# Patient Record
Sex: MALE | Race: WHITE | NOT HISPANIC OR LATINO | Employment: UNEMPLOYED | URBAN - NONMETROPOLITAN AREA
[De-identification: names, ages, dates, MRNs, and addresses within clinical notes are randomized per-mention and may not be internally consistent; named-entity substitution may affect disease eponyms.]

---

## 2017-04-07 ENCOUNTER — HOSPITAL ENCOUNTER (EMERGENCY)
Facility: HOSPITAL | Age: 42
Discharge: HOME | End: 2017-04-07
Admitting: EMERGENCY MEDICINE

## 2017-04-07 ENCOUNTER — HOSPITAL ENCOUNTER (EMERGENCY)
Facility: HOSPITAL | Age: 42
Discharge: HOME | End: 2017-04-07
Attending: EMERGENCY MEDICINE | Admitting: EMERGENCY MEDICINE

## 2017-04-07 VITALS
TEMPERATURE: 98.2 F | RESPIRATION RATE: 18 BRPM | HEIGHT: 71 IN | OXYGEN SATURATION: 99 % | WEIGHT: 200 LBS | SYSTOLIC BLOOD PRESSURE: 120 MMHG | BODY MASS INDEX: 28 KG/M2 | HEART RATE: 117 BPM | DIASTOLIC BLOOD PRESSURE: 78 MMHG

## 2017-04-07 VITALS
OXYGEN SATURATION: 98 % | TEMPERATURE: 98.2 F | HEART RATE: 127 BPM | WEIGHT: 200 LBS | SYSTOLIC BLOOD PRESSURE: 132 MMHG | HEIGHT: 71 IN | BODY MASS INDEX: 28 KG/M2 | RESPIRATION RATE: 18 BRPM | DIASTOLIC BLOOD PRESSURE: 75 MMHG

## 2017-04-07 DIAGNOSIS — L89.90 DECUBITUS SKIN ULCER, UNSPECIFIED PRESSURE ULCER STAGE: Primary | ICD-10-CM

## 2017-04-07 DIAGNOSIS — F15.10 METHAMPHETAMINE ABUSE (HCC): ICD-10-CM

## 2017-04-07 DIAGNOSIS — L03.311 ABDOMINAL WALL CELLULITIS: Primary | ICD-10-CM

## 2017-04-07 LAB
ALBUMIN SERPL-MCNC: 4.7 G/DL (ref 3.5–5)
ALBUMIN/GLOB SERPL: 1.4 G/DL (ref 1.5–2.5)
ALP SERPL-CCNC: 138 U/L (ref 40–129)
ALT SERPL W P-5'-P-CCNC: 24 U/L (ref 10–44)
ANION GAP SERPL CALCULATED.3IONS-SCNC: 7.6 MMOL/L (ref 3.6–11.2)
ANISOCYTOSIS BLD QL: NORMAL
AST SERPL-CCNC: 27 U/L (ref 10–34)
BACTERIA UR QL AUTO: ABNORMAL /HPF
BASOPHILS # BLD AUTO: 0.03 10*3/MM3 (ref 0–0.3)
BASOPHILS NFR BLD AUTO: 0.3 % (ref 0–2)
BILIRUB SERPL-MCNC: 0.4 MG/DL (ref 0.2–1.8)
BILIRUB UR QL STRIP: ABNORMAL
BUN BLD-MCNC: 10 MG/DL (ref 7–21)
BUN/CREAT SERPL: 16.4 (ref 7–25)
CALCIUM SPEC-SCNC: 9.5 MG/DL (ref 7.7–10)
CHLORIDE SERPL-SCNC: 104 MMOL/L (ref 99–112)
CLARITY UR: ABNORMAL
CO2 SERPL-SCNC: 27.4 MMOL/L (ref 24.3–31.9)
COD CRY URNS QL: ABNORMAL /HPF
COLOR UR: ABNORMAL
CREAT BLD-MCNC: 0.61 MG/DL (ref 0.43–1.29)
CRP SERPL-MCNC: 10.44 MG/DL (ref 0–0.99)
DEPRECATED RDW RBC AUTO: 51.8 FL (ref 37–54)
EOSINOPHIL # BLD AUTO: 0.34 10*3/MM3 (ref 0–0.7)
EOSINOPHIL NFR BLD AUTO: 3.5 % (ref 0–5)
ERYTHROCYTE [DISTWIDTH] IN BLOOD BY AUTOMATED COUNT: 20.8 % (ref 11.5–14.5)
ERYTHROCYTE [SEDIMENTATION RATE] IN BLOOD: 34 MM/HR (ref 0–15)
GFR SERPL CREATININE-BSD FRML MDRD: 146 ML/MIN/1.73
GLOBULIN UR ELPH-MCNC: 3.4 GM/DL
GLUCOSE BLD-MCNC: 105 MG/DL (ref 70–110)
GLUCOSE UR STRIP-MCNC: NEGATIVE MG/DL
HCT VFR BLD AUTO: 37.1 % (ref 42–52)
HGB BLD-MCNC: 11.4 G/DL (ref 14–18)
HGB UR QL STRIP.AUTO: ABNORMAL
HYALINE CASTS UR QL AUTO: ABNORMAL /LPF
HYPOCHROMIA BLD QL: NORMAL
IMM GRANULOCYTES # BLD: 0.03 10*3/MM3 (ref 0–0.03)
IMM GRANULOCYTES NFR BLD: 0.3 % (ref 0–0.5)
KETONES UR QL STRIP: ABNORMAL
LEUKOCYTE ESTERASE UR QL STRIP.AUTO: ABNORMAL
LYMPHOCYTES # BLD AUTO: 1.6 10*3/MM3 (ref 1–3)
LYMPHOCYTES NFR BLD AUTO: 16.5 % (ref 21–51)
MCH RBC QN AUTO: 22.4 PG (ref 27–33)
MCHC RBC AUTO-ENTMCNC: 30.7 G/DL (ref 33–37)
MCV RBC AUTO: 73 FL (ref 80–94)
MICROCYTES BLD QL: NORMAL
MONOCYTES # BLD AUTO: 1.01 10*3/MM3 (ref 0.1–0.9)
MONOCYTES NFR BLD AUTO: 10.4 % (ref 0–10)
NEUTROPHILS # BLD AUTO: 6.68 10*3/MM3 (ref 1.4–6.5)
NEUTROPHILS NFR BLD AUTO: 69 % (ref 30–70)
NITRITE UR QL STRIP: POSITIVE
OSMOLALITY SERPL CALC.SUM OF ELEC: 276.9 MOSM/KG (ref 273–305)
PH UR STRIP.AUTO: 5.5 [PH] (ref 5–8)
PLAT MORPH BLD: NORMAL
PLATELET # BLD AUTO: 371 10*3/MM3 (ref 130–400)
PMV BLD AUTO: 9.5 FL (ref 6–10)
POIKILOCYTOSIS BLD QL SMEAR: NORMAL
POTASSIUM BLD-SCNC: 3.9 MMOL/L (ref 3.5–5.3)
PROT SERPL-MCNC: 8.1 G/DL (ref 6–8)
PROT UR QL STRIP: ABNORMAL
RBC # BLD AUTO: 5.08 10*6/MM3 (ref 4.7–6.1)
RBC # UR: ABNORMAL /HPF
REF LAB TEST METHOD: ABNORMAL
SODIUM BLD-SCNC: 139 MMOL/L (ref 135–153)
SP GR UR STRIP: 1.03 (ref 1–1.03)
SQUAMOUS #/AREA URNS HPF: ABNORMAL /HPF
UROBILINOGEN UR QL STRIP: ABNORMAL
WBC NRBC COR # BLD: 9.69 10*3/MM3 (ref 4.5–12.5)
WBC UR QL AUTO: ABNORMAL /HPF

## 2017-04-07 PROCEDURE — 99284 EMERGENCY DEPT VISIT MOD MDM: CPT

## 2017-04-07 PROCEDURE — 85025 COMPLETE CBC W/AUTO DIFF WBC: CPT | Performed by: PHYSICIAN ASSISTANT

## 2017-04-07 PROCEDURE — 96361 HYDRATE IV INFUSION ADD-ON: CPT

## 2017-04-07 PROCEDURE — 87086 URINE CULTURE/COLONY COUNT: CPT | Performed by: EMERGENCY MEDICINE

## 2017-04-07 PROCEDURE — 85652 RBC SED RATE AUTOMATED: CPT | Performed by: PHYSICIAN ASSISTANT

## 2017-04-07 PROCEDURE — 81001 URINALYSIS AUTO W/SCOPE: CPT | Performed by: EMERGENCY MEDICINE

## 2017-04-07 PROCEDURE — 87077 CULTURE AEROBIC IDENTIFY: CPT | Performed by: EMERGENCY MEDICINE

## 2017-04-07 PROCEDURE — 86140 C-REACTIVE PROTEIN: CPT | Performed by: PHYSICIAN ASSISTANT

## 2017-04-07 PROCEDURE — 96365 THER/PROPH/DIAG IV INF INIT: CPT

## 2017-04-07 PROCEDURE — 36415 COLL VENOUS BLD VENIPUNCTURE: CPT

## 2017-04-07 PROCEDURE — 25010000002 VANCOMYCIN PER 500 MG: Performed by: PHYSICIAN ASSISTANT

## 2017-04-07 PROCEDURE — 87186 SC STD MICRODIL/AGAR DIL: CPT | Performed by: EMERGENCY MEDICINE

## 2017-04-07 PROCEDURE — 99283 EMERGENCY DEPT VISIT LOW MDM: CPT

## 2017-04-07 PROCEDURE — 85007 BL SMEAR W/DIFF WBC COUNT: CPT | Performed by: PHYSICIAN ASSISTANT

## 2017-04-07 PROCEDURE — 80053 COMPREHEN METABOLIC PANEL: CPT | Performed by: PHYSICIAN ASSISTANT

## 2017-04-07 PROCEDURE — 87040 BLOOD CULTURE FOR BACTERIA: CPT | Performed by: PHYSICIAN ASSISTANT

## 2017-04-07 RX ORDER — MAGNESIUM HYDROXIDE 1200 MG/15ML
1000 LIQUID ORAL ONCE
Status: COMPLETED | OUTPATIENT
Start: 2017-04-07 | End: 2017-04-07

## 2017-04-07 RX ORDER — MAGNESIUM HYDROXIDE 1200 MG/15ML
LIQUID ORAL
Status: COMPLETED
Start: 2017-04-07 | End: 2017-04-07

## 2017-04-07 RX ORDER — SODIUM CHLORIDE 9 MG/ML
INJECTION, SOLUTION INTRAVENOUS
Status: COMPLETED
Start: 2017-04-07 | End: 2017-04-07

## 2017-04-07 RX ORDER — ALPRAZOLAM 0.5 MG/1
0.5 TABLET ORAL 3 TIMES DAILY
COMMUNITY
End: 2017-04-10 | Stop reason: SDUPTHER

## 2017-04-07 RX ORDER — MECLIZINE HCL 12.5 MG/1
25 TABLET ORAL ONCE
Status: DISCONTINUED | OUTPATIENT
Start: 2017-04-07 | End: 2017-04-07

## 2017-04-07 RX ORDER — SODIUM CHLORIDE 0.9 % (FLUSH) 0.9 %
10 SYRINGE (ML) INJECTION AS NEEDED
Status: DISCONTINUED | OUTPATIENT
Start: 2017-04-07 | End: 2017-04-07 | Stop reason: HOSPADM

## 2017-04-07 RX ORDER — SULFAMETHOXAZOLE AND TRIMETHOPRIM 800; 160 MG/1; MG/1
2 TABLET ORAL 2 TIMES DAILY
Qty: 40 TABLET | Refills: 0 | Status: SHIPPED | OUTPATIENT
Start: 2017-04-07 | End: 2017-04-10

## 2017-04-07 RX ADMIN — VANCOMYCIN HYDROCHLORIDE 1750 MG: 5 INJECTION, POWDER, LYOPHILIZED, FOR SOLUTION INTRAVENOUS at 06:26

## 2017-04-07 RX ADMIN — SODIUM CHLORIDE 1000 ML: 9 INJECTION, SOLUTION INTRAVENOUS at 06:21

## 2017-04-07 RX ADMIN — MAGNESIUM HYDROXIDE 1000 ML: 1200 LIQUID ORAL at 13:31

## 2017-04-07 RX ADMIN — SODIUM CHLORIDE 1000 ML: 900 IRRIGANT IRRIGATION at 13:31

## 2017-04-07 NOTE — PROGRESS NOTES
Case Management/Social Work    Patient Name:  Alex Tierney  YOB: 1975  MRN: [id number]  Admit Date:  4/7/2017    SS received call this AM from RN, Drea who states pt has multiple wounds and is not able to care for himself. SS spoke with pt on this date who states he was discharged from [facility] in [place] this past Monday, 4/3/17. Pt was unable to inform SS of where he has been staying. Pt denies wanting to live with his mother. SS offered pt homeless shelter options and pt denied. Pt states he is going to live with his father in [facility] on this date. Pt has an appointment with PCP on Monday, 4/10/17 and pt states he will request home health to be arranged at that time. Pt's father plans to provide transportation at discharge. No other needs identified.     Electronically signed by:  Tasha Bernal  04/07/17 4:03 PM

## 2017-04-07 NOTE — ED NOTES
Waiting for dad to come and .  Replaced dressings and re secured.  Feeling better. .       Drea Andres RN  04/07/17 7336

## 2017-04-07 NOTE — ED PROVIDER NOTES
Subjective   History of Present Illness  See Rubina Ghosh's chart for H&P  Review of Systems    Past Medical History:   Diagnosis Date   • Allergic rhinitis    • Depression    • Iron deficiency    • T6 spinal cord injury        No Known Allergies    Past Surgical History:   Procedure Laterality Date   • AMPUTATION      left foot   • VASECTOMY      RECORDED 11.23.15       Family History   Problem Relation Age of Onset   • Cancer Maternal Aunt    • Cancer Maternal Grandmother    • Anxiety disorder Other    • Hypertension Other        Social History     Social History   • Marital status: Single     Spouse name: N/A   • Number of children: N/A   • Years of education: N/A     Social History Main Topics   • Smoking status: Current Every Day Smoker     Packs/day: 1.00     Types: Cigarettes   • Smokeless tobacco: None   • Alcohol use No   • Drug use: No   • Sexual activity: Not Asked     Other Topics Concern   • None     Social History Narrative           Objective   Physical Exam    Procedures         ED Course  ED Course   Comment By Time   Endorsed to RITA Aleman PA 04/07 3318   Pt's ED stay has been uneventful.  He is currently sitting in his wheelchair, eating breakfast.  Will discharge home on Bactrim and Bactroban ointment.  Pt to follow up with his PCP next week. RITA Jarrell 04/07 0087                  MDM  Number of Diagnoses or Management Options  Abdominal wall cellulitis:      Amount and/or Complexity of Data Reviewed  Clinical lab tests: reviewed  Decide to obtain previous medical records or to obtain history from someone other than the patient: yes    Patient Progress  Patient progress: stable      Final diagnoses:   Abdominal wall cellulitis            RITA Jarrell  04/07/17 5554

## 2017-04-07 NOTE — ED NOTES
Called in house  ayad that pt was ready t be seen.  Left message.     Drea Andres RN  04/07/17 1811
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
## 2017-04-07 NOTE — DISCHARGE INSTRUCTIONS

## 2017-04-07 NOTE — ED NOTES
Department of [facility] (Meaghan Delong)called and talked with staff and states she had talked with pt in Feb of this year. Staff told her pt was still here so she also talked with pt. Pt stated she  told him she was trying to help find him a place maybe an apartment or something like that  where he could stay and maybe take care of himself.       Drea Andres RN  04/07/17 6070

## 2017-04-07 NOTE — ED NOTES
Pt sitting in his wheelchair bedside, offered to place pt in the bed, pt declined at this time, call light within reach, pt encouraged to call with any needs.     Ayse Hancock RN  04/07/17 0757

## 2017-04-07 NOTE — ED NOTES
Pt in room giving self a bed bath with assit per staff and also stating that he had talked with sister and their dad was suppose to be coming up to get later.       Drea Andres RN  04/07/17 6080
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
## 2017-04-07 NOTE — ED NOTES
Called central intake and talked with Precious Sherman concerning pt situation.  Will report  to her supervisor. Also states that pt sounded like he was qualified to get help from their services.     Drea Andres RN  04/07/17 1422       Drea Andres RN  04/07/17 1421

## 2017-04-07 NOTE — ED NOTES
Cleaned and dried all sore areas and put wet to dry dressing in place. Packing used where needed.  All areas covered with 4x4;s abdo pads and taped.        Drea Andres RN  04/07/17 5590

## 2017-04-08 NOTE — ED NOTES
Sister here and ready to go.  Discharge instructions given and also advised to do what [facility] told him and also house  when they talked with him.  Advised that follow up with his primary doctor was very important to get something done with follow up care for pressure ulcers.     Drea Andres RN  04/07/17 1237

## 2017-04-08 NOTE — ED NOTES
Continues to want for ride.  States now sister will be coming to get.  States when talked with dad he was hateful and that's how he always is, so sister will be picking up.     Drea Andres RN  04/07/17 9548

## 2017-04-08 NOTE — ED NOTES
Talked to lead Marilu Mario rn concerning pt and doing social consult.     Drea Andres, LOBITO  04/07/17 1653

## 2017-04-09 LAB — BACTERIA SPEC AEROBE CULT: ABNORMAL

## 2017-04-10 ENCOUNTER — OFFICE VISIT (OUTPATIENT)
Dept: FAMILY MEDICINE CLINIC | Facility: CLINIC | Age: 42
End: 2017-04-10

## 2017-04-10 VITALS
RESPIRATION RATE: 12 BRPM | OXYGEN SATURATION: 96 % | DIASTOLIC BLOOD PRESSURE: 65 MMHG | TEMPERATURE: 98.2 F | BODY MASS INDEX: 28 KG/M2 | SYSTOLIC BLOOD PRESSURE: 100 MMHG | HEART RATE: 95 BPM | WEIGHT: 200 LBS | HEIGHT: 71 IN

## 2017-04-10 DIAGNOSIS — M86.69: ICD-10-CM

## 2017-04-10 DIAGNOSIS — F17.200 SMOKER: ICD-10-CM

## 2017-04-10 DIAGNOSIS — F32.89 OTHER DEPRESSION: ICD-10-CM

## 2017-04-10 DIAGNOSIS — J30.9 CHRONIC ALLERGIC RHINITIS: Primary | ICD-10-CM

## 2017-04-10 DIAGNOSIS — S24.102S T6 SPINAL CORD INJURY, SEQUELA (HCC): ICD-10-CM

## 2017-04-10 DIAGNOSIS — Z89.612 HISTORY OF LEFT LOWER EXTREMITY AMPUTATION (HCC): ICD-10-CM

## 2017-04-10 PROCEDURE — 99214 OFFICE O/P EST MOD 30 MIN: CPT | Performed by: GENERAL PRACTICE

## 2017-04-10 RX ORDER — SENNA PLUS 8.6 MG/1
1 TABLET ORAL 2 TIMES DAILY
Qty: 60 TABLET | Refills: 5 | Status: SHIPPED | OUTPATIENT
Start: 2017-04-10 | End: 2017-11-06 | Stop reason: SDUPTHER

## 2017-04-10 RX ORDER — CETIRIZINE HYDROCHLORIDE 10 MG/1
10 TABLET ORAL NIGHTLY
Qty: 30 TABLET | Refills: 5 | Status: SHIPPED | OUTPATIENT
Start: 2017-04-10 | End: 2017-11-06 | Stop reason: SDUPTHER

## 2017-04-10 RX ORDER — FLUTICASONE PROPIONATE 50 MCG
2 SPRAY, SUSPENSION (ML) NASAL DAILY
Qty: 1 EACH | Refills: 5 | Status: ON HOLD | OUTPATIENT
Start: 2017-04-10 | End: 2017-06-02

## 2017-04-10 RX ORDER — GABAPENTIN 800 MG/1
800 TABLET ORAL 3 TIMES DAILY
Qty: 90 TABLET | Refills: 2 | Status: SHIPPED | OUTPATIENT
Start: 2017-04-10 | End: 2017-06-20 | Stop reason: SDUPTHER

## 2017-04-10 RX ORDER — ALPRAZOLAM 0.5 MG/1
0.5 TABLET ORAL 2 TIMES DAILY
Qty: 60 TABLET | Refills: 0 | Status: SHIPPED | OUTPATIENT
Start: 2017-04-10 | End: 2017-05-15 | Stop reason: SDUPTHER

## 2017-04-10 RX ORDER — FOLIC ACID 1 MG/1
1 TABLET ORAL DAILY
Qty: 30 TABLET | Refills: 5 | Status: SHIPPED | OUTPATIENT
Start: 2017-04-10 | End: 2017-11-06 | Stop reason: SDUPTHER

## 2017-04-10 RX ORDER — BACLOFEN 10 MG/1
20 TABLET ORAL 3 TIMES DAILY
Qty: 180 TABLET | Refills: 5 | Status: SHIPPED | OUTPATIENT
Start: 2017-04-10 | End: 2017-11-06 | Stop reason: SDUPTHER

## 2017-04-10 RX ORDER — AMITRIPTYLINE HYDROCHLORIDE 25 MG/1
25 TABLET, FILM COATED ORAL NIGHTLY
Qty: 30 TABLET | Refills: 5 | Status: SHIPPED | OUTPATIENT
Start: 2017-04-10 | End: 2017-11-06 | Stop reason: SDUPTHER

## 2017-04-10 NOTE — PROGRESS NOTES
Subjective   Alex Tierney is a 41 y.o. male.     History of Present Illness     Osteomyelitis  Discharged from [facility] on 4/3/17 following a several week admission for sepsis associated with an infected stage IV sacral ulcer.  Bone scan revealed multiple foci of osteomyelitis.  He has a chronic indwelling Sheldon catheter and underwent a colostomy last year.  He also underwent a left BKA.  He has been resistant to a nursing home/rehabilitation setting and is currently living in [place] with the help of his father.  He requires home health assistance and would like referrals to a local orthopedist and urologist.    Depression  At present his mood is good and he denies any loss of interest in activities, difficulty concentrating, or any problem with his appetite or sleep.  He has been taking alprazolam 0.5 twice a day for intermittent anxiety as well as amitriptyline 25 daily at bedtime.  He denies any suicidal ideation.    Allergic Rhinitis  Patient has a history of allergic rhinitis. Patient's symptoms include sneezing, clear rhinorrhea, nasal congestion, periorbital pressure and postnasal drip. These symptoms are perennial with seasonal exacerbation. The patient has been suffering from these symptoms for a number of years . The patient has tried prescription antihistamine - cetirizine and prescription nasal corticosteroid - fluticasone with good relief of symptoms.     The following portions of the patient's history were reviewed and updated as appropriate: allergies, current medications, past family history, past medical history, past social history, past surgical history and problem list.    Review of Systems   Constitutional: Positive for fatigue. Negative for appetite change, chills, fever and unexpected weight change.   HENT: Positive for congestion and rhinorrhea. Negative for ear pain, sneezing, sore throat and voice change.    Eyes: Negative for visual disturbance.   Respiratory: Negative for cough, shortness of  breath and wheezing.    Cardiovascular: Negative for chest pain, palpitations and leg swelling.   Gastrointestinal: Negative for abdominal pain, blood in stool, constipation, diarrhea, nausea and vomiting.   Endocrine: Negative for polydipsia and polyuria.   Genitourinary: Negative for difficulty urinating, dysuria, frequency, hematuria and urgency.   Musculoskeletal: Positive for arthralgias and back pain. Negative for joint swelling, myalgias and neck pain.   Skin: Positive for wound (sacrum). Negative for color change.   Neurological: Positive for weakness and numbness. Negative for tremors and headaches.   Psychiatric/Behavioral: Negative for dysphoric mood, sleep disturbance and suicidal ideas. The patient is nervous/anxious.      Objective   Physical Exam   Constitutional: He is oriented to person, place, and time. He appears well-developed and well-nourished. He is cooperative. He does not have a sickly appearance. No distress.   Sitting in a wheelchair.  Status post left BKA. Bright and in fair spirits. No apparent distress at rest. No pallor, jaundice, diaphoresis, or cyanosis   HENT:   Head: Atraumatic.   Right Ear: Tympanic membrane, external ear and ear canal normal.   Left Ear: Tympanic membrane, external ear and ear canal normal.   Mouth/Throat: Oropharynx is clear and moist.   Eyes: EOM are normal. Pupils are equal, round, and reactive to light. No scleral icterus.   Neck: No JVD present. Carotid bruit is not present. No tracheal deviation present. No thyromegaly present.   Cardiovascular: Normal rate, regular rhythm, S1 normal, S2 normal, normal heart sounds and intact distal pulses.  Exam reveals no gallop.    No murmur heard.  Pulmonary/Chest: Breath sounds normal. He has no wheezes. He has no rales.   Abdominal: Soft. Normal aorta and bowel sounds are normal. He exhibits no abdominal bruit and no mass. There is no hepatosplenomegaly. There is no tenderness. No hernia.   Musculoskeletal: He  exhibits no tenderness or deformity.   Lymphadenopathy:        Head (right side): No submandibular adenopathy present.        Head (left side): No submandibular adenopathy present.     He has no cervical adenopathy.   Neurological: He is alert and oriented to person, place, and time. No cranial nerve deficit.   Spastic paraplegia   Skin: Skin is warm and dry. No rash noted. He is not diaphoretic. No pallor. Nails show no clubbing.   Psychiatric: He has a normal mood and affect. His speech is normal and behavior is normal. Thought content normal.     Assessment/Plan   Problems Addressed this Visit        Respiratory    Chronic allergic rhinitis   Continue current treatment. Reminded regarding allergen avoidance.    Relevant Medications    fluticasone (FLONASE) 50 MCG/ACT nasal spray    cetirizine (zyrTEC) 10 MG tablet       Nervous and Auditory    T6 spinal cord injury  Complicated by recurrent decubitus ulcers and osteomyelitis requiring a left BKA, permanent colostomy, and chronic indwelling Sheldon catheter.  Will arrange home health for nursing assistance and consults both orthopedic surgery and urology.    Relevant Medications    senna (SENNA-TIME) 8.6 MG tablet    gabapentin (NEURONTIN) 800 MG tablet    folic acid (FOLVITE) 1 MG tablet    baclofen (LIORESAL) 10 MG tablet    amitriptyline (ELAVIL) 25 MG tablet    ALPRAZolam (XANAX) 0.5 MG tablet    Other Relevant Orders    Ambulatory Referral to Orthopedic Surgery    Ambulatory Referral to Urology       Musculoskeletal and Integument    Chronic osteomyelitis of multiple sites       Other    Depression  Significant situational component. Supportive therapy.  We will likely begin an alprazolam taper and once 2 months     Relevant Medications    amitriptyline (ELAVIL) 25 MG tablet    ALPRAZolam (XANAX) 0.5 MG tablet    Smoker    History of left lower extremity amputation

## 2017-04-12 ENCOUNTER — DOCUMENTATION (OUTPATIENT)
Dept: FAMILY MEDICINE CLINIC | Facility: CLINIC | Age: 42
End: 2017-04-12

## 2017-04-12 LAB
BACTERIA SPEC AEROBE CULT: NORMAL
BACTERIA SPEC AEROBE CULT: NORMAL

## 2017-04-12 NOTE — PROGRESS NOTES
I faxed a RX for wound care to [facility]. I also submitted via Release a Last Face to face of patient's visit to [facility] for patient to receive wound care from them.

## 2017-04-13 ENCOUNTER — TELEPHONE (OUTPATIENT)
Dept: FAMILY MEDICINE CLINIC | Facility: CLINIC | Age: 42
End: 2017-04-13

## 2017-04-25 ENCOUNTER — OFFICE VISIT (OUTPATIENT)
Dept: UROLOGY | Facility: CLINIC | Age: 42
End: 2017-04-25

## 2017-04-25 DIAGNOSIS — N31.9 NEUROGENIC BLADDER: Primary | ICD-10-CM

## 2017-04-25 PROCEDURE — 99214 OFFICE O/P EST MOD 30 MIN: CPT | Performed by: UROLOGY

## 2017-04-25 RX ORDER — SOLIFENACIN SUCCINATE 5 MG/1
10 TABLET, FILM COATED ORAL DAILY
Qty: 30 TABLET | Refills: 11 | Status: ON HOLD | OUTPATIENT
Start: 2017-04-25 | End: 2017-06-02
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
## 2017-05-01 NOTE — NUR
ADMISSION SKIN ASSESSMENT CONTINUED:
 
LEFT POSTERIOR THIGH #1, UPPER, STAGE 1  4.5 X 6.0 CM REDDENED AREA. BLANCHES,
BUT SLOWLY.
 
LEFT POSTERIOR THIGH #2, MIDDLE, STAGE 2, 3.5 X 3.5 CM CIRCULAR, RED WOUND BED
WITH WHITE EDGES WITH SURROUNDING REDNESS. NO DRAINAGE NOTED.
 
LEFT POSTERIOR THIGH #3, LOWER, 3CM X 1CM DRY SCABBED HEALING AREA WITH
SCARRING SUROUNDING.
 
RT HEEL, 4 DRIED SCABBED AREAS WITH APPEARANCE OF HEALING.
 
RT OUTER FOOT DRIED SCABBED AREA, APPEARANCE OF HEALING.
 
RT ANKLE OUTER, DRY HEALED SCARRED AREA.
 
RT FOOT EDEMATOUS, COOL TO TOUCH PULSE THREADY
 
NOTED MULTIPLE SCATTERED SCARS AND SCABBED AREAS ON BILATERAL LOWER
EXTREMITIES, CHEST, ABD BACK AND ARMS. PT STATES FROM "SORES THAT JUST POP UP
AND THEN GO AWAY"

## 2017-05-15 DIAGNOSIS — S24.102S T6 SPINAL CORD INJURY, SEQUELA (HCC): ICD-10-CM

## 2017-05-15 RX ORDER — ALPRAZOLAM 0.5 MG/1
TABLET ORAL
Qty: 45 TABLET | Refills: 0 | Status: ON HOLD | OUTPATIENT
Start: 2017-05-15 | End: 2017-06-02

## 2017-06-01 ENCOUNTER — HOSPITAL ENCOUNTER (INPATIENT)
Facility: HOSPITAL | Age: 42
LOS: 15 days | Discharge: HOME HEALTH | End: 2017-06-16
Attending: FAMILY MEDICINE | Admitting: FAMILY MEDICINE

## 2017-06-01 DIAGNOSIS — S24.102S T6 SPINAL CORD INJURY, SEQUELA (HCC): Primary | ICD-10-CM

## 2017-06-01 PROBLEM — R53.81 DEBILITY: Status: ACTIVE | Noted: 2017-06-01

## 2017-06-01 LAB
BILIRUB UR QL STRIP: NEGATIVE
CLARITY UR: ABNORMAL
COLOR UR: YELLOW
GLUCOSE UR STRIP-MCNC: NEGATIVE MG/DL
HGB UR QL STRIP.AUTO: NEGATIVE
KETONES UR QL STRIP: NEGATIVE
LEUKOCYTE ESTERASE UR QL STRIP.AUTO: NEGATIVE
NITRITE UR QL STRIP: NEGATIVE
PH UR STRIP.AUTO: >=9 [PH] (ref 5–8)
PROT UR QL STRIP: NEGATIVE
SP GR UR STRIP: 1.01 (ref 1–1.03)
UROBILINOGEN UR QL STRIP: ABNORMAL

## 2017-06-01 PROCEDURE — 81003 URINALYSIS AUTO W/O SCOPE: CPT | Performed by: FAMILY MEDICINE

## 2017-06-01 RX ORDER — ACETAMINOPHEN 325 MG/1
650 TABLET ORAL EVERY 4 HOURS PRN
Status: DISCONTINUED | OUTPATIENT
Start: 2017-06-01 | End: 2017-06-16 | Stop reason: HOSPADM

## 2017-06-01 RX ORDER — ALUMINA, MAGNESIA, AND SIMETHICONE 2400; 2400; 240 MG/30ML; MG/30ML; MG/30ML
15 SUSPENSION ORAL EVERY 6 HOURS PRN
Status: DISCONTINUED | OUTPATIENT
Start: 2017-06-01 | End: 2017-06-16 | Stop reason: HOSPADM

## 2017-06-01 RX ORDER — FLUTICASONE PROPIONATE 50 MCG
2 SPRAY, SUSPENSION (ML) NASAL DAILY
Status: DISCONTINUED | OUTPATIENT
Start: 2017-06-02 | End: 2017-06-16 | Stop reason: HOSPADM

## 2017-06-01 RX ORDER — AMITRIPTYLINE HYDROCHLORIDE 25 MG/1
25 TABLET, FILM COATED ORAL NIGHTLY
Status: DISCONTINUED | OUTPATIENT
Start: 2017-06-01 | End: 2017-06-16 | Stop reason: HOSPADM

## 2017-06-01 RX ORDER — ASPIRIN 81 MG/1
81 TABLET ORAL DAILY
Status: DISCONTINUED | OUTPATIENT
Start: 2017-06-02 | End: 2017-06-16 | Stop reason: HOSPADM

## 2017-06-01 RX ORDER — GABAPENTIN 400 MG/1
800 CAPSULE ORAL EVERY 8 HOURS SCHEDULED
Status: DISCONTINUED | OUTPATIENT
Start: 2017-06-01 | End: 2017-06-16 | Stop reason: HOSPADM

## 2017-06-01 RX ORDER — ONDANSETRON 4 MG/1
4 TABLET, FILM COATED ORAL EVERY 6 HOURS PRN
Status: DISCONTINUED | OUTPATIENT
Start: 2017-06-01 | End: 2017-06-16 | Stop reason: HOSPADM

## 2017-06-01 RX ORDER — BISACODYL 10 MG
10 SUPPOSITORY, RECTAL RECTAL DAILY PRN
Status: DISCONTINUED | OUTPATIENT
Start: 2017-06-01 | End: 2017-06-16 | Stop reason: HOSPADM

## 2017-06-01 RX ORDER — ONDANSETRON 2 MG/ML
4 INJECTION INTRAMUSCULAR; INTRAVENOUS EVERY 6 HOURS PRN
Status: DISCONTINUED | OUTPATIENT
Start: 2017-06-01 | End: 2017-06-16 | Stop reason: HOSPADM

## 2017-06-01 RX ORDER — BACLOFEN 10 MG/1
20 TABLET ORAL EVERY 8 HOURS SCHEDULED
Status: DISCONTINUED | OUTPATIENT
Start: 2017-06-01 | End: 2017-06-16 | Stop reason: HOSPADM

## 2017-06-01 RX ORDER — VENLAFAXINE HYDROCHLORIDE 75 MG/1
75 CAPSULE, EXTENDED RELEASE ORAL
Status: DISCONTINUED | OUTPATIENT
Start: 2017-06-02 | End: 2017-06-16 | Stop reason: HOSPADM

## 2017-06-01 RX ORDER — FOLIC ACID 1 MG/1
1 TABLET ORAL DAILY
Status: DISCONTINUED | OUTPATIENT
Start: 2017-06-02 | End: 2017-06-16 | Stop reason: HOSPADM

## 2017-06-01 RX ORDER — ONDANSETRON 4 MG/1
4 TABLET, ORALLY DISINTEGRATING ORAL EVERY 6 HOURS PRN
Status: DISCONTINUED | OUTPATIENT
Start: 2017-06-01 | End: 2017-06-16 | Stop reason: HOSPADM

## 2017-06-01 RX ORDER — SENNA AND DOCUSATE SODIUM 50; 8.6 MG/1; MG/1
2 TABLET, FILM COATED ORAL NIGHTLY
Status: DISCONTINUED | OUTPATIENT
Start: 2017-06-01 | End: 2017-06-16 | Stop reason: HOSPADM

## 2017-06-01 RX ORDER — CETIRIZINE HYDROCHLORIDE 10 MG/1
10 TABLET ORAL DAILY
Status: DISCONTINUED | OUTPATIENT
Start: 2017-06-02 | End: 2017-06-16 | Stop reason: HOSPADM

## 2017-06-01 RX ORDER — ALPRAZOLAM 0.5 MG/1
0.5 TABLET ORAL 3 TIMES DAILY
Status: COMPLETED | OUTPATIENT
Start: 2017-06-01 | End: 2017-06-11

## 2017-06-01 RX ORDER — MULTIVITAMIN
1 TABLET ORAL DAILY
Status: DISCONTINUED | OUTPATIENT
Start: 2017-06-02 | End: 2017-06-16 | Stop reason: HOSPADM

## 2017-06-01 RX ADMIN — ALPRAZOLAM 0.5 MG: 0.5 TABLET ORAL at 22:17

## 2017-06-01 RX ADMIN — DOCUSATE SODIUM AND SENNA 2 TABLET: 50; 8.6 TABLET, FILM COATED ORAL at 22:17

## 2017-06-01 RX ADMIN — BACLOFEN 20 MG: 10 TABLET ORAL at 22:16

## 2017-06-01 RX ADMIN — METOPROLOL TARTRATE 25 MG: 25 TABLET, FILM COATED ORAL at 22:16

## 2017-06-01 RX ADMIN — AMITRIPTYLINE HYDROCHLORIDE 25 MG: 25 TABLET, FILM COATED ORAL at 22:17

## 2017-06-01 RX ADMIN — GABAPENTIN 800 MG: 400 CAPSULE ORAL at 22:16

## 2017-06-01 NOTE — PLAN OF CARE
Problem: Patient Care Overview (Adult)  Goal: Plan of Care Review  Outcome: Ongoing (interventions implemented as appropriate)    06/01/17 8788   Coping/Psychosocial Response Interventions   Plan Of Care Reviewed With patient   Patient Care Overview   Progress no change         Problem: Activity Intolerance (Adult)  Goal: Identify Related Risk Factors and Signs and Symptoms  Outcome: Outcome(s) achieved Date Met:  06/01/17  Goal: Activity Tolerance  Outcome: Ongoing (interventions implemented as appropriate)    Problem: Fall Risk (Adult)  Goal: Identify Related Risk Factors and Signs and Symptoms  Outcome: Outcome(s) achieved Date Met:  06/01/17  Goal: Absence of Falls  Outcome: Ongoing (interventions implemented as appropriate)

## 2017-06-01 NOTE — PROGRESS NOTES
Rehabilitation Nursing  Inpatient Rehabilitation Admission Assessment of Functional Measures  and Plan of Care    Plan of Care  Copy from POC  Body Function Structure    Skin Integrity (Active)  Current Status (6/1/2017 6:00:00 PM): impairment in skin integrity  Weekly Goal: no further skin breakdown this week  Discharge Goal: wounds showing signs of healing, no s/s infection by discharge    Safety    Potential for Injury (Active)  Current Status (6/1/2017 6:00:00 PM): potential for falls  Weekly Goal: no falls this week  Discharge Goal: no falls by discharge  Functional Measures  LYNETTE Eating:  LYNETTE Grooming:  LYNETTE Bathing:  LYNETTE Upper Body Dressing:  LYNETTE Lower Body Dressing:  LYNETTE Toileting:    [facility] Bladder Management  Level of Assistance:  Bladder Score = 1.  Patient is total assistance for  bladder management. Patient has an indwelling/Junior catheter.  Frequency/Number of Accidents (4 days prior to admission):  Bladder accidents  prior to admission:  0 Patient has not had an accident, but used a  device/medication 4 days prior to admission requiring: junior cath .  Frequency/Number of Accidents this Shift: Bladder accidents this shift:  0 .  Patient has not had an accident, but used a device/medication this shift  requiring: catheter .    [facility] Bowel Management  Level of Assistance: Bowel Score = 3. Patient performs 50-74% of tasks and  requires moderate assistance for bowel management requiring assistive  device/method: colostomy .  Frequency/Number of Accidents (4 days prior to admission): Bowel accidents prior  to admission:  0 Patient has not had an accident 4 days prior to admission.  colostomy  Frequency/Number of Accidents this Shift: Bowel accidents this shift: 0 .  Patient has not had an accident this shift. colostomy    [facility] Bed/Chair/Wheelchair Transfer:  [facility] Toilet Transfer:  [facility] Tub/Shower Transfer:    Previously Documented Mode of Locomotion at Discharge:  [facility] Expected Mode of Locomotion at Discharge:  [facility]  Walk/Wheelchair:  LYNETTE Stairs:    LYNETTE Comprehension:  Both ( auditory and visual) modes of comprehension are used  equally. Comprehension Score = 7, Independent.  Patient comprehends  complex/abstract information in their primary language.  Patient is completely  independent for auditory and visual comprehension.  There are no activity  limitations.  LYNETTE Expression:  Vocal expression is the usual mode. Expression Score = 7,  Independent.  Patient expresses complex/abstract information in their primary  language.  Patient is completely independent for vocal expression.  There are no  activity limitations.  LYNETTE Social Interaction:  Social Interaction Score = 6, Modified Independent.  Patient is modified independent for social interaction, requiring: Requires  additional time.  LYNETTE Problem Solving:  Problem Solving Score = 6, Modified [place].  Patient  makes appropriate decisions in order to solve complex problems, but requires  extra time.  LYNETTE Memory:  Memory Score = 6, Modified [place].  Patient is modified  independent for memory, requiring: Requires additional time.    Discharge Functional Goals:  Mode of Comprehension: B  Comprehension: 7  Mode of Expression: V  Expression: 7  Problem Solvin  Social Interaction: 7  Memory: 7    Signed by: Minnie Pickard, Nurse

## 2017-06-01 NOTE — PLAN OF CARE
Problem: Pressure Ulcer (Adult)  Goal: Signs and Symptoms of Listed Potential Problems Will be Absent or Manageable (Pressure Ulcer)  Outcome: Ongoing (interventions implemented as appropriate)    06/01/17 1653   Pressure Ulcer   Problems Assessed (Pressure Ulcer) all   Problems Present (Pressure Ulcer) none

## 2017-06-01 NOTE — PROGRESS NOTES
Patient Assessment Instrument  Quality Indicators - Admission    Section B. Hearing, Speech Vision  Expression of Ideas and Wants: Expresses complex messages without difficulty and  with speech that is clear and easy to understand.  Understanding Verbal Content: Understands: Clear comprehension without cues or  repetitions.    Section C. Cognitive Patterns      Section [id number]. Prior Functioning      Section [id number]. Prior Device Use      Section [id number]. Self Care Performance      Section VS2268. Self Care Discharge Goals      Section [id number]. Mobility Performance      Section [id number]. Mobility Discharge Goals      Section H. Bladder and Bowel  Bladder Continence: Not applicable (e.g., indwelling catheter).  Bowel Continence: Always incontinent (no episodes of continent bowel movements).    Section I. Active Diagnosis      Section J. Health Conditions      Section K. Swallowing/Nutritional Status      Section M. Skin Conditions      Section O. Special Treatments, Procedures, and Programs      OPTIONAL BRANCH FOR TRACKING FALLS  Fall(s) During Shift:    Signed by: Minnie Pickard, Nurse

## 2017-06-01 NOTE — NURSING NOTE
Pt has numerous scabs on stomach and thighs that are healing blister according to pt.On lower right shin pt has several open areas that are not healed..Cleaned and applied temporary dsg until wound care sees pt in the AM

## 2017-06-02 LAB
ALBUMIN SERPL-MCNC: 3.1 G/DL (ref 3.5–5)
ALBUMIN/GLOB SERPL: 1.1 G/DL (ref 1.5–2.5)
ALP SERPL-CCNC: 88 U/L (ref 40–129)
ALT SERPL W P-5'-P-CCNC: 13 U/L (ref 10–44)
ANION GAP SERPL CALCULATED.3IONS-SCNC: 0.5 MMOL/L (ref 3.6–11.2)
AST SERPL-CCNC: 15 U/L (ref 10–34)
BASOPHILS # BLD AUTO: 0.02 10*3/MM3 (ref 0–0.3)
BASOPHILS NFR BLD AUTO: 0.4 % (ref 0–2)
BILIRUB SERPL-MCNC: 0.2 MG/DL (ref 0.2–1.8)
BUN BLD-MCNC: 10 MG/DL (ref 7–21)
BUN/CREAT SERPL: 19.6 (ref 7–25)
CALCIUM SPEC-SCNC: 8.7 MG/DL (ref 7.7–10)
CHLORIDE SERPL-SCNC: 109 MMOL/L (ref 99–112)
CO2 SERPL-SCNC: 30.5 MMOL/L (ref 24.3–31.9)
CREAT BLD-MCNC: 0.51 MG/DL (ref 0.43–1.29)
DEPRECATED RDW RBC AUTO: 60 FL (ref 37–54)
EOSINOPHIL # BLD AUTO: 0.34 10*3/MM3 (ref 0–0.7)
EOSINOPHIL NFR BLD AUTO: 6.3 % (ref 0–5)
ERYTHROCYTE [DISTWIDTH] IN BLOOD BY AUTOMATED COUNT: 20.6 % (ref 11.5–14.5)
GFR SERPL CREATININE-BSD FRML MDRD: >150 ML/MIN/1.73
GLOBULIN UR ELPH-MCNC: 2.8 GM/DL
GLUCOSE BLD-MCNC: 91 MG/DL (ref 70–110)
HCT VFR BLD AUTO: 26.9 % (ref 42–52)
HGB BLD-MCNC: 7.9 G/DL (ref 14–18)
IMM GRANULOCYTES # BLD: 0.06 10*3/MM3 (ref 0–0.03)
IMM GRANULOCYTES NFR BLD: 1.1 % (ref 0–0.5)
LYMPHOCYTES # BLD AUTO: 1.71 10*3/MM3 (ref 1–3)
LYMPHOCYTES NFR BLD AUTO: 31.7 % (ref 21–51)
MCH RBC QN AUTO: 24.2 PG (ref 27–33)
MCHC RBC AUTO-ENTMCNC: 29.4 G/DL (ref 33–37)
MCV RBC AUTO: 82.3 FL (ref 80–94)
MONOCYTES # BLD AUTO: 0.69 10*3/MM3 (ref 0.1–0.9)
MONOCYTES NFR BLD AUTO: 12.8 % (ref 0–10)
NEUTROPHILS # BLD AUTO: 2.58 10*3/MM3 (ref 1.4–6.5)
NEUTROPHILS NFR BLD AUTO: 47.7 % (ref 30–70)
OSMOLALITY SERPL CALC.SUM OF ELEC: 278 MOSM/KG (ref 273–305)
PLATELET # BLD AUTO: 307 10*3/MM3 (ref 130–400)
PMV BLD AUTO: 9.6 FL (ref 6–10)
POTASSIUM BLD-SCNC: 4.2 MMOL/L (ref 3.5–5.3)
PROT SERPL-MCNC: 5.9 G/DL (ref 6–8)
RBC # BLD AUTO: 3.27 10*6/MM3 (ref 4.7–6.1)
SODIUM BLD-SCNC: 140 MMOL/L (ref 135–153)
WBC NRBC COR # BLD: 5.4 10*3/MM3 (ref 4.5–12.5)

## 2017-06-02 PROCEDURE — 97110 THERAPEUTIC EXERCISES: CPT

## 2017-06-02 PROCEDURE — 85025 COMPLETE CBC W/AUTO DIFF WBC: CPT | Performed by: FAMILY MEDICINE

## 2017-06-02 PROCEDURE — 97163 PT EVAL HIGH COMPLEX 45 MIN: CPT

## 2017-06-02 PROCEDURE — 25010000002 ENOXAPARIN PER 10 MG: Performed by: FAMILY MEDICINE

## 2017-06-02 PROCEDURE — 97167 OT EVAL HIGH COMPLEX 60 MIN: CPT | Performed by: OCCUPATIONAL THERAPIST

## 2017-06-02 PROCEDURE — 97530 THERAPEUTIC ACTIVITIES: CPT

## 2017-06-02 PROCEDURE — 97535 SELF CARE MNGMENT TRAINING: CPT | Performed by: OCCUPATIONAL THERAPIST

## 2017-06-02 PROCEDURE — 80053 COMPREHEN METABOLIC PANEL: CPT | Performed by: FAMILY MEDICINE

## 2017-06-02 PROCEDURE — 97530 THERAPEUTIC ACTIVITIES: CPT | Performed by: OCCUPATIONAL THERAPIST

## 2017-06-02 RX ORDER — ALPRAZOLAM 0.5 MG/1
0.5 TABLET ORAL NIGHTLY
COMMUNITY
End: 2017-07-13 | Stop reason: SDUPTHER

## 2017-06-02 RX ORDER — ALPRAZOLAM 0.5 MG/1
0.25 TABLET ORAL EVERY MORNING
COMMUNITY
End: 2017-07-13

## 2017-06-02 RX ORDER — CASTOR OIL AND BALSAM, PERU 788; 87 MG/G; MG/G
OINTMENT TOPICAL EVERY 12 HOURS SCHEDULED
Status: DISCONTINUED | OUTPATIENT
Start: 2017-06-02 | End: 2017-06-16 | Stop reason: HOSPADM

## 2017-06-02 RX ORDER — VENLAFAXINE HYDROCHLORIDE 75 MG/1
75 CAPSULE, EXTENDED RELEASE ORAL DAILY
COMMUNITY
End: 2017-06-20 | Stop reason: SDUPTHER

## 2017-06-02 RX ORDER — MULTIVITAMIN
1 TABLET ORAL DAILY
COMMUNITY
End: 2017-11-06 | Stop reason: SDUPTHER

## 2017-06-02 RX ORDER — FLUTICASONE PROPIONATE 50 MCG
2 SPRAY, SUSPENSION (ML) NASAL DAILY PRN
COMMUNITY
End: 2017-11-06 | Stop reason: SDUPTHER

## 2017-06-02 RX ORDER — ASPIRIN 81 MG/1
81 TABLET ORAL DAILY
COMMUNITY
End: 2017-11-06 | Stop reason: SDUPTHER

## 2017-06-02 RX ADMIN — GABAPENTIN 800 MG: 400 CAPSULE ORAL at 05:12

## 2017-06-02 RX ADMIN — ALPRAZOLAM 0.5 MG: 0.5 TABLET ORAL at 17:39

## 2017-06-02 RX ADMIN — ASPIRIN 81 MG: 81 TABLET ORAL at 08:59

## 2017-06-02 RX ADMIN — CASTOR OIL AND BALSAM, PERU 1 APPLICATION: 788; 87 OINTMENT TOPICAL at 17:39

## 2017-06-02 RX ADMIN — DOCUSATE SODIUM AND SENNA 2 TABLET: 50; 8.6 TABLET, FILM COATED ORAL at 20:28

## 2017-06-02 RX ADMIN — ENOXAPARIN SODIUM 40 MG: 40 INJECTION SUBCUTANEOUS at 08:59

## 2017-06-02 RX ADMIN — Medication 1 TABLET: at 09:00

## 2017-06-02 RX ADMIN — ALPRAZOLAM 0.5 MG: 0.5 TABLET ORAL at 20:27

## 2017-06-02 RX ADMIN — BACLOFEN 20 MG: 10 TABLET ORAL at 05:12

## 2017-06-02 RX ADMIN — AMITRIPTYLINE HYDROCHLORIDE 25 MG: 25 TABLET, FILM COATED ORAL at 20:27

## 2017-06-02 RX ADMIN — BACLOFEN 20 MG: 10 TABLET ORAL at 21:00

## 2017-06-02 RX ADMIN — ALPRAZOLAM 0.5 MG: 0.5 TABLET ORAL at 08:59

## 2017-06-02 RX ADMIN — CETIRIZINE HYDROCHLORIDE 10 MG: 10 TABLET ORAL at 08:59

## 2017-06-02 RX ADMIN — FOLIC ACID 1 MG: 1 TABLET ORAL at 08:59

## 2017-06-02 RX ADMIN — METOPROLOL TARTRATE 25 MG: 25 TABLET, FILM COATED ORAL at 09:00

## 2017-06-02 RX ADMIN — GABAPENTIN 800 MG: 400 CAPSULE ORAL at 14:31

## 2017-06-02 RX ADMIN — BACLOFEN 20 MG: 10 TABLET ORAL at 14:31

## 2017-06-02 RX ADMIN — METOPROLOL TARTRATE 25 MG: 25 TABLET, FILM COATED ORAL at 20:28

## 2017-06-02 RX ADMIN — GABAPENTIN 800 MG: 400 CAPSULE ORAL at 21:00

## 2017-06-02 RX ADMIN — FLUTICASONE PROPIONATE 2 SPRAY: 50 SPRAY, METERED NASAL at 08:59

## 2017-06-02 RX ADMIN — VENLAFAXINE HYDROCHLORIDE 75 MG: 75 CAPSULE, EXTENDED RELEASE ORAL at 08:59

## 2017-06-02 NOTE — PLAN OF CARE
Problem: Patient Care Overview (Adult)  Goal: Plan of Care Review  Outcome: Ongoing (interventions implemented as appropriate)    06/01/17 9688   Coping/Psychosocial Response Interventions   Plan Of Care Reviewed With patient   Patient Care Overview   Progress improving       Goal: Adult Individualization and Mutuality  Outcome: Ongoing (interventions implemented as appropriate)  Goal: Discharge Needs Assessment  Outcome: Ongoing (interventions implemented as appropriate)    Problem: Activity Intolerance (Adult)  Goal: Activity Tolerance  Outcome: Ongoing (interventions implemented as appropriate)  Goal: Effective Energy Conservation Techniques  Outcome: Ongoing (interventions implemented as appropriate)    Problem: Fall Risk (Adult)  Goal: Absence of Falls  Outcome: Ongoing (interventions implemented as appropriate)    Problem: Pressure Ulcer (Adult)  Goal: Signs and Symptoms of Listed Potential Problems Will be Absent or Manageable (Pressure Ulcer)  Outcome: Ongoing (interventions implemented as appropriate)

## 2017-06-02 NOTE — PROGRESS NOTES
Initial Discharge Functional Goals:  Bladder: 1  Bowel: 1      *************************  Modify Initial Discharge Functional Goals:    Signed by: Skylar Dias, Supervisor

## 2017-06-02 NOTE — PROGRESS NOTES
Inpatient Rehabilitation Functional Measures Assessment    Functional Measures  LYNETTE Eating:  LYNETTE Grooming: Grooming Score = 5. Patient is supervision/set-up for grooming,  requiring: Setting out grooming equipment. Initial preparation. Opening  containers. No assistive devices were required.  LYNETTE Bathing:  Patient bathed in bed. Patient requires no physical assistance for  washing, rinsing, and drying the right arm. Patient requires no physical  assistance for washing, rinsing, and drying the left arm. Patient requires no  physical assistance for washing, rinsing, and drying the chest. Patient requires  no physical assistance for washing, rinsing, and drying the abdomen. Patient  requires no physical assistance for washing, rinsing, and drying the perineal  area. Patient requires total assistance for washing, rinsing, or drying the  buttocks. Patient requires moderate assistance for washing, rinsing, or drying  the right upper leg. Patient requires moderate assistance for washing, rinsing,  or drying the left upper leg. Patient requires total assistance for washing,  rinsing, or drying the right lower leg, including the foot. Patient requires no  physical assistance for washing, rinsing, and drying the left lower leg,  including the foot. Patient performs 60 % of bathing tasks. Bathing Score = 3,  Moderate Assistance. No assistive devices were required.  LYNETTE Upper Body Dressing:  Upper Body Dressing Score = 5. Patient is supervision  for upper body dressing, requiring: Gathering/setting out clothes. Setting out  upper body dressing equipment. No assistive devices were required.  LYNETTE Lower Body Dressing:  Patient requires total assistance for gathering  clothes. Patient requires total assistance for holding clothing and/or threading  the right leg through the undergarment. Patient requires no physical assistance  for donning and/or doffing undergarment threading left leg. Patient requires no  physical assistance for  donning and/or doffing undergarment over hips and  adjusting fasteners. Patient requires total assistance for holding clothing  and/or threading the right leg through the pants/skirt. Patient requires total  assistance for holding clothing and/or threading the left leg through the  pants/skirt. Patient requires total assistance for holding clothing and/or  pulling pants/skirt over hips and adjusting fasteners. Donning and/or doffing  right sock is not applicable for this patient. Donning and/or doffing left sock  is not applicable for this patient. Donning and/or doffing right shoe is not  applicable for this patient. Donning and/or doffing left shoe is not applicable  for this patient. Patient performs 28.57 % of lower body dressing tasks. Lower  Body Dressing Score = 2, Maximal Assistance. No assistive devices were required.    LYNETTE Toileting:    [facility] Bladder Management  Level of Assistance:  Bladder Score = 1.  Patient is total assistance for  bladder management. Patient has an indwelling/[place] catheter.  Frequency/Number of Accidents this Shift:  Bladder accidents this shift:  0 .  Patient has not had an accident, but used a device/medication this shift  requiring: catheter .    LYNETTE Bowel Management  Level of Assistance: Bowel Score = 5.  Patient is supervision/set-up for bowel  management, requiring: colostomy . No assistive devices were required.  Frequency/Number of Accidents this Shift: Bowel accidents this shift: 0 .  Patient has not had an accident, but used a device/medication this shift  requiring: colostomy .    LYNETTE Bed/Chair/Wheelchair Transfer:  LYNETTE Toilet Transfer:  LYNETTE Tub/Shower Transfer:    Previously Documented Mode of Locomotion at Discharge:  [facility] Expected Mode of Locomotion at Discharge:  LYNETTE Walk/Wheelchair:  [facility] Stairs:    [facility] Comprehension:  LYNETTE Expression:  LYNETTE Social Interaction:  LYNETTE Problem Solving:  LYNETTE Memory:    Therapy Mode Minutes  Occupational Therapy:  Physical Therapy:  Speech  Language Pathology:    Discharge Functional Goals:    Signed by: Laura Tomlinson, SRNA

## 2017-06-02 NOTE — PROGRESS NOTES
Inpatient Rehabilitation Functional Measures Assessment    Functional Measures  LYNETTE Eating:  Eating Score = 7. Patient is completely independent for eating.  There are no activity limitations.  LYNETTE Grooming: Activity was not observed.  LYNETTE Bathing:  Activity was not observed.  LYNETTE Upper Body Dressing:  Activity was not observed.  LYNETTE Lower Body Dressing:  Activity was not observed.  LYNETTE Toileting:  Patient requires moderate assistance for adjusting clothing  before using a toilet, commode, bedpan, or urinal. Patient requires minimal  assistance for hygiene. Patient requires moderate assistance for adjusting  clothing after using a toilet, commode, bedpan, or urinal. Patient performs 0 -  24% of toileting tasks.  Toileting Score = 1, Total Assistance. No assistive  devices were required.    LYNETTE Bladder Management  Level of Assistance:  Bladder Score = 1.  Patient is total assistance for  bladder management. Patient has an indwelling/Junior catheter.  Frequency/Number of Accidents this Shift:  Bladder accidents this shift:  0 .  Patient has not had an accident, but used a device/medication this shift  requiring: junior cath .    LYNETTE Bowel Management  Level of Assistance: Bowel Score = 4. Patient performs greater than 75% of tasks  and requires minimal assistance for bowel management requiring assistive  device/method: colostomy .  Frequency/Number of Accidents this Shift: Bowel accidents this shift: 0 .  Patient has not had an accident, but used a device/medication this shift  requiring: colostomy .    LYNETTE Bed/Chair/Wheelchair Transfer:  Bed/chair/wheelchair Transfer Score = 4.  Patient performs 75% or more of effort and minimal assistance (little/incidental  help/lifting of one limb/steadying) for transferring to and from the  bed/chair/wheelchair, requiring: Steadying. placement of sliding board Patient  requires the following assistive device(s): Sliding board. Grab bars. Seating  system of wheelchair.  LYNETTE Toilet  Transfer:  Activity was not observed.  LYNETTE Tub/Shower Transfer:  Activity was not observed.    Previously Documented Mode of Locomotion at Discharge:  LYNETTE Expected Mode of Locomotion at Discharge:  LYNETTE Walk/Wheelchair:  LYNETTE Stairs:    LYNETTE Comprehension:  LYNETTE Expression:  [facility] Social Interaction:  [facility] Problem Solving:  LYNETTE Memory:    Therapy Mode Minutes  Occupational Therapy:  Physical Therapy:  Speech Language Pathology:    Discharge Functional Goals:    Signed by: TALI Dumont
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
## 2017-06-02 NOTE — PROGRESS NOTES
Inpatient Rehabilitation Functional Measures Assessment and Plan of Care    Plan of Care  Mobility    [PT] Bed/Chair/Wheelchair(Active)  Current Status(06/02/2017): min assist with SB  Weekly Goal(06/09/2017): cga  Discharge Goal: spv    [PT] Bed Mobility(Active)  Current Status(06/02/2017): Min/mod  Weekly Goal(06/09/2017): min assist  Discharge Goal: spv    Functional Measures  LYNETTE Eating:  LYNETTE Grooming:  LYNETTE Bathing:  LYNETTE Upper Body Dressing:  LYNETTE Lower Body Dressing:  LYNETTE Toileting:    LYNETTE Bladder Management  Level of Assistance:  Frequency/Number of Accidents this Shift:    LYNETTE Bowel Management  Level of Assistance:  Frequency/Number of Accidents this Shift:    LYNETTE Bed/Chair/Wheelchair Transfer:  Bed/chair/wheelchair Transfer Score = 4.  Patient performs 75% or more of effort and minimal assistance (little/incidental  help/lifting of one limb/steadying) for transferring to and from the  bed/chair/wheelchair, requiring: Steadying. Patient requires the following  assistive device(s): Sliding board.  LYNETTE Toilet Transfer:  LYNETTE Tub/Shower Transfer:    Previously Documented Mode of Locomotion at Discharge:  LYNETTE Expected Mode of Locomotion at Discharge:  LYNETTE Walk/Wheelchair:  WHEELCHAIR OBSERVATION   Wheelchair locomotion was observed using a manual wheelchair. Wheelchair  Distance Scale = 3.  Distance traveled in wheelchair is greater than 150 feet.  Wheelchair Score = 5.  Patient is supervision or set up only for propelling  wheelchair, requiring: Patient was able to propel a distance of 300 feet in a  wheelchair. No other assistive devices were required.    WALK OBSERVATION   Walking did not occur because activity was contraindicated for patient.  LYNETTE Stairs:  Stairs did not occur because activity was contraindicated for  patient.    LYNETTE Comprehension:  LYNTETE Expression:  LYNETTE Social Interaction:  LYNETTE Problem Solving:  LYNETTE Memory:    Therapy Mode Minutes  Occupational Therapy:  Physical Therapy: Individual: 90  minutes.  Speech Language Pathology:    Discharge Functional Goals:  Bed, Chair, Wheelchair Transfers: 5  Tub/Shower: 5  Toilet Transfers: 5  Mode of Locomotion: C  Walk: 1  Wheelchair: 5  Stairs: 1    Signed by: Perico Lucas, Physical Therapist

## 2017-06-02 NOTE — PLAN OF CARE
Problem: Patient Care Overview (Adult)  Goal: Plan of Care Review  Outcome: Ongoing (interventions implemented as appropriate)    Problem: Activity Intolerance (Adult)  Goal: Activity Tolerance  Outcome: Ongoing (interventions implemented as appropriate)    Problem: Fall Risk (Adult)  Goal: Absence of Falls  Outcome: Ongoing (interventions implemented as appropriate)    Problem: Pressure Ulcer (Adult)  Goal: Signs and Symptoms of Listed Potential Problems Will be Absent or Manageable (Pressure Ulcer)  Outcome: Ongoing (interventions implemented as appropriate)
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
## 2017-06-02 NOTE — CONSULTS
Nutrition Services    Patient Name:  Alex Tierney  YOB: 1975  MRN: [id number]  Admit Date:  6/1/2017        WILL ADD XTRA MEAT/EGGS W/ EACH MEAL AND ADD ENSRUE ENLIVE W/ EACH MEAL, per protocol, r/t need for xtra protein for healing of multiple pressure ulcers.  Intakes good so far at  100% meals.  RD will follow.      Electronically signed by:  Iveth Corcoran RD  06/02/17 12:26 PM

## 2017-06-02 NOTE — PROGRESS NOTES
Inpatient Rehabilitation Functional Measures Assessment and Plan of Care    Plan of Care  Self Care    [OT] Bathing(Active)  Current Status(06/02/2017): Mod A  Weekly Goal(06/06/2017): Min A  Discharge Goal: Set-up/supervision    [OT] Dressing (Lower)(Active)  Current Status(06/02/2017): Max A  Weekly Goal(06/06/2017): Mod A  Discharge Goal: Min A    Performed Intervention(s)  ADL retraining, AE education, strengthening, fxal mobility    Functional Measures  LYNETTE Eating:  Eating Score = 5. Patient is supervision/set-up for eating,  requiring: No assistive devices were required.  LYNETTE Grooming: Grooming Score = 5. Patient is supervision/set-up for grooming,  requiring: Setting out grooming equipment. No assistive devices were required.  LYNETTE Bathing:  Patient took sponge bath. Patient requires no physical assistance  for washing, rinsing, and drying the right arm. Patient requires no physical  assistance for washing, rinsing, and drying the left arm. Patient requires no  physical assistance for washing, rinsing, and drying the chest. Patient requires  no physical assistance for washing, rinsing, and drying the abdomen. Patient  requires no physical assistance for washing, rinsing, and drying the perineal  area. Patient requires minimal assistance for washing, rinsing, or drying the  buttocks. Patient requires no physical assistance for washing, rinsing, and  drying the right upper leg. Patient requires no physical assistance for washing,  rinsing, and drying the left upper leg. Patient requires maximal assistance for  washing, rinsing, or drying the right lower leg, including the foot. Patient  requires maximal assistance for washing, rinsing, or drying the left lower leg,  including the foot. Patient performs 70 % of bathing tasks. Bathing Score = 3,  Moderate Assistance. Patient requires the following assistive device(s): Grab  bar/arm rest to maintain balance.  LYNETTE Upper Body Dressing:  Upper Body Dressing Score = 5.  Patient is supervision  for upper body dressing, requiring: Gathering/setting out clothes. Stand by  assistance. No assistive devices were required.  LYNETTE Lower Body Dressing:  Patient requires total assistance for gathering  clothes. Wearing underwear or an undergarment is not applicable for this  patient. Patient requires maximal/significant physical assistance for holding  clothing and/or threading the right leg through the pants/skirt. Patient  requires maximal/significant physical assistance for holding clothing and/or  threading the left leg through the pants/skirt. Patient requires  minimal/incidental physical assistance for pulling pants/skirt over hips and  adjusting fasteners. Patient requires total assistance for holding clothing  and/or donning and/or doffing right sock. Donning and/or doffing left sock is  not applicable for this patient. Donning and/or doffing right shoe is not  applicable for this patient. Donning and/or doffing left shoe is not applicable  for this patient. Patient performs 25 % of lower body dressing tasks. Lower Body  Dressing Score = 2, Maximal Assistance. No assistive devices were required.  LYNETTE Toileting:  Toileting Score = 5.  Patient is supervision/set-up for  toileting, requiring: Verbal cuing, prompting, or instructing. Setting out  toileting equipment. Patient requires the following assistive device(s):  Colostomy & junior cath .    LYNETTE Bladder Management  Level of Assistance:  Frequency/Number of Accidents this Shift:    LYNETTE Bowel Management  Level of Assistance:  Frequency/Number of Accidents this Shift:    LYNETTE Bed/Chair/Wheelchair Transfer:  Bed/chair/wheelchair Transfer Score = 4.  Patient performs 75% or more of effort and minimal assistance (little/incidental  help/lifting of one limb/steadying) for transferring to and from the  bed/chair/wheelchair, requiring: Steadying. Patient requires the following  assistive device(s): Sliding board.  LYNETTE Toilet Transfer:  Activity  was not observed. Pt has junior catheter &  colostomy  LYNETTE Tub/Shower Transfer:  Shower Transfer Score = 1.  Patient performs 75% or  more of effort and requires minimum assistance (little/incidental help/lifting  of one limb/steadying) of two or more people for transferring to and from the  shower. . Patient requires the following assistive device(s): Grab bars. Shower  chair. Sliding board.    Previously Documented Mode of Locomotion at Discharge:  [facility] Expected Mode of Locomotion at Discharge:  [facility] Walk/Wheelchair:  [facility] Stairs:    [facility] Comprehension:  [facility] Expression:  [facility] Social Interaction:  [facility] Problem Solving:  LYNETTE Memory:    Therapy Mode Minutes  Occupational Therapy: Individual: 90 minutes.  Physical Therapy:  Speech Language Pathology:    Discharge Functional Goals:  Eatin  Groomin  Bathing; 5  Upper Body Dressin  Lower Body Dressin  Toiletin  Mode of Bathing: S  Tub/Shower: 4  Toilet Transfers: 4    Signed by: Lucía Pedro OT

## 2017-06-02 NOTE — PROGRESS NOTES
Patient Assessment Instrument  Quality Indicators - Admission    Section B. Hearing, Speech Vision      Section C. Cognitive Patterns      Section [id number]. Prior Functioning      Section [id number]. Prior Device Use      Section PG4643. Self Care Performance      Section [id number]. Self Care Discharge Goals      Section [id number]. Mobility Performance     Roll Left and Right: [place] does less than half the effort. [place] lifts, holds  or supports trunk or limbs but provides less than half the effort.   Sit to Lying: [place] does less than half the effort. [place] lifts, holds or  supports trunk or limbs but provides less than half the effort.   Lying to Sitting on Side of Bed: [place] does less than half the effort. [place]  lifts, holds or supports trunk or limbs but provides less than half the effort.   Sit to Stand: Not attempted due to medical or safety concerns.   Chair/Bed to Chair Transfer: [place] provides verbal cues or touching/steadying  assistance as patient completes activity.   Car Transfer: Not attempted due to medical or safety concerns.    Patient Walks: No, and walking goal IS NOT clinically indicated    Section [id number]. Mobility Discharge Goals     Roll Left and Right: [place] provides verbal cues or touching/steadying  assistance as patient completes activity.   Sit to Lying: [place] provides verbal cues or touching/steadying assistance as  patient completes activity.   Lying to Sitting on Side of Bed: [place] provides verbal cues or  touching/steadying assistance as patient completes activity.   Sit to Stand: [place] does all of the effort. Patient does none of the effort to  complete the activity. Or, the assistance of 2 or more helpers is required for  the patient to complete the activity.   Chair/Bed to Chair Transfer: [place] provides verbal cues or touching/steadying  assistance as patient completes activity.   Toilet Transfer: [place] provides verbal cues or touching/steadying assistance  as patient completes  activity.   Car Transfer: [place] provides verbal cues or touching/steadying assistance as  patient completes activity.   Walk Discharge Goals:   Walk 10 Feet: [place] does all of the effort. Patient does none of the effort to  complete the activity. Or, the assistance of 2 or more helpers is required for  the patient to complete the activity.   1 Step Over Curb or Up/Down Stair: [place] does all of the effort. Patient does  none of the effort to complete the activity. Or, the assistance of 2 or more  helpers is required for the patient to complete the activity.     Wheelchair Discharge Goals:  Wheel 150 Feet: [place] provides verbal cues or touching/steadying assistance as  patient completes activity.    Section H. Bladder and Bowel      Section I. Active Diagnosis      Section J. Health Conditions      Section K. Swallowing/Nutritional Status      Section M. Skin Conditions      Section O. Special Treatments, Procedures, and Programs      OPTIONAL BRANCH FOR TRACKING FALLS  Fall(s) During Shift:    Signed by: Perico Lucas, Physical Therapist

## 2017-06-02 NOTE — PROGRESS NOTES
Patient Assessment Instrument  Quality Indicators - Admission    Section B. Hearing, Speech Vision      Section C. Cognitive Patterns      Section [id number]. Prior Functioning      Section [id number]. Prior Device Use  Manual wheelchair    Section AG1079. Self Care Performance      Section [id number]. Self Care Discharge Goals      Section AG3386. Mobility Performance      Section DE0826. Mobility Discharge Goals      Section H. Bladder and Bowel      Section I. Active Diagnosis      Section J. Health Conditions      Section K. Swallowing/Nutritional Status      Section M. Skin Conditions      Section O. Special Treatments, Procedures, and Programs      OPTIONAL BRANCH FOR TRACKING FALLS  Fall(s) During Shift:    Signed by: LAZ Torres

## 2017-06-02 NOTE — PLAN OF CARE
Problem: Patient Care Overview (Adult)  Goal: Plan of Care Review  Outcome: Ongoing (interventions implemented as appropriate)    Problem: Fall Risk (Adult)  Goal: Absence of Falls  Outcome: Ongoing (interventions implemented as appropriate)    Problem: Pressure Ulcer (Adult)  Goal: Signs and Symptoms of Listed Potential Problems Will be Absent or Manageable (Pressure Ulcer)  Outcome: Ongoing (interventions implemented as appropriate)    Problem: Mobility, Physical Impaired (Adult)  Goal: Identify Related Risk Factors and Signs and Symptoms  Outcome: Outcome(s) achieved Date Met:  06/02/17  Goal: Enhanced Mobility Skills  Outcome: Ongoing (interventions implemented as appropriate)  Goal: Enhanced Functionality Ability  Outcome: Ongoing (interventions implemented as appropriate)

## 2017-06-02 NOTE — PROGRESS NOTES
Rehabilitation Nursing  Inpatient Rehabilitation Functional Measures Assessment and Plan of Care    Plan of Care/Interventions  Copy from POC    Functional Measures  LYNETTE Eating:  LYNETTE Grooming:  LYNETTE Bathing:  LYNETTE Upper Body Dressing:  LYNETTE Lower Body Dressing:  [place] Toileting:    LYNETTE Bladder Management  Level of Assistance:  Frequency/Number of Accidents this Shift:    LY[place]TE Bowel Management  Level of Assistance:  Frequency/Number of Accidents this Shift:    LYNETTE Bed/Chair/Wheelchair Transfer:  [place]NETTE Toilet Transfer:  LYNETTE Tub/Shower Transfer:    Previously Documented Mode of Locomotion at Discharge:  LYNETTE Expected Mode of Locomotion at Discharge:  [place] Walk/Wheelchair:  [place] Stairs:    LYNETTE Comprehension:  Auditory comprehension is the usual mode. Comprehension  Score = 6, Modified [place].  Patient comprehends complex/abstract  information in their primary language, requiring:  LY[place] Expression:  Vocal expression is the usual mode. Expression Score = 6,  Modified Independent.  Patient expresses complex/abstract information in their  primary language, requiring: Additional time.  LYNETTE Social Interaction:  Social Interaction Score = 6, Modified Independent.  Patient is modified independent for social interaction, requiring: Requires  additional time.  LYNETTE Problem Solving:  Problem Solving Score = 6, Modified [place].  Patient  makes appropriate decisions in order to solve complex problems, but requires  extra time.  LYNETTE Memory:  Memory Score = 6, Modified [place].  Patient is modified  independent for memory, requiring: Requires additional time.    Signed by: Nurse Tim

## 2017-06-02 NOTE — PLAN OF CARE
Problem: Inpatient Occupational Therapy  Goal: Transfer Training Goal 1 LTG- OT    06/02/17 1554   Transfer Training OT LTG   Transfer Training OT LTG, Date Established 06/02/17   Transfer Training OT LTG, Time to Achieve by discharge   Transfer Training OT LTG, Activity Type bed to chair /chair to bed;shower chair   Transfer Training OT LTG, [place] Level contact guard assist       Goal: Bathing Goal STG- OT    06/02/17 1554   Bathing OT STG   Bathing Goal OT STG, Date Established 06/02/17   Bathing Goal OT STG, Time to Achieve 5 - 7 days   Bathing Goal OT STG, Activity Type lower body bathing;upper body bathing   Bathing Goal OT STG, [place] Level minimum assist (75% patient effort)       Goal: Bathing Goal LTG- OT    06/02/17 1554   Bathing OT LTG   Bathing Goal OT LTG, Date Established 06/02/17   Bathing Goal OT LTG, Time to Achieve by discharge   Bathing Goal OT LTG, Activity Type upper body bathing;lower body bathing   Bathing Goal OT LTG, [place] Level set up required;supervision required       Goal: Eating Self-Feeding Goal LTG- OT    06/02/17 1554   Eating Self-Feeding OT LTG   Eat Self Feeding Goal OT LTG, Date Established 06/02/17   Eat Self Feeding Goal OT LTG, Time to Achieve by discharge   Eat Self Feed Goal OT LTG, [place] Level conditional independence       Goal: Grooming Goal LTG- OT    06/02/17 1554   Grooming OT LTG   Grooming Goal OT LTG, Date Established 06/02/17   Grooming Goal OT LTG, Time to Achieve by discharge   Grooming Goal OT LTG, [place] Level conditional independence       Goal: LB Dressing Goal STG- OT    06/02/17 1554   LB Dressing OT STG   LB Dressing Goal OT STG, Date Established 06/02/17   LB Dressing Goal OT STG, Time to Achieve 5 - 7 days   LB Dressing Goal OT STG, [place] Level moderate assist (50% patient effort)       Goal: LB Dressing Goal LTG- OT    06/02/17 1554   LB Dressing OT LTG   LB Dressing Goal OT LTG, Date Established 06/02/17   LB  Dressing Goal OT LTG, Time to Achieve by discharge   LB Dressing Goal OT LTG, [place] Level minimum assist (75% patient effort)

## 2017-06-02 NOTE — PLAN OF CARE
Problem: Inpatient Physical Therapy  Goal: Bed Mobility Goal STG- PT    06/02/17 1521   Bed Mobility PT STG   Bed Mobility PT STG, Date Established 06/02/17   Bed Mobility PT STG, Time to Achieve 1 wk   Bed Mobility PT STG, Activity Type all bed mobility   Bed Mobility PT STG, [place] Level minimum assist (75% patient effort)       Goal: Bed Mobility Goal LTG- PT    06/02/17 1521   Bed Mobility PT LTG   Bed Mobility PT LTG, Date Established 06/02/17   Bed Mobility PT LTG, Time to Achieve 2 wks   Bed Mobility PT LTG, Activity Type all bed mobility   Bed Mobility PT LTG, [place] Level supervision required       Goal: Transfer Training Goal 1 STG- PT    06/02/17 1521   Transfer Training PT STG   Transfer Training PT STG, Date Established 06/02/17   Transfer Training PT STG, Time to Achieve 1 wk   Transfer Training PT STG, Activity Type all transfers   Transfer Training PT STG, [place] Level contact guard assist       Goal: Transfer Training Goal 1 LTG- PT    06/02/17 1521   Transfer Training PT LTG   Transfer Training PT LTG, Date Established 06/02/17   Transfer Training PT LTG, Time to Achieve 2 wks   Transfer Training PT LTG, Activity Type all transfers   Transfer Training PT LTG, [place] Level supervision required       Goal: Static Sitting Balance Goal STG- PT    06/02/17 1521   Static Sitting Balance PT STG   Static Sitting Balance PT STG, Date Established 06/02/17   Static Sitting Balance PT STG, Time to Achieve 1 wk   Static Sitting Balance PT STG, [place] Level minimum assist (75% patient effort)       Goal: Static Sitting Balance Goal LTG- PT    06/02/17 1521   Static Sitting Balance PT LTG   Static Sitting Balance PT LTG, Date Established 06/02/17   Static Sitting Balance PT LTG, Time to Achieve 2 wks   Static Sitting Balance PT LTG, [place] Level supervision required

## 2017-06-02 NOTE — NURSING NOTE
Patient noted to have several healing PI's of different stages. (See Flow Sheet Documentation for measurements and descriptions).   Patient also has venous ulcerations to right lower leg.  Treatments ordered.
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
## 2017-06-02 NOTE — PROGRESS NOTES
Inpatient Rehabilitation Functional Measures Assessment and Plan of Care    Plan of Care    Body Function Structure    Skin Integrity (Active)  Current Status (6/1/2017 6:00:00 PM): impairment in skin integrity  Weekly Goal: no further skin breakdown this week  Discharge Goal: wounds showing signs of healing, no s/s infection by discharge    Safety    Potential for Injury (Active)  Current Status (6/1/2017 6:00:00 PM): potential for falls  Weekly Goal: no falls this week  Discharge Goal: no falls by discharge  Functional Measures  LYNETTE Eating:  LYNETTE Grooming:  LYNETTE Bathing:  LYNETTE Upper Body Dressing:  LYNETTE Lower Body Dressing:  LYNETTE Toileting:    LYNETTE Bladder Management  Level of Assistance:  Frequency/Number of Accidents this Shift:    LYNETTE Bowel Management  Level of Assistance:  Frequency/Number of Accidents this Shift:    LYNETTE Bed/Chair/Wheelchair Transfer:  [facility] Toilet Transfer:  LYNETTE Tub/Shower Transfer:    Previously Documented Mode of Locomotion at Discharge:  [facility] Expected Mode of Locomotion at Discharge:  [facility] Walk/Wheelchair:  [facility] Stairs:    LYNETTE Comprehension:  Auditory comprehension is the usual mode. Comprehension  Score = 7, Independent.  Patient comprehends complex/abstract information in  their primary language.  Patient is completely independent for auditory  comprehension.  There are no activity limitations.  LYNETTE Expression:  Vocal expression is the usual mode. Expression Score = 7,  Independent.  Patient expresses complex/abstract information in their primary  language.  Patient is completely independent for vocal expression.  There are no  activity limitations.  LYNETTE Social Interaction:  Social Interaction Score = 7, Independent. Patient is  completely independent for social interaction.  There are no activity  limitations.  LYNETTE Problem Solving:  Problem Solving Score = 7, Independent.  Patient makes  appropriate decisions in order to solve complex problems.  Patient is completely  independent for problem  solving.  There are no activity limitations.  LYNETTE Memory:  Memory Score = 7, Independent.  Patient is completely independent  for memory.  There are no activity limitations.    Therapy Mode Minutes  Occupational Therapy:  Physical Therapy:  Speech Language Pathology:    Discharge Functional Goals:    Signed by: Lin Kumari RN

## 2017-06-02 NOTE — NURSING NOTE
MULTIPLE SCARS AND OPEN AREAS NOTED ON ABDOMEN AND BOTH LEGS. PT STATES HE HAD REACTION TO VANCOMYCIN AND DEVELOPED MULTIPLE LARGE FLUID FILLED BLISTERS AND THE BLISTERS BUSTED LEAVING OPEN AREAS.  MULTIPLE PRESSURE ULCERS IN VARIOUS STAGES OF HEALING ON COCCYX, BUTTOCKS AND LEGS. MULTIPLE POSSIBLE DTI NOTED ON RIGHT FOOT. 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

## 2017-06-02 NOTE — PROGRESS NOTES
Patient Assessment Instrument  Quality Indicators - Admission    Section B. Hearing, Speech Vision      Section C. Cognitive Patterns      Section [id number]. Prior Functioning      Section [id number]. Prior Device Use      Section [id number]. Self Care Performance     Eating: [place] sets up or cleans up; patient completes activity. [place] assists  only prior to or following the activity.   Oral Hygiene: [place] sets up or cleans up; patient completes activity. [place]  assists only prior to or following the activity.   Toileting Hygiene: [place] provides verbal cues or touching/steadying assistance  as patient completes activity.   Shower/Bathe Self: [place] does less than half the effort. [place] lifts, holds  or supports trunk or limbs but provides less than half the effort.   Upper Body Dressing: [place] provides verbal cues or touching/steadying  assistance as patient completes activity.   Lower Body Dressing: [place] does more than half the effort. [place] lifts or  holds trunk or limbs and provides more than half the effort.   Putting On/Taking Off Footwear: [place] does all of the effort. Patient does  none of the effort to complete the activity. Or, the assistance of 2 or more  helpers is required for the patient to complete the activity.    Section [id number]. Self Care Discharge Goals     Eating: Patient completed the activities by him/herself with no assistance from  a helper.   Oral Hygiene: Patient completed the activities by him/herself with no  assistance from a helper.   Toileting Hygiene: [place] sets up or cleans up; patient completes activity.  [place] assists only prior to or following the activity.   Shower/Bathe Self: [place] provides verbal cues or touching/steadying assistance  as patient completes activity.   Upper Body Dressing: [place] sets up or cleans up; patient completes activity.  [place] assists only prior to or following the activity.   Lower Body Dressing: [place] provides verbal cues or  touching/steadying  assistance as patient completes activity.   Putting On/Taking Off Footwear: [place] does less than half the effort. [place]  lifts, holds or supports trunk or limbs but provides less than half the effort.    Section [id number]. Mobility Performance      Section [id number]. Mobility Discharge Goals      Section H. Bladder and Bowel      Section I. Active Diagnosis      Section J. Health Conditions      Section K. Swallowing/Nutritional Status      Section M. Skin Conditions      Section O. Special Treatments, Procedures, and Programs      OPTIONAL BRANCH FOR TRACKING FALLS  Fall(s) During Shift:    Signed by: Lucía Pedro OT

## 2017-06-02 NOTE — SIGNIFICANT NOTE
06/02/17 1546   Case Management/Social Work Plan   Plan SS left three messages today for [employer] [phone] regarding roho cushion.  Will follow.
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
## 2017-06-03 PROCEDURE — 97530 THERAPEUTIC ACTIVITIES: CPT

## 2017-06-03 PROCEDURE — 97535 SELF CARE MNGMENT TRAINING: CPT | Performed by: OCCUPATIONAL THERAPIST

## 2017-06-03 PROCEDURE — 25010000002 ENOXAPARIN PER 10 MG: Performed by: FAMILY MEDICINE

## 2017-06-03 PROCEDURE — 97530 THERAPEUTIC ACTIVITIES: CPT | Performed by: OCCUPATIONAL THERAPIST

## 2017-06-03 PROCEDURE — 97110 THERAPEUTIC EXERCISES: CPT

## 2017-06-03 RX ADMIN — ALPRAZOLAM 0.5 MG: 0.5 TABLET ORAL at 16:55

## 2017-06-03 RX ADMIN — ASPIRIN 81 MG: 81 TABLET ORAL at 09:13

## 2017-06-03 RX ADMIN — DOCUSATE SODIUM AND SENNA 2 TABLET: 50; 8.6 TABLET, FILM COATED ORAL at 20:13

## 2017-06-03 RX ADMIN — METOPROLOL TARTRATE 25 MG: 25 TABLET, FILM COATED ORAL at 09:13

## 2017-06-03 RX ADMIN — BACLOFEN 20 MG: 10 TABLET ORAL at 05:24

## 2017-06-03 RX ADMIN — VENLAFAXINE HYDROCHLORIDE 75 MG: 75 CAPSULE, EXTENDED RELEASE ORAL at 09:13

## 2017-06-03 RX ADMIN — BACLOFEN 20 MG: 10 TABLET ORAL at 21:21

## 2017-06-03 RX ADMIN — AMITRIPTYLINE HYDROCHLORIDE 25 MG: 25 TABLET, FILM COATED ORAL at 20:13

## 2017-06-03 RX ADMIN — Medication 1 TABLET: at 09:13

## 2017-06-03 RX ADMIN — GABAPENTIN 800 MG: 400 CAPSULE ORAL at 14:57

## 2017-06-03 RX ADMIN — FOLIC ACID 1 MG: 1 TABLET ORAL at 09:13

## 2017-06-03 RX ADMIN — CASTOR OIL AND BALSAM, PERU: 788; 87 OINTMENT TOPICAL at 09:13

## 2017-06-03 RX ADMIN — ENOXAPARIN SODIUM 40 MG: 40 INJECTION SUBCUTANEOUS at 09:14

## 2017-06-03 RX ADMIN — GABAPENTIN 800 MG: 400 CAPSULE ORAL at 21:21

## 2017-06-03 RX ADMIN — ALPRAZOLAM 0.5 MG: 0.5 TABLET ORAL at 09:12

## 2017-06-03 RX ADMIN — BACLOFEN 20 MG: 10 TABLET ORAL at 14:57

## 2017-06-03 RX ADMIN — CETIRIZINE HYDROCHLORIDE 10 MG: 10 TABLET ORAL at 09:13

## 2017-06-03 RX ADMIN — CASTOR OIL AND BALSAM, PERU 1 APPLICATION: 788; 87 OINTMENT TOPICAL at 20:13

## 2017-06-03 RX ADMIN — ALPRAZOLAM 0.5 MG: 0.5 TABLET ORAL at 20:12

## 2017-06-03 RX ADMIN — METOPROLOL TARTRATE 25 MG: 25 TABLET, FILM COATED ORAL at 20:13

## 2017-06-03 RX ADMIN — GABAPENTIN 800 MG: 400 CAPSULE ORAL at 05:24

## 2017-06-03 NOTE — PROGRESS NOTES
Inpatient Rehabilitation Functional Measures Assessment    Functional Measures  LYNETTE Eating:  Eating Score = 5. Patient is supervision/set-up for eating,  requiring: Opening containers. No assistive devices were required.  LYNETTE Grooming:  LYNETTE Bathing:  LYNETTE Upper Body Dressing:  LYNETTE Lower Body Dressing:  LYNETTE Toileting:  Activity was not observed.    LYNETTE Bladder Management  Level of Assistance:  Bladder Score = 1.  Patient is total assistance for  bladder management. Patient has an indwelling/Sheldon catheter.  Frequency/Number of Accidents this Shift:  Bladder accidents this shift:  0 .  Patient has not had an accident, but used a device/medication this shift  requiring: f/c .    LYNETTE Bowel Management  Level of Assistance: Bowel Score = 1. Patient performs less than 25% of tasks  and requires total assistance for bowel management or two or more people  required for bowel management requiring assistive device/method: pt has a  colostomy .  Frequency/Number of Accidents this Shift: Bowel accidents this shift: 0 .  Patient has not had an accident this shift.    LYNETTE Bed/Chair/Wheelchair Transfer:  Bed/chair/wheelchair Transfer Score = 3.  Patient performs 50-74% of effort and requires moderate assistance (some  lifting) for transferring to and from the bed/chair/wheelchair. No assistive  devices were required.  LYNETTE Toilet Transfer:  Activity was not observed.  LYNETTE Tub/Shower Transfer:  Activity was not observed.    Previously Documented Mode of Locomotion at Discharge:  [facility] Expected Mode of Locomotion at Discharge:  LYNETTE Walk/Wheelchair:  LYNETTE Stairs:    LYNETTE Comprehension:  LYNETTE Expression:  LYNETTE Social Interaction:  LYNETTE Problem Solving:  LYNETTE Memory:    Therapy Mode Minutes  Occupational Therapy:  Physical Therapy:  Speech Language Pathology:    Discharge Functional Goals:    Signed by: TALI Barriga

## 2017-06-03 NOTE — PLAN OF CARE
Problem: Patient Care Overview (Adult)  Goal: Plan of Care Review  Outcome: Ongoing (interventions implemented as appropriate)    Problem: Activity Intolerance (Adult)  Goal: Activity Tolerance  Outcome: Ongoing (interventions implemented as appropriate)  Goal: Effective Energy Conservation Techniques  Outcome: Ongoing (interventions implemented as appropriate)    Problem: Fall Risk (Adult)  Goal: Absence of Falls  Outcome: Ongoing (interventions implemented as appropriate)    Problem: Pressure Ulcer (Adult)  Goal: Signs and Symptoms of Listed Potential Problems Will be Absent or Manageable (Pressure Ulcer)  Outcome: Ongoing (interventions implemented as appropriate)    Problem: Skin Integrity Impairment, Risk/Actual (Adult)  Goal: Identify Related Risk Factors and Signs and Symptoms  Outcome: Outcome(s) achieved Date Met:  06/03/17  Goal: Skin Integrity/Wound Healing  Outcome: Ongoing (interventions implemented as appropriate)    Problem: Mobility, Physical Impaired (Adult)  Goal: Enhanced Mobility Skills  Outcome: Ongoing (interventions implemented as appropriate)  Goal: Enhanced Functionality Ability  Outcome: Ongoing (interventions implemented as appropriate)

## 2017-06-03 NOTE — PROGRESS NOTES
Inpatient Rehabilitation - Occupational Therapy Treatment Note   [place]     Patient Name: Alex Tierney  : 1975  MRN: [id number]  Today's Date: 6/3/2017  Onset of Illness/Injury or Date of Surgery Date: 17  Date of Referral to OT: 17  Referring Physician: Dr. Brooks      Admit Date: 2017    Visit Dx:   No diagnosis found.  Patient Active Problem List   Diagnosis   • Chronic allergic rhinitis   • Depression   • Smoker   • T6 spinal cord injury   • History of left lower extremity amputation   • Chronic osteomyelitis of multiple sites   • Debility             Adult Rehabilitation Note       17 1100          Rehab Assessment/Intervention    Discipline occupational therapist  -      Document Type therapy note (daily note)  -      Subjective Information no complaints;agree to therapy  -      Patient Effort, Rehab Treatment good  -      Precautions/Limitations fall precautions   skin integrity, colostomy, junior cath  -      Recorded by [] Michell Silva OT      Transfer Assessment/Treatment    Transfers, Bed-Chair [place] minimum assist (75% patient effort);contact guard assist;verbal cues required  -      Transfers, Bed-Chair-Bed, Assist Device sliding board  -      Recorded by [] Michell Silva OT      Therapy Exercises    Bilateral Upper Extremity AROM:;sitting   gmc fmc and reaching  -      BUE Resistance --   UE bike 5 min x3, rickxhaw 20x4  -      Recorded by [] Michell Silva OT        User Key  (r) = Recorded By, (t) = Taken By, (c) = Cosigned By    Initials Name Effective Dates     Michell Silva OT 16 -                 OT Goals       17 1554          Transfer Training OT LTG    Transfer Training OT LTG, Date Established 17  -BF      Transfer Training OT LTG, Time to Achieve by discharge  -BF      Transfer Training OT LTG, Activity Type bed to chair /chair to bed;shower chair  -BF      Transfer Training OT  LTG, [place] Level contact guard assist  -BF      Bathing OT STG    Bathing Goal OT STG, Date Established 06/02/17  -BF      Bathing Goal OT STG, Time to Achieve 5 - 7 days  -BF      Bathing Goal OT STG, Activity Type lower body bathing;upper body bathing  -BF      Bathing Goal OT STG, [place] Level minimum assist (75% patient effort)  -BF      Bathing OT LTG    Bathing Goal OT LTG, Date Established 06/02/17  -BF      Bathing Goal OT LTG, Time to Achieve by discharge  -BF      Bathing Goal OT LTG, Activity Type upper body bathing;lower body bathing  -BF      Bathing Goal OT LTG, [place] Level set up required;supervision required  -BF      Eating Self-Feeding OT LTG    Eat Self Feeding Goal OT LTG, Date Established 06/02/17  -BF      Eat Self Feeding Goal OT LTG, Time to Achieve by discharge  -BF      Eat Self Feed Goal OT LTG, [place] Level conditional independence  -BF      Grooming OT LTG    Grooming Goal OT LTG, Date Established 06/02/17  -BF      Grooming Goal OT LTG, Time to Achieve by discharge  -BF      Grooming Goal OT LTG, [place] Level conditional independence  -BF      LB Dressing OT STG    LB Dressing Goal OT STG, Date Established 06/02/17  -BF      LB Dressing Goal OT STG, Time to Achieve 5 - 7 days  -BF      LB Dressing Goal OT STG, [place] Level moderate assist (50% patient effort)  -BF      LB Dressing OT LTG    LB Dressing Goal OT LTG, Date Established 06/02/17  -BF      LB Dressing Goal OT LTG, Time to Achieve by discharge  -BF      LB Dressing Goal OT LTG, [place] Level minimum assist (75% patient effort)  -BF        User Key  (r) = Recorded By, (t) = Taken By, (c) = Cosigned By    Initials Name Provider Type    YARELI Pedro OT Occupational Therapist          Occupational Therapy Education     Title: PT OT SLP Therapies (Active)     Topic: Occupational Therapy (Active)     Point: ADL training (Active)    Description: Instruct learner(s) on proper  safety adaptation and remediation techniques during self care or transfers.   Instruct in proper use of assistive devices.    Learning Progress Summary    Learner Readiness Method Response Comment Documented by Status   Patient Acceptance E,D NR   06/02/17 1551 Active    Acceptance E,D NR   06/02/17 1551 Active               Point: Precautions (Active)    Description: Instruct learner(s) on prescribed precautions during self-care and functional transfers.    Learning Progress Summary    Learner Readiness Method Response Comment Documented by Status   Patient Acceptance E,D NR   06/02/17 1551 Active    Acceptance E,D NR   06/02/17 1551 Active                      User Key     Initials Effective Dates Name Provider Type [facility] 03/14/16 -  Lucía Pedro OT Occupational Therapist OT                  OT Recommendation and Plan  Anticipated Equipment Needs At Discharge:  (TBD)  Anticipated Discharge Disposition:  (TBD)  Planned Therapy Interventions: activity intolerance, adaptive equipment training, ADL retraining, home exercise program, strengthening, transfer training (Therapeutic groups as beneficial)  Therapy Frequency: 3-5 times/wk          Time Calculation:         Time Calculation- OT       06/03/17 1137          Time Calculation-     OT Start Time 0635  -      OT Stop Time 0825  -      OT Time Calculation (min) 110 min  -        User Key  (r) = Recorded By, (t) = Taken By, (c) = Cosigned By    Initials Name Provider Type     Michell Silva OT Occupational Therapist           Therapy Charges for Today     Code Description Service Date Service Provider Modifiers Qty    70133431302  OT SELF CARE/MGMT/TRAIN EA 15 MIN 6/3/2017 Michell Silva OT GO 1    [id number]  OT THERAPEUTIC ACT EA 15 MIN 6/3/2017 Michell Silva OT GO 5               Michell Silva OT  6/3/2017

## 2017-06-03 NOTE — THERAPY TREATMENT NOTE
Inpatient Rehabilitation - Physical Therapy Treatment Note   [place]     Patient Name: Alex Tierney  : 1975  MRN: [id number]  Today's Date: 6/3/2017  Onset of Illness/Injury or Date of Surgery Date: 17  Date of Referral to PT: 17  Referring Physician: Dr. Brooks    Admit Date: 2017    Visit Dx:  No diagnosis found.  Patient Active Problem List   Diagnosis   • Chronic allergic rhinitis   • Depression   • Smoker   • T6 spinal cord injury   • History of left lower extremity amputation   • Chronic osteomyelitis of multiple sites   • Debility               Adult Rehabilitation Note       17 1200 17 1100       Rehab Assessment/Intervention    Discipline physical therapist  -RT occupational therapist  -     Document Type therapy note (daily note)  -RT therapy note (daily note)  -     Subjective Information agree to therapy  -RT no complaints;agree to therapy  -     Patient Effort, Rehab Treatment good  -RT good  -     Precautions/Limitations fall precautions   wounds  -RT fall precautions   skin integrity, colostomy, junior cath  -     Patient Response to Treatment Pt motivated today and pt reported fatigue following core exercises.  Rec prafo boot and nsg notified.  -RT      Recorded by [RT] Perico Lucas, PT [AH] Michell Silva, OT     Cognitive Assessment/Intervention    Current Cognitive/Communication Assessment functional  -RT      Orientation Status oriented x 4  -RT      Recorded by [RT] Perico Lucas, PT      Bed Mobility, Assessment/Treatment    Bed Mobility, Assistive Device bed rails  -RT      Bed Mobility, Roll Left, [place] minimum assist (75% patient effort);moderate assist (50% patient effort)  -RT      Bed Mobility, Roll Right, [place] minimum assist (75% patient effort);moderate assist (50% patient effort)  -RT      Bed Mob, Supine to Sit, [place] minimum assist (75% patient effort);moderate assist (50% patient effort)  -RT      Bed Mob,  Sit to Supine, [place] minimum assist (75% patient effort);moderate assist (50% patient effort)  -RT      Recorded by [RT] Perico Lucas PT      Transfer Assessment/Treatment    Transfers, Bed-Chair [place] minimum assist (75% patient effort);contact guard assist;verbal cues required  -RT minimum assist (75% patient effort);contact guard assist;verbal cues required  -     Transfers, Chair-Bed [place] minimum assist (75% patient effort);verbal cues required;nonverbal cues required (demo/gesture)  -RT      Transfers, Bed-Chair-Bed, Assist Device sliding board  -RT sliding board  -     Recorded by [RT] Perico Lucas, PT [AH] Michell Silva, OT     Therapy Exercises    Bilateral Lower Extremities PROM:  -RT      Bilateral Upper Extremity  AROM:;sitting   gmc fmc and reaching  -     BUE Resistance  --   UE bike 5 min x3, rickxhaw 20x4  -     Trunk Exercises sitting;trunk rotation;trunk flexion   punches, reaching, tb exercises  -RT      Recorded by [RT] Perico Lucas, PT [] Michell Silva, OT     Positioning and Restraints    Pre-Treatment Position in bed  -RT      Post Treatment Position bed  -RT      In Bed call light within reach  -RT      Recorded by [RT] Perico Lucas PT        User Key  (r) = Recorded By, (t) = Taken By, (c) = Cosigned By    Initials Name Effective Dates     Michell Silva, OT 03/14/16 -     RT Perico Lucas, PT 03/14/16 -                 IP PT Goals       06/02/17 1521          Bed Mobility PT STG    Bed Mobility PT STG, Date Established 06/02/17  -RT      Bed Mobility PT STG, Time to Achieve 1 wk  -RT      Bed Mobility PT STG, Activity Type all bed mobility  -RT      Bed Mobility PT STG, [place] Level minimum assist (75% patient effort)  -RT      Bed Mobility PT LTG    Bed Mobility PT LTG, Date Established 06/02/17  -RT      Bed Mobility PT LTG, Time to Achieve 2 wks  -RT      Bed Mobility PT LTG, Activity Type all bed mobility  -RT       Bed Mobility PT LTG, [place] Level supervision required  -RT      Transfer Training PT STG    Transfer Training PT STG, Date Established 06/02/17  -RT      Transfer Training PT STG, Time to Achieve 1 wk  -RT      Transfer Training PT STG, Activity Type all transfers  -RT      Transfer Training PT STG, [place] Level contact guard assist  -RT      Transfer Training PT LTG    Transfer Training PT LTG, Date Established 06/02/17  -RT      Transfer Training PT LTG, Time to Achieve 2 wks  -RT      Transfer Training PT LTG, Activity Type all transfers  -RT      Transfer Training PT LTG, [place] Level supervision required  -RT      Static Sitting Balance PT STG    Static Sitting Balance PT STG, Date Established 06/02/17  -RT      Static Sitting Balance PT STG, Time to Achieve 1 wk  -RT      Static Sitting Balance PT STG, [place] Level minimum assist (75% patient effort)  -RT      Static Sitting Balance PT LTG    Static Sitting Balance PT LTG, Date Established 06/02/17  -RT      Static Sitting Balance PT LTG, Time to Achieve 2 wks  -RT      Static Sitting Balance PT LTG, [place] Level supervision required  -RT        User Key  (r) = Recorded By, (t) = Taken By, (c) = Cosigned By    Initials Name Provider Type    RT Perico Lucas, PT Physical Therapist          Physical Therapy Education     Title: PT OT SLP Therapies (Active)     Topic: Physical Therapy (Done)     Point: Mobility training (Done)    Learning Progress Summary    Learner Readiness Method Response Comment Documented by Status   Patient Acceptance E VU  RT 06/03/17 1236 Done    Acceptance E VU  RT 06/02/17 1519 Done               Point: Home exercise program (Done)    Learning Progress Summary    Learner Readiness Method Response Comment Documented by Status   Patient Acceptance E VU  RT 06/03/17 1236 Done    Acceptance E VU  RT 06/02/17 1519 Done               Point: Body mechanics (Done)    Learning Progress Summary    Learner  Readiness Method Response Comment Documented by Status   Patient Acceptance E VU  RT 06/03/17 1236 Done    Acceptance E VU  RT 06/02/17 1519 Done               Point: Precautions (Done)    Learning Progress Summary    Learner Readiness Method Response Comment Documented by Status   Patient Acceptance E VU  RT 06/03/17 1236 Done    Acceptance E VU  RT 06/02/17 1519 Done                      User Key     Initials Effective Dates Name Provider Type Discipline    RT 03/14/16 -  Perico S Lance, PT Physical Therapist PT                    PT Recommendation and Plan  Planned Therapy Interventions: balance training, bed mobility training, home exercise program, lumbar stabilization, manual therapy techniques, neuromuscular re-education, patient/family education, postural re-education, ROM (Range of Motion), strengthening, stretching, transfer training  PT Frequency: 5 times/wk, 2 times/day, per priority policy            Time Calculation:         PT Charges       06/03/17 1236          Time Calculation    Start Time 1000  -RT      Stop Time 1130  -RT      Time Calculation (min) 90 min  -RT      PT Received On 06/03/17  -RT      PT Goal Re-Cert Due Date 06/16/17  -RT        User Key  (r) = Recorded By, (t) = Taken By, (c) = Cosigned By    Initials Name Provider Type    RT Perico S Lance, PT Physical Therapist          Therapy Charges for Today     Code Description Service Date Service Provider Modifiers Qty    90054217319 HC PT THER SUPP EA 15 MIN 6/2/2017 Perico S Lance, PT GP 2    33519805070 HC PT EVAL HIGH COMPLEXITY 3 6/2/2017 Perico S Lance, PT GP 1    01317266016 HC PT THER PROC EA 15 MIN 6/2/2017 Perico S Lance, PT GP 2    84275912381 HC PT THERAPEUTIC ACT EA 15 MIN 6/2/2017 Perico S Lance, PT GP 1    85355638013 HC PT THER SUPP EA 15 MIN 6/3/2017 Perico S Lance, PT GP 2    13342923302 HC PT THER PROC EA 15 MIN 6/3/2017 Perico S Lance, PT GP 4    85285725955 HC PT THERAPEUTIC ACT EA 15 MIN 6/3/2017 Perico S  Lance, PT GP 2               Perico Lucas, PT  6/3/2017

## 2017-06-03 NOTE — PROGRESS NOTES
Inpatient Rehabilitation Functional Measures Assessment    Functional Measures  LYNETTE Eating:  LYNETTE Grooming: Grooming Score = 5. Patient is supervision/set-up for grooming,  requiring: Opening containers. Initial preparation. Setting out grooming  equipment. No assistive devices were required.  LYNETTE Bathing:  Patient took sponge bath. Bathing Score = 5.  Patient is  supervision/set-up for bathing, requiring: Preparing washing materials.  Preparing the water. Setting out bathing equipment. No assistive devices were  required.  LYNETTE Upper Body Dressing:  Upper Body Dressing Score = 5. Patient is supervision  for upper body dressing, requiring: Gathering/setting out clothes. Setting out  upper body dressing equipment. No assistive devices were required.  LYNETTE Lower Body Dressing:  Patient requires total assistance for gathering  clothes. Wearing underwear or an undergarment is not applicable for this  patient. Patient requires total assistance for holding clothing and/or threading  the right leg through the pants/skirt. Patient requires total assistance for  holding clothing and/or threading the left leg through the pants/skirt. Patient  requires moderate/considerable physical assistance for pulling pants/skirt over  hips and adjusting fasteners. Donning and/or doffing right sock is not  applicable for this patient. Donning and/or doffing left sock was not observed  for this patient. Donning and/or doffing right shoe is not applicable for this  patient. Donning and/or doffing left shoe was not observed for this patient.  Patient performs 12.5 -  24% of lower body dressing tasks. Lower Body Dressing  Score = 1, Total Assistance. No assistive devices were required.  LYNETTE Toileting:    LYNETTE Bladder Management  Level of Assistance:  Bladder Score = 1.  Patient is total assistance for  bladder management. Patient has an indwelling/Sheldon catheter.  Frequency/Number of Accidents this Shift:  Bladder accidents this shift:  0  .  Patient has not had an accident, but used a device/medication this shift  requiring: catheter .    [facility] Bowel Management  Level of Assistance: Bowel Score = 5.  Patient is supervision/set-up for bowel  management, requiring: colostomy . Patient requires the following assistive  device(s): colostomy .  Frequency/Number of Accidents this Shift: Bowel accidents this shift: 0 .  Patient has not had an accident this shift.    LYNETTE Bed/Chair/Wheelchair Transfer:  Bed/chair/wheelchair Transfer Score = 5.  Patient is supervision/set-up for transferring to and from the  bed/chair/wheelchair, requiring: Verbal cuing, prompting, or instructing. Stand  by assistance. Set up (positioning equipment, lock brakes and/or adjust foot  rest). Stand by assistance. Patient requires the following assistive device(s):  Seating system of wheelchair. Grab bars.  LYNETTE Toilet Transfer:  [facility] Tub/Shower Transfer:    Previously Documented Mode of Locomotion at Discharge:  [facility] Expected Mode of Locomotion at Discharge:  [facility] Walk/Wheelchair:  [facility] Stairs:    [facility] Comprehension:  [facility] Expression:  [facility] Social Interaction:  [facility] Problem Solving:  [facility] Memory:    Therapy Mode Minutes  Occupational Therapy:  Physical Therapy:  Speech Language Pathology:    Discharge Functional Goals:    Signed by: TALI Jacobs

## 2017-06-03 NOTE — PROGRESS NOTES
Inpatient Rehabilitation Functional Measures Assessment    Functional Measures  LYNETTE Eating:  LYNETTE Grooming:  LYNETTE Bathing:  LYNETTE Upper Body Dressing:  LYNETTE Lower Body Dressing:  LYNETTE Toileting:    LYNETTE Bladder Management  Level of Assistance:  Frequency/Number of Accidents this Shift:    LYNETTE Bowel Management  Level of Assistance:  Frequency/Number of Accidents this Shift:    LYNETTE Bed/Chair/Wheelchair Transfer:  LYNETTE Toilet Transfer:  LYNETTE Tub/Shower Transfer:    Previously Documented Mode of Locomotion at Discharge:  LYNETTE Expected Mode of Locomotion at Discharge:  LYNETTE Walk/Wheelchair:  LYNETTE Stairs:    LYNETTE Comprehension:  Both ( auditory and visual) modes of comprehension are used  equally. Comprehension Score = 7, Independent.  Patient comprehends  complex/abstract information in their primary language.  Patient is completely  independent for auditory and visual comprehension.  There are no activity  limitations.  LYNETTE Expression:  Vocal expression is the usual mode. Expression Score = 7,  Independent.  Patient expresses complex/abstract information in their primary  language.  Patient is completely independent for vocal expression.  There are no  activity limitations.  LYNETTE Social Interaction:  Social Interaction Score = 7, Independent. Patient is  completely independent for social interaction.  There are no activity  limitations.  LYNETTE Problem Solving:  Problem Solving Score = 7, Independent.  Patient makes  appropriate decisions in order to solve complex problems.  Patient is completely  independent for problem solving.  There are no activity limitations.  LYNETTE Memory:  Memory Score = 7, Independent.  Patient is completely independent  for memory.  There are no activity limitations.    Therapy Mode Minutes  Occupational Therapy:  Physical Therapy:  Speech Language Pathology:    Discharge Functional Goals:    Signed by: Nurse Ciara

## 2017-06-03 NOTE — PROGRESS NOTES
Inpatient Rehabilitation Functional Measures Assessment    Functional Measures  LYNETTE Eating:  LYNETTE Grooming:  LYNETTE Bathing:  LYNETTE Upper Body Dressing:  LYNETTE Lower Body Dressing:  LYNETTE Toileting:    LYNETTE Bladder Management  Level of Assistance:  Frequency/Number of Accidents this Shift:    LYNETTE Bowel Management  Level of Assistance:  Frequency/Number of Accidents this Shift:    LYNETTE Bed/Chair/Wheelchair Transfer:  Bed/chair/wheelchair Transfer Score = 3.  Patient performs 50-74% of effort and requires moderate assistance (some  lifting) for transferring to and from the bed/chair/wheelchair. Patient requires  the following assistive device(s): Sliding board.  LYNETTE Toilet Transfer:  LYNETTE Tub/Shower Transfer:    Previously Documented Mode of Locomotion at Discharge:  LYNETTE Expected Mode of Locomotion at Discharge:  LYNETTE Walk/Wheelchair:  WHEELCHAIR OBSERVATION   Wheelchair locomotion was observed using a manual wheelchair. Wheelchair  Distance Scale = 3.  Distance traveled in wheelchair is greater than 150 feet.  Wheelchair Score = 5.  Patient is supervision or set up only for propelling  wheelchair, requiring: Patient was able to propel a distance of 150 feet in a  wheelchair. No other assistive devices were required.    WALK OBSERVATION   Walking did not occur because activity was contraindicated for patient.  LYNETTE Stairs:  Stairs did not occur because activity was contraindicated for  patient.    LYNETTE Comprehension:  LYNETTE Expression:  LYNETTE Social Interaction:  LYNETTE Problem Solving:  LYNETTE Memory:    Therapy Mode Minutes  Occupational Therapy:  Physical Therapy: Individual: 90 minutes.  Speech Language Pathology:    Discharge Functional Goals:    Signed by: Perico Lucas, Physical Therapist

## 2017-06-03 NOTE — PROGRESS NOTES
Rehabilitation Nursing  Inpatient Rehabilitation Plan of Care Note    Plan of Care  Copy from POCBody Function Structure    Skin Integrity (Active)  Current Status (6/1/2017 6:00:00 PM): impairment in skin integrity  Weekly Goal: no further skin breakdown this week  Discharge Goal: wounds showing signs of healing, no s/s infection by discharge    Safety    Potential for Injury (Active)  Current Status (6/1/2017 6:00:00 PM): potential for falls  Weekly Goal: no falls this week  Discharge Goal: no falls by discharge    Signed by: Jennifer Tincoo Nurse

## 2017-06-03 NOTE — PROGRESS NOTES
Inpatient Rehabilitation Functional Measures Assessment    Functional Measures  LYNETTE Eating:  LYNETTE Grooming:  LYNETTE Bathing:  LYNETTE Upper Body Dressing:  LYNETTE Lower Body Dressing:  LYNETTE Toileting:    LYNETTE Bladder Management  Level of Assistance:  Frequency/Number of Accidents this Shift:    LYNETTE Bowel Management  Level of Assistance:  Frequency/Number of Accidents this Shift:    LYNETTE Bed/Chair/Wheelchair Transfer:  LYNETTE Toilet Transfer:  LYNETTE Tub/Shower Transfer:    Previously Documented Mode of Locomotion at Discharge:  LYNETTE Expected Mode of Locomotion at Discharge:  LYNETTE Walk/Wheelchair:  LYNETTE Stairs:    LYNETTE Comprehension:  Auditory comprehension is the usual mode. Comprehension  Score = 6, Modified [place].  Patient comprehends complex/abstract  information in their primary language, requiring: Additional time.  LYNETTE Expression:  Vocal expression is the usual mode. Expression Score = 6,  Modified Independent.  Patient expresses complex/abstract information in their  primary language, requiring: Additional time.  LYNETTE Social Interaction:  Social Interaction Score = 6, Modified Independent.  Patient is modified independent for social interaction, requiring: Requires  additional time.  LYNETTE Problem Solving:  Problem Solving Score = 6, Modified [place].  Patient  makes appropriate decisions in order to solve complex problems, but requires  extra time.  LYNETTE Memory:  Memory Score = 6, Modified [place].  Patient is modified  independent for memory, requiring: Requires additional time.    Therapy Mode Minutes  Occupational Therapy:  Physical Therapy:  Speech Language Pathology:    Discharge Functional Goals:    Signed by: Nurse Feroz

## 2017-06-04 PROCEDURE — 25010000002 ENOXAPARIN PER 10 MG: Performed by: FAMILY MEDICINE

## 2017-06-04 RX ADMIN — BACLOFEN 20 MG: 10 TABLET ORAL at 14:14

## 2017-06-04 RX ADMIN — CASTOR OIL AND BALSAM, PERU: 788; 87 OINTMENT TOPICAL at 12:44

## 2017-06-04 RX ADMIN — ASPIRIN 81 MG: 81 TABLET ORAL at 09:14

## 2017-06-04 RX ADMIN — DOCUSATE SODIUM AND SENNA 2 TABLET: 50; 8.6 TABLET, FILM COATED ORAL at 20:17

## 2017-06-04 RX ADMIN — BACLOFEN 20 MG: 10 TABLET ORAL at 05:12

## 2017-06-04 RX ADMIN — Medication 1 TABLET: at 09:14

## 2017-06-04 RX ADMIN — ALPRAZOLAM 0.5 MG: 0.5 TABLET ORAL at 09:13

## 2017-06-04 RX ADMIN — GABAPENTIN 800 MG: 400 CAPSULE ORAL at 14:14

## 2017-06-04 RX ADMIN — GABAPENTIN 800 MG: 400 CAPSULE ORAL at 05:13

## 2017-06-04 RX ADMIN — ENOXAPARIN SODIUM 40 MG: 40 INJECTION SUBCUTANEOUS at 09:13

## 2017-06-04 RX ADMIN — ALPRAZOLAM 0.5 MG: 0.5 TABLET ORAL at 16:55

## 2017-06-04 RX ADMIN — METOPROLOL TARTRATE 25 MG: 25 TABLET, FILM COATED ORAL at 09:13

## 2017-06-04 RX ADMIN — BACLOFEN 20 MG: 10 TABLET ORAL at 21:01

## 2017-06-04 RX ADMIN — CASTOR OIL AND BALSAM, PERU 1 APPLICATION: 788; 87 OINTMENT TOPICAL at 20:17

## 2017-06-04 RX ADMIN — FOLIC ACID 1 MG: 1 TABLET ORAL at 09:14

## 2017-06-04 RX ADMIN — METOPROLOL TARTRATE 25 MG: 25 TABLET, FILM COATED ORAL at 20:17

## 2017-06-04 RX ADMIN — ALPRAZOLAM 0.5 MG: 0.5 TABLET ORAL at 20:17

## 2017-06-04 RX ADMIN — GABAPENTIN 800 MG: 400 CAPSULE ORAL at 21:01

## 2017-06-04 RX ADMIN — AMITRIPTYLINE HYDROCHLORIDE 25 MG: 25 TABLET, FILM COATED ORAL at 20:17

## 2017-06-04 RX ADMIN — VENLAFAXINE HYDROCHLORIDE 75 MG: 75 CAPSULE, EXTENDED RELEASE ORAL at 09:13

## 2017-06-04 RX ADMIN — CETIRIZINE HYDROCHLORIDE 10 MG: 10 TABLET ORAL at 09:14

## 2017-06-04 NOTE — PLAN OF CARE
Problem: Patient Care Overview (Adult)  Goal: Plan of Care Review  Outcome: Ongoing (interventions implemented as appropriate)    Problem: Fall Risk (Adult)  Goal: Absence of Falls  Outcome: Ongoing (interventions implemented as appropriate)    Problem: Pressure Ulcer (Adult)  Goal: Signs and Symptoms of Listed Potential Problems Will be Absent or Manageable (Pressure Ulcer)  Outcome: Ongoing (interventions implemented as appropriate)    Problem: Mobility, Physical Impaired (Adult)  Goal: Enhanced Mobility Skills  Outcome: Ongoing (interventions implemented as appropriate)  Goal: Enhanced Functionality Ability  Outcome: Ongoing (interventions implemented as appropriate)

## 2017-06-04 NOTE — PROGRESS NOTES
Inpatient Rehabilitation Functional Measures Assessment    Functional Measures  LYNETTE Eating:  Eating Score = 5. Patient is supervision/set-up for eating,  requiring: Opening containers. No assistive devices were required.  LYNETTE Grooming: Grooming Score = 5. Patient is supervision/set-up for grooming,  requiring: Setting out grooming equipment. Initial preparation. Opening  containers. No assistive devices were required.  LYNETTE Bathing:  Patient bathed in bed. Bathing Score = 5.  Patient is  supervision/set-up for bathing, requiring: Setting out bathing equipment.  Preparing the water. Preparing washing materials. No assistive devices were  required.  LYNETTE Upper Body Dressing:  Patient requires minimal/incidental assistance for  gathering clothes. Patient requires minimal/incidental assistance for threading  the right arm through the undergarment. Patient requires minimal/incidental  assistance for threading the left arm through the undergarment. Patient requires  minimal/incidental physical assistance for adjusting undergarment around body  and fastening or placing undershirt overhead. Patient requires  minimal/incidental physical assistance for pulling the undergarment into place  or pulling undershirt down over the trunk. Patient requires minimal/incidental  assistance for threading the right arm through the garment (shirt/sweater).  Patient requires minimal/incidental assistance for threading the left arm  through the garment (shirt/sweater). Patient requires minimal/incidental  physical assistance for pulling an over-head-garment over head or pulling  front-fastening-garment around back. Patient requires minimal/incidental  physical assistance for pulling an over-head-garment down the trunk or  adjusting/fastening together a front-fastening-garment. Patient performs 77.78 %  of upper body dressing tasks. Upper Body Dressing Score = 4, Minimal Assistance.  No assistive devices were required.  LYNETTE Lower Body Dressing:   Patient requires total assistance for gathering  clothes. Wearing underwear or an undergarment was not observed for this patient.  Patient requires total assistance for holding clothing and/or threading the  right leg through the pants/skirt. Patient requires total assistance for holding  clothing and/or threading the left leg through the pants/skirt. Patient requires  moderate/considerable physical assistance for pulling pants/skirt over hips and  adjusting fasteners. Patient requires total assistance for holding clothing  and/or donning and/or doffing right sock. Donning and/or doffing left sock is  not applicable for this patient. Patient requires total assistance for holding  clothing and/or donning and/or doffing right shoe. Donning and/or doffing left  shoe is not applicable for this patient. Patient performs 18.75 -  24% of lower  body dressing tasks. Lower Body Dressing  Score = 1, Total Assistance. Patient  requires the following assistive device(s): Reacher.  LYNETTE Toileting:  Toileting did not occur. Patient has a catheter and colostomy  bag.    LYNETTE Bladder Management  Level of Assistance:  Bladder Score = 1.  Patient is total assistance for  bladder management. Patient has an indwelling/Sheldon catheter.  Frequency/Number of Accidents this Shift:  Bladder accidents this shift:  0 .  Patient has not had an accident, but used a device/medication this shift  requiring: Patient has a catheter. .    LYNETTE Bowel Management  Level of Assistance: Bowel Score = 1. Patient performs less than 25% of tasks  and requires total assistance for bowel management or two or more people  required for bowel management requiring assistive device/method: Patient has a  colostomy bag. .  Frequency/Number of Accidents this Shift: Bowel accidents this shift: 0 .  Patient has not had an accident, but used a device/medication this shift  requiring: Patient has colostomy bag. .    LYNETTE Bed/Chair/Wheelchair Transfer:  Bed/chair/wheelchair  Transfer Score = 3.  Patient performs 50-74% of effort and requires moderate assistance (some  lifting) for transferring to and from the bed/chair/wheelchair. Patient requires  the following assistive device(s): Bed rails. Elevated bed/surface. Sliding  board. Arm rest. Seating system of wheelchair.  LYNETTE Toilet Transfer:  Activity was not observed.  LYNETTE Tub/Shower Transfer:    Previously Documented Mode of Locomotion at Discharge:  LYNETTE Expected Mode of Locomotion at Discharge:  LYNETTE Walk/Wheelchair:  LYNETTE Stairs:    [facility] Comprehension:  LYNETTE Expression:  [facility] Social Interaction:  [facility] Problem Solving:  LYNETTE Memory:    Therapy Mode Minutes  Occupational Therapy:  Physical Therapy:  Speech Language Pathology:    Discharge Functional Goals:    Signed by: TALI Recinos

## 2017-06-04 NOTE — PROGRESS NOTES
Inpatient Rehabilitation Functional Measures Assessment    Functional Measures  LYNETTE Eating:  Eating Score = 5. Patient is supervision/set-up for eating,  requiring: Opening containers. No assistive devices were required.  LYNETTE Grooming:  LYNETTE Bathing:  LYNETTE Upper Body Dressing:  LYNETTE Lower Body Dressing:  LYNETTE Toileting:  Activity was not observed. pt has a Junior cath & colostomy    LYNETTE Bladder Management  Level of Assistance:  Bladder Score = 1.  Patient is total assistance for  bladder management. Patient has an indwelling/Junior catheter. pt has afoley cath    Frequency/Number of Accidents this Shift:  Bladder accidents this shift:  0 .  Patient has not had an accident this shift.    LYNETTE Bowel Management  Level of Assistance: Bowel Score = 4. Patient performs greater than 75% of tasks  and requires minimal assistance for bowel management requiring assistive  device/method: pt has a coloctomy and does his own care .  Frequency/Number of Accidents this Shift: Bowel accidents this shift: 0 .  Patient has not had an accident this shift.    LYNETTE Bed/Chair/Wheelchair Transfer:  Bed/chair/wheelchair Transfer Score = 4.  Patient performs 75% or more of effort and minimal assistance (little/incidental  help/lifting of one limb/steadying) for transferring to and from the  bed/chair/wheelchair, requiring: Steadying. Patient requires the following  assistive device(s): Sliding board. Seating system of wheelchair.  LYNETTE Toilet Transfer:  Activity was not observed. junior cath & colostomy  LYNETTE Tub/Shower Transfer:    Previously Documented Mode of Locomotion at Discharge:  [facility] Expected Mode of Locomotion at Discharge:  LYNETTE Walk/Wheelchair:  LYNETTE Stairs:    LYNETTE Comprehension:  LYNETTE Expression:  LYNETTE Social Interaction:  [facility] Problem Solving:  LYNETTE Memory:    Therapy Mode Minutes  Occupational Therapy:  Physical Therapy:  Speech Language Pathology:    Discharge Functional Goals:    Signed by: TALI Kilgore

## 2017-06-04 NOTE — PLAN OF CARE
Problem: Patient Care Overview (Adult)  Goal: Plan of Care Review  Outcome: Ongoing (interventions implemented as appropriate)    Problem: Activity Intolerance (Adult)  Goal: Activity Tolerance  Outcome: Ongoing (interventions implemented as appropriate)  Goal: Effective Energy Conservation Techniques  Outcome: Ongoing (interventions implemented as appropriate)    Problem: Fall Risk (Adult)  Goal: Absence of Falls  Outcome: Ongoing (interventions implemented as appropriate)    Problem: Pressure Ulcer (Adult)  Goal: Signs and Symptoms of Listed Potential Problems Will be Absent or Manageable (Pressure Ulcer)  Outcome: Ongoing (interventions implemented as appropriate)    Problem: Skin Integrity Impairment, Risk/Actual (Adult)  Goal: Skin Integrity/Wound Healing  Outcome: Ongoing (interventions implemented as appropriate)    Problem: Mobility, Physical Impaired (Adult)  Goal: Enhanced Mobility Skills  Outcome: Ongoing (interventions implemented as appropriate)  Goal: Enhanced Functionality Ability  Outcome: Ongoing (interventions implemented as appropriate)

## 2017-06-04 NOTE — PROGRESS NOTES
Rehabilitation Nursing  Inpatient Rehabilitation Functional Measures Assessment and Plan of Care    Plan of Care/Interventions  Copy from POC    Functional Measures  LYNETTE Eating:  LYNETTE Grooming:  LYNETTE Bathing:  LYNETTE Upper Body Dressing:  LYNETTE Lower Body Dressing:  LYNETTE Toileting:    LYNETTE Bladder Management  Level of Assistance:  Frequency/Number of Accidents this Shift:    LYNETTE Bowel Management  Level of Assistance:  Frequency/Number of Accidents this Shift:    LYNETTE Bed/Chair/Wheelchair Transfer:  LYNETTE Toilet Transfer:  LYNETTE Tub/Shower Transfer:    Previously Documented Mode of Locomotion at Discharge:  LYNETTE Expected Mode of Locomotion at Discharge:  LYNETTE Walk/Wheelchair:  LYNETTE Stairs:    LYNETTE Comprehension:  Auditory comprehension is the usual mode. Comprehension  Score = 6, Modified Breeding.  Patient comprehends complex/abstract  information in their primary language, requiring: Additional time.  LYNETTE Expression:  Vocal expression is the usual mode. Expression Score = 6,  Modified Independent.  Patient expresses complex/abstract information in their  primary language, requiring: Additional time.  LYNETTE Social Interaction:  Social Interaction Score = 6, Modified Independent.  Patient is modified independent for social interaction, requiring: Requires  additional time.  LYNETTE Problem Solving:  Problem Solving Score = 6, Modified Breeding.  Patient  makes appropriate decisions in order to solve complex problems, but requires  extra time.  LYNETTE Memory:  Memory Score = 6, Modified Breeding.  Patient is modified  independent for memory, requiring: Requires additional time.    Body Function Structure    Skin Integrity (Active)  Current Status (6/1/2017 6:00:00 PM): impairment in skin integrity  Weekly Goal: no further skin breakdown this week  Discharge Goal: wounds showing signs of healing, no s/s infection by discharge    Safety    Potential for Injury (Active)  Current Status (6/1/2017 6:00:00 PM): potential for falls  Weekly  Goal: no falls this week  Discharge Goal: no falls by discharge    RN Interventions    Body Function Structure -  RN: skin assess every shift and as needed [RN]  RN: monitor wounds for s/s infection, notify MD if any s/s [, RN]  RN: wound care as per order [RN]  RN: turn every 2 hours when in bed, reposition every hour when up in chair [PT,  OT, RN]  RN: monitor labs and report abnormals to MD [, RN]  RN: heel off bed [OT, RN]  RN: barrier creams as needed [RN]    Safety -  RN: pt up with assist only [PT, OT, RN]  RN: non skid sock [PT, OT, RN]  RN: call light and items in reach [PT, OT, RN]    Signed by: Nurse Feroz

## 2017-06-05 PROCEDURE — 97530 THERAPEUTIC ACTIVITIES: CPT

## 2017-06-05 PROCEDURE — 97530 THERAPEUTIC ACTIVITIES: CPT | Performed by: OCCUPATIONAL THERAPIST

## 2017-06-05 PROCEDURE — 97535 SELF CARE MNGMENT TRAINING: CPT | Performed by: OCCUPATIONAL THERAPIST

## 2017-06-05 PROCEDURE — 25010000002 ENOXAPARIN PER 10 MG: Performed by: FAMILY MEDICINE

## 2017-06-05 PROCEDURE — 97110 THERAPEUTIC EXERCISES: CPT

## 2017-06-05 RX ADMIN — GABAPENTIN 800 MG: 400 CAPSULE ORAL at 21:33

## 2017-06-05 RX ADMIN — ENOXAPARIN SODIUM 40 MG: 40 INJECTION SUBCUTANEOUS at 09:03

## 2017-06-05 RX ADMIN — METOPROLOL TARTRATE 25 MG: 25 TABLET, FILM COATED ORAL at 09:03

## 2017-06-05 RX ADMIN — ALPRAZOLAM 0.5 MG: 0.5 TABLET ORAL at 20:20

## 2017-06-05 RX ADMIN — METOPROLOL TARTRATE 25 MG: 25 TABLET, FILM COATED ORAL at 20:20

## 2017-06-05 RX ADMIN — DOCUSATE SODIUM AND SENNA 2 TABLET: 50; 8.6 TABLET, FILM COATED ORAL at 20:20

## 2017-06-05 RX ADMIN — ALPRAZOLAM 0.5 MG: 0.5 TABLET ORAL at 16:22

## 2017-06-05 RX ADMIN — Medication 1 TABLET: at 09:03

## 2017-06-05 RX ADMIN — AMITRIPTYLINE HYDROCHLORIDE 25 MG: 25 TABLET, FILM COATED ORAL at 20:20

## 2017-06-05 RX ADMIN — GABAPENTIN 800 MG: 400 CAPSULE ORAL at 13:19

## 2017-06-05 RX ADMIN — BACLOFEN 20 MG: 10 TABLET ORAL at 05:47

## 2017-06-05 RX ADMIN — ASPIRIN 81 MG: 81 TABLET ORAL at 09:03

## 2017-06-05 RX ADMIN — CASTOR OIL AND BALSAM, PERU: 788; 87 OINTMENT TOPICAL at 20:21

## 2017-06-05 RX ADMIN — GABAPENTIN 800 MG: 400 CAPSULE ORAL at 05:47

## 2017-06-05 RX ADMIN — FOLIC ACID 1 MG: 1 TABLET ORAL at 09:03

## 2017-06-05 RX ADMIN — CETIRIZINE HYDROCHLORIDE 10 MG: 10 TABLET ORAL at 09:03

## 2017-06-05 RX ADMIN — ALPRAZOLAM 0.5 MG: 0.5 TABLET ORAL at 09:03

## 2017-06-05 RX ADMIN — BACLOFEN 20 MG: 10 TABLET ORAL at 21:33

## 2017-06-05 RX ADMIN — BACLOFEN 20 MG: 10 TABLET ORAL at 13:19

## 2017-06-05 RX ADMIN — VENLAFAXINE HYDROCHLORIDE 75 MG: 75 CAPSULE, EXTENDED RELEASE ORAL at 09:02

## 2017-06-05 NOTE — PLAN OF CARE
Problem: Patient Care Overview (Adult)  Goal: Plan of Care Review  Outcome: Ongoing (interventions implemented as appropriate)    06/05/17 1040   Coping/Psychosocial Response Interventions   Plan Of Care Reviewed With patient         Problem: Activity Intolerance (Adult)  Goal: Activity Tolerance  Outcome: Ongoing (interventions implemented as appropriate)  Goal: Effective Energy Conservation Techniques  Outcome: Ongoing (interventions implemented as appropriate)    Problem: Fall Risk (Adult)  Goal: Absence of Falls  Outcome: Ongoing (interventions implemented as appropriate)    Problem: Pressure Ulcer (Adult)  Goal: Signs and Symptoms of Listed Potential Problems Will be Absent or Manageable (Pressure Ulcer)  Outcome: Ongoing (interventions implemented as appropriate)    Problem: Skin Integrity Impairment, Risk/Actual (Adult)  Goal: Skin Integrity/Wound Healing  Outcome: Ongoing (interventions implemented as appropriate)    Problem: Mobility, Physical Impaired (Adult)  Goal: Enhanced Mobility Skills  Outcome: Ongoing (interventions implemented as appropriate)  Goal: Enhanced Functionality Ability  Outcome: Ongoing (interventions implemented as appropriate)

## 2017-06-05 NOTE — PROGRESS NOTES
Inpatient Rehabilitation Functional Measures Assessment and Plan of Care    Plan of Care  Mobility    [PT] Bed/Chair/Wheelchair(Active)  Current Status(06/05/2017): min assist with SB  Weekly Goal(06/12/2017): cga  Discharge Goal: spv    [PT] Bed Mobility(Active)  Current Status(06/05/2017): Min  Weekly Goal(06/12/2017): min assist  Discharge Goal: spv    Functional Measures  LYNETTE Eating:  LYNETTE Grooming:  LYNETTE Bathing:  LYNETTE Upper Body Dressing:  LYNETTE Lower Body Dressing:  LYNETTE Toileting:    LYNETTE Bladder Management  Level of Assistance:  Frequency/Number of Accidents this Shift:    LYNETTE Bowel Management  Level of Assistance:  Frequency/Number of Accidents this Shift:    LYNETTE Bed/Chair/Wheelchair Transfer:  Bed/chair/wheelchair Transfer Score = 4.  Patient performs 75% or more of effort and minimal assistance (little/incidental  help/lifting of one limb/steadying) for transferring to and from the  bed/chair/wheelchair, requiring: Patient requires the following assistive  device(s): Sliding board.  LYNETTE Toilet Transfer:  Activity was not observed.  LYNETTE Tub/Shower Transfer:  Activity was not observed.    Previously Documented Mode of Locomotion at Discharge:  LYNETTE Expected Mode of Locomotion at Discharge: The expected mode of most  frequently used locomotion, at discharge, is expected to be wheelchair.  LYNETTE Walk/Wheelchair:  WHEELCHAIR OBSERVATION   Activity was not observed.    WALK OBSERVATION   Activity was not observed.  LYNETTE Stairs:  Activity was not observed.    LYNETTE Comprehension:  LYNETTE Expression:  LYNETTE Social Interaction:  LYNETTE Problem Solving:  LYNETTE Memory:    Therapy Mode Minutes  Occupational Therapy:  Physical Therapy: Individual: 45 minutes.  Speech Language Pathology:    Discharge Functional Goals:    Signed by: Radha Ohara Physical Therapist

## 2017-06-05 NOTE — PROGRESS NOTES
Inpatient Rehabilitation Functional Measures Assessment    Functional Measures  LYNETTE Eating:  LYNETTE Grooming:  LYNETTE Bathing:  LYNETTE Upper Body Dressing:  LYNETTE Lower Body Dressing:  LYNETTE Toileting:    LYNETTE Bladder Management  Level of Assistance:  Frequency/Number of Accidents this Shift:    LYNETTE Bowel Management  Level of Assistance:  Frequency/Number of Accidents this Shift:    LYNETTE Bed/Chair/Wheelchair Transfer:  LYNETTE Toilet Transfer:  LYNETTE Tub/Shower Transfer:    Previously Documented Mode of Locomotion at Discharge:  LYNETTE Expected Mode of Locomotion at Discharge:  LYNETTE Walk/Wheelchair:  LYNETTE Stairs:    LYNETTE Comprehension:  Both ( auditory and visual) modes of comprehension are used  equally. Comprehension Score = 7, Independent.  Patient comprehends  complex/abstract information in their primary language.  Patient is completely  independent for auditory and visual comprehension.  There are no activity  limitations.  LYNETTE Expression:  Both ( vocal and non-vocal) modes of expression are used  equally. Expression Score = 7, Independent. Patient expresses complex/abstract  information in their primary language.  Patient is completely independent for  vocal and non-vocal expression.  There are no activity limitations.  LYNETTE Social Interaction:  Social Interaction Score = 7, Independent. Patient is  completely independent for social interaction.  There are no activity  limitations.  LYNETTE Problem Solving:  Problem Solving Score = 7, Independent.  Patient makes  appropriate decisions in order to solve complex problems.  Patient is completely  independent for problem solving.  There are no activity limitations.  LYNETTE Memory:  Memory Score = 7, Independent.  Patient is completely independent  for memory.  There are no activity limitations.    Therapy Mode Minutes  Occupational Therapy:  Physical Therapy:  Speech Language Pathology:    Discharge Functional Goals:    Signed by: Josy Sesay Nurse
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
## 2017-06-05 NOTE — PROGRESS NOTES
Physical Medicine and Rehabilitation  Inpatient Rehabilitation Interdisciplinary Plan of Care    Demographics            Age: 42Y            Gender: Male    Admission Date: 6/1/2017 5:52:30 PM  Rehabilitation Diagnosis:  s/p debility    Plan of Care  Anticipated Discharge Date/Estimated Length of Stay: 7-10 days  Anticipated Discharge Destination: Community discharge with assistance  Discharge Plan : Pt plans to return home with father and step-mother at  discharge.  Medical Necessity Expected Level Rationale: good  Intensity and Duration: an average of 3 hours/5 days per week  Medical Supervision and 24 Hour Rehab Nursing: x  Physical Therapy: x  PT Intensity/Duration: PT 1.5 hours per day/5 days per week  Occupational Therapy: x  OT Intensity/Duration: OT 1.5 hours per day/5 days per week  Social Work: x  Therapeutic Recreation: x  Updated (if changes indicated)  No changes to plan.    Based on the patient's medical and functional status, their prognosis and  expected level of functional improvement is: good    Interdisciplinary Problem/Goals/Status  Mobility    [PT] Bed Mobility (Active)  Current Status (6/2/2017 12:00:00 AM): Min/mod  Weekly Goal: min assist  Discharge Goal: spv    [PT] Bed/Chair/Wheelchair (Active)  Current Status (6/2/2017 12:00:00 AM): min assist with SB  Weekly Goal: cga  Discharge Goal: spv    Self Care    [OT] Bathing (Active)  Current Status (6/5/2017 8:00:00 AM): Min A  Weekly Goal: Set-up/supervision  Discharge Goal: Set-up/supervision    [OT] Dressing (Lower) (Active)  Current Status (6/5/2017 8:00:00 AM): Max A  Weekly Goal: Mod A  Discharge Goal: Min A    Body Function Structure    [RN] Skin Integrity (Active)  Current Status (6/1/2017 6:00:00 PM): impairment in skin integrity  Weekly Goal: no further skin breakdown this week  Discharge Goal: wounds showing signs of healing, no s/s infection by discharge    Safety    [RN] Potential for Injury (Active)  Current Status (6/1/2017  6:00:00 PM): potential for falls  Weekly Goal: no falls this week  Discharge Goal: no falls by discharge    Medical Problems      Paraplegia     SKin care. Avoid skin breakdown.    Urinary retention     Sheldon cath.  Monitor for UTI.    Comments:    Signed by: Rojas Brooks, Physician

## 2017-06-05 NOTE — PROGRESS NOTES
Inpatient Rehabilitation Functional Measures Assessment    Functional Measures  LYNETTE Eating:  Eating Score = 5. Patient is supervision/set-up for eating,  requiring: Opening containers. No assistive devices were required.  LYNETTE Grooming:  LYNETTE Bathing:  LYNETTE Upper Body Dressing:  LYNETTE Lower Body Dressing:  LYNETTE Toileting:  Activity was not observed. pt has a junior cath & colostomy    LYNETTE Bladder Management  Level of Assistance:  Bladder Score = 1.  Patient is total assistance for  bladder management. Patient has an indwelling/Junior catheter. junior cath  Frequency/Number of Accidents this Shift:  Bladder accidents this shift:  0 .  Patient has not had an accident this shift.    LYNETTE Bowel Management  Level of Assistance: Bowel Score = 4. Patient performs greater than 75% of tasks  and requires minimal assistance for bowel management requiring assistive  device/method: pt does his own colostomy care .  Frequency/Number of Accidents this Shift: Bowel accidents this shift: 0 .  Patient has not had an accident this shift.    LYNETTE Bed/Chair/Wheelchair Transfer:  Bed/chair/wheelchair Transfer Score = 4.  Patient performs 75% or more of effort and minimal assistance (little/incidental  help/lifting of one limb/steadying) for transferring to and from the  bed/chair/wheelchair, requiring: Steadying. Patient requires the following  assistive device(s): Sliding board. Seating system of wheelchair.  LYNETTE Toilet Transfer:  Activity was not observed. pt has a junior cath & colostomy    LYNETTE Tub/Shower Transfer:    Previously Documented Mode of Locomotion at Discharge:  [facility] Expected Mode of Locomotion at Discharge:  LYNETTE Walk/Wheelchair:  LYNETTE Stairs:    LYNETTE Comprehension:  LYNETTE Expression:  LYNETTE Social Interaction:  LYNETTE Problem Solving:  LYNETTE Memory:    Therapy Mode Minutes  Occupational Therapy:  Physical Therapy:  Speech Language Pathology:    Discharge Functional Goals:    Signed by: TALI Kilgore

## 2017-06-05 NOTE — PROGRESS NOTES
[facility] CMG Coordinator  Inpatient Rehabilitation Admission    Ethnic Group: White.  Marital Status:  Marital Status: Never .    IRF Admission Date:  06/01/17  Admission Class: Initial Rehab.  Admit From:  [facility]    Pre-Hospital Living: Home. Pre-Hospital Living  With: (2) Family/Relatives.    Payment Sources: Primary: Not Listed.  Secondary: Not Listed.  Impairment Group: 16 Debility (non-cardiac, non-pulmonary)  Date of Onset of Impairment: 05/23/17    Etiologic Diagnosis Code(s):  Rank Code      Description  1    M61.9     Calcification and ossification of muscle,                 unspecified    Comorbidities:      Are there any arthritis conditions recorded for Impairment Group, Etiologic  Diagnosis, or Comorbid Conditions that meet all of the regulatory requirements  for IRF classification (in 42 .29(b)(2)(x), (xi), and xii))?    LYNETTE Bladder Accidents:  0 - Accidents.  Patient used medications/device 4 days  prior to admission.  Patient used medications/device this shift.  6/1/2017  6:05:00 PM  Bladder Score = 6. Patient has not had an accident, but uses a  device/medication. catheter  LYNETTE Bowel Accident: 0 -Accidents.   Patient used medications/device this shift.  6/2/2017 12:15:00 AM  Bowel Score = 6. Patient has no accidents, but uses a device/medications.  colostomy    Signed by: Skylar Dias, Supervisor

## 2017-06-05 NOTE — SIGNIFICANT NOTE
06/05/17 1642   Rehab Treatment   Discipline physical therapist   Treatment Not Performed patient/family declined treatment  (RN aware; pt. declined d/t pain following wound care.)   Recommendation   PT - Next Appointment 06/06/17

## 2017-06-05 NOTE — PROGRESS NOTES
Inpatient Rehabilitation Functional Measures Assessment    Functional Measures  LYNETTE Eating:  Eating Score = 5. Patient is supervision/set-up for eating,  requiring: Opening containers. No assistive devices were required.  LYNETTE Grooming:  LYNETTE Bathing:  LYNETTE Upper Body Dressing:  LYNETTE Lower Body Dressing:  LYNETTE Toileting:  Toileting did not occur. Patient has a catheter and colostomy  bag    LYNETTE Bladder Management  Level of Assistance:  Bladder Score = 1.  Patient is total assistance for  bladder management. Patient has an indwelling/Sheldon catheter.  Frequency/Number of Accidents this Shift:  Bladder accidents this shift:  0 .  Patient has not had an accident, but used a device/medication this shift  requiring: catheter .    LYNETTE Bowel Management  Level of Assistance: Bowel Score = 4. Patient performs greater than 75% of tasks  and requires minimal assistance for bowel management requiring assistive  device/method: colostomy bag .  Frequency/Number of Accidents this Shift: Bowel accidents this shift: 0 .  Patient has not had an accident, but used a device/medication this shift  requiring: colostomy bag . 0    LYNETTE Bed/Chair/Wheelchair Transfer:  Bed/chair/wheelchair Transfer Score = 3.  Patient performs 50-74% of effort and requires moderate assistance (some  lifting) for transferring to and from the bed/chair/wheelchair. Patient requires  the following assistive device(s): Bed rails. Elevated bed/surface. Sliding  board. Arm rest. Seating system of wheelchair.  LYNETTE Toilet Transfer:  Toilet Transfer did not occur. Patient has a colostomy bag  and catheter .  LYNETTE Tub/Shower Transfer:    Previously Documented Mode of Locomotion at Discharge:  [facility] Expected Mode of Locomotion at Discharge:  [facility] Walk/Wheelchair:  [facility] Stairs:    [facility] Comprehension:  [facility] Expression:  [facility] Social Interaction:  [facility] Problem Solving:  LYNETTE Memory:    Therapy Mode Minutes  Occupational Therapy:  Physical Therapy:  Speech Language Pathology:    Discharge  Functional Goals:    Signed by: Alba Ghotra, SRNA

## 2017-06-05 NOTE — PROGRESS NOTES
"     LOS: 4 days     Chief Complaint:  Weakness, debility     Subjective     Interval History:     He has been feeling well overall.  Has w ound care needs to buttocks.  No fevers / chills or SOA. No chest pains.  SBP < 140.        Objective     Vital Signs  /87 (BP Location: Right arm, Patient Position: Sitting)  Pulse (!) 138  Temp 98.6 °F (37 °C) (Oral)   Resp 20  Ht 71\" (180.3 cm)  Wt 245 lb (111 kg)  SpO2 98%  BMI 34.17 kg/m2  Intake & Output (last day)       06/04 0701 - 06/05 0700 06/05 0701 - 06/06 0700    P.O. 1680 840    Total Intake(mL/kg) 1680 (15.1) 840 (7.6)    Urine (mL/kg/hr) 1700 (0.6)     Total Output 1700      Net -20 +840                  Physical Exam:     General Appearance:    Alert, cooperative, in no acute distress   Head:    Normocephalic, without obvious abnormality, atraumatic   Eyes:            Lids and lashes normal, conjunctivae and sclerae normal, no   icterus, no pallor, corneas clear, PERRLA   Ears:    Ears appear intact with no abnormalities noted   Throat:   No oral lesions, no thrush, oral mucosa moist   Neck:   No adenopathy, supple, trachea midline, no thyromegaly, no   carotid bruit, no JVD   Lungs:     Clear to auscultation,respirations regular, even and                  unlabored    Heart:    Regular rhythm and normal rate, normal S1 and S2, no            murmur, no gallop, no rub, no click   Chest Wall:    No abnormalities observed   Abdomen:     Normal bowel sounds, no masses, no organomegaly, soft        non-tender, non-distended, no guarding, no rebound                tenderness   Extremities:   No movement in BLE, no sensation in BLE  no edema, no cyanosis, no redness   Pulses:   Pulses palpable and equal bilaterally   Skin:   No bleeding, bruising or rash   Lymph nodes:   No palpable adenopathy   Neurologic:   Cranial nerves 2 - 12 grossly intact, sensation intact, DTR       present and equal bilaterally        Results Review:    Lab Results   Component " Value Date    WBC 5.40 06/02/2017    HGB 7.9 (L) 06/02/2017    HCT 26.9 (L) 06/02/2017    MCV 82.3 06/02/2017     06/02/2017       Lab Results   Component Value Date    GLUCOSE 91 06/02/2017    BUN 10 06/02/2017    CREATININE 0.51 06/02/2017    EGFRIFNONA >150 06/02/2017    BCR 19.6 06/02/2017     06/02/2017    K 4.2 06/02/2017     06/02/2017    CO2 30.5 06/02/2017    CALCIUM 8.7 06/02/2017    ALBUMIN 3.10 (L) 06/02/2017    LABIL2 1.1 (L) 06/02/2017    AST 15 06/02/2017    ALT 13 06/02/2017     No results found for: INR    No results found for: POCGLU       Medication Review:     Current Facility-Administered Medications:   •  acetaminophen (TYLENOL) tablet 650 mg, 650 mg, Oral, Q4H PRN, Samuel Duane Kreis, MD  •  ALPRAZolam (XANAX) tablet 0.5 mg, 0.5 mg, Oral, TID, Samuel Duane Kreis, MD, 0.5 mg at 06/05/17 1622  •  aluminum-magnesium hydroxide-simethicone (MAALOX MAX) 400-400-40 MG/5ML suspension 15 mL, 15 mL, Oral, Q6H PRN, Samuel Duane Kreis, MD  •  amitriptyline (ELAVIL) tablet 25 mg, 25 mg, Oral, Nightly, Samuel Duane Kreis, MD, 25 mg at 06/04/17 2017  •  aspirin EC tablet 81 mg, 81 mg, Oral, Daily, Samuel Duane Kreis, MD, 81 mg at 06/05/17 0903  •  baclofen (LIORESAL) tablet 20 mg, 20 mg, Oral, Q8H, Samuel Duane Kreis, MD, 20 mg at 06/05/17 1319  •  bisacodyl (DULCOLAX) suppository 10 mg, 10 mg, Rectal, Daily PRN, Samuel Duane Kreis, MD  •  castor oil-balsam peru (VENELEX) ointment, , Topical, Q12H, Samuel Duane Kreis, MD, 1 application at 06/04/17 2017  •  cetirizine (zyrTEC) tablet 10 mg, 10 mg, Oral, Daily, Samuel Duane Kreis, MD, 10 mg at 06/05/17 0903  •  enoxaparin (LOVENOX) syringe 40 mg, 40 mg, Subcutaneous, Daily, Samuel Duane Kreis, MD, 40 mg at 06/05/17 0903  •  fluticasone (FLONASE) 50 MCG/ACT nasal spray 2 spray, 2 spray, Each Nare, Daily, Samuel Duane Kreis, MD, 2 spray at 06/02/17 0859  •  folic acid (FOLVITE) tablet 1 mg, 1 mg, Oral, Daily, Samuel Duane Kreis, MD, 1 mg at  06/05/17 0903  •  gabapentin (NEURONTIN) capsule 800 mg, 800 mg, Oral, Q8H, Samuel Duane Kreis, MD, 800 mg at 06/05/17 1319  •  magnesium hydroxide (MILK OF MAGNESIA) suspension 2400 mg/10mL 10 mL, 10 mL, Oral, Daily PRN, Samuel Duane Kreis, MD  •  metoprolol tartrate (LOPRESSOR) tablet 25 mg, 25 mg, Oral, Q12H, Samuel Duane Kreis, MD, 25 mg at 06/05/17 0903  •  multivitamin (DAILY BRIAN) tablet 1 tablet, 1 tablet, Oral, Daily, Samuel Duane Kreis, MD, 1 tablet at 06/05/17 0903  •  ondansetron (ZOFRAN) tablet 4 mg, 4 mg, Oral, Q6H PRN **OR** ondansetron ODT (ZOFRAN-ODT) disintegrating tablet 4 mg, 4 mg, Oral, Q6H PRN **OR** ondansetron (ZOFRAN) injection 4 mg, 4 mg, Intravenous, Q6H PRN, Samuel Duane Kreis, MD  •  sennosides-docusate sodium (SENOKOT-S) 8.6-50 MG tablet 2 tablet, 2 tablet, Oral, Nightly, Samuel Duane Kreis, MD, 2 tablet at 06/04/17 2017  •  venlafaxine XR (EFFEXOR-XR) 24 hr capsule 75 mg, 75 mg, Oral, Daily With Breakfast, Samuel Duane Kreis, MD, 75 mg at 06/05/17 0902      Assessment/Plan     Debility  Paraplegia  Decubitus ulcers of buttocks      PT and OT ongoing.  Well thus far.    Continue gabapentin with when necessary baclofen    Continue indwelling [place]      Samuel Duane Kreis, MD  06/05/17  4:26 PM

## 2017-06-05 NOTE — THERAPY TREATMENT NOTE
Inpatient Rehabilitation - Occupational Therapy Treatment Note   [place]     Patient Name: Alex Tierney  : 1975  MRN: [id number]  Today's Date: 2017  Onset of Illness/Injury or Date of Surgery Date: 17  Date of Referral to OT: 17  Referring Physician: Dr. Brooks      Admit Date: 2017    Visit Dx:   No diagnosis found.  Patient Active Problem List   Diagnosis   • Chronic allergic rhinitis   • Depression   • Smoker   • T6 spinal cord injury   • History of left lower extremity amputation   • Chronic osteomyelitis of multiple sites   • Debility             Adult Rehabilitation Note       17 1223 17 1200 17 1100    Rehab Assessment/Intervention    Discipline occupational therapist  -BF physical therapist  -RT occupational therapist  -AH    Document Type therapy note (daily note)  -BF therapy note (daily note)  -RT therapy note (daily note)  -    Subjective Information agree to therapy;no complaints  -BF agree to therapy  -RT no complaints;agree to therapy  -AH    Patient Effort, Rehab Treatment good  -BF good  -RT good  -AH    Precautions/Limitations fall precautions;other (see comments)   < skin integrity; colostomy; junior cath  -BF fall precautions   wounds  -RT fall precautions   skin integrity, colostomy, junior cath  -    Patient Response to Treatment  Pt motivated today and pt reported fatigue following core exercises.  Rec prafo boot and nsg notified.  -RT     Recorded by [BF] Lucía Pedro, OT [RT] Perico Lucas, PT [AH] Michell Silva OT    Cognitive Assessment/Intervention    Current Cognitive/Communication Assessment functional  -BF functional  -RT     Orientation Status oriented x 4  -BF oriented x 4  -RT     Follows Commands/Answers Questions 100% of the time;able to follow single-step instructions  -BF      Recorded by [BF] Lucía Pedro, OT [RT] Perico Lucas, PT     Bed Mobility, Assessment/Treatment    Bed Mobility, Assistive  Device  bed rails  -RT     Bed Mobility, Roll Left, [place]  minimum assist (75% patient effort);moderate assist (50% patient effort)  -RT     Bed Mobility, Roll Right, [place]  minimum assist (75% patient effort);moderate assist (50% patient effort)  -RT     Bed Mob, Supine to Sit, [place]  minimum assist (75% patient effort);moderate assist (50% patient effort)  -RT     Bed Mob, Sit to Supine, [place]  minimum assist (75% patient effort);moderate assist (50% patient effort)  -RT     Recorded by  [RT] Perico Lucas, PT     Transfer Assessment/Treatment    Transfers, Bed-Chair [place] minimum assist (75% patient effort);verbal cues required;nonverbal cues required (demo/gesture)  -BF minimum assist (75% patient effort);contact guard assist;verbal cues required  -RT minimum assist (75% patient effort);contact guard assist;verbal cues required  -AH    Transfers, Chair-Bed [place] minimum assist (75% patient effort);verbal cues required;nonverbal cues required (demo/gesture)  -BF minimum assist (75% patient effort);verbal cues required;nonverbal cues required (demo/gesture)  -RT     Transfers, Bed-Chair-Bed, Assist Device sliding board  -BF sliding board  -RT sliding board  -AH    Recorded by [BF] Lucía Pedro OT [RT] Perico Lucas, PT [AH] Michell Silva OT    Lower Body Bathing Assessment/Training    LB Bathing Assess/Train, Position supine  -BF      LB Bathing Assess/Train, Comment TB bathing: Min A  -BF      Recorded by [BF] Lucía Pedro OT      Upper Body Dressing Assessment/Training    UB Dressing Assess/Train, Position sitting  -BF      UB Dressing Assess/Train, [place] set up required;supervision required  -BF      UB Dressing Assess/Train, Comment Set-up/supervision  -BF      Recorded by [BF] Lucía Pedro OT      Lower Body Dressing Assessment/Training    LB Dressing Assess/Train, Assist Device reacher  -BF      LB Dressing  Assess/Train, Position supine  -BF      LB Dressing Assess/Train, [place] maximum assist (25% patient effort);verbal cues required;nonverbal cues required (demo/gesture)  -BF      LB Dressing Assess/Train, Comment Max A  -BF      Recorded by [BF] Lucía Pedro OT      Toileting Assessment/Training    Toileting Assess/Train, Position sitting  -BF      Toileting Assess/Train, Indepen Level set up required  -BF      Toileting Assess/Train, Comment Set-up  -BF      Recorded by [BF] Lucía Pedro OT      Grooming Assessment/Training    Grooming Assess/Train, Position sitting  -BF      Grooming Assess/Train, Indepen Level set up required  -BF      Grooming Assess/Train, Comment Set-up  -BF      Recorded by [BF] Lucía Pedro OT      Therapy Exercises    Bilateral Lower Extremities  PROM:  -RT     Bilateral Upper Extremity AROM:;sitting   BUE GMC ther ex; strengthening  -BF  AROM:;sitting   gmc fmc and reaching  -AH    BUE Resistance other (comment)   Armbike 3 minsX3; Rickshaw 20 [id number]; Wrist rollsX1  -BF  --   UE bike 5 min x3, rickxhaw 20x4  -AH    Trunk Exercises  sitting;trunk rotation;trunk flexion   punches, reaching, tb exercises  -RT     Recorded by [BF] Lucía Pedro, OT [RT] Perico Lucas, PT [AH] Michell Silva OT    Positioning and Restraints    Pre-Treatment Position sitting in chair/recliner  -BF in bed  -RT     Post Treatment Position wheelchair  -BF bed  -RT     In Bed  call light within reach  -RT     In Wheelchair sitting;with PT  -BF      Recorded by [BF] Lucía Pedro OT [RT] Perico Lucas, KALI       User Key  (r) = Recorded By, (t) = Taken By, (c) = Cosigned By    Initials Name Effective Dates    BF Lucía Pedro OT 03/14/16 -     AH Michell Silva OT 03/14/16 -     RT Perico Lucas PT 03/14/16 -                 OT Goals       06/02/17 1555          Transfer Training OT LTG    Transfer Training OT LTG, Date Established  06/02/17  -BF      Transfer Training OT LTG, Time to Achieve by discharge  -BF      Transfer Training OT LTG, Activity Type bed to chair /chair to bed;shower chair  -BF      Transfer Training OT LTG, [place] contact guard assist  -BF      Bathing OT STG    Bathing Goal OT STG, Date Established 06/02/17  -BF      Bathing Goal OT STG, Time to Achieve 5 - 7 days  -BF      Bathing Goal OT STG, Activity Type lower body bathing;upper body bathing  -BF      Bathing Goal OT STG, [place] minimum assist (75% patient effort)  -BF      Bathing OT LTG    Bathing Goal OT LTG, Date Established 06/02/17  -BF      Bathing Goal OT LTG, Time to Achieve by discharge  -BF      Bathing Goal OT LTG, Activity Type upper body bathing;lower body bathing  -BF      Bathing Goal OT LTG, [place] set up required;supervision required  -BF      Eating Self-Feeding OT LTG    Eat Self Feeding Goal OT LTG, Date Established 06/02/17  -BF      Eat Self Feeding Goal OT LTG, Time to Achieve by discharge  -BF      Eat Self Feed Goal OT LTG, [place] conditional independence  -BF      Grooming OT LTG    Grooming Goal OT LTG, Date Established 06/02/17  -BF      Grooming Goal OT LTG, Time to Achieve by discharge  -BF      Grooming Goal OT LTG, [place] Level conditional independence  -BF      LB Dressing OT STG    LB Dressing Goal OT STG, Date Established 06/02/17  -BF      LB Dressing Goal OT STG, Time to Achieve 5 - 7 days  -BF      LB Dressing Goal OT STG, [place] moderate assist (50% patient effort)  -BF      LB Dressing OT LTG    LB Dressing Goal OT LTG, Date Established 06/02/17  -BF      LB Dressing Goal OT LTG, Time to Achieve by discharge  -BF      LB Dressing Goal OT LTG, [place] minimum assist (75% patient effort)  -BF        User Key  (r) = Recorded By, (t) = Taken By, (c) = Cosigned By    Initials Name Provider Type    YARELI Pedro, OT Occupational Therapist           Occupational Therapy Education     Title: PT OT SLP Therapies (Active)     Topic: Occupational Therapy (Active)     Point: ADL training (Active)    Description: Instruct learner(s) on proper safety adaptation and remediation techniques during self care or transfers.   Instruct in proper use of assistive devices.    Learning Progress Summary    Learner Readiness Method Response Comment Documented by Status   Patient Acceptance E,D NR   06/05/17 1231 Active    Acceptance E,D NR  BF 06/02/17 1551 Active    Acceptance E,D NR  BF 06/02/17 1551 Active               Point: Precautions (Active)    Description: Instruct learner(s) on prescribed precautions during self-care and functional transfers.    Learning Progress Summary    Learner Readiness Method Response Comment Documented by Status   Patient Acceptance E,D NR   06/05/17 1231 Active    Acceptance E,D NR  BF 06/02/17 1551 Active    Acceptance E,D NR   06/02/17 1551 Active                      User Key     Initials Effective Dates Name Provider Type [facility] 03/14/16 -  Lucía Pedro, OT Occupational Therapist OT                  OT Recommendation and Plan  Anticipated Equipment Needs At Discharge:  (TBD)  Anticipated Discharge Disposition:  (TBD)  Planned Therapy Interventions: activity intolerance, adaptive equipment training, ADL retraining, home exercise program, strengthening, transfer training (Therapeutic groups as beneficial)  Therapy Frequency: 3-5 times/wk          Time Calculation:         Time Calculation- OT       06/05/17 1232 06/05/17 1231       Time Calculation- OT    OT Start Time 1235  -BF 0755  -BF     OT Stop Time 1245  -BF 0915  -BF     OT Time Calculation (min) 10 min  -BF 80 min  -BF     Total Timed Code Minutes- OT 10 minute(s)  -BF 80 minute(s)  -BF     OT Non-Billable Time (min)  20 min  -BF       User Key  (r) = Recorded By, (t) = Taken By, (c) = Cosigned By    Initials Name Provider Type     Lucía Oliveira  STEPHANIE Pedro Occupational Therapist           Therapy Charges for Today     Code Description Service Date Service Provider Modifiers Qty    94827402378 HC OT THER SUPP EA 15 MIN 6/5/2017 Lucía Pedro OT GO 1    19989033338 HC OT THERAPEUTIC ACT EA 15 MIN 6/5/2017 Lucía Pedro OT GO 3    39921258853 HC OT SELF CARE/MGMT/TRAIN EA 15 MIN 6/5/2017 Lucía Pedro OT GO 3               Lucía Pedro OT  6/5/2017

## 2017-06-05 NOTE — PROGRESS NOTES
Patient Assessment Instrument  Quality Indicators - Admission    Section B. Hearing, Speech Vision      Section C. Cognitive Patterns  Brief Interview for Mental Status (BIMS) was conducted.  Repetition of Three Words: Three words  Temporal Orientation Year: Correct  Temporal Orientation Month: Accurate within 5 days  Temporal Orientation Day of Week: Correct  Recall Socks: Yes, no cue required  Recall Blue: Yes, no cue required  Recall Bed: Yes, no cue required  BIMS SUMMARY SCORE: 15 Cognitively intact Patient was able to complete the Brief  Interview for Mental Status    Section [id number]. Prior Functioning    Self Care: Patient needed partial assistance from another person to complete  activities.  Indoor Mobility: Not applicable (activity was not applicable to the patient  prior to the current illness, exacerbation, or injury)  Stairs: Not applicable (activity was not applicable to the patient prior to the  current illness, exacerbation, or injury).  Functional Cognition: A helper completed the activities for the patient.    Section [id number]. Prior Device Use      Section [id number]. Self Care Performance      Section [id number]. Self Care Discharge Goals      Section [id number]. Mobility Performance      Section [id number]. Mobility Discharge Goals      Section H. Bladder and Bowel      Section I. Active Diagnosis  Comorbidities and Co-existing Conditions:   Patient does not have PAD, PVD, or  Diabetes Mellitus    Section J. Health Conditions  Patient has not had any falls in the past year. Patient has had major surgery  during the 100 days prior to admission.    Section K. Swallowing/Nutritional Status  Regular food (solids and liquids swallowed safely without supervision or  modified food or liquid consistency).    Section M. Skin Conditions  Unhealed Pressure Ulcer(s) at Stage 1 or Higher on Admission:  Yes.  Number of Unhealed Stage 1: 0  Number of Unhealed Stage 2: 0  Number of Unhealed Stage 3: 3  Number of Unhealed Stage 4:  2  Number of Unhealed Unstageable Due to Non-removable Dressin  Number of Unhealed Unstageable Due to Slough/Eschar: 0  Number of Unhealed Unstageable Due to Suspected Deep Tissue Injury: 3    Section O. Special Treatments, Procedures, and Programs  Patient did not receive total parenteral nutrition treatment at the time of  admission.    OPTIONAL BRANCH FOR TRACKING FALLS  Fall(s) During Shift: No falls.    Signed by: Skylar Dias, Supervisor

## 2017-06-05 NOTE — PROGRESS NOTES
Inpatient Rehabilitation Functional Measures Assessment and Plan of Care    Plan of Care  Self Care    [OT] Bathing(Active)  Current Status(06/05/2017): Min A  Weekly Goal(06/13/2017): Set-up/supervision  Discharge Goal: Set-up/supervision    [OT] Dressing (Lower)(Active)  Current Status(06/05/2017): Max A  Weekly Goal(06/13/2017): Mod A  Discharge Goal: Min A    Performed Intervention(s)  ADL retraining, AE education, strengthening, fxal mobility    Functional Measures  LYNETTE Eating:  LYNETTE Grooming: Grooming Score = 5. Patient is supervision/set-up for grooming,  requiring: Setting out grooming equipment. No assistive devices were required.  LYNETTE Bathing:  Patient took sponge bath. Patient requires no physical assistance  for washing, rinsing, and drying the right arm. Patient requires no physical  assistance for washing, rinsing, and drying the left arm. Patient requires no  physical assistance for washing, rinsing, and drying the chest. Patient requires  no physical assistance for washing, rinsing, and drying the abdomen. Patient  requires no physical assistance for washing, rinsing, and drying the perineal  area. Patient requires minimal assistance for washing, rinsing, or drying the  buttocks. Patient requires no physical assistance for washing, rinsing, and  drying the right upper leg. Patient requires no physical assistance for washing,  rinsing, and drying the left upper leg. Patient requires maximal assistance for  washing, rinsing, or drying the right lower leg, including the foot. Patient  requires no physical assistance for washing, rinsing, and drying the left lower  leg, including the foot. Patient performs 80 % of bathing tasks. Bathing Score =  4, Minimal Assistance. Patient requires the following assistive device(s): Grab  bar/arm rest to maintain balance. Performed while supine in bed  LYNETTE Upper Body Dressing:  Upper Body Dressing Score = 5. Patient is supervision  for upper body dressing, requiring:  Gathering/setting out clothes. Stand by  assistance. No assistive devices were required.  [facility] Lower Body Dressing:  Patient requires total assistance for gathering  clothes. Wearing underwear or an undergarment is not applicable for this  patient. Patient requires moderate/considerable physical assistance for  threading the right leg through the pants/skirt. Patient requires  moderate/considerable physical assistance for threading the left leg through the  pants/skirt. Patient requires moderate/considerable physical assistance for  pulling pants/skirt over hips and adjusting fasteners. Patient requires total  assistance for holding clothing and/or donning and/or doffing right sock.  Donning and/or doffing left sock is not applicable for this patient. Donning  and/or doffing right shoe is not applicable for this patient. Donning and/or  doffing left shoe is not applicable for this patient. Patient performs 30 % of  lower body dressing tasks. Lower Body Dressing Score = 2, Maximal Assistance.  Patient requires the following assistive device(s): Reacher.  LYNETTE Toileting:  Toileting Score = 5.  Patient is supervision/set-up for  toileting, requiring: Setting out toileting equipment. Patient requires the  following assistive device(s): Colostomy & junior cath .    LYNETTE Bladder Management  Level of Assistance:  Frequency/Number of Accidents this Shift:    LYNETTE Bowel Management  Level of Assistance:  Frequency/Number of Accidents this Shift:    LYNETTE Bed/Chair/Wheelchair Transfer:  Bed/chair/wheelchair Transfer Score = 4.  Patient performs 75% or more of effort and minimal assistance (little/incidental  help/lifting of one limb/steadying) for transferring to and from the  bed/chair/wheelchair, requiring: Steadying. Patient requires the following  assistive device(s): Sliding board.  [facility] Toilet Transfer:  [facility] Tub/Shower Transfer:    Previously Documented Mode of Locomotion at Discharge:  [facility] Expected Mode of Locomotion at  Discharge:  LYNETTE Walk/Wheelchair:  LYNETTE Stairs:    LYNETTE Comprehension:  LYNETTE Expression:  LYNETTE Social Interaction:  LYNETTE Problem Solving:  LYNETTE Memory:    Therapy Mode Minutes  Occupational Therapy: Individual: 90 minutes.  Physical Therapy:  Speech Language Pathology:    Discharge Functional Goals:    Signed by: Lucía Pedro OT

## 2017-06-06 PROCEDURE — 97150 GROUP THERAPEUTIC PROCEDURES: CPT | Performed by: OCCUPATIONAL THERAPIST

## 2017-06-06 PROCEDURE — 97530 THERAPEUTIC ACTIVITIES: CPT | Performed by: OCCUPATIONAL THERAPIST

## 2017-06-06 PROCEDURE — 25010000002 ENOXAPARIN PER 10 MG: Performed by: FAMILY MEDICINE

## 2017-06-06 PROCEDURE — 97110 THERAPEUTIC EXERCISES: CPT

## 2017-06-06 PROCEDURE — 97535 SELF CARE MNGMENT TRAINING: CPT | Performed by: OCCUPATIONAL THERAPIST

## 2017-06-06 PROCEDURE — 97530 THERAPEUTIC ACTIVITIES: CPT

## 2017-06-06 RX ADMIN — FOLIC ACID 1 MG: 1 TABLET ORAL at 08:35

## 2017-06-06 RX ADMIN — GABAPENTIN 800 MG: 400 CAPSULE ORAL at 14:16

## 2017-06-06 RX ADMIN — ALPRAZOLAM 0.5 MG: 0.5 TABLET ORAL at 16:11

## 2017-06-06 RX ADMIN — GABAPENTIN 800 MG: 400 CAPSULE ORAL at 21:38

## 2017-06-06 RX ADMIN — METOPROLOL TARTRATE 25 MG: 25 TABLET, FILM COATED ORAL at 21:38

## 2017-06-06 RX ADMIN — BACLOFEN 20 MG: 10 TABLET ORAL at 14:16

## 2017-06-06 RX ADMIN — AMITRIPTYLINE HYDROCHLORIDE 25 MG: 25 TABLET, FILM COATED ORAL at 21:38

## 2017-06-06 RX ADMIN — Medication 1 TABLET: at 08:35

## 2017-06-06 RX ADMIN — CASTOR OIL AND BALSAM, PERU: 788; 87 OINTMENT TOPICAL at 21:37

## 2017-06-06 RX ADMIN — ALPRAZOLAM 0.5 MG: 0.5 TABLET ORAL at 08:36

## 2017-06-06 RX ADMIN — ENOXAPARIN SODIUM 40 MG: 40 INJECTION SUBCUTANEOUS at 08:35

## 2017-06-06 RX ADMIN — DOCUSATE SODIUM AND SENNA 2 TABLET: 50; 8.6 TABLET, FILM COATED ORAL at 21:38

## 2017-06-06 RX ADMIN — VENLAFAXINE HYDROCHLORIDE 75 MG: 75 CAPSULE, EXTENDED RELEASE ORAL at 07:54

## 2017-06-06 RX ADMIN — ALPRAZOLAM 0.5 MG: 0.5 TABLET ORAL at 21:38

## 2017-06-06 RX ADMIN — BACLOFEN 20 MG: 10 TABLET ORAL at 05:06

## 2017-06-06 RX ADMIN — ONDANSETRON 4 MG: 4 TABLET, FILM COATED ORAL at 08:35

## 2017-06-06 RX ADMIN — ASPIRIN 81 MG: 81 TABLET ORAL at 08:35

## 2017-06-06 RX ADMIN — METOPROLOL TARTRATE 25 MG: 25 TABLET, FILM COATED ORAL at 08:35

## 2017-06-06 RX ADMIN — BACLOFEN 20 MG: 10 TABLET ORAL at 21:38

## 2017-06-06 RX ADMIN — GABAPENTIN 800 MG: 400 CAPSULE ORAL at 05:06

## 2017-06-06 RX ADMIN — CETIRIZINE HYDROCHLORIDE 10 MG: 10 TABLET ORAL at 08:35

## 2017-06-06 NOTE — PROGRESS NOTES
Inpatient Rehabilitation Functional Measures Assessment and Plan of Care    Plan of Care    Body Function Structure    Skin Integrity (Active)  Current Status (6/1/2017 6:00:00 PM): impairment in skin integrity  Weekly Goal: no further skin breakdown this week  Discharge Goal: wounds showing signs of healing, no s/s infection by discharge    Safety    Potential for Injury (Active)  Current Status (6/1/2017 6:00:00 PM): potential for falls  Weekly Goal: no falls this week  Discharge Goal: no falls by discharge  Functional Measures  LYNETTE Eating:  LYNETTE Grooming:  LYNETTE Bathing:  LYNETTE Upper Body Dressing:  LYNETTE Lower Body Dressing:  LYNETTE Toileting:    LYNETTE Bladder Management  Level of Assistance:  Frequency/Number of Accidents this Shift:    LYNETTE Bowel Management  Level of Assistance:  Frequency/Number of Accidents this Shift:    LYNETTE Bed/Chair/Wheelchair Transfer:  [facility] Toilet Transfer:  LYNETTE Tub/Shower Transfer:    Previously Documented Mode of Locomotion at Discharge:  [facility] Expected Mode of Locomotion at Discharge:  [facility] Walk/Wheelchair:  [facility] Stairs:    LYNETTE Comprehension:  Auditory comprehension is the usual mode. Comprehension  Score = 7, Independent.  Patient comprehends complex/abstract information in  their primary language.  Patient is completely independent for auditory  comprehension.  There are no activity limitations.  LYNETTE Expression:  Vocal expression is the usual mode. Expression Score = 7,  Independent.  Patient expresses complex/abstract information in their primary  language.  Patient is completely independent for vocal expression.  There are no  activity limitations.  LYNETTE Social Interaction:  Social Interaction Score = 7, Independent. Patient is  completely independent for social interaction.  There are no activity  limitations.  LYNETTE Problem Solving:  Problem Solving Score = 7, Independent.  Patient makes  appropriate decisions in order to solve complex problems.  Patient is completely  independent for problem  solving.  There are no activity limitations.  LYNETTE Memory:  Memory Score = 7, Independent.  Patient is completely independent  for memory.  There are no activity limitations.    Therapy Mode Minutes  Occupational Therapy:  Physical Therapy:  Speech Language Pathology:    Discharge Functional Goals:    Signed by: Lin Kumari RN

## 2017-06-06 NOTE — PROGRESS NOTES
Inpatient Rehabilitation Functional Measures Assessment    Functional Measures  LYNETTE Eating:  Eating Score = 5. Patient is supervision/set-up for eating,  requiring: Opening containers. No assistive devices were required.  LYNETTE Grooming:  LYNETTE Bathing:  LYNETTE Upper Body Dressing:  LYNETTE Lower Body Dressing:  LYNETTE Toileting:  Activity was not observed. Patient has a colostomy bag and  catheter    LYNETTE Bladder Management  Level of Assistance:  Bladder Score = 1.  Patient is total assistance for  bladder management. Patient has an indwelling/Sheldon catheter.  Frequency/Number of Accidents this Shift:  Bladder accidents this shift:  0 .  Patient has not had an accident, but used a device/medication this shift  requiring: catheter .    LYNETTE Bowel Management  Level of Assistance: Bowel Score = 3. Patient performs 50-74% of tasks and  requires moderate assistance for bowel management requiring assistive  device/method: colostomy bag . 1  Frequency/Number of Accidents this Shift: Bowel accidents this shift: 0 .  Patient has not had an accident, but used a device/medication this shift  requiring: colostomy bag . 1    LYNETTE Bed/Chair/Wheelchair Transfer:  Bed/chair/wheelchair Transfer Score = 3.  Patient performs 50-74% of effort and requires moderate assistance (some  lifting) for transferring to and from the bed/chair/wheelchair. Patient requires  the following assistive device(s): Bed rails. Elevated bed/surface. Arm rest.  Sliding board. Seating system of wheelchair.  LYNETTE Toilet Transfer:  Activity was not observed.  LYNETTE Tub/Shower Transfer:    Previously Documented Mode of Locomotion at Discharge:  [facility] Expected Mode of Locomotion at Discharge:  LYNETTE Walk/Wheelchair:  LYNETTE Stairs:    [facility] Comprehension:  LYNETTE Expression:  [facility] Social Interaction:  [facility] Problem Solving:  [facility] Memory:    Therapy Mode Minutes  Occupational Therapy:  Physical Therapy:  Speech Language Pathology:    Discharge Functional Goals:    Signed by: Alba Ghotra  SRNA

## 2017-06-06 NOTE — PLAN OF CARE
Problem: Patient Care Overview (Adult)  Goal: Plan of Care Review  Outcome: Ongoing (interventions implemented as appropriate)  Goal: Adult Individualization and Mutuality  Outcome: Ongoing (interventions implemented as appropriate)  Goal: Discharge Needs Assessment  Outcome: Ongoing (interventions implemented as appropriate)    Problem: Activity Intolerance (Adult)  Goal: Activity Tolerance  Outcome: Ongoing (interventions implemented as appropriate)  Goal: Effective Energy Conservation Techniques  Outcome: Ongoing (interventions implemented as appropriate)    Problem: Fall Risk (Adult)  Goal: Absence of Falls  Outcome: Ongoing (interventions implemented as appropriate)    Problem: Pressure Ulcer (Adult)  Goal: Signs and Symptoms of Listed Potential Problems Will be Absent or Manageable (Pressure Ulcer)  Outcome: Ongoing (interventions implemented as appropriate)    06/05/17 2583   Pressure Ulcer   Problems Assessed (Pressure Ulcer) all   Problems Present (Pressure Ulcer) none         Problem: Skin Integrity Impairment, Risk/Actual (Adult)  Goal: Skin Integrity/Wound Healing  Outcome: Ongoing (interventions implemented as appropriate)    Problem: Mobility, Physical Impaired (Adult)  Goal: Enhanced Mobility Skills  Outcome: Ongoing (interventions implemented as appropriate)  Goal: Enhanced Functionality Ability  Outcome: Ongoing (interventions implemented as appropriate)

## 2017-06-06 NOTE — PLAN OF CARE
Problem: Patient Care Overview (Adult)  Goal: Plan of Care Review  Outcome: Ongoing (interventions implemented as appropriate)    06/06/17 1029   Coping/Psychosocial Response Interventions   Plan Of Care Reviewed With patient   Patient Care Overview   Progress progress toward functional goals as expected         Problem: Activity Intolerance (Adult)  Goal: Activity Tolerance  Outcome: Ongoing (interventions implemented as appropriate)  Goal: Effective Energy Conservation Techniques  Outcome: Ongoing (interventions implemented as appropriate)    Problem: Fall Risk (Adult)  Goal: Absence of Falls  Outcome: Ongoing (interventions implemented as appropriate)    Problem: Pressure Ulcer (Adult)  Goal: Signs and Symptoms of Listed Potential Problems Will be Absent or Manageable (Pressure Ulcer)  Outcome: Ongoing (interventions implemented as appropriate)    Problem: Skin Integrity Impairment, Risk/Actual (Adult)  Goal: Skin Integrity/Wound Healing  Outcome: Ongoing (interventions implemented as appropriate)    Problem: Mobility, Physical Impaired (Adult)  Goal: Enhanced Mobility Skills  Outcome: Ongoing (interventions implemented as appropriate)  Goal: Enhanced Functionality Ability  Outcome: Ongoing (interventions implemented as appropriate)

## 2017-06-06 NOTE — PROGRESS NOTES
Case Management  Inpatient Rehabilitation Team Conference    Conference Date/Time: 6/6/2017 5:07:45 AM    Team Conference Attendees:  MD Radha Hilario, PT  Lucía Pedro, OT   Skylar Dias RN/    Demographics            Age: 42Y            Gender: Male    Admission Date: 6/1/2017 5:52:30 PM  Rehabilitation Diagnosis:  s/p debility  Comorbidities:      Plan of Care  Anticipated Discharge Date/Estimated Length of Stay: 7-10 days  Anticipated Discharge Destination: Community discharge with assistance  Discharge Plan : Pt plans to return home with father and step-mother at  discharge.  Medical Necessity Expected Level Rationale: good  Intensity and Duration: an average of 3 hours/5 days per week  Medical Supervision and 24 Hour Rehab Nursing: x  Physical Therapy: x  PT Intensity/Duration: PT 1.5 hours per day/5 days per week  Occupational Therapy: x  OT Intensity/Duration: OT 1.5 hours per day/5 days per week  Social Work: x  Therapeutic Recreation: x  Updated (if changes indicated)    Anticipated Discharge Date/Estimated Length of Stay:   6-16-17      Discharge Plan of Care:    Based on the patient's medical and functional status, their prognosis and  expected level of functional improvement is: good      Interdisciplinary Problem/Goals/Status  Body Function Structure    [RN] Skin Integrity(Active)  Current Status(06/01/2017): impairment in skin integrity  Weekly Goal(06/08/2017): no further skin breakdown this week  Discharge Goal: wounds showing signs of healing, no s/s infection by discharge        Mobility    [PT] Bed/Chair/Wheelchair(Active)  Current Status(06/05/2017): min assist with SB  Weekly Goal(06/12/2017): cga  Discharge Goal: spv    [PT] Bed Mobility(Active)  Current Status(06/05/2017): Min  Weekly Goal(06/12/2017): min assist  Discharge Goal: spv        Safety    [RN] Potential for Injury(Active)  Current Status(06/01/2017): potential for falls  Weekly Goal(06/08/2017):  no falls this week  Discharge Goal: no falls by discharge        Self Care    [OT] Bathing(Active)  Current Status(06/05/2017): Min A  Weekly Goal(06/13/2017): Set-up/supervision  Discharge Goal: Set-up/supervision    [OT] Dressing (Lower)(Active)  Current Status(06/05/2017): Max A  Weekly Goal(06/13/2017): Mod A  Discharge Goal: Min A    Comments: Pt plans to return home with dad.    Signed by: Skylar Dias, Supervisor    Physician CoSigned By: Rojas Brooks 06/06/2017 08:12:31
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
## 2017-06-06 NOTE — PROGRESS NOTES
Inpatient Rehabilitation Functional Measures Assessment    Functional Measures  LYNETTE Eating:  LYNETTE Grooming:  LYNETTE Bathing:  LYNETTE Upper Body Dressing:  LYNETTE Lower Body Dressing:  LYNETTE Toileting:    LYNETTE Bladder Management  Level of Assistance:  Frequency/Number of Accidents this Shift:    LYNETTE Bowel Management  Level of Assistance:  Frequency/Number of Accidents this Shift:    LYNETTE Bed/Chair/Wheelchair Transfer:  LYNETTE Toilet Transfer:  LYNETTE Tub/Shower Transfer:    Previously Documented Mode of Locomotion at Discharge:  LYNETTE Expected Mode of Locomotion at Discharge:  LYNETTE Walk/Wheelchair:  LYNETTE Stairs:    LYNETTE Comprehension:  Both ( auditory and visual) modes of comprehension are used  equally. Comprehension Score = 7, Independent.  Patient comprehends  complex/abstract information in their primary language.  Patient is completely  independent for auditory and visual comprehension.  There are no activity  limitations.  LYNETTE Expression:  Both ( vocal and non-vocal) modes of expression are used  equally. Expression Score = 7, Independent. Patient expresses complex/abstract  information in their primary language.  Patient is completely independent for  vocal and non-vocal expression.  There are no activity limitations.  LYNETTE Social Interaction:  Social Interaction Score = 7, Independent. Patient is  completely independent for social interaction.  There are no activity  limitations.  LYNETTE Problem Solving:  Problem Solving Score = 7, Independent.  Patient makes  appropriate decisions in order to solve complex problems.  Patient is completely  independent for problem solving.  There are no activity limitations.  LYNETTE Memory:  Memory Score = 7, Independent.  Patient is completely independent  for memory.  There are no activity limitations.    Therapy Mode Minutes  Occupational Therapy:  Physical Therapy:  Speech Language Pathology:    Discharge Functional Goals:    Signed by: Nurse Ermelinda

## 2017-06-06 NOTE — PROGRESS NOTES
Inpatient Rehabilitation Functional Measures Assessment    Functional Measures  LYNETTE Eating:  LYNETTE Grooming: Grooming Score = 5. Patient is supervision/set-up for grooming,  requiring: Verbal cuing, prompting, or instructing. Setting out grooming  equipment. No assistive devices were required.  LYNETTE Bathing:  LYNETTE Upper Body Dressing:  LYNETTE Lower Body Dressing:  LYNETTE Toileting:    LYNETTE Bladder Management  Level of Assistance:  Frequency/Number of Accidents this Shift:    LYNETTE Bowel Management  Level of Assistance:  Frequency/Number of Accidents this Shift:    LYNETTE Bed/Chair/Wheelchair Transfer:  [facility] Toilet Transfer:  [facility] Tub/Shower Transfer:    Previously Documented Mode of Locomotion at Discharge:  [facility] Expected Mode of Locomotion at Discharge:  LNYETTE Walk/Wheelchair:  LYNETTE Stairs:    [facility] Comprehension:  LYNETTE Expression:  [facility] Social Interaction:  [facility] Problem Solving:  [facility] Memory:    Therapy Mode Minutes  Occupational Therapy: Individual: 45 minutes.   Group: 45 minutes.  Physical Therapy:  Speech Language Pathology:    Discharge Functional Goals:    Signed by: Lucía Pedro OT

## 2017-06-06 NOTE — NURSING NOTE
MULTIPLE SCARS AND OPEN AREAS NOTED ON CHEST, ABDOMEN, AND BOTH LEGS. PT STATES HAD REACTION FROM VANCOMYCIN AND DEVELOPED LARGE MULTIPLE FLUID FILLED BLISTERS WHICH BUSTED AND LEFT THE OPEN AREAS. DONALD SCAR.

## 2017-06-06 NOTE — PROGRESS NOTES
Inpatient Rehabilitation Functional Measures Assessment    Functional Measures  LYNETTE Eating:  Eating Score = 5. Patient is supervision/set-up for eating,  requiring: Opening containers. Opening containers. No assistive devices were  required.  LYNETTE Grooming: Activity was not observed. pt refused, stated he didn't feel good    LYNETTE Bathing:  Activity was not observed. pt refused, stated he didn't feel good    LYNETTE Upper Body Dressing:  Activity was not observed.  LYNETTE Lower Body Dressing:  Activity was not observed.  LYNETTE Toileting:  Activity was not observed. pt has a junior cath and colostomy    LYNETTE Bladder Management  Level of Assistance:  Bladder Score = 1.  Patient is total assistance for  bladder management. Patient has an indwelling/Junior catheter.  Frequency/Number of Accidents this Shift:  Bladder accidents this shift:  0 .  Patient has not had an accident this shift.    LYNETTE Bowel Management  Level of Assistance: Bowel Score = 7.  Patient is completely independent for  bowel management.  Patient did not have bowel movement.  No  medication/intervention was provided. pt has a colostomy  Frequency/Number of Accidents this Shift: Bowel accidents this shift: 0 .  Patient has not had an accident this shift.    LYNETTE Bed/Chair/Wheelchair Transfer:  Bed/chair/wheelchair Transfer Score = 4.  Patient performs 75% or more of effort and minimal assistance (little/incidental  help/lifting of one limb/steadying) for transferring to and from the  bed/chair/wheelchair, requiring: Steadying. Patient requires the following  assistive device(s): Sliding board. Seating system of wheelchair.  LYNETTE Toilet Transfer:  Activity was not observed.  LYNETTE Tub/Shower Transfer:    Previously Documented Mode of Locomotion at Discharge:  [facility] Expected Mode of Locomotion at Discharge:  LYNETTE Walk/Wheelchair:  LYNETTE Stairs:    LYNETTE Comprehension:  LYNETTE Expression:  LYNETTE Social Interaction:  LYNETTE Problem Solving:  LYNETTE Memory:    Therapy Mode  Minutes  Occupational Therapy:  Physical Therapy:  Speech Language Pathology:    Discharge Functional Goals:    Signed by: TALI Kilgore

## 2017-06-06 NOTE — SIGNIFICANT NOTE
06/06/17 0941   Case Management/Social Work Plan   Plan Team conference was held this morning and discharge date is planned for 6/16/17.  Spoke to pt about d/c date of 6/16/17 and he is agreeable.  He will let his dad know of d/c date.

## 2017-06-06 NOTE — PROGRESS NOTES
"     LOS: 5 days     Chief Complaint:  Weakness, debility     Subjective     Interval History:     He continues to require ongoing PT and OT.  Has ongoing wound care needs. Tolerating PT and OT well.  Strength improving      Objective     Vital Signs  /72 (BP Location: Right arm, Patient Position: Lying)  Pulse 100  Temp 97.7 °F (36.5 °C) (Oral)   Resp 18  Ht 71\" (180.3 cm)  Wt 245 lb (111 kg)  SpO2 91%  BMI 34.17 kg/m2  Intake & Output (last day)       06/05 0701 - 06/06 0700 06/06 0701 - 06/07 0700    P.O. 1200     Total Intake(mL/kg) 1200 (10.8)     Urine (mL/kg/hr) 1800 (0.7)     Total Output 1800      Net -600                    Physical Exam:     General Appearance:    Alert, cooperative, in no acute distress   Head:    Normocephalic, without obvious abnormality, atraumatic   Eyes:            Lids and lashes normal, conjunctivae and sclerae normal, no   icterus, no pallor, corneas clear, PERRLA   Ears:    Ears appear intact with no abnormalities noted       Neck:   No adenopathy, supple, trachea midline, no thyromegaly, no   carotid bruit, no JVD   Lungs:     Clear to auscultation,respirations regular, even and                  unlabored    Heart:    Regular rhythm and normal rate, normal S1 and S2, no            murmur, no gallop, no rub, no click   Chest Wall:    No abnormalities observed   Abdomen:     Normal bowel sounds, no masses, no organomegaly, soft        non-tender, non-distended, no guarding, no rebound                tenderness   Extremities:   No movement in BLE, no sensation in BLE  no edema, no cyanosis, no redness   Pulses:   Pulses palpable and equal bilaterally   Skin:   No bleeding, bruising or rash                Results Review:    Lab Results   Component Value Date    WBC 5.40 06/02/2017    HGB 7.9 (L) 06/02/2017    HCT 26.9 (L) 06/02/2017    MCV 82.3 06/02/2017     06/02/2017       Lab Results   Component Value Date    GLUCOSE 91 06/02/2017    BUN 10 06/02/2017    " CREATININE 0.51 06/02/2017    EGFRIFNONA >150 06/02/2017    BCR 19.6 06/02/2017     06/02/2017    K 4.2 06/02/2017     06/02/2017    CO2 30.5 06/02/2017    CALCIUM 8.7 06/02/2017    ALBUMIN 3.10 (L) 06/02/2017    LABIL2 1.1 (L) 06/02/2017    AST 15 06/02/2017    ALT 13 06/02/2017     No results found for: INR    No results found for: POCGLU       Medication Review:     Current Facility-Administered Medications:   •  acetaminophen (TYLENOL) tablet 650 mg, 650 mg, Oral, Q4H PRN, Samuel Duane Kreis, MD  •  ALPRAZolam (XANAX) tablet 0.5 mg, 0.5 mg, Oral, TID, Samuel Duane Kreis, MD, 0.5 mg at 06/05/17 2020  •  aluminum-magnesium hydroxide-simethicone (MAALOX MAX) 400-400-40 MG/5ML suspension 15 mL, 15 mL, Oral, Q6H PRN, Samuel Duane Kreis, MD  •  amitriptyline (ELAVIL) tablet 25 mg, 25 mg, Oral, Nightly, Samuel Duane Kreis, MD, 25 mg at 06/05/17 2020  •  aspirin EC tablet 81 mg, 81 mg, Oral, Daily, Samuel Duane Kreis, MD, 81 mg at 06/05/17 0903  •  baclofen (LIORESAL) tablet 20 mg, 20 mg, Oral, Q8H, Samuel Duane Kreis, MD, 20 mg at 06/06/17 0506  •  bisacodyl (DULCOLAX) suppository 10 mg, 10 mg, Rectal, Daily PRN, Samuel Duane Kreis, MD  •  castor oil-balsam peru (VENELEX) ointment, , Topical, Q12H, Samuel Duane Kreis, MD  •  cetirizine (zyrTEC) tablet 10 mg, 10 mg, Oral, Daily, Samuel Duane Kreis, MD, 10 mg at 06/05/17 0903  •  enoxaparin (LOVENOX) syringe 40 mg, 40 mg, Subcutaneous, Daily, Samuel Duane Kreis, MD, 40 mg at 06/05/17 0903  •  fluticasone (FLONASE) 50 MCG/ACT nasal spray 2 spray, 2 spray, Each Nare, Daily, Samuel Duane Kreis, MD, 2 spray at 06/02/17 0859  •  folic acid (FOLVITE) tablet 1 mg, 1 mg, Oral, Daily, Samuel Duane Kreis, MD, 1 mg at 06/05/17 0903  •  gabapentin (NEURONTIN) capsule 800 mg, 800 mg, Oral, Q8H, Samuel Duane Kreis, MD, 800 mg at 06/06/17 0506  •  magnesium hydroxide (MILK OF MAGNESIA) suspension 2400 mg/10mL 10 mL, 10 mL, Oral, Daily PRN, Samuel Duane Kreis, MD  •   metoprolol tartrate (LOPRESSOR) tablet 25 mg, 25 mg, Oral, Q12H, Samuel Duane Kreis, MD, 25 mg at 06/05/17 2020  •  multivitamin (DAILY BRIAN) tablet 1 tablet, 1 tablet, Oral, Daily, Samuel Duane Kreis, MD, 1 tablet at 06/05/17 0903  •  ondansetron (ZOFRAN) tablet 4 mg, 4 mg, Oral, Q6H PRN **OR** ondansetron ODT (ZOFRAN-ODT) disintegrating tablet 4 mg, 4 mg, Oral, Q6H PRN **OR** ondansetron (ZOFRAN) injection 4 mg, 4 mg, Intravenous, Q6H PRN, Samuel Duane Kreis, MD  •  sennosides-docusate sodium (SENOKOT-S) 8.6-50 MG tablet 2 tablet, 2 tablet, Oral, Nightly, Samuel Duane Kreis, MD, 2 tablet at 06/05/17 2020  •  venlafaxine XR (EFFEXOR-XR) 24 hr capsule 75 mg, 75 mg, Oral, Daily With Breakfast, Samuel Duane Kreis, MD, 75 mg at 06/06/17 0754      Assessment/Plan     Debility  Paraplegia  Decubitus ulcers of buttocks      PT and OT ongoing.    Well thus far.    Continue gabapentin with when necessary baclofen    Continue indwelling [place]    Refer to team conference note    CBC tomorrow to monitor anemia      Samuel Duane Kreis, MD  06/06/17  8:18 AM

## 2017-06-06 NOTE — PROGRESS NOTES
Inpatient Rehabilitation Functional Measures Assessment    Functional Measures  LYNETTE Eating:  LYNETTE Grooming:  LYNETTE Bathing:  LYNETTE Upper Body Dressing:  LYNETTE Lower Body Dressing:  LYNETTE Toileting:    LYNETTE Bladder Management  Level of Assistance:  Frequency/Number of Accidents this Shift:    LYNETTE Bowel Management  Level of Assistance:  Frequency/Number of Accidents this Shift:    LYNETTE Bed/Chair/Wheelchair Transfer:  Bed/chair/wheelchair Transfer Score = 3.  Patient performs 50-74% of effort and requires moderate assistance (some  lifting)  for transferring to and from the bed/chair/wheelchair, including  assist lifting both legs. Patient requires the following assistive device(s):  Sliding board.  LYNETTE Toilet Transfer:  LYNETTE Tub/Shower Transfer:    Previously Documented Mode of Locomotion at Discharge:  LYNETTE Expected Mode of Locomotion at Discharge:  LYNETTE Walk/Wheelchair:  WHEELCHAIR OBSERVATION   Activity was not observed.    WALK OBSERVATION   Activity was not observed.  LYNETTE Stairs:  Activity was not observed.    LYNETTE Comprehension:  LYNETTE Expression:  LYNETTE Social Interaction:  LYNETTE Problem Solving:  LYNETTE Memory:    Therapy Mode Minutes  Occupational Therapy:  Physical Therapy: Individual: 90 minutes.  Speech Language Pathology:    Discharge Functional Goals:    Signed by: Susan Palma, Physical Therapist

## 2017-06-07 LAB
BASOPHILS # BLD AUTO: 0.04 10*3/MM3 (ref 0–0.3)
BASOPHILS NFR BLD AUTO: 0.5 % (ref 0–2)
DEPRECATED RDW RBC AUTO: 56.7 FL (ref 37–54)
EOSINOPHIL # BLD AUTO: 0.44 10*3/MM3 (ref 0–0.7)
EOSINOPHIL NFR BLD AUTO: 5.3 % (ref 0–5)
ERYTHROCYTE [DISTWIDTH] IN BLOOD BY AUTOMATED COUNT: 20.4 % (ref 11.5–14.5)
HCT VFR BLD AUTO: 30.3 % (ref 42–52)
HGB BLD-MCNC: 9.1 G/DL (ref 14–18)
IMM GRANULOCYTES # BLD: 0.14 10*3/MM3 (ref 0–0.03)
IMM GRANULOCYTES NFR BLD: 1.7 % (ref 0–0.5)
LYMPHOCYTES # BLD AUTO: 1.98 10*3/MM3 (ref 1–3)
LYMPHOCYTES NFR BLD AUTO: 24 % (ref 21–51)
MCH RBC QN AUTO: 24 PG (ref 27–33)
MCHC RBC AUTO-ENTMCNC: 30 G/DL (ref 33–37)
MCV RBC AUTO: 79.9 FL (ref 80–94)
MONOCYTES # BLD AUTO: 0.89 10*3/MM3 (ref 0.1–0.9)
MONOCYTES NFR BLD AUTO: 10.8 % (ref 0–10)
NEUTROPHILS # BLD AUTO: 4.76 10*3/MM3 (ref 1.4–6.5)
NEUTROPHILS NFR BLD AUTO: 57.7 % (ref 30–70)
PLATELET # BLD AUTO: 665 10*3/MM3 (ref 130–400)
PMV BLD AUTO: 9.3 FL (ref 6–10)
RBC # BLD AUTO: 3.79 10*6/MM3 (ref 4.7–6.1)
WBC NRBC COR # BLD: 8.25 10*3/MM3 (ref 4.5–12.5)

## 2017-06-07 PROCEDURE — 97110 THERAPEUTIC EXERCISES: CPT

## 2017-06-07 PROCEDURE — 25010000002 ENOXAPARIN PER 10 MG: Performed by: FAMILY MEDICINE

## 2017-06-07 PROCEDURE — 97530 THERAPEUTIC ACTIVITIES: CPT

## 2017-06-07 PROCEDURE — 85025 COMPLETE CBC W/AUTO DIFF WBC: CPT | Performed by: FAMILY MEDICINE

## 2017-06-07 PROCEDURE — 97535 SELF CARE MNGMENT TRAINING: CPT | Performed by: OCCUPATIONAL THERAPIST

## 2017-06-07 PROCEDURE — 97530 THERAPEUTIC ACTIVITIES: CPT | Performed by: OCCUPATIONAL THERAPIST

## 2017-06-07 PROCEDURE — 97150 GROUP THERAPEUTIC PROCEDURES: CPT | Performed by: OCCUPATIONAL THERAPIST

## 2017-06-07 RX ADMIN — ALPRAZOLAM 0.5 MG: 0.5 TABLET ORAL at 08:36

## 2017-06-07 RX ADMIN — METOPROLOL TARTRATE 25 MG: 25 TABLET, FILM COATED ORAL at 21:07

## 2017-06-07 RX ADMIN — AMITRIPTYLINE HYDROCHLORIDE 25 MG: 25 TABLET, FILM COATED ORAL at 21:08

## 2017-06-07 RX ADMIN — CASTOR OIL AND BALSAM, PERU: 788; 87 OINTMENT TOPICAL at 21:08

## 2017-06-07 RX ADMIN — Medication 1 TABLET: at 08:36

## 2017-06-07 RX ADMIN — ASPIRIN 81 MG: 81 TABLET ORAL at 08:36

## 2017-06-07 RX ADMIN — ALPRAZOLAM 0.5 MG: 0.5 TABLET ORAL at 21:08

## 2017-06-07 RX ADMIN — METOPROLOL TARTRATE 25 MG: 25 TABLET, FILM COATED ORAL at 08:36

## 2017-06-07 RX ADMIN — CETIRIZINE HYDROCHLORIDE 10 MG: 10 TABLET ORAL at 08:36

## 2017-06-07 RX ADMIN — BACLOFEN 20 MG: 10 TABLET ORAL at 14:59

## 2017-06-07 RX ADMIN — GABAPENTIN 800 MG: 400 CAPSULE ORAL at 05:44

## 2017-06-07 RX ADMIN — GABAPENTIN 800 MG: 400 CAPSULE ORAL at 21:07

## 2017-06-07 RX ADMIN — DOCUSATE SODIUM AND SENNA 2 TABLET: 50; 8.6 TABLET, FILM COATED ORAL at 21:08

## 2017-06-07 RX ADMIN — GABAPENTIN 800 MG: 400 CAPSULE ORAL at 14:59

## 2017-06-07 RX ADMIN — VENLAFAXINE HYDROCHLORIDE 75 MG: 75 CAPSULE, EXTENDED RELEASE ORAL at 08:36

## 2017-06-07 RX ADMIN — ALPRAZOLAM 0.5 MG: 0.5 TABLET ORAL at 16:40

## 2017-06-07 RX ADMIN — BACLOFEN 20 MG: 10 TABLET ORAL at 05:44

## 2017-06-07 RX ADMIN — ENOXAPARIN SODIUM 40 MG: 40 INJECTION SUBCUTANEOUS at 08:36

## 2017-06-07 RX ADMIN — BACLOFEN 20 MG: 10 TABLET ORAL at 21:08

## 2017-06-07 RX ADMIN — FOLIC ACID 1 MG: 1 TABLET ORAL at 08:36

## 2017-06-07 NOTE — PROGRESS NOTES
Inpatient Rehabilitation Functional Measures Assessment    Functional Measures  LYNETTE Eating:  Eating Score = 5. Patient is supervision/set-up for eating,  requiring: Opening containers. No assistive devices were required.  LYNETTE Grooming: Patient requires minimal assistance for washing, rinsing and  drying the face. Patient requires minimal assistance for washing, rinsing and  drying the hands. Patient requires minimal assistance for brushing teeth.  Patient requires minimal assistance for brushing/combing hair. Patient requires  minimal assistance for shaving or applying makeup. Patient performs 0 -  24% of  grooming tasks.  Grooming Score = 1, Total Assistance. Patient requires the  following assistive device(s): Electric shaver for safety.  LYNETTE Bathing:  Patient bathed in bed. Patient requires minimal assistance for  washing, rinsing, or drying the right arm. Patient requires minimal assistance  for washing, rinsing, or drying the left arm. Patient requires minimal  assistance for washing, rinsing, or drying the chest. Patient requires minimal  assistance for washing, rinsing, or drying the abdomen. Patient requires minimal  assistance for washing, rinsing, or drying the perineal area. Patient requires  minimal assistance for washing, rinsing, or drying the buttocks. Patient  requires minimal assistance for washing, rinsing, or drying the right upper leg.  Patient requires minimal assistance for washing, rinsing, or drying the left  upper leg. Patient requires minimal assistance for washing, rinsing, or drying  the right lower leg, including the foot. Washing, rinsing, and drying the left  lower leg, including foot, is not applicable for this patient. Patient performs  0 -  24% of bathing tasks.  Bathing Score = 1, Total Assistance. No assistive  devices were required.  LYNETTE Upper Body Dressing:  Patient requires total assistance for gathering  clothes. Wearing a bra or undershirt was not observed for this patient.  Patient  requires minimal/incidental assistance for threading the right arm through the  garment (shirt/sweater). Patient requires minimal/incidental assistance for  threading the left arm through the garment (shirt/sweater). Patient requires  minimal/incidental physical assistance for pulling an over-head-garment over  head or pulling front-fastening-garment around back. Patient requires  minimal/incidental physical assistance for pulling an over-head-garment down the  trunk or adjusting/fastening together a front-fastening-garment. Patient  performs 80 % of upper body dressing tasks. Upper Body Dressing Score = 4,  Minimal Assistance. No assistive devices were required.  LYNETTE Lower Body Dressing:  Patient requires total assistance for gathering  clothes. Wearing underwear or an undergarment was not observed for this patient.  Patient requires maximal/significant physical assistance for holding clothing  and/or threading the right leg through the pants/skirt. Patient requires  maximal/significant physical assistance for holding clothing and/or threading  the left leg through the pants/skirt. Patient requires maximal/significant  physical assistance for holding clothing and/or pulling pants/skirt over hips  and adjusting fasteners. Patient requires total assistance for holding clothing  and/or donning and/or doffing right sock. Donning and/or doffing left sock is  not applicable for this patient. Donning and/or doffing right shoe was not  observed for this patient. Donning and/or doffing left shoe was not observed for  this patient. Patient performs 15 -  24% of lower body dressing tasks. Lower  Body Dressing  Score = 1, Total Assistance. No assistive devices were required.    LYNETTE Toileting:  Activity was not observed. pt has a junior cath and colostomy    LYNETTE Bladder Management  Level of Assistance:  Bladder Score = 1.  Patient is total assistance for  bladder management. Patient has an indwelling/Junior catheter.  junior cath  Frequency/Number of Accidents this Shift:  Bladder accidents this shift:  0 .  Patient has not had an accident this shift.    LYNETTE Bowel Management  Level of Assistance: Bowel Score = 1. Patient performs less than 25% of tasks  and requires total assistance for bowel management or two or more people  required for bowel management requiring assistive device/method: pt takes care  of his own colostomy .  Frequency/Number of Accidents this Shift: Bowel accidents this shift: 0 .  Patient has not had an accident this shift.    LYNETTE Bed/Chair/Wheelchair Transfer:  Bed/chair/wheelchair Transfer Score = 4.  Patient performs 75% or more of effort and minimal assistance (little/incidental  help/lifting of one limb/steadying) for transferring to and from the  bed/chair/wheelchair, requiring: Steadying. Patient requires the following  assistive device(s): Sliding board. Seating system of wheelchair.  LYNETTE Toilet Transfer:  Activity was not observed. pt has a junior cath and  colostomy  LYNETTE Tub/Shower Transfer:    Previously Documented Mode of Locomotion at Discharge:  [facility] Expected Mode of Locomotion at Discharge:  LYNETTE Walk/Wheelchair:  LYNETTE Stairs:    LYNETTE Comprehension:  LYNETTE Expression:  LYNETTE Social Interaction:  LYNETTE Problem Solving:  LYNETTE Memory:    Therapy Mode Minutes  Occupational Therapy:  Physical Therapy:  Speech Language Pathology:    Discharge Functional Goals:    Signed by: TALI Kilgore

## 2017-06-07 NOTE — PROGRESS NOTES
Inpatient Rehabilitation Functional Measures Assessment    Functional Measures  LYNETTE Eating:  Eating Score = 5. Patient is supervision/set-up for eating,  requiring: No assistive devices were required.  LYNETTE Grooming:  LYNETTE Bathing:  LYNETTE Upper Body Dressing:  LYNETTE Lower Body Dressing:  LYNETTE Toileting:  Activity was not observed.    LYNETTE Bladder Management  Level of Assistance:  Bladder Score = 1.  Patient is total assistance for  bladder management. Patient has an indwelling/Sheldon catheter.  Frequency/Number of Accidents this Shift:  Bladder accidents this shift:  0 .  Patient has not had an accident, but used a device/medication this shift  requiring: catheter .    LYNETTE Bowel Management  Level of Assistance: Bowel Score = 3. Patient performs 50-74% of tasks and  requires moderate assistance for bowel management requiring assistive  device/method: ppt has a colostomy .  Frequency/Number of Accidents this Shift: Bowel accidents this shift: 0 .  Patient has not had an accident this shift.    LYNETTE Bed/Chair/Wheelchair Transfer:  Bed/chair/wheelchair Transfer Score = 3.  Patient performs 50-74% of effort and requires moderate assistance (some  lifting) for transferring to and from the bed/chair/wheelchair. No assistive  devices were required.  LYNETTE Toilet Transfer:  Activity was not observed.  LYNETTE Tub/Shower Transfer:  Activity was not observed.    Previously Documented Mode of Locomotion at Discharge:  LYNETTE Expected Mode of Locomotion at Discharge:  LYNETTE Walk/Wheelchair:  LYNETTE Stairs:    LYNETTE Comprehension:  LYNETTE Expression:  LYNETTE Social Interaction:  LYNETTE Problem Solving:  LYNETTE Memory:    Therapy Mode Minutes  Occupational Therapy:  Physical Therapy:  Speech Language Pathology:    Discharge Functional Goals:    Signed by: TALI Barriga

## 2017-06-07 NOTE — PROGRESS NOTES
Inpatient Rehabilitation Functional Measures Assessment    Functional Measures  LYNETTE Eating:  LYNETTE Grooming:  LYNETTE Bathing:  LYNETTE Upper Body Dressing:  LYNETTE Lower Body Dressing:  LYNETTE Toileting:    LYNETTE Bladder Management  Level of Assistance:  Frequency/Number of Accidents this Shift:    LYNETTE Bowel Management  Level of Assistance:  Frequency/Number of Accidents this Shift:    LYNETTE Bed/Chair/Wheelchair Transfer:  Bed/chair/wheelchair Transfer Score = 4.  Patient performs 75% or more of effort and minimal assistance (little/incidental  help/lifting of one limb/steadying) for transferring to and from the  bed/chair/wheelchair, requiring: Contact guard. Patient requires the following  assistive device(s): Sliding board.  LYNETTE Toilet Transfer:  LYNETTE Tub/Shower Transfer:    Previously Documented Mode of Locomotion at Discharge:  LYNETTE Expected Mode of Locomotion at Discharge:  LYNETTE Walk/Wheelchair:  WHEELCHAIR OBSERVATION   Wheelchair locomotion was observed using a manual wheelchair. Wheelchair  Distance Scale = 3.  Distance traveled in wheelchair is greater than 150 feet.  Wheelchair Score = 6.  Patient is modified independent requiring no assistance  for propelling wheelchair  (turns around; maneuvers to a table, bed, toilet;  negotiates 3% grade; maneuvers over rugs/door adarsh). Patient was able to propel  a distance of 150 feet in a wheelchair.    WALK OBSERVATION   Activity was not observed.  LYNETTE Stairs:  Activity was not observed.    LYNETTE Comprehension:  LYNETTE Expression:  LYNETTE Social Interaction:  LYNETTE Problem Solving:  LYNETTE Memory:    Therapy Mode Minutes  Occupational Therapy:  Physical Therapy: Individual: 90 minutes.  Speech Language Pathology:    Discharge Functional Goals:    Signed by: Susu Sheridan PT

## 2017-06-07 NOTE — PROGRESS NOTES
"     LOS: 6 days     Chief Complaint:  Weakness, debility     Subjective     Interval History:     Heart rate is usually running around 200, sometimes up into the 120s or 130s when he standing therapy.  He has no symptoms whatsoever when this occurs.  He denies any chest pains or palpitations.  Bed mobility requires mod assist.  Transfers required min assist.  He feels like he's getting a lot better.  He has ongoing wound care to decubitus ulcerations.  His mood is good.  Not sad or tearful.  No chest pains or palpitations.      Objective     Vital Signs  /71 (BP Location: Right arm, Patient Position: Lying)  Pulse 107  Temp 98.4 °F (36.9 °C) (Oral)   Resp 18  Ht 71\" (180.3 cm)  Wt 245 lb (111 kg)  SpO2 97%  BMI 34.17 kg/m2  Intake & Output (last day)       06/06 0701 - 06/07 0700 06/07 0701 - 06/08 0700    P.O. 1680     Total Intake(mL/kg) 1680 (15.1)     Urine (mL/kg/hr) 1450 (0.5)     Stool 1 (0)     Total Output 1451      Net +229                    Physical Exam:     General Appearance:    Alert, cooperative, in no acute distress   Head:    Normocephalic, without obvious abnormality, atraumatic   Eyes:            Lids and lashes normal, conjunctivae and sclerae normal, no   icterus, no pallor, corneas clear, PERRLA   Ears:    Ears appear intact with no abnormalities noted       Neck:   No adenopathy, supple, trachea midline, no thyromegaly, no   carotid bruit, no JVD   Lungs:     Clear to auscultation,respirations regular, even and                  unlabored    Heart:    Regular rhythm and normal rate, normal S1 and S2, no            murmur, no gallop, no rub, no click   Chest Wall:    No abnormalities observed   Abdomen:     Normal bowel sounds, no masses, no organomegaly, soft        non-tender, non-distended, no guarding, no rebound                tenderness   Extremities:   No movement in BLE, no sensation in BLE  no edema, no cyanosis, no redness   Pulses:   Pulses palpable and equal " bilaterally   Skin:   No bleeding, bruising or rash                Results Review:    Lab Results   Component Value Date    WBC 8.25 06/07/2017    HGB 9.1 (L) 06/07/2017    HCT 30.3 (L) 06/07/2017    MCV 79.9 (L) 06/07/2017     (H) 06/07/2017       Lab Results   Component Value Date    GLUCOSE 91 06/02/2017    BUN 10 06/02/2017    CREATININE 0.51 06/02/2017    EGFRIFNONA >150 06/02/2017    BCR 19.6 06/02/2017     06/02/2017    K 4.2 06/02/2017     06/02/2017    CO2 30.5 06/02/2017    CALCIUM 8.7 06/02/2017    ALBUMIN 3.10 (L) 06/02/2017    LABIL2 1.1 (L) 06/02/2017    AST 15 06/02/2017    ALT 13 06/02/2017     No results found for: INR    No results found for: POCGLU       Medication Review:     Current Facility-Administered Medications:   •  acetaminophen (TYLENOL) tablet 650 mg, 650 mg, Oral, Q4H PRN, Samuel Duane Kreis, MD  •  ALPRAZolam (XANAX) tablet 0.5 mg, 0.5 mg, Oral, TID, Samuel Duane Kreis, MD, 0.5 mg at 06/07/17 0836  •  aluminum-magnesium hydroxide-simethicone (MAALOX MAX) 400-400-40 MG/5ML suspension 15 mL, 15 mL, Oral, Q6H PRN, Samuel Duane Kreis, MD  •  amitriptyline (ELAVIL) tablet 25 mg, 25 mg, Oral, Nightly, Samuel Duane Kreis, MD, 25 mg at 06/06/17 2138  •  aspirin EC tablet 81 mg, 81 mg, Oral, Daily, Samuel Duane Kreis, MD, 81 mg at 06/07/17 0836  •  baclofen (LIORESAL) tablet 20 mg, 20 mg, Oral, Q8H, Samuel Duane Kreis, MD, 20 mg at 06/07/17 0544  •  bisacodyl (DULCOLAX) suppository 10 mg, 10 mg, Rectal, Daily PRN, Samuel Duane Kreis, MD  •  castor oil-balsam peru (VENELEX) ointment, , Topical, Q12H, Samuel Duane Kreis, MD  •  cetirizine (zyrTEC) tablet 10 mg, 10 mg, Oral, Daily, Samuel Duane Kreis, MD, 10 mg at 06/07/17 0836  •  enoxaparin (LOVENOX) syringe 40 mg, 40 mg, Subcutaneous, Daily, Samuel Duane Kreis, MD, 40 mg at 06/07/17 0836  •  fluticasone (FLONASE) 50 MCG/ACT nasal spray 2 spray, 2 spray, Each Nare, Daily, Samuel Duane Kreis, MD, 2 spray at 06/02/17 0859  •   folic acid (FOLVITE) tablet 1 mg, 1 mg, Oral, Daily, Samuel Duane Kreis, MD, 1 mg at 06/07/17 0836  •  gabapentin (NEURONTIN) capsule 800 mg, 800 mg, Oral, Q8H, Samuel Duane Kreis, MD, 800 mg at 06/07/17 0544  •  magnesium hydroxide (MILK OF MAGNESIA) suspension 2400 mg/10mL 10 mL, 10 mL, Oral, Daily PRN, Samuel Duane Kreis, MD  •  metoprolol tartrate (LOPRESSOR) tablet 25 mg, 25 mg, Oral, Q12H, Samuel Duane Kreis, MD, 25 mg at 06/07/17 0836  •  multivitamin (DAILY BRIAN) tablet 1 tablet, 1 tablet, Oral, Daily, Samuel Duane Kreis, MD, 1 tablet at 06/07/17 0836  •  ondansetron (ZOFRAN) tablet 4 mg, 4 mg, Oral, Q6H PRN, 4 mg at 06/06/17 0835 **OR** ondansetron ODT (ZOFRAN-ODT) disintegrating tablet 4 mg, 4 mg, Oral, Q6H PRN **OR** ondansetron (ZOFRAN) injection 4 mg, 4 mg, Intravenous, Q6H PRN, Samuel Duane Kreis, MD  •  sennosides-docusate sodium (SENOKOT-S) 8.6-50 MG tablet 2 tablet, 2 tablet, Oral, Nightly, Samuel Duane Kreis, MD, 2 tablet at 06/06/17 3444  •  venlafaxine XR (EFFEXOR-XR) 24 hr capsule 75 mg, 75 mg, Oral, Daily With Breakfast, Samuel Duane Kreis, MD, 75 mg at 06/07/17 0836      Assessment/Plan     Debility  Paraplegia  Decubitus ulcers of buttocks      PT and OT ongoing.    Continue gabapentin with when necessary baclofen    Continue indwelling Sheldon    Hemoglobin is up to 9.1.  Improving.  We'll monitor anemia CBC here in a few days      Samuel Duane Kreis, MD  06/07/17  9:04 AM
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
## 2017-06-07 NOTE — PROGRESS NOTES
Inpatient Rehabilitation Functional Measures Assessment    Functional Measures  LYNETTE Eating:  LYNETTE Grooming:  LYNETTE Bathing:  LYNETTE Upper Body Dressing:  LYNETTE Lower Body Dressing:  LYNETTE Toileting:    LYNETTE Bladder Management  Level of Assistance:  Frequency/Number of Accidents this Shift:    LYNETTE Bowel Management  Level of Assistance:  Frequency/Number of Accidents this Shift:    LYNETTE Bed/Chair/Wheelchair Transfer:  LYNETTE Toilet Transfer:  LYNETTE Tub/Shower Transfer:    Previously Documented Mode of Locomotion at Discharge:  LYNETTE Expected Mode of Locomotion at Discharge:  [place] Walk/Wheelchair:  LYNETTE Stairs:    LYNETTE Comprehension:  Both ( auditory and visual) modes of comprehension are used  equally. Comprehension Score = 6, Modified [place].  Patient comprehends  complex/abstract information in their primary language, requiring: Glasses.  Additional time.  LYNETTE Expression:  Both ( vocal and non-vocal) modes of expression are used  equally. Expression Score = 6,  Modified Independent. Patient expresses  complex/abstract information in their primary language, requiring: Additional  time.  LYNETTE Social Interaction:  Social Interaction Score = 6, Modified Independent.  Patient is modified independent for social interaction, requiring: Requires  additional time.  LYNETTE Problem Solving:  Problem Solving Score = 6, Modified [place].  Patient  makes appropriate decisions in order to solve complex problems, but requires  extra time.  LYNETTE Memory:  Memory Score = 6, Modified [place].  Patient is modified  independent for memory, requiring: Requires additional time. Requires additional  time.    Therapy Mode Minutes  Occupational Therapy:  Physical Therapy:  Speech Language Pathology:    Discharge Functional Goals:    Signed by: Flores Silva Nurse

## 2017-06-07 NOTE — PROGRESS NOTES
Inpatient Rehabilitation Functional Measures Assessment    Functional Measures  LYNETTE Eating:  LYNETTE Grooming: Grooming Score = 5. Patient is supervision/set-up for grooming,  requiring: Setting out grooming equipment. No assistive devices were required.  LYNETTE Bathing:  LYNETTE Upper Body Dressing:  LYNETTE Lower Body Dressing:  LYNETTE Toileting:    LYNETTE Bladder Management  Level of Assistance:  Frequency/Number of Accidents this Shift:    LYNETTE Bowel Management  Level of Assistance:  Frequency/Number of Accidents this Shift:    LYNETTE Bed/Chair/Wheelchair Transfer:  LYNETTE Toilet Transfer:  LYNETTE Tub/Shower Transfer:    Previously Documented Mode of Locomotion at Discharge:  [facility] Expected Mode of Locomotion at Discharge:  LYNETTE Walk/Wheelchair:  LYNETTE Stairs:    LYNETTE Comprehension:  LYNETTE Expression:  LYNETTE Social Interaction:  LYNETTE Problem Solving:  LYNETTE Memory:    Therapy Mode Minutes  Occupational Therapy: Individual: 45 minutes.   Group: 45 minutes.  Physical Therapy:  Speech Language Pathology:    Discharge Functional Goals:    Signed by: Lucía Pedro OT
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
## 2017-06-07 NOTE — PROGRESS NOTES
Rehabilitation Nursing  Inpatient Rehabilitation Plan of Care Note    Plan of Care  Copy from POCBody Function Structure    Skin Integrity (Active)  Current Status (6/1/2017 6:00:00 PM): impairment in skin integrity  Weekly Goal: no further skin breakdown this week  Discharge Goal: wounds showing signs of healing, no s/s infection by discharge    Safety    Potential for Injury (Active)  Current Status (6/1/2017 6:00:00 PM): potential for falls  Weekly Goal: no falls this week  Discharge Goal: no falls by discharge    Signed by: Flores Silva Nurse
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
## 2017-06-07 NOTE — PLAN OF CARE
Problem: Patient Care Overview (Adult)  Goal: Plan of Care Review  Outcome: Ongoing (interventions implemented as appropriate)    06/07/17 1052   Coping/Psychosocial Response Interventions   Plan Of Care Reviewed With patient   Patient Care Overview   Progress progress toward functional goals as expected         Problem: Activity Intolerance (Adult)  Goal: Activity Tolerance  Outcome: Ongoing (interventions implemented as appropriate)  Goal: Effective Energy Conservation Techniques  Outcome: Ongoing (interventions implemented as appropriate)    Problem: Fall Risk (Adult)  Goal: Absence of Falls  Outcome: Ongoing (interventions implemented as appropriate)    Problem: Pressure Ulcer (Adult)  Goal: Signs and Symptoms of Listed Potential Problems Will be Absent or Manageable (Pressure Ulcer)  Outcome: Ongoing (interventions implemented as appropriate)    Problem: Skin Integrity Impairment, Risk/Actual (Adult)  Goal: Skin Integrity/Wound Healing  Outcome: Ongoing (interventions implemented as appropriate)    Problem: Mobility, Physical Impaired (Adult)  Goal: Enhanced Mobility Skills  Outcome: Ongoing (interventions implemented as appropriate)  Goal: Enhanced Functionality Ability  Outcome: Ongoing (interventions implemented as appropriate)

## 2017-06-08 PROCEDURE — 25010000002 ENOXAPARIN PER 10 MG: Performed by: FAMILY MEDICINE

## 2017-06-08 PROCEDURE — 97110 THERAPEUTIC EXERCISES: CPT

## 2017-06-08 PROCEDURE — 97530 THERAPEUTIC ACTIVITIES: CPT | Performed by: OCCUPATIONAL THERAPIST

## 2017-06-08 PROCEDURE — 97535 SELF CARE MNGMENT TRAINING: CPT | Performed by: OCCUPATIONAL THERAPIST

## 2017-06-08 PROCEDURE — 97530 THERAPEUTIC ACTIVITIES: CPT

## 2017-06-08 RX ADMIN — CETIRIZINE HYDROCHLORIDE 10 MG: 10 TABLET ORAL at 09:44

## 2017-06-08 RX ADMIN — ENOXAPARIN SODIUM 40 MG: 40 INJECTION SUBCUTANEOUS at 09:44

## 2017-06-08 RX ADMIN — CASTOR OIL AND BALSAM, PERU: 788; 87 OINTMENT TOPICAL at 21:08

## 2017-06-08 RX ADMIN — METOPROLOL TARTRATE 25 MG: 25 TABLET, FILM COATED ORAL at 09:45

## 2017-06-08 RX ADMIN — BACLOFEN 20 MG: 10 TABLET ORAL at 21:06

## 2017-06-08 RX ADMIN — AMITRIPTYLINE HYDROCHLORIDE 25 MG: 25 TABLET, FILM COATED ORAL at 21:07

## 2017-06-08 RX ADMIN — ALPRAZOLAM 0.5 MG: 0.5 TABLET ORAL at 15:41

## 2017-06-08 RX ADMIN — ALPRAZOLAM 0.5 MG: 0.5 TABLET ORAL at 21:13

## 2017-06-08 RX ADMIN — FOLIC ACID 1 MG: 1 TABLET ORAL at 09:44

## 2017-06-08 RX ADMIN — Medication 1 TABLET: at 09:44

## 2017-06-08 RX ADMIN — DOCUSATE SODIUM AND SENNA 2 TABLET: 50; 8.6 TABLET, FILM COATED ORAL at 21:07

## 2017-06-08 RX ADMIN — BACLOFEN 20 MG: 10 TABLET ORAL at 06:07

## 2017-06-08 RX ADMIN — GABAPENTIN 800 MG: 400 CAPSULE ORAL at 14:08

## 2017-06-08 RX ADMIN — GABAPENTIN 800 MG: 400 CAPSULE ORAL at 06:07

## 2017-06-08 RX ADMIN — VENLAFAXINE HYDROCHLORIDE 75 MG: 75 CAPSULE, EXTENDED RELEASE ORAL at 09:44

## 2017-06-08 RX ADMIN — GABAPENTIN 800 MG: 400 CAPSULE ORAL at 21:07

## 2017-06-08 RX ADMIN — ALPRAZOLAM 0.5 MG: 0.5 TABLET ORAL at 09:44

## 2017-06-08 RX ADMIN — BACLOFEN 20 MG: 10 TABLET ORAL at 14:08

## 2017-06-08 RX ADMIN — METOPROLOL TARTRATE 25 MG: 25 TABLET, FILM COATED ORAL at 21:07

## 2017-06-08 RX ADMIN — ASPIRIN 81 MG: 81 TABLET ORAL at 09:44

## 2017-06-08 NOTE — THERAPY TREATMENT NOTE
Inpatient Rehabilitation - Physical Therapy Treatment Note   [place]     Patient Name: Alex Tierney  : 1975  MRN: [id number]  Today's Date: 2017  Onset of Illness/Injury or Date of Surgery Date: 17  Date of Referral to PT: 17  Referring Physician: Dr. Brooks    Admit Date: 2017    Visit Dx:  No diagnosis found.  Patient Active Problem List   Diagnosis   • Chronic allergic rhinitis   • Depression   • Smoker   • T6 spinal cord injury   • History of left lower extremity amputation   • Chronic osteomyelitis of multiple sites   • Debility               Adult Rehabilitation Note       17 1500 17 1802 17 1726    Rehab Assessment/Intervention    Discipline physical therapy assistant  - physical therapist  -AG physical therapist  -CT    Document Type therapy note (daily note)  - therapy note (daily note)  - therapy note (daily note)   Second AM therapy session  -CT    Subjective Information agree to therapy;no complaints  - no complaints;agree to therapy  - agree to therapy;no complaints  -CT    Patient Effort, Rehab Treatment good  - good  - good  -CT    Precautions/Limitations fall precautions   decreased skin integrity/ wounds  - fall precautions   decr skin integrity; wounds  - fall precautions;other (see comments)   <skin integrity, colostomy, junior cath  -CT    Patient Response to Treatment Patient tolerated treatment session good and tolerated new therapeutic activities and exercises.  -  Pt tolerated treament session well with rest breaks provided as needed.   -CT    Recorded by [] Magda Carney PTA [AG] Radha Ohara, PT [CT] Susu Sheridan, PT    Pain Assessment    Pain Assessment No/denies pain  - No/denies pain  -AG     Recorded by [] Magda Carney PTA [AG] Radha Ohara, PT     Vision Assessment/Intervention    Visual Impairment WFL  - WFL  -AG     Recorded by [] Magda Carney PTA [AG] Radha Ohara, PT      Cognitive Assessment/Intervention    Current Cognitive/Communication Assessment functional  - functional  -AG functional  -CT    Orientation Status oriented x 4  -AH oriented x 4  -AG oriented x 4  -CT    Follows Commands/Answers Questions 100% of the time;able to follow single-step instructions  - 100% of the time;able to follow single-step instructions  -% of the time  -CT    Personal Safety mild impairment;decreased awareness, need for assist;decreased awareness, need for safety  - mild impairment;decreased awareness, need for assist;decreased awareness, need for safety  -     Personal Safety Interventions nonskid shoes/slippers when out of bed;gait belt;fall prevention program maintained  - fall prevention program maintained;gait belt;nonskid shoes/slippers when out of bed;supervised activity  -AG nonskid shoes/slippers when out of bed;supervised activity  -CT    Recorded by [] Magda Carney PTA [AG] Radha Ohara PT [CT] Susu Sheridan, PT    Bed Mobility, Assessment/Treatment    Bed Mobility, Assistive Device draw sheet  - bed rails  -     Bed Mobility, Roll Left, [place] minimum assist (75% patient effort)  - minimum assist (75% patient effort)  -AG     Bed Mobility, Roll Right, [place] minimum assist (75% patient effort)  - minimum assist (75% patient effort)  -     Bed Mobility, Scoot/Bridge, [place] dependent (less than 25% patient effort)  -  dependent (less than 25% patient effort)  -CT    Bed Mob, Supine to Sit, [place] verbal cues required;nonverbal cues required (demo/gesture);minimum assist (75% patient effort);moderate assist (50% patient effort)  - verbal cues required;nonverbal cues required (demo/gesture);minimum assist (75% patient effort);moderate assist (50% patient effort)  - moderate assist (50% patient effort);verbal cues required;nonverbal cues required (demo/gesture)  -CT    Bed Mob, Sit to Supine, [place] verbal cues  required;nonverbal cues required (demo/gesture);minimum assist (75% patient effort);moderate assist (50% patient effort)  - verbal cues required;nonverbal cues required (demo/gesture);minimum assist (75% patient effort);moderate assist (50% patient effort)  -AG moderate assist (50% patient effort);verbal cues required;nonverbal cues required (demo/gesture)  -CT    Bed Mobility, Safety Issues decreased use of legs for bridging/pushing;impaired trunk control for bed mobility  - decreased use of legs for bridging/pushing;impaired trunk control for bed mobility  -AG decreased use of legs for bridging/pushing;decreased use of arms for pushing/pulling;impaired trunk control for bed mobility  -CT    Bed Mobility, Impairments muscle tone abnormal;strength decreased;impaired balance;coordination impaired;sensory feedback impaired   colostomy  - muscle tone abnormal;strength decreased;impaired balance;coordination impaired;sensory feedback impaired   colostomy; wounds/ decr skin integrity  -AG strength decreased;impaired balance;coordination impaired;motor control impaired;postural control impaired;sensory feedback impaired;muscle tone abnormal  -CT    Recorded by [] Magda Carney PTA [AG] Radha Ohara, PT [CT] Susu Sheridan PT    Transfer Assessment/Treatment    Transfers, Bed-Chair [place] verbal cues required;nonverbal cues required (demo/gesture);contact guard assist  -AH verbal cues required;nonverbal cues required (demo/gesture);contact guard assist  -AG minimum assist (75% patient effort);verbal cues required;nonverbal cues required (demo/gesture)  -CT    Transfers, Chair-Bed [place] verbal cues required;nonverbal cues required (demo/gesture);contact guard assist  -AH verbal cues required;nonverbal cues required (demo/gesture);contact guard assist  -AG minimum assist (75% patient effort);verbal cues required;nonverbal cues required (demo/gesture)  -CT    Transfers, Bed-Chair-Bed, Assist  Device sliding board  - sliding board  -AG sliding board  -CT    Transfer, Safety Issues loses balance backward  - loses balance backward  -AG loses balance backward  -CT    Transfer, Impairments muscle tone abnormal;strength decreased;impaired balance;coordination impaired;motor control impaired;postural control impaired  - muscle tone abnormal;strength decreased;impaired balance;coordination impaired;motor control impaired;postural control impaired  -AG strength decreased;impaired balance;coordination impaired;motor control impaired;postural control impaired;sensory feedback impaired;sensation decreased;muscle tone abnormal  -CT    Recorded by [] Magda Carney PTA [AG] Radha Ohara, PT [CT] Susu Sheridan, KALI    Wheelchair Training/Management    Wheelchair Propulsion Training  forward propulsion  -     Wheelchair, Distance Propelled  300  -AG     Recorded by  [AG] Radha Ohara PT     Motor Skills/Interventions    Additional Documentation Balance Skills Training (Group)  - Balance Skills Training (Group)  -AG     Recorded by [] Magda Carney PTA [AG] Radha Ohara, KALI     Balance Skills Training    Sitting-Level of Assistance Close supervision  - Close supervision   static sitting  -AG Close supervision  -CT    Sitting-Balance Support No upper extremity supported  - No upper extremity supported   R LE supported  -AG --   requires one extremity to assist balance at eob  -CT    Sitting-Balance Activities Lateral lean;Ball toss;Reaching for objects;Reaching across midline;Trunk control activities  - Lateral lean;Forward lean;Ball toss;Reaching for objects;Reaching across midline;Trunk control activities   dynamic sitting varies from min to mod A  -AG Reaching for objects;Reaching across midline  -CT    Sitting # of Minutes  15  -AG     Recorded by [] Magda Carney PTA [AG] Radha Ohara, PT [CT] Susu Sheridan, KALI    Therapy Exercises    Bilateral Lower Extremities  PROM:;supine;prone  -AH PROM:;supine;prone  -AG PROM:;supine   supine stretching  -CT    Recorded by [AH] Magda Carney PTA [AG] Radha Ohara, PT [CT] Susu Sheridan PT    Positioning and Restraints    Pre-Treatment Position other (comment)   in w/c both AM and PM session  -AH in bed  -AG sitting in chair/recliner  -CT    Post Treatment Position wheelchair   in both AM and PM session  -AH wheelchair  -AG wheelchair  -CT    In Wheelchair sitting;call light within reach;encouraged to call for assist;exit alarm on  -AH sitting;RLE elevated;patient within staff view  -AG sitting;patient within staff view  -CT    Recorded by [AH] Magda Carney PTA [AG] Radha Ohara, PT [CT] Susu Sheridan PT      06/07/17 1610 06/06/17 1701 06/06/17 1441    Rehab Assessment/Intervention    Discipline occupational therapist  -BF physical therapist  -LB occupational therapist  -BF    Document Type therapy note (daily note)  -BF therapy note (daily note)  -LB therapy note (daily note)  -BF    Subjective Information agree to therapy;no complaints  -BF agree to therapy  -LB agree to therapy;no complaints  -BF    Patient Effort, Rehab Treatment good  -BF good  -LB good  -BF    Symptoms Noted During/After Treatment  none  -LB     Precautions/Limitations fall precautions;other (see comments)   < skin integrity, colostomy, junior cath  -BF fall precautions   colostomy, junior, < skin integrity  -LB fall precautions;other (see comments)   colostomy, junior, < skin integrity  -BF    Specific Treatment Considerations Pt participated in 2-person therapeutic group for second session addressing BUE strengthening and endurance  -BF  Pt participated in 2-person therapy group addressing BUE strengthening and endurance for first session   -BF    Patient Response to Treatment  He tolerated both sessions with stretching in prone, supine, and long sitting. He also worked on transfers and bed mobility.  -LB     Recorded by [BF] Lucía Oliveira  Willis, OT [LB] Susan Palma, PT [BF] Lucía Pedro, STEPHANIE    Pain Assessment    Pain Assessment  No/denies pain  -LB     Recorded by  [LB] Susan Palma PT     Cognitive Assessment/Intervention    Current Cognitive/Communication Assessment functional  -BF functional  -LB functional  -BF    Orientation Status oriented x 4  -BF  oriented x 4  -BF    Follows Commands/Answers Questions 100% of the time  -% of the time  -% of the time;able to follow single-step instructions  -BF    Personal Safety Interventions  gait belt;nonskid shoes/slippers when out of bed;supervised activity  -LB     Recorded by [BF] Lucía Pedro OT [LB] Susan Palma, PT [BF] Lucía Pedro OT    Bed Mobility, Assessment/Treatment    Bed Mobility, Scoot/Bridge, [place]  dependent (less than 25% patient effort)   this makes bed mobility more difficult  -LB     Bed Mob, Supine to Sit, [place]  verbal cues required;nonverbal cues required (demo/gesture);moderate assist (50% patient effort)  -LB     Bed Mob, Sit to Supine, [place]  verbal cues required;nonverbal cues required (demo/gesture);moderate assist (50% patient effort)  -LB     Bed Mobility, Safety Issues  decreased use of legs for bridging/pushing;decreased use of arms for pushing/pulling;impaired trunk control for bed mobility  -LB     Bed Mobility, Impairments  strength decreased;impaired balance;coordination impaired;motor control impaired;postural control impaired;sensory feedback impaired;muscle tone abnormal  -LB     Recorded by  [LB] Susan Palma PT     Transfer Assessment/Treatment    Transfers, Bed-Chair [place]  minimum assist (75% patient effort);verbal cues required;nonverbal cues required (demo/gesture)  -LB     Transfers, Chair-Bed [place]  minimum assist (75% patient effort);verbal cues required;nonverbal cues required (demo/gesture)  -LB     Transfers, Bed-Chair-Bed, Assist Device   sliding board  -LB     Transfer, Safety Issues  loses balance backward  -LB     Transfer, Impairments  strength decreased;impaired balance;coordination impaired;motor control impaired;postural control impaired;sensory feedback impaired;sensation decreased;muscle tone abnormal  -LB     Recorded by  [LB] Susan Palma, KALI     Grooming Assessment/Training    Grooming Assess/Train, Position sitting  -BF  sitting  -BF    Grooming Assess/Train, Indepen Level set up required;supervision required;verbal cues required  -BF  set up required;verbal cues required;supervision required  -BF    Grooming Assess/Train, Comment Set-up  -BF      Recorded by [BF] Lucía Pedro OT  [BF] Lucía Pedro OT    Balance Skills Training    Sitting-Level of Assistance  Close supervision  -LB     Sitting-Balance Support  --   requires one extremity to assist balance at eob  -LB     Recorded by  [LB] Susan Palma PT     Therapy Exercises    Bilateral Lower Extremities  PROM:;supine   prone lying 10 min, hamstring stretch in long sitting  -LB     Bilateral Upper Extremity AROM:;sitting   BUE GMC/FMC ther ex; strengthening  -BF  AROM:;sitting   BUE GMC/FMC ther ex; strengthening; reaching  -BF    BUE Resistance other (comment)   Rickshaw 20 [id number]; Armbike 4 minsX3; 3 lb dowel therex  -BF  other (comment)   Rickshaw 20 [id number]; Armbike [id number]; Wrist rollsX3  -BF    Recorded by [BF] Lucía Pedro, OT [LB] Susan Palma, PT [BF] Lucía Pedro, STEPHANIE    Positioning and Restraints    Pre-Treatment Position  --   am-bed, pm-w/c  -LB sitting in chair/recliner  -BF    Post Treatment Position wheelchair  -BF  wheelchair  -BF    In Wheelchair sitting;patient within staff view   After both sessions  -BF --   he left gym independently  -LB sitting;patient within staff view   After both sessions  -BF    Recorded by [BF] Lucía Pedro OT [LB] Susan Palma, PT [BF] Lucía Pedro,  OT      User Key  (r) = Recorded By, (t) = Taken By, (c) = Cosigned By    Initials Name Effective Dates    LB Susan Robledo Louie, PT 03/14/16 -     BF Lucía Pedro, OT 03/14/16 -     AG Radha Ohara, PT 03/14/16 -     CT Susu Sheridan, PT 03/14/16 -     AH Magda Carney, PTA 07/14/16 -                 IP PT Goals       06/02/17 1521          Bed Mobility PT STG    Bed Mobility PT STG, Date Established 06/02/17  -RT      Bed Mobility PT STG, Time to Achieve 1 wk  -RT      Bed Mobility PT STG, Activity Type all bed mobility  -RT      Bed Mobility PT STG, [place] Level minimum assist (75% patient effort)  -RT      Bed Mobility PT LTG    Bed Mobility PT LTG, Date Established 06/02/17  -RT      Bed Mobility PT LTG, Time to Achieve 2 wks  -RT      Bed Mobility PT LTG, Activity Type all bed mobility  -RT      Bed Mobility PT LTG, [place] Level supervision required  -RT      Transfer Training PT STG    Transfer Training PT STG, Date Established 06/02/17  -RT      Transfer Training PT STG, Time to Achieve 1 wk  -RT      Transfer Training PT STG, Activity Type all transfers  -RT      Transfer Training PT STG, [place] Level contact guard assist  -RT      Transfer Training PT LTG    Transfer Training PT LTG, Date Established 06/02/17  -RT      Transfer Training PT LTG, Time to Achieve 2 wks  -RT      Transfer Training PT LTG, Activity Type all transfers  -RT      Transfer Training PT LTG, [place] Level supervision required  -RT      Static Sitting Balance PT STG    Static Sitting Balance PT STG, Date Established 06/02/17  -RT      Static Sitting Balance PT STG, Time to Achieve 1 wk  -RT      Static Sitting Balance PT STG, [place] Level minimum assist (75% patient effort)  -RT      Static Sitting Balance PT LTG    Static Sitting Balance PT LTG, Date Established 06/02/17  -RT      Static Sitting Balance PT LTG, Time to Achieve 2 wks  -RT      Static Sitting Balance PT LTG, [place]  Level supervision required  -RT        User Key  (r) = Recorded By, (t) = Taken By, (c) = Cosigned By    Initials Name Provider Type    RT Perico S Lance, PT Physical Therapist          Physical Therapy Education     Title: PT OT SLP Therapies (Active)     Topic: Physical Therapy (Done)     Point: Mobility training (Done)    Learning Progress Summary    Learner Readiness Method Response Comment Documented by Status   Patient Acceptance E VU   06/08/17 1547 Done    Acceptance E VU   06/08/17 0039 Done    Acceptance E,D NR,VU  AG 06/07/17 1810 Done    Acceptance E VU  CT 06/07/17 1737 Done    Acceptance E VU  FH 06/07/17 0050 Done    Acceptance E VU,NR  LB 06/06/17 1710 Done    Acceptance E VU,NR  LB 06/05/17 1521 Done    Acceptance E VU  RT 06/03/17 1236 Done    Acceptance E VU  RT 06/02/17 1519 Done               Point: Home exercise program (Done)    Learning Progress Summary    Learner Readiness Method Response Comment Documented by Status   Patient Acceptance E VU   06/08/17 1547 Done    Acceptance E VU   06/08/17 0039 Done    Acceptance E,D NR,VU  AG 06/07/17 1810 Done    Acceptance E VU  CT 06/07/17 1737 Done    Acceptance E VU  FH 06/07/17 0050 Done    Acceptance E VU,NR  LB 06/06/17 1710 Done    Acceptance E VU,NR   06/05/17 1521 Done    Acceptance E VU  RT 06/03/17 1236 Done    Acceptance E VU  RT 06/02/17 1519 Done               Point: Body mechanics (Done)    Learning Progress Summary    Learner Readiness Method Response Comment Documented by Status   Patient Acceptance E VU   06/08/17 1547 Done    Acceptance E VU   06/08/17 0039 Done    Acceptance E,D NR,VU  AG 06/07/17 1810 Done    Acceptance E VU  CT 06/07/17 1737 Done    Acceptance E VU   06/07/17 0050 Done    Acceptance E VU,NR  LB 06/06/17 1710 Done    Acceptance E VU,NR   06/05/17 1521 Done    Acceptance E VU  RT 06/03/17 1236 Done    Acceptance E VU  RT 06/02/17 1519 Done               Point: Precautions (Done)    Learning  Progress Summary    Learner Readiness Method Response Comment Documented by Status   Patient Acceptance E VU   06/08/17 1547 Done    Acceptance E VU   06/08/17 0039 Done    Acceptance E,D NR,VU  AG 06/07/17 1810 Done    Acceptance E VU  CT 06/07/17 1737 Done    Acceptance E VU   06/07/17 0050 Done    Acceptance E VU,NR  LB 06/06/17 1710 Done    Acceptance E VU,NR  LB 06/05/17 1521 Done    Acceptance E VU  RT 06/03/17 1236 Done    Acceptance E VU  RT 06/02/17 1519 Done                      User Key     Initials Effective Dates Name Provider Type Discipline     06/16/16 -  Flores Silva, RN Registered Nurse Nurse     03/14/16 -  Susan Palma, PT Physical Therapist PT     03/14/16 -  Radha Ohara, PT Physical Therapist PT    CT 03/14/16 -  Susu Sheridan, PT Physical Therapist PT     07/14/16 -  Magda Carney PTA Physical Therapy Assistant PT    RT 03/14/16 -  Perico Lucas, PT Physical Therapist PT                    PT Recommendation and Plan  Planned Therapy Interventions: balance training, bed mobility training, home exercise program, lumbar stabilization, manual therapy techniques, neuromuscular re-education, patient/family education, postural re-education, ROM (Range of Motion), strengthening, stretching, transfer training  PT Frequency: 5 times/wk, 2 times/day, per priority policy            Time Calculation:         PT Charges       06/08/17 1550 06/08/17 1547       Time Calculation    Start Time 1250  - 0915  -     Stop Time 1335  - 1000  -     Time Calculation (min) 45 min  - 45 min  -     PT Received On 06/08/17  - 06/08/17  -     PT - Next Appointment 06/09/17  -        User Key  (r) = Recorded By, (t) = Taken By, (c) = Cosigned By    Initials Name Provider Type     Magda Carney PTA Physical Therapy Assistant          Therapy Charges for Today     Code Description Service Date Service Provider Modifiers Qty    45367195624 HC PT THER PROC EA  15 MIN 6/8/2017 Magda Carney, PTA GP 2    [id number]  PT THERAPEUTIC ACT EA 15 MIN 6/8/2017 Magda Carney PTA GP 4    [id number]  PT THER SUPP EA 15 MIN 6/8/2017 Magda Carney PTA GP 3               Magda Carney, SHILPA  6/8/2017

## 2017-06-08 NOTE — THERAPY TREATMENT NOTE
Inpatient Rehabilitation - Occupational Therapy Progress Note   [place]     Patient Name: Alex Tierney  : 1975  MRN: [id number]  Today's Date: 2017  Onset of Illness/Injury or Date of Surgery Date: 17  Date of Referral to OT: 17  Referring Physician: Dr. Brooks      Admit Date: 2017    Visit Dx:   No diagnosis found.  Patient Active Problem List   Diagnosis   • Chronic allergic rhinitis   • Depression   • Smoker   • T6 spinal cord injury   • History of left lower extremity amputation   • Chronic osteomyelitis of multiple sites   • Debility             Adult Rehabilitation Note       17 1620 17 1500 17 1802    Rehab Assessment/Intervention    Discipline occupational therapist  -BF physical therapy assistant  - physical therapist  -    Document Type therapy note (daily note);progress note  -BF therapy note (daily note)  - therapy note (daily note)  -    Subjective Information agree to therapy;no complaints  - agree to therapy;no complaints  - no complaints;agree to therapy  -AG    Patient Effort, Rehab Treatment good  - good  - good  -AG    Precautions/Limitations fall precautions;other (see comments)   < skin integrity; colostomy; junior cath  - fall precautions   decreased skin integrity/ wounds  - fall precautions   decr skin integrity; wounds  -AG    Patient Response to Treatment  Patient tolerated treatment session good and tolerated new therapeutic activities and exercises.  -AH     Recorded by [BF] Lucía Pedro, OT [AH] Magda Carney PTA [AG] Radha Ohara, PT    Pain Assessment    Pain Assessment  No/denies pain  - No/denies pain  -AG    Recorded by  [] Magda Carney PTA [AG] Radha Ohara, PT    Vision Assessment/Intervention    Visual Impairment  WFL  -AH WFL  -AG    Recorded by  [] Magda Carney PTA [AG] Radha Ohara, PT    Cognitive Assessment/Intervention    Current Cognitive/Communication Assessment  functional  -BF functional  - functional  -AG    Orientation Status oriented x 4  -BF oriented x 4  -AH oriented x 4  -AG    Follows Commands/Answers Questions 100% of the time;able to follow single-step instructions  - 100% of the time;able to follow single-step instructions  - 100% of the time;able to follow single-step instructions  -AG    Personal Safety  mild impairment;decreased awareness, need for assist;decreased awareness, need for safety  - mild impairment;decreased awareness, need for assist;decreased awareness, need for safety  -    Personal Safety Interventions  nonskid shoes/slippers when out of bed;gait belt;fall prevention program maintained  - fall prevention program maintained;gait belt;nonskid shoes/slippers when out of bed;supervised activity  -AG    Recorded by [BF] Lucía Pedro, OT [] Magda Carney, PTA [AG] Radha Ohara, KALI    Bed Mobility, Assessment/Treatment    Bed Mobility, Assistive Device  draw sheet  - bed rails  -    Bed Mobility, Roll Left, [place]  minimum assist (75% patient effort)  - minimum assist (75% patient effort)  -    Bed Mobility, Roll Right, [place]  minimum assist (75% patient effort)  - minimum assist (75% patient effort)  -    Bed Mobility, Scoot/Bridge, [place]  dependent (less than 25% patient effort)  -     Bed Mob, Supine to Sit, [place]  verbal cues required;nonverbal cues required (demo/gesture);minimum assist (75% patient effort);moderate assist (50% patient effort)  - verbal cues required;nonverbal cues required (demo/gesture);minimum assist (75% patient effort);moderate assist (50% patient effort)  -    Bed Mob, Sit to Supine, [place]  verbal cues required;nonverbal cues required (demo/gesture);minimum assist (75% patient effort);moderate assist (50% patient effort)  - verbal cues required;nonverbal cues required (demo/gesture);minimum assist (75% patient effort);moderate assist (50%  patient effort)  -AG    Bed Mobility, Safety Issues  decreased use of legs for bridging/pushing;impaired trunk control for bed mobility  -AH decreased use of legs for bridging/pushing;impaired trunk control for bed mobility  -AG    Bed Mobility, Impairments  muscle tone abnormal;strength decreased;impaired balance;coordination impaired;sensory feedback impaired   colostomy  -AH muscle tone abnormal;strength decreased;impaired balance;coordination impaired;sensory feedback impaired   colostomy; wounds/ decr skin integrity  -AG    Recorded by  [AH] Magda Carney PTA [AG] Radha Ohara, PT    Transfer Assessment/Treatment    Transfers, Bed-Chair [place] contact guard assist;verbal cues required;nonverbal cues required (demo/gesture)  - verbal cues required;nonverbal cues required (demo/gesture);contact guard assist  -AH verbal cues required;nonverbal cues required (demo/gesture);contact guard assist  -AG    Transfers, Chair-Bed [place]  verbal cues required;nonverbal cues required (demo/gesture);contact guard assist  -AH verbal cues required;nonverbal cues required (demo/gesture);contact guard assist  -AG    Transfers, Bed-Chair-Bed, Assist Device sliding board  -BF sliding board  -AH sliding board  -AG    Transfer, Safety Issues  loses balance backward  -AH loses balance backward  -AG    Transfer, Impairments  muscle tone abnormal;strength decreased;impaired balance;coordination impaired;motor control impaired;postural control impaired  - muscle tone abnormal;strength decreased;impaired balance;coordination impaired;motor control impaired;postural control impaired  -AG    Recorded by [BF] Lucía Pedro, OT [AH] Magda Carney, PTA [AG] Radha Ohara, PT    Wheelchair Training/Management    Wheelchair Propulsion Training   forward propulsion  -AG    Wheelchair, Distance Propelled   300  -AG    Recorded by   [AG] Radha Ohara, PT    ADL Assessment/Intervention    Additional  Documentation Self-Feeding Assessment/Training (Group)  -BF      Recorded by [BF] Lucía Pedro, OT      Lower Body Bathing Assessment/Training    LB Bathing Assess/Train, Position supine  -BF      LB Bathing Assess/Train, Comment TB bathing: Min A  -BF      Recorded by [BF] Lucía Pedro, OT      Upper Body Dressing Assessment/Training    UB Dressing Assess/Train, Position sitting  -BF      UB Dressing Assess/Train, [place] set up required;supervision required  -BF      UB Dressing Assess/Train, Comment Set-up/supervision  -BF      Recorded by [BF] Lucía Pedro, OT      Lower Body Dressing Assessment/Training    LB Dressing Assess/Train, Assist Device reacher  -BF      LB Dressing Assess/Train, Position supine;sitting  -BF      LB Dressing Assess/Train, [place] moderate assist (50% patient effort);verbal cues required;nonverbal cues required (demo/gesture)  -BF      LB Dressing Assess/Train, Comment Mod A  -BF      Recorded by [BF] Lucía Pedro, OT      Toileting Assessment/Training    Toileting Assess/Train, Position sitting  -BF      Toileting Assess/Train, Indepen Level set up required  -BF      Toileting Assess/Train, Comment Set-up  -BF      Recorded by [BF] Lucía Pedro, OT      Grooming Assessment/Training    Grooming Assess/Train, Position sitting  -BF      Grooming Assess/Train, Indepen Level set up required  -BF      Grooming Assess/Train, Comment Set-up  -BF      Recorded by [BF] Lucía Pedro, OT      Self-Feeding Assessment/Training    Self-Feeding Assess/Train, Comment Mod I  -BF      Recorded by [BF] Lucía Pedro, OT      Motor Skills/Interventions    Additional Documentation  Balance Skills Training (Group)  - Balance Skills Training (Group)  -AG    Recorded by  [] Magda Carney, PTA [AG] Radha Ohara, PT    Balance Skills Training    Sitting-Level of Assistance  Close supervision  - Close supervision   static  sitting  -AG    Sitting-Balance Support  No upper extremity supported  - No upper extremity supported   R LE supported  -AG    Sitting-Balance Activities  Lateral lean;Ball toss;Reaching for objects;Reaching across midline;Trunk control activities  - Lateral lean;Forward lean;Ball toss;Reaching for objects;Reaching across midline;Trunk control activities   dynamic sitting varies from min to mod A  -AG    Sitting # of Minutes   15  -AG    Recorded by  [] Magda Carney PTA [AG] Radha Ohara, PT    Therapy Exercises    Bilateral Lower Extremities  PROM:;supine;prone  -AH PROM:;supine;prone  -AG    Bilateral Upper Extremity AROM:;sitting   BUE GMC/FMC ther ex; strengthening  -BF      BUE Resistance other (comment)   Arm bike 4 mins X3; Wrist rolls X2  -BF      Recorded by [BF] Lucía Pedro, OT [] Magda Carney PTA [AG] Radha Ohara, PT    Positioning and Restraints    Pre-Treatment Position  other (comment)   in w/c both AM and PM session  -AH in bed  -AG    Post Treatment Position wheelchair  -BF wheelchair   in both AM and PM session  - wheelchair  -AG    In Wheelchair sitting;patient within staff view  -BF sitting;call light within reach;encouraged to call for assist;exit alarm on  -AH sitting;RLE elevated;patient within staff view  -AG    Recorded by [BF] Lucía Pedro, OT [] Magda Carney PTA [AG] Radha Ohara, PT      06/07/17 1726 06/07/17 1610 06/06/17 1701    Rehab Assessment/Intervention    Discipline physical therapist  -CT occupational therapist  -BF physical therapist  -LB    Document Type therapy note (daily note)   Second AM therapy session  -CT therapy note (daily note)  -BF therapy note (daily note)  -LB    Subjective Information agree to therapy;no complaints  -CT agree to therapy;no complaints  -BF agree to therapy  -LB    Patient Effort, Rehab Treatment good  -CT good  -BF good  -LB    Symptoms Noted During/After Treatment   none  -LB     Precautions/Limitations fall precautions;other (see comments)   <skin integrity, colostomy, junior cath  -CT fall precautions;other (see comments)   < skin integrity, colostomy, junior cath  -BF fall precautions   colostomy, junior, < skin integrity  -LB    Specific Treatment Considerations  Pt participated in 2-person therapeutic group for second session addressing BUE strengthening and endurance  -BF     Patient Response to Treatment Pt tolerated treament session well with rest breaks provided as needed.   -CT  He tolerated both sessions with stretching in prone, supine, and long sitting. He also worked on transfers and bed mobility.  -LB    Recorded by [CT] Susu Sheridan, PT [BF] Lucía Pedro, OT [LB] Susan Palma, PT    Pain Assessment    Pain Assessment   No/denies pain  -LB    Recorded by   [LB] Susan Palma, PT    Cognitive Assessment/Intervention    Current Cognitive/Communication Assessment functional  -CT functional  -BF functional  -LB    Orientation Status oriented x 4  -CT oriented x 4  -BF     Follows Commands/Answers Questions 100% of the time  -% of the time  -% of the time  -LB    Personal Safety Interventions nonskid shoes/slippers when out of bed;supervised activity  -CT  gait belt;nonskid shoes/slippers when out of bed;supervised activity  -LB    Recorded by [CT] Susu Sheridan, PT [BF] Lucía Pedro, OT [LB] Susan Palma, PT    Bed Mobility, Assessment/Treatment    Bed Mobility, Scoot/Bridge, [place] dependent (less than 25% patient effort)  -CT  dependent (less than 25% patient effort)   this makes bed mobility more difficult  -LB    Bed Mob, Supine to Sit, [place] moderate assist (50% patient effort);verbal cues required;nonverbal cues required (demo/gesture)  -CT  verbal cues required;nonverbal cues required (demo/gesture);moderate assist (50% patient effort)  -LB    Bed Mob, Sit to Supine, [place] moderate assist (50% patient  effort);verbal cues required;nonverbal cues required (demo/gesture)  -CT  verbal cues required;nonverbal cues required (demo/gesture);moderate assist (50% patient effort)  -LB    Bed Mobility, Safety Issues decreased use of legs for bridging/pushing;decreased use of arms for pushing/pulling;impaired trunk control for bed mobility  -CT  decreased use of legs for bridging/pushing;decreased use of arms for pushing/pulling;impaired trunk control for bed mobility  -LB    Bed Mobility, Impairments strength decreased;impaired balance;coordination impaired;motor control impaired;postural control impaired;sensory feedback impaired;muscle tone abnormal  -CT  strength decreased;impaired balance;coordination impaired;motor control impaired;postural control impaired;sensory feedback impaired;muscle tone abnormal  -LB    Recorded by [CT] Susu Sheridan, PT  [LB] Susan Palma PT    Transfer Assessment/Treatment    Transfers, Bed-Chair [place] minimum assist (75% patient effort);verbal cues required;nonverbal cues required (demo/gesture)  -CT  minimum assist (75% patient effort);verbal cues required;nonverbal cues required (demo/gesture)  -LB    Transfers, Chair-Bed [place] minimum assist (75% patient effort);verbal cues required;nonverbal cues required (demo/gesture)  -CT  minimum assist (75% patient effort);verbal cues required;nonverbal cues required (demo/gesture)  -LB    Transfers, Bed-Chair-Bed, Assist Device sliding board  -CT  sliding board  -LB    Transfer, Safety Issues loses balance backward  -CT  loses balance backward  -LB    Transfer, Impairments strength decreased;impaired balance;coordination impaired;motor control impaired;postural control impaired;sensory feedback impaired;sensation decreased;muscle tone abnormal  -CT  strength decreased;impaired balance;coordination impaired;motor control impaired;postural control impaired;sensory feedback impaired;sensation decreased;muscle tone abnormal  -LB     Recorded by [CT] Susu Sheridan, PT  [LB] Susan Palma, PT    Grooming Assessment/Training    Grooming Assess/Train, Position  sitting  -BF     Grooming Assess/Train, Indepen Level  set up required;supervision required;verbal cues required  -BF     Grooming Assess/Train, Comment  Set-up  -BF     Recorded by  [BF] Lucía Pedro, STEPHANIE     Balance Skills Training    Sitting-Level of Assistance Close supervision  -CT  Close supervision  -LB    Sitting-Balance Support --   requires one extremity to assist balance at eob  -CT  --   requires one extremity to assist balance at eob  -LB    Sitting-Balance Activities Reaching for objects;Reaching across midline  -CT      Recorded by [CT] Susu Sheridan, PT  [LB] Susan Palma, PT    Therapy Exercises    Bilateral Lower Extremities PROM:;supine   supine stretching  -CT  PROM:;supine   prone lying 10 min, hamstring stretch in long sitting  -LB    Bilateral Upper Extremity  AROM:;sitting   BUE GMC/FMC ther ex; strengthening  -BF     BUE Resistance  other (comment)   Rickshaw 20 [id number]; Armbike 4 minsX3; 3 lb dowel therex  -BF     Recorded by [CT] Susu Sheridan, PT [BF] Lucía Pedro, OT [LB] Susan Palma, PT    Positioning and Restraints    Pre-Treatment Position sitting in chair/recliner  -CT  --   am-bed, pm-w/c  -LB    Post Treatment Position wheelchair  -CT wheelchair  -BF     In Wheelchair sitting;patient within staff view  -CT sitting;patient within staff view   After both sessions  -BF --   he left gym independently  -LB    Recorded by [CT] Susu Sheridan, PT [BF] Lucía Pedro, OT [LB] Susan Palma, PT      06/06/17 1441          Rehab Assessment/Intervention    Discipline occupational therapist  -BF      Document Type therapy note (daily note)  -BF      Subjective Information agree to therapy;no complaints  -BF      Patient Effort, Rehab Treatment good  -BF      Precautions/Limitations fall precautions;other  (see comments)   colostomy, junior, < skin integrity  -BF      Specific Treatment Considerations Pt participated in 2-person therapy group addressing BUE strengthening and endurance for first session   -BF      Recorded by [BF] Lucía Pedro OT      Cognitive Assessment/Intervention    Current Cognitive/Communication Assessment functional  -BF      Orientation Status oriented x 4  -BF      Follows Commands/Answers Questions 100% of the time;able to follow single-step instructions  -BF      Recorded by [BF] Lucía Pedro, OT      Grooming Assessment/Training    Grooming Assess/Train, Position sitting  -BF      Grooming Assess/Train, Indepen Level set up required;verbal cues required;supervision required  -BF      Recorded by [BF] Lucía Pedro OT      Therapy Exercises    Bilateral Upper Extremity AROM:;sitting   BUE GMC/FMC ther ex; strengthening; reaching  -BF      BUE Resistance other (comment)   Rickshaw 20 [id number]; Armbike [id number]; Wrist rollsX3  -BF      Recorded by [BF] Lucía Pedro OT      Positioning and Restraints    Pre-Treatment Position sitting in chair/recliner  -BF      Post Treatment Position wheelchair  -BF      In Wheelchair sitting;patient within staff view   After both sessions  -BF      Recorded by [BF] Lucía Pedro OT        User Key  (r) = Recorded By, (t) = Taken By, (c) = Cosigned By    Initials Name Effective Dates    LB Susan Palma, PT 03/14/16 -     BF Lucía Pedro, OT 03/14/16 -     AG Radha Ohara, PT 03/14/16 -     CT Susu Sheridan, PT 03/14/16 -      Magda Carney, PTA 07/14/16 -                 OT Goals       06/08/17 1624 06/02/17 1554       Transfer Training OT LTG    Transfer Training OT LTG, Date Established  06/02/17  -BF     Transfer Training OT LTG, Time to Achieve  by discharge  -BF     Transfer Training OT LTG, Activity Type  bed to chair /chair to bed;shower chair  -BF     Transfer Training OT LTG,  [place] Level  contact guard assist  -BF     Bathing OT STG    Bathing Goal OT STG, Date Established  06/02/17  -BF     Bathing Goal OT STG, Time to Achieve  5 - 7 days  -BF     Bathing Goal OT STG, Activity Type  lower body bathing;upper body bathing  -BF     Bathing Goal OT STG, [place] Level  minimum assist (75% patient effort)  -BF     Bathing Goal OT STG, Outcome goal met  -BF      Bathing OT LTG    Bathing Goal OT LTG, Date Established  06/02/17  -BF     Bathing Goal OT LTG, Time to Achieve  by discharge  -BF     Bathing Goal OT LTG, Activity Type  upper body bathing;lower body bathing  -BF     Bathing Goal OT LTG, [place] Level  set up required;supervision required  -BF     Eating Self-Feeding OT LTG    Eat Self Feeding Goal OT LTG, Date Established  06/02/17  -BF     Eat Self Feeding Goal OT LTG, Time to Achieve  by discharge  -BF     Eat Self Feed Goal OT LTG, [place] Level  conditional independence  -BF     Grooming OT LTG    Grooming Goal OT LTG, Date Established  06/02/17  -BF     Grooming Goal OT LTG, Time to Achieve  by discharge  -BF     Grooming Goal OT LTG, [place] Level  conditional independence  -BF     LB Dressing OT STG    LB Dressing Goal OT STG, Date Established  06/02/17  -BF     LB Dressing Goal OT STG, Time to Achieve  5 - 7 days  -BF     LB Dressing Goal OT STG, [place] Level  moderate assist (50% patient effort)  -BF     LB Dressing Goal OT STG, Outcome goal met  -BF      LB Dressing OT LTG    LB Dressing Goal OT LTG, Date Established  06/02/17  -BF     LB Dressing Goal OT LTG, Time to Achieve  by discharge  -BF     LB Dressing Goal OT LTG, [place] Level  minimum assist (75% patient effort)  -BF       User Key  (r) = Recorded By, (t) = Taken By, (c) = Cosigned By    Initials Name Provider Type    YARELI Pedro OT Occupational Therapist          Occupational Therapy Education     Title: PT OT SLP Therapies (Active)     Topic: Occupational  Therapy (Active)     Point: ADL training (Active)    Description: Instruct learner(s) on proper safety adaptation and remediation techniques during self care or transfers.   Instruct in proper use of assistive devices.    Learning Progress Summary    Learner Readiness Method Response Comment Documented by Status   Patient Acceptance E,D NR  BF 06/08/17 1624 Active    Acceptance E,D NR  BF 06/07/17 1616 Active    Acceptance E,D NR  BF 06/06/17 1446 Active    Acceptance E,D NR  BF 06/05/17 1231 Active    Acceptance E,D NR  BF 06/02/17 1551 Active    Acceptance E,D NR  BF 06/02/17 1551 Active               Point: Precautions (Active)    Description: Instruct learner(s) on prescribed precautions during self-care and functional transfers.    Learning Progress Summary    Learner Readiness Method Response Comment Documented by Status   Patient Acceptance E,D NR  BF 06/08/17 1624 Active    Acceptance E,D NR  BF 06/07/17 1616 Active    Acceptance E,D NR  BF 06/06/17 1446 Active    Acceptance E,D NR  BF 06/05/17 1231 Active    Acceptance E,D NR  BF 06/02/17 1551 Active    Acceptance E,D NR  BF 06/02/17 1551 Active                      User Key     Initials Effective Dates Name Provider Type Discipline     03/14/16 -  Lucía Pedro, OT Occupational Therapist OT                  OT Recommendation and Plan  Anticipated Equipment Needs At Discharge:  (TBD)  Anticipated Discharge Disposition:  (TBD)  Planned Therapy Interventions: activity intolerance, adaptive equipment training, ADL retraining, home exercise program, strengthening, transfer training (Therapeutic groups as beneficial)  Therapy Frequency: 3-5 times/wk          Time Calculation:         Time Calculation- OT       06/08/17 1625 06/08/17 0755       Time Calculation- OT    OT Start Time 1135  -BF 0755  -BF     OT Stop Time 1145  -BF 0915  -BF     OT Time Calculation (min) 10 min  -BF 80 min  -BF     Total Timed Code Minutes- OT 10 minute(s)  -BF 80 minute(s)   -BF     OT Non-Billable Time (min)  20 min  -BF       User Key  (r) = Recorded By, (t) = Taken By, (c) = Cosigned By    Initials Name Provider Type     Lucía Pedro OT Occupational Therapist           Therapy Charges for Today     Code Description Service Date Service Provider Modifiers Qty    67854871999 HC OT THER SUPP EA 15 MIN 6/7/2017 Lucía Pedro OT GO 1    29176533654 HC OT THERAPEUTIC ACT EA 15 MIN 6/7/2017 Lucía Pedro OT GO 2    63780725855 HC OT SELF CARE/MGMT/TRAIN EA 15 MIN 6/7/2017 Lucía Pedro OT GO 1    72369870492 HC OT THER PROC GROUP 6/7/2017 Lucía Pedro OT GO 1    68467656893 HC OT THER SUPP EA 15 MIN 6/8/2017 Lucía Pedro OT GO 1    39905598585 HC OT THERAPEUTIC ACT EA 15 MIN 6/8/2017 Lucía Pedro OT GO 2    52347881405 HC OT SELF CARE/MGMT/TRAIN EA 15 MIN 6/8/2017 Lucía Pedro OT GO 4               Lucía Pedro OT  6/8/2017

## 2017-06-08 NOTE — PROGRESS NOTES
Inpatient Rehabilitation Functional Measures Assessment    Functional Measures  LYNETTE Eating:  LYNETTE Grooming:  LYNETTE Bathing:  LYNETTE Upper Body Dressing:  LYNETTE Lower Body Dressing:  LYNETTE Toileting:    LYNETTE Bladder Management  Level of Assistance:  Frequency/Number of Accidents this Shift:    LYNETTE Bowel Management  Level of Assistance:  Frequency/Number of Accidents this Shift:    LYNETTE Bed/Chair/Wheelchair Transfer:  LYNETTE Toilet Transfer:  LYNETTE Tub/Shower Transfer:    Previously Documented Mode of Locomotion at Discharge:  LYNETTE Expected Mode of Locomotion at Discharge:  LYNETTE Walk/Wheelchair:  LYNETTE Stairs:    LYNETTE Comprehension:  Both ( auditory and visual) modes of comprehension are used  equally. Comprehension Score = 6, Modified [place].  Patient comprehends  complex/abstract information in their primary language, requiring: Glasses.  Additional time.  LYNETTE Expression:  Both ( vocal and non-vocal) modes of expression are used  equally. Expression Score = 6,  Modified Independent. Patient expresses  complex/abstract information in their primary language, requiring: Additional  time.  LYNETTE Social Interaction:  Social Interaction Score = 6, Modified Independent.  Patient is modified independent for social interaction, requiring: Requires  additional time.  LYNETTE Problem Solving:  Problem Solving Score = 6, Modified [place].  Patient  makes appropriate decisions in order to solve complex problems, but requires  extra time.  LYNETTE Memory:  Memory Score = 6, Modified [place].  Patient is modified  independent for memory, requiring: Requires additional time.    Therapy Mode Minutes  Occupational Therapy:  Physical Therapy:  Speech Language Pathology:    Discharge Functional Goals:    Signed by: Nurse Ana

## 2017-06-08 NOTE — PLAN OF CARE
Problem: Patient Care Overview (Adult)  Goal: Plan of Care Review  Outcome: Ongoing (interventions implemented as appropriate)    06/07/17 1052 06/08/17 0750   Coping/Psychosocial Response Interventions   Plan Of Care Reviewed With --  patient   Patient Care Overview   Progress progress toward functional goals as expected --          Problem: Activity Intolerance (Adult)  Goal: Activity Tolerance  Outcome: Ongoing (interventions implemented as appropriate)    Problem: Fall Risk (Adult)  Goal: Absence of Falls  Outcome: Ongoing (interventions implemented as appropriate)    Problem: Pressure Ulcer (Adult)  Goal: Signs and Symptoms of Listed Potential Problems Will be Absent or Manageable (Pressure Ulcer)  Outcome: Ongoing (interventions implemented as appropriate)    Problem: Skin Integrity Impairment, Risk/Actual (Adult)  Goal: Skin Integrity/Wound Healing  Outcome: Ongoing (interventions implemented as appropriate)

## 2017-06-08 NOTE — PROGRESS NOTES
Inpatient Rehabilitation Functional Measures Assessment and Plan of Care    Plan of Care      Functional Measures  LYNETTE Eating:  LYNETTE Grooming:  LYNETTE Bathing:  LYNETTE Upper Body Dressing:  LYNETTE Lower Body Dressing:  LYNETTE Toileting:    LYNETTE Bladder Management  Level of Assistance:  Frequency/Number of Accidents this Shift:    LYNETTE Bowel Management  Level of Assistance:  Frequency/Number of Accidents this Shift:    LYNETTE Bed/Chair/Wheelchair Transfer:  LYNETTE Toilet Transfer:  LYNETTE Tub/Shower Transfer:    Previously Documented Mode of Locomotion at Discharge:  LYNETTE Expected Mode of Locomotion at Discharge:  LYNETTE Walk/Wheelchair:  LYNETTE Stairs:    LYNETTE Comprehension:  Both ( auditory and visual) modes of comprehension are used  equally. Comprehension Score = 6, Modified [place].  Patient comprehends  complex/abstract information in their primary language, requiring: Additional  time. Additional time.  LYNETTE Expression:  Both ( vocal and non-vocal) modes of expression are used  equally. Expression Score = 6,  Modified Independent. Patient expresses  complex/abstract information in their primary language, requiring: Additional  time.  LYNETTE Social Interaction:  Social Interaction Score = 6, Modified Independent.  Patient is modified independent for social interaction, requiring: Requires  additional time.  LYNETTE Problem Solving:  Problem Solving Score = 6, Modified [place].  Patient  makes appropriate decisions in order to solve complex problems, but requires  extra time.  LYNETTE Memory:  Memory Score = 6, Modified [place].  Patient is modified  independent for memory, requiring: Requires additional time.    Therapy Mode Minutes  Occupational Therapy:  Physical Therapy:  Speech Language Pathology:    Discharge Functional Goals:    Signed by: Sabino Kaufman RN

## 2017-06-08 NOTE — PLAN OF CARE
Problem: Inpatient Occupational Therapy  Goal: Bathing Goal STG- OT  Outcome: Outcome(s) achieved Date Met:  06/08/17 06/08/17 1624   Bathing OT STG   Bathing Goal OT STG, Outcome goal met       Goal: LB Dressing Goal STG- OT  Outcome: Outcome(s) achieved Date Met:  06/08/17 06/08/17 1624   LB Dressing OT STG   LB Dressing Goal OT STG, Outcome goal met

## 2017-06-08 NOTE — PROGRESS NOTES
Inpatient Rehabilitation Functional Measures Assessment    Functional Measures  LYNETTE Eating:  LYNETTE Grooming:  LYNETTE Bathing:  LYNETTE Upper Body Dressing:  LYNETTE Lower Body Dressing:  LYNETTE Toileting:    LYNETTE Bladder Management  Level of Assistance:  Bladder Score = 1.  Patient is total assistance for  bladder management. Patient has an indwelling/Sheldon catheter.  Frequency/Number of Accidents this Shift:    LYNETTE Bowel Management  Level of Assistance: Bowel Score = 5.  Patient is supervision/set-up for bowel  management, requiring: colostomy . No assistive devices were required.  Frequency/Number of Accidents this Shift:    LYNETTE Bed/Chair/Wheelchair Transfer:  Bed/chair/wheelchair Transfer Score = 1.  Patient performs 75% or more of effort and requires minimal assistance  (incidental help) of two or more people for transferring to and from the  bed/chair/wheelchair. safety reasons Patient requires the following assistive  device(s): Sliding board. Bed rails. Seating system of wheelchair.  LYNETTE Toilet Transfer:  LYNETTE Tub/Shower Transfer:    Previously Documented Mode of Locomotion at Discharge:  [facility] Expected Mode of Locomotion at Discharge:  [facility] Walk/Wheelchair:  [facility] Stairs:    [facility] Comprehension:  [facility] Expression:  [facility] Social Interaction:  [facility] Problem Solving:  [facility] Memory:    Therapy Mode Minutes  Occupational Therapy:  Physical Therapy:  Speech Language Pathology:    Discharge Functional Goals:    Signed by: TALI Jacobs

## 2017-06-08 NOTE — PROGRESS NOTES
Inpatient Rehabilitation Functional Measures Assessment    Functional Measures  LYNETTE Eating:  LYNETTE Grooming:  LYNETTE Bathing:  LYNETTE Upper Body Dressing:  LYNETTE Lower Body Dressing:  LYNETTE Toileting:    LYNETTE Bladder Management  Level of Assistance:  Frequency/Number of Accidents this Shift:    LYNETTE Bowel Management  Level of Assistance:  Frequency/Number of Accidents this Shift:    LYNETTE Bed/Chair/Wheelchair Transfer:  Bed/chair/wheelchair Transfer Score = 5.  Patient is supervision/set-up for transferring to and from the  bed/chair/wheelchair, requiring: Stand by assistance. Patient requires the  following assistive device(s): Sliding board.  LYNETTE Toilet Transfer:  Activity was not observed.  LYNETTE Tub/Shower Transfer:  Activity was not observed.    Previously Documented Mode of Locomotion at Discharge: Wheelchair  LYNETTE Expected Mode of Locomotion at Discharge: No change to the current  documented expected, most frequently used mode of locomotion at discharge  LYNETTE Walk/Wheelchair:  WHEELCHAIR OBSERVATION   Activity was not observed.    WALK OBSERVATION   Activity was not observed.  LYNETTE Stairs:  Activity was not observed.    LYNETTE Comprehension:  LYNETTE Expression:  LYNETTE Social Interaction:  LYNETTE Problem Solving:  LYNETTE Memory:    Therapy Mode Minutes  Occupational Therapy:  Physical Therapy: Individual: 90 minutes.  Speech Language Pathology:    Discharge Functional Goals:    Signed by: Magda Carney PTA

## 2017-06-08 NOTE — PROGRESS NOTES
Inpatient Rehabilitation Functional Measures Assessment    Functional Measures  LYNETTE Eating:  LYNETTE Grooming: Grooming Score = 5. Patient is supervision/set-up for grooming,  requiring: Verbal cuing, prompting, or instructing. Setting out grooming  equipment. No assistive devices were required.  LYNETTE Bathing:  Patient took sponge bath. Patient requires no physical assistance  for washing, rinsing, and drying the right arm. Patient requires no physical  assistance for washing, rinsing, and drying the left arm. Patient requires no  physical assistance for washing, rinsing, and drying the chest. Patient requires  no physical assistance for washing, rinsing, and drying the abdomen. Patient  requires no physical assistance for washing, rinsing, and drying the perineal  area. Patient requires no physical assistance for washing, rinsing, and drying  the buttocks. Patient requires no physical assistance for washing, rinsing, and  drying the right upper leg. Patient requires no physical assistance for washing,  rinsing, and drying the left upper leg. Patient requires total assistance for  washing, rinsing, or drying the right lower leg, including the foot. Patient  requires no physical assistance for washing, rinsing, and drying the left lower  leg, including the foot. Patient performs 90 % of bathing tasks. Bathing Score =  4, Minimal Assistance. Patient requires the following assistive device(s): Grab  bar/arm rest to maintain balance.  LYNETTE Upper Body Dressing:  Upper Body Dressing Score = 5. Patient is supervision  for upper body dressing, requiring: Gathering/setting out clothes. No assistive  devices were required.  LYNETTE Lower Body Dressing:  Patient requires total assistance for gathering  clothes. Wearing underwear or an undergarment is not applicable for this  patient. Patient requires maximal/significant physical assistance for holding  clothing and/or threading the right leg through the pants/skirt. Patient  requires no  physical assistance for donning and/or doffing pants/skirt threading  left leg. Patient requires minimal/incidental physical assistance for pulling  pants/skirt over hips and adjusting fasteners. Patient requires  maximal/significant physical assistance for holding clothing and/or donning  and/or doffing right sock. Patient requires no physical assistance for donning  and/or doffing left sock. Donning and/or doffing right shoe is not applicable  for this patient. Donning and/or doffing left shoe is not applicable for this  patient. Patient performs 54.17 % of lower body dressing tasks. Lower Body  Dressing Score = 3, Moderate Assistance. Patient requires the following  assistive device(s): [place]acher.  LYNETTE Toileting:  Toileting Score = 5.  Patient is supervision/set-up for  toileting, requiring: Setting out toileting equipment. Patient requires the  following assistive device(s): Colostomy .    LYNETTE Bladder Management  Level of Assistance:  Frequency/Number of Accidents this Shift:    LYNETTE Bowel Management  Level of Assistance:  Frequency/Number of Accidents this Shift:    LYNETTE Bed/Chair/Wheelchair Transfer:  Bed/chair/wheelchair Transfer Score = 4.  Patient performs 75% or more of effort and minimal assistance (little/incidental  help/lifting of one limb/steadying) for transferring to and from the  bed/chair/wheelchair, requiring: Contact guard. Patient requires the following  assistive device(s): Sliding board.  LYNETTE Toilet Transfer:  LYNETTE Tub/Shower Transfer:    Previously Documented Mode of Locomotion at Discharge:  [facility] Expected Mode of Locomotion at Discharge:  [facility] Walk/Wheelchair:  [facility] Stairs:    [facility] Comprehension:  [facility] Expression:  [facility] Social Interaction:  [facility] Problem Solving:  LYNETTE Memory:    Therapy Mode Minutes  Occupational Therapy: Individual: 90 minutes.  Physical Therapy:  Speech Language Pathology:    Discharge Functional Goals:    Signed by: Lucía Pedro OT

## 2017-06-08 NOTE — PROGRESS NOTES
"     LOS: 7 days     Chief Complaint:  Weakness, debility     Subjective     Interval History:     He denies new complaint this morning.  He has no fevers or chills.  Doing better with his ADL and self-care.      Objective     Vital Signs  /93 (BP Location: Right arm, Patient Position: Lying)  Pulse (!) 126  Temp 98.3 °F (36.8 °C) (Oral)   Resp 20  Ht 71\" (180.3 cm)  Wt 245 lb (111 kg)  SpO2 100%  BMI 34.17 kg/m2  Intake & Output (last day)       06/07 0701 - 06/08 0700 06/08 0701 - 06/09 0700    P.O. 1800     Total Intake(mL/kg) 1800 (16.2)     Urine (mL/kg/hr) 1800 (0.7)     Stool 1 (0)     Total Output 1801      Net -1                    Physical Exam:     General Appearance:    Alert, cooperative, in no acute distress   Head:    Normocephalic, without obvious abnormality, atraumatic   Eyes:            Lids and lashes normal, conjunctivae and sclerae normal, no   icterus, no pallor, corneas clear, PERRLA   Ears:    Ears appear intact with no abnormalities noted       Neck:   No adenopathy, supple, trachea midline, no thyromegaly, no   carotid bruit, no JVD   Lungs:     Clear to auscultation,respirations regular, even and                  unlabored    Heart:    Regular rhythm and normal rate, normal S1 and S2, no            murmur, no gallop, no rub, no click   Chest Wall:    No abnormalities observed   Abdomen:     Normal bowel sounds, no masses, no organomegaly, soft        non-tender, non-distended, no guarding, no rebound                tenderness   Extremities:   No movement in BLE, no sensation in BLE  no edema, no cyanosis, no redness   Pulses:   Pulses palpable and equal bilaterally   Skin:   No bleeding, bruising or rash                Results Review:    Lab Results   Component Value Date    WBC 8.25 06/07/2017    HGB 9.1 (L) 06/07/2017    HCT 30.3 (L) 06/07/2017    MCV 79.9 (L) 06/07/2017     (H) 06/07/2017       Lab Results   Component Value Date    GLUCOSE 91 06/02/2017    BUN 10 " 06/02/2017    CREATININE 0.51 06/02/2017    EGFRIFNONA >150 06/02/2017    BCR 19.6 06/02/2017     06/02/2017    K 4.2 06/02/2017     06/02/2017    CO2 30.5 06/02/2017    CALCIUM 8.7 06/02/2017    ALBUMIN 3.10 (L) 06/02/2017    LABIL2 1.1 (L) 06/02/2017    AST 15 06/02/2017    ALT 13 06/02/2017     No results found for: INR    No results found for: POCGLU       Medication Review:     Current Facility-Administered Medications:   •  acetaminophen (TYLENOL) tablet 650 mg, 650 mg, Oral, Q4H PRN, Samuel Duane Kreis, MD  •  ALPRAZolam (XANAX) tablet 0.5 mg, 0.5 mg, Oral, TID, Samuel Duane Kreis, MD, 0.5 mg at 06/07/17 2108  •  aluminum-magnesium hydroxide-simethicone (MAALOX MAX) 400-400-40 MG/5ML suspension 15 mL, 15 mL, Oral, Q6H PRN, Samuel Duane Kreis, MD  •  amitriptyline (ELAVIL) tablet 25 mg, 25 mg, Oral, Nightly, Samuel Duane Kreis, MD, 25 mg at 06/07/17 2108  •  aspirin EC tablet 81 mg, 81 mg, Oral, Daily, Samuel Duane Kreis, MD, 81 mg at 06/07/17 0836  •  baclofen (LIORESAL) tablet 20 mg, 20 mg, Oral, Q8H, Samuel Duane Kreis, MD, 20 mg at 06/08/17 0607  •  bisacodyl (DULCOLAX) suppository 10 mg, 10 mg, Rectal, Daily PRN, Samuel Duane Kreis, MD  •  castor oil-balsam peru (VENELEX) ointment, , Topical, Q12H, Samuel Duane Kreis, MD  •  cetirizine (zyrTEC) tablet 10 mg, 10 mg, Oral, Daily, Samuel Duane Kreis, MD, 10 mg at 06/07/17 0836  •  enoxaparin (LOVENOX) syringe 40 mg, 40 mg, Subcutaneous, Daily, Samuel Duane Kreis, MD, 40 mg at 06/07/17 0836  •  fluticasone (FLONASE) 50 MCG/ACT nasal spray 2 spray, 2 spray, Each Nare, Daily, Samuel Duane Kreis, MD, 2 spray at 06/02/17 0859  •  folic acid (FOLVITE) tablet 1 mg, 1 mg, Oral, Daily, Samuel Duane Kreis, MD, 1 mg at 06/07/17 0836  •  gabapentin (NEURONTIN) capsule 800 mg, 800 mg, Oral, Q8H, Samuel Duane Kreis, MD, 800 mg at 06/08/17 0607  •  magnesium hydroxide (MILK OF MAGNESIA) suspension 2400 mg/10mL 10 mL, 10 mL, Oral, Daily PRN, Samuel Duane Kreis,  MD  •  metoprolol tartrate (LOPRESSOR) tablet 25 mg, 25 mg, Oral, Q12H, Samuel Duane Kreis, MD, 25 mg at 06/07/17 2107  •  multivitamin (DAILY BRIAN) tablet 1 tablet, 1 tablet, Oral, Daily, Samuel Duane Kreis, MD, 1 tablet at 06/07/17 0836  •  ondansetron (ZOFRAN) tablet 4 mg, 4 mg, Oral, Q6H PRN, 4 mg at 06/06/17 0835 **OR** ondansetron ODT (ZOFRAN-ODT) disintegrating tablet 4 mg, 4 mg, Oral, Q6H PRN **OR** ondansetron (ZOFRAN) injection 4 mg, 4 mg, Intravenous, Q6H PRN, Samuel Duane Kreis, MD  •  sennosides-docusate sodium (SENOKOT-S) 8.6-50 MG tablet 2 tablet, 2 tablet, Oral, Nightly, Samuel Duane Kreis, MD, 2 tablet at 06/07/17 2108  •  venlafaxine XR (EFFEXOR-XR) 24 hr capsule 75 mg, 75 mg, Oral, Daily With Breakfast, Samuel Duane Kreis, MD, 75 mg at 06/07/17 0836      Assessment/Plan     Debility  Paraplegia  Decubitus ulcers of buttocks      PT and OT ongoing.    Continue gabapentin with when necessary baclofen    Continue indwelling Sheldon          Samuel Duane Kreis, MD  06/08/17  9:00 AM

## 2017-06-08 NOTE — PROGRESS NOTES
Inpatient Rehabilitation Functional Measures Assessment    Functional Measures  LYNETTE Eating:  Eating Score = 7. Patient is completely independent for eating.  There are no activity limitations.  LYNETTE Grooming: Activity was not observed.  LYNETTE Bathing:  Activity was not observed.  LYNETTE Upper Body Dressing:  Activity was not observed.  LYNETTE Lower Body Dressing:  Activity was not observed.  LYNETTE Toileting:  Patient requires minimal assistance for adjusting clothing  before using a toilet, commode, bedpan, or urinal. Patient requires minimal  assistance for hygiene. Patient requires minimal assistance for adjusting  clothing after using a toilet, commode, bedpan, or urinal. Patient performs 0 -  24% of toileting tasks.  Toileting Score = 1, Total Assistance. Patient requires  the following assistive device(s): Grab bar. Arm rest of a specialized seat.    LYNETTE Bladder Management  Level of Assistance:  Bladder Score = 1.  Patient is total assistance for  bladder management. Patient has an indwelling/Sheldon catheter.  Frequency/Number of Accidents this Shift:  Bladder accidents this shift:  0 .  Patient has not had an accident, but used a device/medication this shift  requiring: Sheldon cath .    LYNETTE Bowel Management  Level of Assistance: Bowel Score = 4. Patient performs greater than 75% of tasks  and requires minimal assistance for bowel management requiring assistive  device/method: Colostomy .  Frequency/Number of Accidents this Shift: Bowel accidents this shift: 0 .  Patient has not had an accident, but used a device/medication this shift  requiring: Colostomy .    LYNETTE Bed/Chair/Wheelchair Transfer:  Bed/chair/wheelchair Transfer Score = 3.  Patient performs 50-74% of effort and requires moderate assistance (some  lifting) for transferring to and from the bed/chair/wheelchair. Patient requires  the following assistive device(s): Sliding board. Grab bars. Seating system of  wheelchair.  LYNETTE Toilet Transfer:  Activity was not  observed.  LYNETTE Tub/Shower Transfer:  Activity was not observed.    Previously Documented Mode of Locomotion at Discharge:  LYNETTE Expected Mode of Locomotion at Discharge:  LYNETTE Walk/Wheelchair:  LYNETTE Stairs:    LYNETTE Comprehension:  LYNETTE Expression:  [facility] Social Interaction:  [facility] Problem Solving:  LYNETTE Memory:    Therapy Mode Minutes  Occupational Therapy:  Physical Therapy:  Speech Language Pathology:    Discharge Functional Goals:    Signed by: TALI Dumont

## 2017-06-09 PROCEDURE — 97110 THERAPEUTIC EXERCISES: CPT

## 2017-06-09 PROCEDURE — 97535 SELF CARE MNGMENT TRAINING: CPT

## 2017-06-09 PROCEDURE — 97530 THERAPEUTIC ACTIVITIES: CPT

## 2017-06-09 PROCEDURE — 25010000002 ENOXAPARIN PER 10 MG: Performed by: FAMILY MEDICINE

## 2017-06-09 RX ADMIN — ALPRAZOLAM 0.5 MG: 0.5 TABLET ORAL at 20:58

## 2017-06-09 RX ADMIN — BACLOFEN 20 MG: 10 TABLET ORAL at 13:42

## 2017-06-09 RX ADMIN — GABAPENTIN 800 MG: 400 CAPSULE ORAL at 06:06

## 2017-06-09 RX ADMIN — DOCUSATE SODIUM AND SENNA 2 TABLET: 50; 8.6 TABLET, FILM COATED ORAL at 20:58

## 2017-06-09 RX ADMIN — ASPIRIN 81 MG: 81 TABLET ORAL at 09:33

## 2017-06-09 RX ADMIN — GABAPENTIN 800 MG: 400 CAPSULE ORAL at 13:42

## 2017-06-09 RX ADMIN — CASTOR OIL AND BALSAM, PERU: 788; 87 OINTMENT TOPICAL at 09:38

## 2017-06-09 RX ADMIN — FOLIC ACID 1 MG: 1 TABLET ORAL at 09:33

## 2017-06-09 RX ADMIN — METOPROLOL TARTRATE 25 MG: 25 TABLET, FILM COATED ORAL at 09:33

## 2017-06-09 RX ADMIN — ALPRAZOLAM 0.5 MG: 0.5 TABLET ORAL at 09:33

## 2017-06-09 RX ADMIN — GABAPENTIN 800 MG: 400 CAPSULE ORAL at 21:03

## 2017-06-09 RX ADMIN — CETIRIZINE HYDROCHLORIDE 10 MG: 10 TABLET ORAL at 09:33

## 2017-06-09 RX ADMIN — CASTOR OIL AND BALSAM, PERU: 788; 87 OINTMENT TOPICAL at 20:59

## 2017-06-09 RX ADMIN — Medication 1 TABLET: at 09:33

## 2017-06-09 RX ADMIN — AMITRIPTYLINE HYDROCHLORIDE 25 MG: 25 TABLET, FILM COATED ORAL at 20:59

## 2017-06-09 RX ADMIN — METOPROLOL TARTRATE 25 MG: 25 TABLET, FILM COATED ORAL at 20:58

## 2017-06-09 RX ADMIN — BACLOFEN 20 MG: 10 TABLET ORAL at 21:03

## 2017-06-09 RX ADMIN — BACLOFEN 20 MG: 10 TABLET ORAL at 06:06

## 2017-06-09 RX ADMIN — VENLAFAXINE HYDROCHLORIDE 75 MG: 75 CAPSULE, EXTENDED RELEASE ORAL at 09:33

## 2017-06-09 RX ADMIN — ENOXAPARIN SODIUM 40 MG: 40 INJECTION SUBCUTANEOUS at 09:33

## 2017-06-09 RX ADMIN — ALPRAZOLAM 0.5 MG: 0.5 TABLET ORAL at 15:19

## 2017-06-09 NOTE — PLAN OF CARE
Problem: Patient Care Overview (Adult)  Goal: Adult Individualization and Mutuality  Outcome: Ongoing (interventions implemented as appropriate)  Goal: Discharge Needs Assessment  Outcome: Ongoing (interventions implemented as appropriate)    Problem: Activity Intolerance (Adult)  Goal: Activity Tolerance  Outcome: Ongoing (interventions implemented as appropriate)  Goal: Effective Energy Conservation Techniques  Outcome: Ongoing (interventions implemented as appropriate)    Problem: Fall Risk (Adult)  Goal: Absence of Falls  Outcome: Ongoing (interventions implemented as appropriate)    Problem: Skin Integrity Impairment, Risk/Actual (Adult)  Goal: Skin Integrity/Wound Healing  Outcome: Ongoing (interventions implemented as appropriate)    Problem: Mobility, Physical Impaired (Adult)  Goal: Enhanced Mobility Skills  Outcome: Ongoing (interventions implemented as appropriate)  Goal: Enhanced Functionality Ability  Outcome: Ongoing (interventions implemented as appropriate)

## 2017-06-09 NOTE — PROGRESS NOTES
Inpatient Rehabilitation Functional Measures Assessment    Functional Measures  LYNETTE Eating:  Eating Score = 5. Patient is supervision/set-up for eating,  requiring: No assistive devices were required.  LYNETTE Grooming:  LYNETTE Bathing:  [facility] Upper Body Dressing:  LYNETTE Lower Body Dressing:  LYNETTE Toileting:    LYNETTE Bladder Management  Level of Assistance:  Frequency/Number of Accidents this Shift:    LYNETTE Bowel Management  Level of Assistance:  Frequency/Number of Accidents this Shift:    LYNETTE Bed/Chair/Wheelchair Transfer:  [facility] Toilet Transfer:  [facility] Tub/Shower Transfer:    Previously Documented Mode of Locomotion at Discharge:  [facility] Expected Mode of Locomotion at Discharge:  [facility] Walk/Wheelchair:  [facility] Stairs:    [facility] Comprehension:  [facility] Expression:  [facility] Social Interaction:  [facility] Problem Solving:  LYNETTE Memory:    Therapy Mode Minutes  Occupational Therapy: Individual: 90 minutes.  Physical Therapy:  Speech Language Pathology:    Discharge Functional Goals:    Signed by: Titus Kaba Occupational Therapist

## 2017-06-09 NOTE — PROGRESS NOTES
Inpatient Rehabilitation Functional Measures Assessment    Functional Measures  LYENTTE Eating:  LYNETTE Grooming: Grooming Score = 5. Patient is supervision/set-up for grooming,  requiring: Setting out grooming equipment. Initial preparation. Opening  containers. No assistive devices were required.  LYNETTE Bathing:  Patient took sponge bath. Bathing Score = 5.  Patient is  supervision/set-up for bathing, requiring: Preparing the water. Setting out  bathing equipment. Preparing washing materials. No assistive devices were  required.  LYNETTE Upper Body Dressing:  Upper Body Dressing Score = 5. Patient is supervision  for upper body dressing, requiring: Setting out upper body dressing equipment.  Gathering/setting out clothes. No assistive devices were required.  LYNETTE Lower Body Dressing:  Patient requires minimal/incidental assistance for  gathering clothes. Wearing underwear or an undergarment is not applicable for  this patient. Patient requires minimal/incidental assistance for threading the  right leg through the pants/skirt. Patient requires no physical assistance for  donning and/or doffing pants/skirt threading left leg. Patient requires  minimal/incidental physical assistance for pulling pants/skirt over hips and  adjusting fasteners. Patient requires total assistance for holding clothing  and/or donning and/or doffing right sock. Patient requires total assistance for  holding clothing and/or donning and/or doffing left sock. Donning and/or doffing  right shoe was not observed for this patient. Donning and/or doffing left shoe  was not observed for this patient. Patient performs 54.17 % of lower body  dressing tasks. Lower Body Dressing Score = 3, Moderate Assistance. No assistive  devices were required.  LYNETTE Toileting:    LYNETTE Bladder Management  Level of Assistance:  Bladder Score = 1.  Patient is total assistance for  bladder management. Patient has an indwelling/Sheldon catheter. catheter  Frequency/Number of Accidents  this Shift:  Bladder accidents this shift:  0 .  Patient has not had an accident, but used a device/medication this shift  requiring: catheter .    [facility] Bowel Management  Level of Assistance: Bowel Score = 1. Patient performs less than 25% of tasks  and requires total assistance for bowel management or two or more people  required for bowel management requiring assistive device/method: colostomy .  Frequency/Number of Accidents this Shift: Bowel accidents this shift: 0 .  Patient has not had an accident, but used a device/medication this shift  requiring: colostomy .      [facility] Bed/Chair/Wheelchair Transfer:  Bed/chair/wheelchair Transfer Score = 3.  Patient performs 50-74% of effort and requires moderate assistance (some  lifting)  for transferring to and from the bed/chair/wheelchair, including  assist lifting both legs. Patient requires the following assistive device(s):  Seating system of wheelchair. Grab bars. Sliding board.  [facility] Toilet Transfer:  [facility] Tub/Shower Transfer:    Previously Documented Mode of Locomotion at Discharge:  [facility] Expected Mode of Locomotion at Discharge:  [facility] Walk/Wheelchair:  [facility] Stairs:    [facility] Comprehension:  [facility] Expression:  [facility] Social Interaction:  [facility] Problem Solving:  [facility] Memory:    Therapy Mode Minutes  Occupational Therapy:  Physical Therapy:  Speech Language Pathology:    Discharge Functional Goals:    Signed by: TALI Jacobs

## 2017-06-09 NOTE — PLAN OF CARE
Problem: Patient Care Overview (Adult)  Goal: Plan of Care Review  Outcome: Ongoing (interventions implemented as appropriate)    Problem: Activity Intolerance (Adult)  Goal: Activity Tolerance  Outcome: Ongoing (interventions implemented as appropriate)    Problem: Fall Risk (Adult)  Goal: Absence of Falls  Outcome: Ongoing (interventions implemented as appropriate)    Problem: Pressure Ulcer (Adult)  Goal: Signs and Symptoms of Listed Potential Problems Will be Absent or Manageable (Pressure Ulcer)  Outcome: Ongoing (interventions implemented as appropriate)    Problem: Skin Integrity Impairment, Risk/Actual (Adult)  Goal: Skin Integrity/Wound Healing  Outcome: Ongoing (interventions implemented as appropriate)

## 2017-06-09 NOTE — PROGRESS NOTES
"     LOS: 8 days     Chief Complaint:  Weakness, debility     Subjective     Interval History:     He denies new complaint this morning.  He has no fevers or chills.  Doing better with his ADL and self-care.      Objective     Vital Signs  /65  Pulse (!) 137  Temp 99.3 °F (37.4 °C) (Oral)   Resp 20  Ht 71\" (180.3 cm)  Wt 245 lb (111 kg)  SpO2 97%  BMI 34.17 kg/m2  Intake & Output (last day)       06/08 0701 - 06/09 0700 06/09 0701 - 06/10 0700    P.O. 2160     Irrigation 1     Total Intake(mL/kg) 2161 (19.4)     Urine (mL/kg/hr) 1500 (0.6)     Stool 1 (0)     Total Output 1501      Net +660                    Physical Exam:     General Appearance:    Alert, cooperative, in no acute distress   Head:    Normocephalic, without obvious abnormality, atraumatic   Eyes:            Lids and lashes normal, conjunctivae and sclerae normal, no   icterus, no pallor, corneas clear, PERRLA   Ears:    Ears appear intact with no abnormalities noted       Neck:   No adenopathy, supple, trachea midline, no thyromegaly, no   carotid bruit, no JVD   Lungs:     Clear to auscultation,respirations regular, even and                  unlabored    Heart:    Regular rhythm and normal rate, normal S1 and S2, no            murmur, no gallop, no rub, no click   Chest Wall:    No abnormalities observed   Abdomen:     Normal bowel sounds, no masses, no organomegaly, soft        non-tender, non-distended, no guarding, no rebound                tenderness   Extremities:   No movement in BLE, no sensation in BLE  no edema, no cyanosis, no redness   Pulses:   Pulses palpable and equal bilaterally   Skin:   No bleeding, bruising or rash                Results Review:    Lab Results   Component Value Date    WBC 8.25 06/07/2017    HGB 9.1 (L) 06/07/2017    HCT 30.3 (L) 06/07/2017    MCV 79.9 (L) 06/07/2017     (H) 06/07/2017       Lab Results   Component Value Date    GLUCOSE 91 06/02/2017    BUN 10 06/02/2017    CREATININE 0.51 " 06/02/2017    EGFRIFNONA >150 06/02/2017    BCR 19.6 06/02/2017     06/02/2017    K 4.2 06/02/2017     06/02/2017    CO2 30.5 06/02/2017    CALCIUM 8.7 06/02/2017    ALBUMIN 3.10 (L) 06/02/2017    LABIL2 1.1 (L) 06/02/2017    AST 15 06/02/2017    ALT 13 06/02/2017     No results found for: INR    No results found for: POCGLU       Medication Review:     Current Facility-Administered Medications:   •  acetaminophen (TYLENOL) tablet 650 mg, 650 mg, Oral, Q4H PRN, Samuel Duane Kreis, MD  •  ALPRAZolam (XANAX) tablet 0.5 mg, 0.5 mg, Oral, TID, Samuel Duane Kreis, MD, 0.5 mg at 06/08/17 2113  •  aluminum-magnesium hydroxide-simethicone (MAALOX MAX) 400-400-40 MG/5ML suspension 15 mL, 15 mL, Oral, Q6H PRN, Samuel Duane Kreis, MD  •  amitriptyline (ELAVIL) tablet 25 mg, 25 mg, Oral, Nightly, Samuel Duane Kreis, MD, 25 mg at 06/08/17 2107  •  aspirin EC tablet 81 mg, 81 mg, Oral, Daily, Samuel Duane Kreis, MD, 81 mg at 06/08/17 0944  •  baclofen (LIORESAL) tablet 20 mg, 20 mg, Oral, Q8H, Samuel Duane Kreis, MD, 20 mg at 06/09/17 0606  •  bisacodyl (DULCOLAX) suppository 10 mg, 10 mg, Rectal, Daily PRN, Samuel Duane Kreis, MD  •  castor oil-balsam peru (VENELEX) ointment, , Topical, Q12H, Samuel Duane Kreis, MD  •  cetirizine (zyrTEC) tablet 10 mg, 10 mg, Oral, Daily, Samuel Duane Kreis, MD, 10 mg at 06/08/17 0944  •  enoxaparin (LOVENOX) syringe 40 mg, 40 mg, Subcutaneous, Daily, Samuel Duane Kreis, MD, 40 mg at 06/08/17 0944  •  fluticasone (FLONASE) 50 MCG/ACT nasal spray 2 spray, 2 spray, Each Nare, Daily, Samuel Duane Kreis, MD, 2 spray at 06/02/17 0859  •  folic acid (FOLVITE) tablet 1 mg, 1 mg, Oral, Daily, Samuel Duane Kreis, MD, 1 mg at 06/08/17 0944  •  gabapentin (NEURONTIN) capsule 800 mg, 800 mg, Oral, Q8H, Samuel Duane Kreis, MD, 800 mg at 06/09/17 0606  •  magnesium hydroxide (MILK OF MAGNESIA) suspension 2400 mg/10mL 10 mL, 10 mL, Oral, Daily PRN, Samuel Duane Kreis, MD  •  metoprolol tartrate  (LOPRESSOR) tablet 25 mg, 25 mg, Oral, Q12H, Samuel Duane Kreis, MD, 25 mg at 06/08/17 2107  •  multivitamin (DAILY BRIAN) tablet 1 tablet, 1 tablet, Oral, Daily, Samuel Duane Kreis, MD, 1 tablet at 06/08/17 0944  •  ondansetron (ZOFRAN) tablet 4 mg, 4 mg, Oral, Q6H PRN, 4 mg at 06/06/17 0835 **OR** ondansetron ODT (ZOFRAN-ODT) disintegrating tablet 4 mg, 4 mg, Oral, Q6H PRN **OR** ondansetron (ZOFRAN) injection 4 mg, 4 mg, Intravenous, Q6H PRN, Samuel Duane Kreis, MD  •  sennosides-docusate sodium (SENOKOT-S) 8.6-50 MG tablet 2 tablet, 2 tablet, Oral, Nightly, Samuel Duane Kreis, MD, 2 tablet at 06/08/17 2107  •  venlafaxine XR (EFFEXOR-XR) 24 hr capsule 75 mg, 75 mg, Oral, Daily With Breakfast, Samuel Duane Kreis, MD, 75 mg at 06/08/17 0944      Assessment/Plan     Debility  Paraplegia  Decubitus ulcers of buttocks      PT and OT ongoing.    Continue gabapentin with when necessary baclofen    Continue indwelling Sheldon    He's doing well        Samuel Duane Kreis, MD  06/09/17  9:27 AM

## 2017-06-09 NOTE — THERAPY TREATMENT NOTE
Inpatient Rehabilitation - Occupational Therapy Treatment Note   [place]     Patient Name: Alex Tierney  : 1975  MRN: [id number]  Today's Date: 2017  Onset of Illness/Injury or Date of Surgery Date: 17  Date of Referral to OT: 17  Referring Physician: Dr. Brooks      Admit Date: 2017    Visit Dx:   No diagnosis found.  Patient Active Problem List   Diagnosis   • Chronic allergic rhinitis   • Depression   • Smoker   • T6 spinal cord injury   • History of left lower extremity amputation   • Chronic osteomyelitis of multiple sites   • Debility             Adult Rehabilitation Note       17 1054 17 1620 17 1500    Rehab Assessment/Intervention    Discipline occupational therapist  -KR occupational therapist  - physical therapy assistant  -    Document Type therapy note (daily note)  -KR therapy note (daily note);progress note  - therapy note (daily note)  -    Subjective Information agree to therapy  -KR agree to therapy;no complaints  -BF agree to therapy;no complaints  -    Patient Effort, Rehab Treatment excellent  -KR good  - good  -    Precautions/Limitations  fall precautions;other (see comments)   < skin integrity; colostomy; junior cath  - fall precautions   decreased skin integrity/ wounds  -    Patient Response to Treatment   Patient tolerated treatment session good and tolerated new therapeutic activities and exercises.  -AH    Recorded by [KR] Titus Kaba OT [BF] Lucía Pedro OT [AH] Magda Carney PTA    Pain Assessment    Pain Assessment   No/denies pain  -AH    Recorded by   [AH] Magda Carney PTA    Vision Assessment/Intervention    Visual Impairment   WFL  -AH    Recorded by   [] Magda Carney PTA    Cognitive Assessment/Intervention    Current Cognitive/Communication Assessment  functional  -BF functional  -AH    Orientation Status  oriented x 4  -BF oriented x 4  -AH    Follows Commands/Answers  Questions  100% of the time;able to follow single-step instructions  - 100% of the time;able to follow single-step instructions  -    Personal Safety   mild impairment;decreased awareness, need for assist;decreased awareness, need for safety  -    Personal Safety Interventions   nonskid shoes/slippers when out of bed;gait belt;fall prevention program maintained  -    Recorded by  [] Lucía Pedro, OT [] Magda Carney PTA    Bed Mobility, Assessment/Treatment    Bed Mobility, Assistive Device   draw sheet  -    Bed Mobility, Roll Left, [place]   minimum assist (75% patient effort)  -    Bed Mobility, Roll Right, [place]   minimum assist (75% patient effort)  -    Bed Mobility, Scoot/Bridge, [place]   dependent (less than 25% patient effort)  -    Bed Mob, Supine to Sit, [place]   verbal cues required;nonverbal cues required (demo/gesture);minimum assist (75% patient effort);moderate assist (50% patient effort)  -    Bed Mob, Sit to Supine, [place]   verbal cues required;nonverbal cues required (demo/gesture);minimum assist (75% patient effort);moderate assist (50% patient effort)  -    Bed Mobility, Safety Issues   decreased use of legs for bridging/pushing;impaired trunk control for bed mobility  -    Bed Mobility, Impairments   muscle tone abnormal;strength decreased;impaired balance;coordination impaired;sensory feedback impaired   colostomy  -    Recorded by   [] Magda Carney PTA    Transfer Assessment/Treatment    Transfers, Bed-Chair [place] stand by assist;contact guard assist  - contact guard assist;verbal cues required;nonverbal cues required (demo/gesture)  - verbal cues required;nonverbal cues required (demo/gesture);contact guard assist  -    Transfers, Chair-Bed [place]   verbal cues required;nonverbal cues required (demo/gesture);contact guard assist  -    Transfers, Bed-Chair-Bed, Assist Device sliding  board  -KR sliding board  -BF sliding board  -AH    Transfer, Safety Issues   loses balance backward  -AH    Transfer, Impairments   muscle tone abnormal;strength decreased;impaired balance;coordination impaired;motor control impaired;postural control impaired  -AH    Recorded by [KR] Titus Kaba OT [BF] Lucía Pedro, OT [AH] Magda Carney, PTA    ADL Assessment/Intervention    Additional Documentation  Self-Feeding Assessment/Training (Group)  -BF     Recorded by  [BF] Lucía Pedro OT     Lower Body Bathing Assessment/Training    LB Bathing Assess/Train, Position  supine  -BF     LB Bathing Assess/Train, Comment  TB bathing: Min A  -BF     Recorded by  [BF] Lucía Pedro OT     Upper Body Dressing Assessment/Training    UB Dressing Assess/Train, Position  sitting  -BF     UB Dressing Assess/Train, [place] supervision required  -KR set up required;supervision required  -BF     UB Dressing Assess/Train, Comment  Set-up/supervision  -BF     Recorded by [KR] Titus Kaba OT [BF] Lucía Pedro OT     Lower Body Dressing Assessment/Training    LB Dressing Assess/Train, Assist Device  reacher  -BF     LB Dressing Assess/Train, Position  supine;sitting  -BF     LB Dressing Assess/Train, [place] supervision required  -KR moderate assist (50% patient effort);verbal cues required;nonverbal cues required (demo/gesture)  -BF     LB Dressing Assess/Train, Comment  Mod A  -BF     Recorded by [KR] Titus Kaba OT [BF] Lucía Pedro OT     Toileting Assessment/Training    Toileting Assess/Train, Position  sitting  -BF     Toileting Assess/Train, Indepen Level  set up required  -BF     Toileting Assess/Train, Comment  Set-up  -BF     Recorded by  [BF] Lucía Pedro OT     Grooming Assessment/Training    Grooming Assess/Train, Position  sitting  -BF     Grooming Assess/Train, Indepen Level  set up required  -BF     Grooming Assess/Train, Comment  Set-up  -BF      Recorded by  [BF] Lucía Pedro OT     Self-Feeding Assessment/Training    Self-Feeding Assess/Train, Comment  Mod I  -BF     Recorded by  [BF] Lucía Pedro OT     Motor Skills/Interventions    Additional Documentation   Balance Skills Training (Group)  -    Recorded by   [] Magda Carney PTA    Balance Skills Training    Sitting-Level of Assistance   Close supervision  -    Sitting-Balance Support   No upper extremity supported  -    Sitting-Balance Activities   Lateral lean;Ball toss;Reaching for objects;Reaching across midline;Trunk control activities  -    Recorded by   [] Magda Carney PTA    Therapy Exercises    Bilateral Lower Extremities   PROM:;supine;prone  -    Bilateral Upper Extremity AROM:;sitting;elbow flexion/extension;hand pumps;shoulder extension/flexion;shoulder abduction/adduction  - AROM:;sitting   BUE GMC/FMC ther ex; strengthening  -BF     BUE Resistance manual resistance- moderate  -KR other (comment)   Arm bike 4 mins X3; Wrist rolls X2  -BF     Recorded by [KR] Titus Kaba OT [BF] Lucía Pedro OT [] Magda Carney PTA    Positioning and Restraints    Pre-Treatment Position   other (comment)   in w/c both AM and PM session  -    Post Treatment Position  wheelchair  - wheelchair   in both AM and PM session  -    In Wheelchair  sitting;patient within staff view  - sitting;call light within reach;encouraged to call for assist;exit alarm on  -    Recorded by  [BF] Lucía Pedro OT [] Magda Carney PTA      06/07/17 1802 06/07/17 1726 06/07/17 1610    Rehab Assessment/Intervention    Discipline physical therapist  -AG physical therapist  -CT occupational therapist  -BF    Document Type therapy note (daily note)  -AG therapy note (daily note)   Second AM therapy session  -CT therapy note (daily note)  -BF    Subjective Information no complaints;agree to therapy  -AG agree to therapy;no  complaints  -CT agree to therapy;no complaints  -BF    Patient Effort, Rehab Treatment good  -AG good  -CT good  -BF    Precautions/Limitations fall precautions   decr skin integrity; wounds  -AG fall precautions;other (see comments)   <skin integrity, colostomy, junior cath  -CT fall precautions;other (see comments)   < skin integrity, colostomy, junior cath  -BF    Specific Treatment Considerations   Pt participated in 2-person therapeutic group for second session addressing BUE strengthening and endurance  -BF    Patient Response to Treatment  Pt tolerated treament session well with rest breaks provided as needed.   -CT     Recorded by [AG] Radha Ohara, PT [CT] Susu Sheridan, PT [BF] Lucía Pedro, OT    Pain Assessment    Pain Assessment No/denies pain  -AG      Recorded by [AG] Radha Ohara, PT      Vision Assessment/Intervention    Visual Impairment WFL  -AG      Recorded by [AG] Radha Ohara PT      Cognitive Assessment/Intervention    Current Cognitive/Communication Assessment functional  -AG functional  -CT functional  -BF    Orientation Status oriented x 4  -AG oriented x 4  -CT oriented x 4  -BF    Follows Commands/Answers Questions 100% of the time;able to follow single-step instructions  -% of the time  -% of the time  -BF    Personal Safety mild impairment;decreased awareness, need for assist;decreased awareness, need for safety  -AG      Personal Safety Interventions fall prevention program maintained;gait belt;nonskid shoes/slippers when out of bed;supervised activity  -AG nonskid shoes/slippers when out of bed;supervised activity  -CT     Recorded by [AG] Radha Ohara, PT [CT] Susu Sheridan, PT [BF] Lucía Pedro, OT    Bed Mobility, Assessment/Treatment    Bed Mobility, Assistive Device bed rails  -AG      Bed Mobility, Roll Left, [place] minimum assist (75% patient effort)  -AG      Bed Mobility, Roll Right, [place] minimum assist (75% patient effort)   -AG      Bed Mobility, Scoot/Bridge, [place]  dependent (less than 25% patient effort)  -CT     Bed Mob, Supine to Sit, [place] verbal cues required;nonverbal cues required (demo/gesture);minimum assist (75% patient effort);moderate assist (50% patient effort)  -AG moderate assist (50% patient effort);verbal cues required;nonverbal cues required (demo/gesture)  -CT     Bed Mob, Sit to Supine, [place] verbal cues required;nonverbal cues required (demo/gesture);minimum assist (75% patient effort);moderate assist (50% patient effort)  -AG moderate assist (50% patient effort);verbal cues required;nonverbal cues required (demo/gesture)  -CT     Bed Mobility, Safety Issues decreased use of legs for bridging/pushing;impaired trunk control for bed mobility  -AG decreased use of legs for bridging/pushing;decreased use of arms for pushing/pulling;impaired trunk control for bed mobility  -CT     Bed Mobility, Impairments muscle tone abnormal;strength decreased;impaired balance;coordination impaired;sensory feedback impaired   colostomy; wounds/ decr skin integrity  -AG strength decreased;impaired balance;coordination impaired;motor control impaired;postural control impaired;sensory feedback impaired;muscle tone abnormal  -CT     Recorded by [AG] Radha Ohara, PT [CT] Susu Sheridan PT     Transfer Assessment/Treatment    Transfers, Bed-Chair [place] verbal cues required;nonverbal cues required (demo/gesture);contact guard assist  -AG minimum assist (75% patient effort);verbal cues required;nonverbal cues required (demo/gesture)  -CT     Transfers, Chair-Bed [place] verbal cues required;nonverbal cues required (demo/gesture);contact guard assist  -AG minimum assist (75% patient effort);verbal cues required;nonverbal cues required (demo/gesture)  -CT     Transfers, Bed-Chair-Bed, Assist Device sliding board  -AG sliding board  -CT     Transfer, Safety Issues loses balance backward  -AG loses balance  backward  -CT     Transfer, Impairments muscle tone abnormal;strength decreased;impaired balance;coordination impaired;motor control impaired;postural control impaired  -AG strength decreased;impaired balance;coordination impaired;motor control impaired;postural control impaired;sensory feedback impaired;sensation decreased;muscle tone abnormal  -CT     Recorded by [AG] Radha Ohara, PT [CT] Susu Sheridan PT     Wheelchair Training/Management    Wheelchair Propulsion Training forward propulsion  -AG      Wheelchair, Distance Propelled 300  -AG      Recorded by [AG] Radha Ohara PT      Grooming Assessment/Training    Grooming Assess/Train, Position   sitting  -BF    Grooming Assess/Train, [place]ep[place] Level   set up required;supervision required;verbal cues required  -BF    Grooming Assess/Train, Comment   Set-up  -BF    Recorded by   [BF] Lucía Pedro OT    Motor Skills/Interventions    Additional Documentation Balance Skills Training (Group)  -AG      Recorded by [AG] Radha Ohara PT      Balance Skills Training    Sitting-Level of Assistance Close supervision   static sitting  -AG Close supervision  -CT     Sitting-Balance Support No upper extremity supported   R LE supported  -AG --   requires one extremity to assist balance at eob  -CT     Sitting-Balance Activities Lateral lean;Forward lean;Ball toss;Reaching for objects;Reaching across midline;Trunk control activities   dynamic sitting varies from min to mod A  -AG Reaching for objects;Reaching across midline  -CT     Sitting # of Minutes 15  -AG      Recorded by [AG] Radha Ohara, PT [CT] Susu Sheridan PT     Therapy Exercises    Bilateral Lower Extremities PROM:;supine;prone  -AG PROM:;supine   supine stretching  -CT     Bilateral Upper Extremity   AROM:;sitting   BUE GMC/FMC ther ex; strengthening  -BF    BUE Resistance   other (comment)   Rickshaw 20 [id number]; Armbike 4 minsX3; 3 lb dowel therex  -BF    Recorded by [AG] Radha Ohara, PT  [CT] Susu Sheridan, PT [BF] Lucía Pedro, STEPHANIE    Positioning and Restraints    Pre-Treatment Position in bed  -AG sitting in chair/recliner  -CT     Post Treatment Position wheelchair  -AG wheelchair  -CT wheelchair  -BF    In Wheelchair sitting;RLE elevated;patient within staff view  -AG sitting;patient within staff view  -CT sitting;patient within staff view   After both sessions  -BF    Recorded by [AG] Radha Ohara, PT [CT] Susu Sheridan, PT [BF] Lucía Pedro, OT      06/06/17 1701 06/06/17 1441       Rehab Assessment/Intervention    Discipline physical therapist  -LB occupational therapist  -BF     Document Type therapy note (daily note)  -LB therapy note (daily note)  -BF     Subjective Information agree to therapy  -LB agree to therapy;no complaints  -BF     Patient Effort, Rehab Treatment good  -LB good  -BF     Symptoms Noted During/After Treatment none  -LB      Precautions/Limitations fall precautions   colostomy, junior, < skin integrity  -LB fall precautions;other (see comments)   colostomy, junior, < skin integrity  -BF     Specific Treatment Considerations  Pt participated in 2-person therapy group addressing BUE strengthening and endurance for first session   -BF     Patient Response to Treatment He tolerated both sessions with stretching in prone, supine, and long sitting. He also worked on transfers and bed mobility.  -LB      Recorded by [LB] Susan Palma, PT [BF] Lucía Pedro, OT     Pain Assessment    Pain Assessment No/denies pain  -LB      Recorded by [LB] Susan Palma, PT      Cognitive Assessment/Intervention    Current Cognitive/Communication Assessment functional  -LB functional  -BF     Orientation Status  oriented x 4  -BF     Follows Commands/Answers Questions 100% of the time  -% of the time;able to follow single-step instructions  -BF     Personal Safety Interventions gait belt;nonskid shoes/slippers when out of bed;supervised activity   -LB      Recorded by [LB] Susan Palma PT [BF] Lucía Pedro, STEPHANIE     Bed Mobility, Assessment/Treatment    Bed Mobility, Scoot/Bridge, [place] dependent (less than 25% patient effort)   this makes bed mobility more difficult  -LB      Bed Mob, Supine to Sit, [place] verbal cues required;nonverbal cues required (demo/gesture);moderate assist (50% patient effort)  -LB      Bed Mob, Sit to Supine, [place] verbal cues required;nonverbal cues required (demo/gesture);moderate assist (50% patient effort)  -LB      Bed Mobility, Safety Issues decreased use of legs for bridging/pushing;decreased use of arms for pushing/pulling;impaired trunk control for bed mobility  -LB      Bed Mobility, Impairments strength decreased;impaired balance;coordination impaired;motor control impaired;postural control impaired;sensory feedback impaired;muscle tone abnormal  -LB      Recorded by [LB] Susan Palma PT      Transfer Assessment/Treatment    Transfers, Bed-Chair [place] minimum assist (75% patient effort);verbal cues required;nonverbal cues required (demo/gesture)  -LB      Transfers, Chair-Bed [place] minimum assist (75% patient effort);verbal cues required;nonverbal cues required (demo/gesture)  -LB      Transfers, Bed-Chair-Bed, Assist Device sliding board  -LB      Transfer, Safety Issues loses balance backward  -LB      Transfer, Impairments strength decreased;impaired balance;coordination impaired;motor control impaired;postural control impaired;sensory feedback impaired;sensation decreased;muscle tone abnormal  -LB      Recorded by [LB] Susan Palma PT      Grooming Assessment/Training    Grooming Assess/Train, Position  sitting  -BF     Grooming Assess/Train, [place]ep[place] Level  set up required;verbal cues required;supervision required  -BF     Recorded by  [BF] Lucía Pedro, STEPHANIE     Balance Skills Training    Sitting-Level of Assistance Close supervision  -LB       Sitting-Balance Support --   requires one extremity to assist balance at eob  -LB      Recorded by [LB] Susan Palma, PT      Therapy Exercises    Bilateral Lower Extremities PROM:;supine   prone lying 10 min, hamstring stretch in long sitting  -LB      Bilateral Upper Extremity  AROM:;sitting   BUE GMC/FMC ther ex; strengthening; reaching  -BF     BUE Resistance  other (comment)   Rickshaw 20 [id number]; Armbike [id number]; Wrist rollsX3  -BF     Recorded by [LB] Susan Palma, PT [BF] Lucía Pedro, OT     Positioning and Restraints    Pre-Treatment Position --   am-bed, pm-w/c  -LB sitting in chair/recliner  -BF     Post Treatment Position  wheelchair  -BF     In Wheelchair --   he left gym independently  -LB sitting;patient within staff view   After both sessions  -BF     Recorded by [LB] Susan Palma, PT [BF] Lucía Pedro, OT       User Key  (r) = Recorded By, (t) = Taken By, (c) = Cosigned By    Initials Name Effective Dates    LB Susan Palma, PT 03/14/16 -     BF Lucía Pedro, OT 03/14/16 -     AG Radha Ohara, PT 03/14/16 -     DAVID Kaba, OT 03/14/16 -     CT Susu Sheridan, PT 03/14/16 -      Magda Carney, PTA 07/14/16 -                 OT Goals       06/08/17 1624 06/02/17 1554       Transfer Training OT LTG    Transfer Training OT LTG, Date Established  06/02/17  -BF     Transfer Training OT LTG, Time to Achieve  by discharge  -BF     Transfer Training OT LTG, Activity Type  bed to chair /chair to bed;shower chair  -BF     Transfer Training OT LTG, [place] Level  contact guard assist  -BF     Bathing OT STG    Bathing Goal OT STG, Date Established  06/02/17  -BF     Bathing Goal OT STG, Time to Achieve  5 - 7 days  -BF     Bathing Goal OT STG, Activity Type  lower body bathing;upper body bathing  -BF     Bathing Goal OT STG, [place] Level  minimum assist (75% patient effort)  -BF     Bathing Goal OT STG, Outcome goal met  -BF       Bathing OT LTG    Bathing Goal OT LTG, Date Established  06/02/17  -BF     Bathing Goal OT LTG, Time to Achieve  by discharge  -BF     Bathing Goal OT LTG, Activity Type  upper body bathing;lower body bathing  -BF     Bathing Goal OT LTG, [place] Level  set up required;supervision required  -BF     Eating Self-Feeding OT LTG    Eat Self Feeding Goal OT LTG, Date Established  06/02/17  -BF     Eat Self Feeding Goal OT LTG, Time to Achieve  by discharge  -BF     Eat Self Feed Goal OT LTG, [place] Level  conditional independence  -BF     Grooming OT LTG    Grooming Goal OT LTG, Date Established  06/02/17  -BF     Grooming Goal OT LTG, Time to Achieve  by discharge  -BF     Grooming Goal OT LTG, [place] Level  conditional independence  -BF     LB Dressing OT STG    LB Dressing Goal OT STG, Date Established  06/02/17  -BF     LB Dressing Goal OT STG, Time to Achieve  5 - 7 days  -BF     LB Dressing Goal OT STG, [place] Level  moderate assist (50% patient effort)  -BF     LB Dressing Goal OT STG, Outcome goal met  -BF      LB Dressing OT LTG    LB Dressing Goal OT LTG, Date Established  06/02/17  -BF     LB Dressing Goal OT LTG, Time to Achieve  by discharge  -BF     LB Dressing Goal OT LTG, [place] Level  minimum assist (75% patient effort)  -BF       User Key  (r) = Recorded By, (t) = Taken By, (c) = Cosigned By    Initials Name Provider Type    YARELI Pedro, OT Occupational Therapist          Occupational Therapy Education     Title: PT OT SLP Therapies (Active)     Topic: Occupational Therapy (Active)     Point: ADL training (Active)    Description: Instruct learner(s) on proper safety adaptation and remediation techniques during self care or transfers.   Instruct in proper use of assistive devices.    Learning Progress Summary    Learner Readiness Method Response Comment Documented by Status   Patient Acceptance E,D NR   06/08/17 1624 Active    Acceptance E,D NR  BF 06/07/17  1616 Active    Acceptance E,D NR   06/06/17 1446 Active    Acceptance E,D NR   06/05/17 1231 Active    Acceptance E,D NR   06/02/17 1551 Active    Acceptance E,D NR   06/02/17 1551 Active               Point: Precautions (Active)    Description: Instruct learner(s) on prescribed precautions during self-care and functional transfers.    Learning Progress Summary    Learner Readiness Method Response Comment Documented by Status   Patient Acceptance E,D NR   06/08/17 1624 Active    Acceptance E,D NR   06/07/17 1616 Active    Acceptance E,D NR  BF 06/06/17 1446 Active    Acceptance E,D NR   06/05/17 1231 Active    Acceptance E,D NR   06/02/17 1551 Active    Acceptance E,D NR   06/02/17 1551 Active                      User Key     Initials Effective Dates Name Provider Type Discipline     03/14/16 -  Lucía Pedro, OT Occupational Therapist OT                  OT Recommendation and Plan  Anticipated Equipment Needs At Discharge:  (TBD)  Anticipated Discharge Disposition:  (TBD)  Planned Therapy Interventions: activity intolerance, adaptive equipment training, ADL retraining, home exercise program, strengthening, transfer training (Therapeutic groups as beneficial)  Therapy Frequency: 3-5 times/wk          Time Calculation:         Time Calculation- OT       06/09/17 1057          Time Calculation- OT    OT Start Time 0830  -KR      OT Stop Time 1000  -KR      OT Time Calculation (min) 90 min  -KR        User Key  (r) = Recorded By, (t) = Taken By, (c) = Cosigned By    Initials Name Provider Type    DAVID Kaba OT Occupational Therapist           Therapy Charges for Today     Code Description Service Date Service Provider Modifiers Qty    94447057308 HC OT SELF CARE/MGMT/TRAIN EA 15 MIN 6/9/2017 Titus Kaba OT GO 1    26143140898 HC OT THERAPEUTIC ACT EA 15 MIN 6/9/2017 Titus Kaba OT GO 5               Titus Kaba OT  6/9/2017

## 2017-06-09 NOTE — THERAPY TREATMENT NOTE
Inpatient Rehabilitation - Physical Therapy Treatment Note   [place]     Patient Name: Alex Tierney  : 1975  MRN: [id number]  Today's Date: 2017  Onset of Illness/Injury or Date of Surgery Date: 17  Date of Referral to PT: 17  Referring Physician: Dr. Brooks    Admit Date: 2017    Visit Dx:  No diagnosis found.  Patient Active Problem List   Diagnosis   • Chronic allergic rhinitis   • Depression   • Smoker   • T6 spinal cord injury   • History of left lower extremity amputation   • Chronic osteomyelitis of multiple sites   • Debility               Adult Rehabilitation Note       17 1500 17 1054 17 1620    Rehab Assessment/Intervention    Discipline physical therapy assistant  - occupational therapist  -KR occupational therapist  -BF    Document Type therapy note (daily note)  - therapy note (daily note)  -KR therapy note (daily note);progress note  -BF    Subjective Information agree to therapy;no complaints  - agree to therapy  -KR agree to therapy;no complaints  -BF    Patient Effort, Rehab Treatment excellent  - excellent  -KR good  -BF    Precautions/Limitations   fall precautions;other (see comments)   < skin integrity; colostomy; junior cath  -BF    Patient Response to Treatment Patient tolerated treatment session good and was motivated to perform treatment session. Patient demonstrates core weakness.  -AH      Recorded by [AH] Magda Carney PTA [KR] Titus Kaba OT [BF] Lucía Pedro OT    Pain Assessment    Pain Assessment No/denies pain  -AH      Recorded by [] Magda Carney PTA      Cognitive Assessment/Intervention    Current Cognitive/Communication Assessment functional  -AH  functional  -BF    Orientation Status oriented x 4  -AH  oriented x 4  -BF    Follows Commands/Answers Questions 100% of the time;able to follow single-step instructions  -AH  100% of the time;able to follow single-step instructions  -BF    Personal  Safety decreased awareness, need for assist;decreased awareness, need for safety  -      Personal Safety Interventions gait belt;nonskid shoes/slippers when out of bed;fall prevention program maintained  -      Recorded by [] Magda Carney PTA  [BF] Lucía Pedro, STEPHANIE    Bed Mobility, Assessment/Treatment    Bed Mobility, Assistive Device draw sheet  -      Bed Mobility, Roll Left, [place] minimum assist (75% patient effort)  -      Bed Mobility, Roll Right, [place] minimum assist (75% patient effort)  -      Bed Mob, Supine to Sit, [place] verbal cues required;nonverbal cues required (demo/gesture);minimum assist (75% patient effort);moderate assist (50% patient effort)  -      Bed Mob, Sit to Supine, [place] verbal cues required;nonverbal cues required (demo/gesture);minimum assist (75% patient effort);moderate assist (50% patient effort)  -      Bed Mobility, Safety Issues decreased use of legs for bridging/pushing;impaired trunk control for bed mobility  -      Bed Mobility, Impairments muscle tone abnormal;strength decreased;impaired balance;coordination impaired;sensory feedback impaired   colostomy  -      Recorded by [] Magda Carney PTA      Transfer Assessment/Treatment    Transfers, Bed-Chair [place] stand by assist;contact guard assist  - stand by assist;contact guard assist  - contact guard assist;verbal cues required;nonverbal cues required (demo/gesture)  -    Transfers, Chair-Bed [place] verbal cues required;nonverbal cues required (demo/gesture);contact guard assist  -      Transfers, Bed-Chair-Bed, Assist Device sliding board  - sliding board  -KR sliding board  -    Transfer, Safety Issues loses balance backward  -      Recorded by [] Magda Carney PTA [KR] Titus Kaba OT [BF] Lucía Pedro, OT    Gait Assessment/Treatment    Gait, [place] Level not appropriate to assess  -       Recorded by [AH] Magda Carney, PTA      ADL Assessment/Intervention    Additional Documentation   Self-Feeding Assessment/Training (Group)  -BF    Recorded by   [BF] Lucía Pedro OT    Lower Body Bathing Assessment/Training    LB Bathing Assess/Train, Position   supine  -BF    LB Bathing Assess/Train, Comment   TB bathing: Min A  -BF    Recorded by   [BF] Lucía Pedro OT    Upper Body Dressing Assessment/Training    UB Dressing Assess/Train, Position   sitting  -BF    UB Dressing Assess/Train, [place]  supervision required  -KR set up required;supervision required  -BF    UB Dressing Assess/Train, Comment   Set-up/supervision  -BF    Recorded by  [KR] Titus Kaba OT [BF] Lucía Pedro OT    Lower Body Dressing Assessment/Training    LB Dressing Assess/Train, Assist Device   reacher  -BF    LB Dressing Assess/Train, Position   supine;sitting  -BF    LB Dressing Assess/Train, [place]  supervision required  -KR moderate assist (50% patient effort);verbal cues required;nonverbal cues required (demo/gesture)  -BF    LB Dressing Assess/Train, Comment   Mod A  -BF    Recorded by  [KR] Titus Kaba OT [BF] Lucía Pedro, STEPHANIE    Toileting Assessment/Training    Toileting Assess/Train, Position   sitting  -BF    Toileting Assess/Train, Indepen Level   set up required  -BF    Toileting Assess/Train, Comment   Set-up  -BF    Recorded by   [BF] Lucía Pedro, STEPHANIE    Grooming Assessment/Training    Grooming Assess/Train, Position   sitting  -BF    Grooming Assess/Train, Indepen Level   set up required  -BF    Grooming Assess/Train, Comment   Set-up  -BF    Recorded by   [BF] Lucía Pedro OT    Self-Feeding Assessment/Training    Self-Feeding Assess/Train, Comment   Mod I  -BF    Recorded by   [BF] Lucía Pedro, OT    Balance Skills Training    Sitting-Level of Assistance Close supervision  -      Sitting-Balance Support No upper extremity  supported  -      Sitting-Balance Activities Lateral lean;Ball toss;Reaching for objects;Reaching across midline;Trunk control activities  -      Recorded by [] Magda Carney PTA      Therapy Exercises    Bilateral Lower Extremities PROM:;supine;prone  -      Bilateral Upper Extremity  AROM:;sitting;elbow flexion/extension;hand pumps;shoulder extension/flexion;shoulder abduction/adduction  - AROM:;sitting   BUE GMC/FMC ther ex; strengthening  -BF    BUE Resistance  manual resistance- moderate  -KR other (comment)   Arm bike 4 mins X3; Wrist rolls X2  -BF    Recorded by [] Magda Carney PTA [KR] Titus Kaba OT [BF] Lucía Pedro OT    Positioning and Restraints    Pre-Treatment Position other (comment)   W/C in AM and PM session  -      Post Treatment Position wheelchair   w/c both AM and PM session  -  wheelchair  -BF    In Wheelchair sitting;call light within reach;encouraged to call for assist;exit alarm on  -  sitting;patient within staff view  -BF    Recorded by [] Magda Carney PTA  [BF] Lucía Pedro OT      06/08/17 1500 06/07/17 1802 06/07/17 1726    Rehab Assessment/Intervention    Discipline physical therapy assistant  - physical therapist  - physical therapist  -CT    Document Type therapy note (daily note)  - therapy note (daily note)  - therapy note (daily note)   Second AM therapy session  -CT    Subjective Information agree to therapy;no complaints  - no complaints;agree to therapy  - agree to therapy;no complaints  -CT    Patient Effort, Rehab Treatment good  - good  - good  -CT    Precautions/Limitations fall precautions   decreased skin integrity/ wounds  - fall precautions   decr skin integrity; wounds  - fall precautions;other (see comments)   <skin integrity, colostomy, junior cath  -CT    Patient Response to Treatment Patient tolerated treatment session good and tolerated new therapeutic activities and exercises.   -  Pt tolerated treament session well with rest breaks provided as needed.   -CT    Recorded by [] Magda Carney PTA [AG] Radha Ohara PT [CT] Susu Sheridan, PT    Pain Assessment    Pain Assessment No/denies pain  - No/denies pain  -AG     Recorded by [] Magda Carney PTA [AG] Radha Ohara PT     Vision Assessment/Intervention    Visual Impairment WFL  - WFL  -AG     Recorded by [] Magda Carney PTA [AG] Radha Ohara, PT     Cognitive Assessment/Intervention    Current Cognitive/Communication Assessment functional  - functional  -AG functional  -CT    Orientation Status oriented x 4  - oriented x 4  -AG oriented x 4  -CT    Follows Commands/Answers Questions 100% of the time;able to follow single-step instructions  - 100% of the time;able to follow single-step instructions  - 100% of the time  -CT    Personal Safety mild impairment;decreased awareness, need for assist;decreased awareness, need for safety  - mild impairment;decreased awareness, need for assist;decreased awareness, need for safety  -     Personal Safety Interventions nonskid shoes/slippers when out of bed;gait belt;fall prevention program maintained  - fall prevention program maintained;gait belt;nonskid shoes/slippers when out of bed;supervised activity  - nonskid shoes/slippers when out of bed;supervised activity  -CT    Recorded by [] Magda Carney PTA [AG] Radha Ohara, PT [CT] Susu Sheridan, PT    Bed Mobility, Assessment/Treatment    Bed Mobility, Assistive Device draw sheet  - bed rails  -     Bed Mobility, Roll Left, [place] minimum assist (75% patient effort)  - minimum assist (75% patient effort)  -     Bed Mobility, Roll Right, [place] minimum assist (75% patient effort)  - minimum assist (75% patient effort)  -     Bed Mobility, Scoot/Bridge, [place] dependent (less than 25% patient effort)  -  dependent (less than 25% patient effort)  -CT     Bed Mob, Supine to Sit, [place] verbal cues required;nonverbal cues required (demo/gesture);minimum assist (75% patient effort);moderate assist (50% patient effort)  - verbal cues required;nonverbal cues required (demo/gesture);minimum assist (75% patient effort);moderate assist (50% patient effort)  -AG moderate assist (50% patient effort);verbal cues required;nonverbal cues required (demo/gesture)  -CT    Bed Mob, Sit to Supine, [place] verbal cues required;nonverbal cues required (demo/gesture);minimum assist (75% patient effort);moderate assist (50% patient effort)  - verbal cues required;nonverbal cues required (demo/gesture);minimum assist (75% patient effort);moderate assist (50% patient effort)  -AG moderate assist (50% patient effort);verbal cues required;nonverbal cues required (demo/gesture)  -CT    Bed Mobility, Safety Issues decreased use of legs for bridging/pushing;impaired trunk control for bed mobility  - decreased use of legs for bridging/pushing;impaired trunk control for bed mobility  -AG decreased use of legs for bridging/pushing;decreased use of arms for pushing/pulling;impaired trunk control for bed mobility  -CT    Bed Mobility, Impairments muscle tone abnormal;strength decreased;impaired balance;coordination impaired;sensory feedback impaired   colostomy  - muscle tone abnormal;strength decreased;impaired balance;coordination impaired;sensory feedback impaired   colostomy; wounds/ decr skin integrity  -AG strength decreased;impaired balance;coordination impaired;motor control impaired;postural control impaired;sensory feedback impaired;muscle tone abnormal  -CT    Recorded by [AH] Magda Carney PTA [AG] Radha Ohara PT [CT] Susu Sheridan PT    Transfer Assessment/Treatment    Transfers, Bed-Chair [place] verbal cues required;nonverbal cues required (demo/gesture);contact guard assist  - verbal cues required;nonverbal cues required  (demo/gesture);contact guard assist  -AG minimum assist (75% patient effort);verbal cues required;nonverbal cues required (demo/gesture)  -CT    Transfers, Chair-Bed [place] verbal cues required;nonverbal cues required (demo/gesture);contact guard assist  -AH verbal cues required;nonverbal cues required (demo/gesture);contact guard assist  -AG minimum assist (75% patient effort);verbal cues required;nonverbal cues required (demo/gesture)  -CT    Transfers, Bed-Chair-Bed, Assist Device sliding board  - sliding board  -AG sliding board  -CT    Transfer, Safety Issues loses balance backward  - loses balance backward  -AG loses balance backward  -CT    Transfer, Impairments muscle tone abnormal;strength decreased;impaired balance;coordination impaired;motor control impaired;postural control impaired  - muscle tone abnormal;strength decreased;impaired balance;coordination impaired;motor control impaired;postural control impaired  -AG strength decreased;impaired balance;coordination impaired;motor control impaired;postural control impaired;sensory feedback impaired;sensation decreased;muscle tone abnormal  -CT    Recorded by [] Magda Carney PTA [AG] Radha Ohara, PT [CT] Susu Sheridan PT    Wheelchair Training/Management    Wheelchair Propulsion Training  forward propulsion  -     Wheelchair, Distance Propelled  300  -AG     Recorded by  [AG] Radha Ohara, PT     Motor Skills/Interventions    Additional Documentation Balance Skills Training (Group)  - Balance Skills Training (Group)  -AG     Recorded by [] Magda Carney PTA [AG] Radha Ohara, PT     Balance Skills Training    Sitting-Level of Assistance Close supervision  - Close supervision   static sitting  -AG Close supervision  -CT    Sitting-Balance Support No upper extremity supported  - No upper extremity supported   R LE supported  -AG --   requires one extremity to assist balance at eob  -CT    Sitting-Balance  Activities Lateral lean;Ball toss;Reaching for objects;Reaching across midline;Trunk control activities  - Lateral lean;Forward lean;Ball toss;Reaching for objects;Reaching across midline;Trunk control activities   dynamic sitting varies from min to mod A  -AG Reaching for objects;Reaching across midline  -CT    Sitting # of Minutes  15  -AG     Recorded by [AH] Magda Carney PTA [AG] Radha Ohara, PT [CT] Susu Sheridan PT    Therapy Exercises    Bilateral Lower Extremities PROM:;supine;prone  -AH PROM:;supine;prone  -AG PROM:;supine   supine stretching  -CT    Recorded by [AH] Magda Carney PTA [AG] Radha Ohara, PT [CT] Susu Sheridan PT    Positioning and Restraints    Pre-Treatment Position other (comment)   in w/c both AM and PM session  -AH in bed  -AG sitting in chair/recliner  -CT    Post Treatment Position wheelchair   in both AM and PM session  - wheelchair  -AG wheelchair  -CT    In Wheelchair sitting;call light within reach;encouraged to call for assist;exit alarm on  - sitting;RLE elevated;patient within staff view  -AG sitting;patient within staff view  -CT    Recorded by [AH] Magda Carney PTA [AG] Radha Ohara, PT [CT] Susu Sheridan PT      06/07/17 1610 06/06/17 1701       Rehab Assessment/Intervention    Discipline occupational therapist  -BF physical therapist  -LB     Document Type therapy note (daily note)  -BF therapy note (daily note)  -LB     Subjective Information agree to therapy;no complaints  -BF agree to therapy  -LB     Patient Effort, Rehab Treatment good  -BF good  -LB     Symptoms Noted During/After Treatment  none  -LB     Precautions/Limitations fall precautions;other (see comments)   < skin integrity, colostomy, junior cath  -BF fall precautions   colostomy, junior, < skin integrity  -LB     Specific Treatment Considerations Pt participated in 2-person therapeutic group for second session addressing BUE strengthening and endurance  -BF       Patient Response to Treatment  He tolerated both sessions with stretching in prone, supine, and long sitting. He also worked on transfers and bed mobility.  -LB     Recorded by [BF] Lucía Pedro OT [LB] Susan Palma, PT     Pain Assessment    Pain Assessment  No/denies pain  -LB     Recorded by  [LB] Susan Palma PT     Cognitive Assessment/Intervention    Current Cognitive/Communication Assessment functional  -BF functional  -LB     Orientation Status oriented x 4  -BF      Follows Commands/Answers Questions 100% of the time  -% of the time  -LB     Personal Safety Interventions  gait belt;nonskid shoes/slippers when out of bed;supervised activity  -LB     Recorded by [BF] Lucía Pedro, OT [LB] Susan Palma PT     Bed Mobility, Assessment/Treatment    Bed Mobility, Scoot/Bridge, [place]  dependent (less than 25% patient effort)   this makes bed mobility more difficult  -LB     Bed Mob, Supine to Sit, [place]  verbal cues required;nonverbal cues required (demo/gesture);moderate assist (50% patient effort)  -LB     Bed Mob, Sit to Supine, [place]  verbal cues required;nonverbal cues required (demo/gesture);moderate assist (50% patient effort)  -LB     Bed Mobility, Safety Issues  decreased use of legs for bridging/pushing;decreased use of arms for pushing/pulling;impaired trunk control for bed mobility  -LB     Bed Mobility, Impairments  strength decreased;impaired balance;coordination impaired;motor control impaired;postural control impaired;sensory feedback impaired;muscle tone abnormal  -LB     Recorded by  [LB] Susan Palma, KALI     Transfer Assessment/Treatment    Transfers, Bed-Chair [place]  minimum assist (75% patient effort);verbal cues required;nonverbal cues required (demo/gesture)  -LB     Transfers, Chair-Bed [place]  minimum assist (75% patient effort);verbal cues required;nonverbal cues required (demo/gesture)  -LB      Transfers, Bed-Chair-Bed, Assist Device  sliding board  -LB     Transfer, Safety Issues  loses balance backward  -LB     Transfer, Impairments  strength decreased;impaired balance;coordination impaired;motor control impaired;postural control impaired;sensory feedback impaired;sensation decreased;muscle tone abnormal  -LB     Recorded by  [LB] Susan Palma, KALI     Grooming Assessment/Training    Grooming Assess/Train, Position sitting  -BF      Grooming Assess/Train, Indepen Level set up required;supervision required;verbal cues required  -BF      Grooming Assess/Train, Comment Set-up  -BF      Recorded by [BF] Lucía Pedro, OT      Balance Skills Training    Sitting-Level of Assistance  Close supervision  -LB     Sitting-Balance Support  --   requires one extremity to assist balance at eob  -LB     Recorded by  [LB] Susan Palma PT     Therapy Exercises    Bilateral Lower Extremities  PROM:;supine   prone lying 10 min, hamstring stretch in long sitting  -LB     Bilateral Upper Extremity AROM:;sitting   BUE GMC/FMC ther ex; strengthening  -BF      BUE Resistance other (comment)   Rickshaw 20 [id number]; Armbike 4 minsX3; 3 lb dowel therex  -BF      Recorded by [BF] Lucía Pedro, OT [LB] Susan Palma, KALI     Positioning and Restraints    Pre-Treatment Position  --   am-bed, pm-w/c  -LB     Post Treatment Position wheelchair  -BF      In Wheelchair sitting;patient within staff view   After both sessions  -BF --   he left gym independently  -LB     Recorded by [BF] Lucía Pedro, OT [LB] Susan Palma, PT       User Key  (r) = Recorded By, (t) = Taken By, (c) = Cosigned By    Initials Name Effective Dates    LB Susan Palma, PT 03/14/16 -     BF Lucía Pedro, OT 03/14/16 -     AG Radha Ohara, PT 03/14/16 -     KR Titus Kaba, OT 03/14/16 -     CT Susu Sheridan, PT 03/14/16 -     LANA Carney, PTA 07/14/16 -                 IP PT  Goals       06/02/17 1521          Bed Mobility PT STG    Bed Mobility PT STG, Date Established 06/02/17  -RT      Bed Mobility PT STG, Time to Achieve 1 wk  -RT      Bed Mobility PT STG, Activity Type all bed mobility  -RT      Bed Mobility PT STG, [place] Level minimum assist (75% patient effort)  -RT      Bed Mobility PT LTG    Bed Mobility PT LTG, Date Established 06/02/17  -RT      Bed Mobility PT LTG, Time to Achieve 2 wks  -RT      Bed Mobility PT LTG, Activity Type all bed mobility  -RT      Bed Mobility PT LTG, [place] Level supervision required  -RT      Transfer Training PT STG    Transfer Training PT STG, Date Established 06/02/17  -RT      Transfer Training PT STG, Time to Achieve 1 wk  -RT      Transfer Training PT STG, Activity Type all transfers  -RT      Transfer Training PT STG, [place] Level contact guard assist  -RT      Transfer Training PT LTG    Transfer Training PT LTG, Date Established 06/02/17  -RT      Transfer Training PT LTG, Time to Achieve 2 wks  -RT      Transfer Training PT LTG, Activity Type all transfers  -RT      Transfer Training PT LTG, [place] Level supervision required  -RT      Static Sitting Balance PT STG    Static Sitting Balance PT STG, Date Established 06/02/17  -RT      Static Sitting Balance PT STG, Time to Achieve 1 wk  -RT      Static Sitting Balance PT STG, [place] Level minimum assist (75% patient effort)  -RT      Static Sitting Balance PT LTG    Static Sitting Balance PT LTG, Date Established 06/02/17  -RT      Static Sitting Balance PT LTG, Time to Achieve 2 wks  -RT      Static Sitting Balance PT LTG, [place] Level supervision required  -RT        User Key  (r) = Recorded By, (t) = Taken By, (c) = Cosigned By    Initials Name Provider Type    RT Perico Lucas PT Physical Therapist          Physical Therapy Education     Title: PT OT SLP Therapies (Active)     Topic: Physical Therapy (Done)     Point: Mobility training (Done)     Learning Progress Summary    Learner Readiness Method Response Comment Documented by Status   Patient Acceptance E VU   06/09/17 1557 Done    Acceptance E VU   06/08/17 2328 Done    Acceptance E VU   06/08/17 1547 Done    Acceptance E VU   06/08/17 0039 Done    Acceptance E,D NR,VU  AG 06/07/17 1810 Done    Acceptance E VU  CT 06/07/17 1737 Done    Acceptance E VU   06/07/17 0050 Done    Acceptance E VU,NR   06/06/17 1710 Done    Acceptance E VU,NR   06/05/17 1521 Done    Acceptance E VU  RT 06/03/17 1236 Done    Acceptance E VU  RT 06/02/17 1519 Done               Point: Home exercise program (Done)    Learning Progress Summary    Learner Readiness Method Response Comment Documented by Status   Patient Acceptance E VU   06/09/17 1557 Done    Acceptance E VU   06/08/17 2328 Done    Acceptance E VU   06/08/17 1547 Done    Acceptance E VU   06/08/17 0039 Done    Acceptance E,D NR,VU   06/07/17 1810 Done    Acceptance E VU  CT 06/07/17 1737 Done    Acceptance E VU   06/07/17 0050 Done    Acceptance E VU,NR  LB 06/06/17 1710 Done    Acceptance E VU,NR   06/05/17 1521 Done    Acceptance E VU  RT 06/03/17 1236 Done    Acceptance E VU  RT 06/02/17 1519 Done               Point: Body mechanics (Done)    Learning Progress Summary    Learner Readiness Method Response Comment Documented by Status   Patient Acceptance E VU   06/09/17 1557 Done    Acceptance E VU   06/08/17 2328 Done    Acceptance E VU   06/08/17 1547 Done    Acceptance E VU   06/08/17 0039 Done    Acceptance E,D NR,VU  AG 06/07/17 1810 Done    Acceptance E VU  CT 06/07/17 1737 Done    Acceptance E VU   06/07/17 0050 Done    Acceptance E VU,NR   06/06/17 1710 Done    Acceptance E VU,NR   06/05/17 1521 Done    Acceptance E VU  RT 06/03/17 1236 Done    Acceptance E VU  RT 06/02/17 1519 Done               Point: Precautions (Done)    Learning Progress Summary    Learner Readiness Method Response Comment Documented by Status    Patient Acceptance E VU   06/09/17 1557 Done    Acceptance E VU   06/08/17 2328 Done    Acceptance E VU   06/08/17 1547 Done    Acceptance E VU   06/08/17 0039 Done    Acceptance E,D NR,VU  AG 06/07/17 1810 Done    Acceptance E VU  CT 06/07/17 1737 Done    Acceptance E VU   06/07/17 0050 Done    Acceptance E VU,NR  LB 06/06/17 1710 Done    Acceptance E VU,NR   06/05/17 1521 Done    Acceptance E VU  RT 06/03/17 1236 Done    Acceptance E VU  RT 06/02/17 1519 Done                      User Key     Initials Effective Dates Name Provider Type Discipline     06/16/16 -  Flores Silva, RN Registered Nurse Nurse     03/14/16 -  Susan Palma, PT Physical Therapist PT     03/14/16 -  Radha Ohara, PT Physical Therapist PT    CT 03/14/16 -  Susu Sheridan, PT Physical Therapist PT     07/14/16 -  Magda Carney PTA Physical Therapy Assistant PT    RT 03/14/16 -  Perico Lucas, PT Physical Therapist PT                    PT Recommendation and Plan  Planned Therapy Interventions: balance training, bed mobility training, home exercise program, lumbar stabilization, manual therapy techniques, neuromuscular re-education, patient/family education, postural re-education, ROM (Range of Motion), strengthening, stretching, transfer training  PT Frequency: 5 times/wk, 2 times/day, per priority policy            Time Calculation:         PT Charges       06/09/17 1605 06/09/17 1558       Time Calculation    Start Time 1045  - 1245  -     Stop Time 1140  - 1325  -     Time Calculation (min) 55 min  - 40 min  -     PT Received On 06/09/17  - 06/09/17  -     PT - Next Appointment 06/12/17  - 06/12/17  -       User Key  (r) = Recorded By, (t) = Taken By, (c) = Cosigned By    Initials Name Provider Type     Magda Carney, SHILPA Physical Therapy Assistant          Therapy Charges for Today     Code Description Service Date Service Provider Modifiers Qty    37577577345   PT THER PROC EA 15 MIN 6/8/2017 Magda Carney, PTA GP 2    05427545410 HC PT THERAPEUTIC ACT EA 15 MIN 6/8/2017 Magda Carney, PTA GP 4    12102609737 HC PT THER SUPP EA 15 MIN 6/8/2017 Magda Carney, PTA GP 3    80617140921 HC PT THER PROC EA 15 MIN 6/9/2017 Magda Carney, PTA GP 4    17981303115 HC PT THERAPEUTIC ACT EA 15 MIN 6/9/2017 Magda Carney, PTA GP 2    46909826301 HC PT THER SUPP EA 15 MIN 6/9/2017 Magda Carney, PTA GP 3               Magda Carney, PTA  6/9/2017

## 2017-06-09 NOTE — PROGRESS NOTES
Inpatient Rehabilitation Functional Measures Assessment    Functional Measures  LYNETTE Eating:  LYNETTE Grooming:  LYNETTE Bathing:  LYNETTE Upper Body Dressing:  LYNETTE Lower Body Dressing:  LYNETTE Toileting:    LYNETTE Bladder Management  Level of Assistance:  Frequency/Number of Accidents this Shift:    LYNETTE Bowel Management  Level of Assistance:  Frequency/Number of Accidents this Shift:    LYNETTE Bed/Chair/Wheelchair Transfer:  LYNETTE Toilet Transfer:  LYNETTE Tub/Shower Transfer:    Previously Documented Mode of Locomotion at Discharge:  LYNETTE Expected Mode of Locomotion at Discharge:  LYNETTE Walk/Wheelchair:  LYNETTE Stairs:    LYNETTE Comprehension:  Both ( auditory and visual) modes of comprehension are used  equally. Comprehension Score = 6, Modified [place].  Patient comprehends  complex/abstract information in their primary language, requiring: Additional  time. Additional time.  LYNETTE Expression:  Both ( vocal and non-vocal) modes of expression are used  equally. Expression Score = 6,  Modified Independent. Patient expresses  complex/abstract information in their primary language, requiring: Additional  time.  LYNETTE Social Interaction:  Social Interaction Score = 6, Modified Independent.  Patient is modified independent for social interaction, requiring: Requires  additional time.  LYNETTE Problem Solving:  Problem Solving Score = 6, Modified [place].  Patient  makes appropriate decisions in order to solve complex problems, but requires  extra time.  LYNETTE Memory:  Memory Score = 6, Modified [place].  Patient is modified  independent for memory, requiring: Requires additional time.    Therapy Mode Minutes  Occupational Therapy:  Physical Therapy:  Speech Language Pathology:    Discharge Functional Goals:    Signed by: Sabino Kaufman RN

## 2017-06-09 NOTE — PROGRESS NOTES
Inpatient Rehabilitation Functional Measures Assessment    Functional Measures  LYNETTE Eating:  LYNETTE Grooming:  LYNETTE Bathing:  LYNETTE Upper Body Dressing:  LYNETTE Lower Body Dressing:  LYNETTE Toileting:    LYNETTE Bladder Management  Level of Assistance:  Frequency/Number of Accidents this Shift:    LYNETTE Bowel Management  Level of Assistance:  Frequency/Number of Accidents this Shift:    LYNETTE Bed/Chair/Wheelchair Transfer:  LYNETTE Toilet Transfer:  LYNETTE Tub/Shower Transfer:    Previously Documented Mode of Locomotion at Discharge:  LYNETTE Expected Mode of Locomotion at Discharge:  LYNETTE Walk/Wheelchair:  LYNETTE Stairs:    LYNETTE Comprehension:  Both ( auditory and visual) modes of comprehension are used  equally. Comprehension Score = 6, Modified [place].  Patient comprehends  complex/abstract information in their primary language, requiring: Additional  time. Glasses.  LYNETTE Expression:  Both ( vocal and non-vocal) modes of expression are used  equally. Expression Score = 6,  Modified Independent. Patient expresses  complex/abstract information in their primary language, requiring: Additional  time. Additional time. Additional time.  LYNETTE Social Interaction:  Social Interaction Score = 6, Modified Independent.  Patient is modified independent for social interaction, requiring: Requires  additional time.  LYNETTE Problem Solving:  Problem Solving Score = 6, Modified [place].  Patient  makes appropriate decisions in order to solve complex problems, but requires  extra time.  LYNETTE Memory:  Memory Score = 6, Modified [place].  Patient is modified  independent for memory, requiring: Requires additional time.    Therapy Mode Minutes  Occupational Therapy:  Physical Therapy:  Speech Language Pathology:    Discharge Functional Goals:    Signed by: Flores Silva, Nurse

## 2017-06-10 PROCEDURE — 25010000002 ENOXAPARIN PER 10 MG: Performed by: FAMILY MEDICINE

## 2017-06-10 RX ADMIN — AMITRIPTYLINE HYDROCHLORIDE 25 MG: 25 TABLET, FILM COATED ORAL at 20:56

## 2017-06-10 RX ADMIN — VENLAFAXINE HYDROCHLORIDE 75 MG: 75 CAPSULE, EXTENDED RELEASE ORAL at 08:21

## 2017-06-10 RX ADMIN — BACLOFEN 20 MG: 10 TABLET ORAL at 21:08

## 2017-06-10 RX ADMIN — BACLOFEN 20 MG: 10 TABLET ORAL at 05:08

## 2017-06-10 RX ADMIN — ENOXAPARIN SODIUM 40 MG: 40 INJECTION SUBCUTANEOUS at 08:21

## 2017-06-10 RX ADMIN — GABAPENTIN 800 MG: 400 CAPSULE ORAL at 21:07

## 2017-06-10 RX ADMIN — ALPRAZOLAM 0.5 MG: 0.5 TABLET ORAL at 08:21

## 2017-06-10 RX ADMIN — CETIRIZINE HYDROCHLORIDE 10 MG: 10 TABLET ORAL at 08:21

## 2017-06-10 RX ADMIN — METOPROLOL TARTRATE 25 MG: 25 TABLET, FILM COATED ORAL at 08:21

## 2017-06-10 RX ADMIN — FOLIC ACID 1 MG: 1 TABLET ORAL at 08:21

## 2017-06-10 RX ADMIN — ALPRAZOLAM 0.5 MG: 0.5 TABLET ORAL at 20:56

## 2017-06-10 RX ADMIN — METOPROLOL TARTRATE 25 MG: 25 TABLET, FILM COATED ORAL at 20:56

## 2017-06-10 RX ADMIN — ASPIRIN 81 MG: 81 TABLET ORAL at 08:21

## 2017-06-10 RX ADMIN — GABAPENTIN 800 MG: 400 CAPSULE ORAL at 05:08

## 2017-06-10 RX ADMIN — CASTOR OIL AND BALSAM, PERU: 788; 87 OINTMENT TOPICAL at 20:56

## 2017-06-10 RX ADMIN — CASTOR OIL AND BALSAM, PERU: 788; 87 OINTMENT TOPICAL at 08:21

## 2017-06-10 RX ADMIN — ALPRAZOLAM 0.5 MG: 0.5 TABLET ORAL at 16:43

## 2017-06-10 RX ADMIN — DOCUSATE SODIUM AND SENNA 2 TABLET: 50; 8.6 TABLET, FILM COATED ORAL at 20:56

## 2017-06-10 RX ADMIN — Medication 1 TABLET: at 08:21

## 2017-06-10 NOTE — PROGRESS NOTES
Inpatient Rehabilitation Functional Measures Assessment    Functional Measures  LYNETTE Eating:  LYNETTE Grooming:  LYNETTE Bathing:  LYNETTE Upper Body Dressing:  LYNETTE Lower Body Dressing:  LYNETTE Toileting:    LYNETTE Bladder Management  Level of Assistance:  Bladder Score = 1.  Patient is total assistance for  bladder management. Patient has an indwelling/Sheldon catheter.  Frequency/Number of Accidents this Shift:    LYNETTE Bowel Management  Level of Assistance: Bowel Score = 5.  Patient is supervision/set-up for bowel  management, requiring: Setting out equipment. Patient requires the following  assistive device(s): colostomy .  Frequency/Number of Accidents this Shift:    LYNETTE Bed/Chair/Wheelchair Transfer:  Bed/chair/wheelchair Transfer Score = 5.  Patient is supervision/set-up for transferring to and from the  bed/chair/wheelchair, requiring: Stand by assistance. Patient requires the  following assistive device(s): Sliding board.  LYNETTE Toilet Transfer:  LYNETTE Tub/Shower Transfer:    Previously Documented Mode of Locomotion at Discharge:  [facility] Expected Mode of Locomotion at Discharge:  [facility] Walk/Wheelchair:  [facility] Stairs:    [facility] Comprehension:  [facility] Expression:  [facility] Social Interaction:  [facility] Problem Solving:  [facility] Memory:    Therapy Mode Minutes  Occupational Therapy:  Physical Therapy:  Speech Language Pathology:    Discharge Functional Goals:    Signed by: TALI Arnold

## 2017-06-10 NOTE — PROGRESS NOTES
Rehabilitation Nursing  Inpatient Rehabilitation Plan of Care Note    Plan of Care  Copy from POCBody Function Structure    Skin Integrity (Active)  Current Status (6/1/2017 6:00:00 PM): impairment in skin integrity  Weekly Goal: no further skin breakdown this week  Discharge Goal: wounds showing signs of healing, no s/s infection by discharge    Safety    Potential for Injury (Active)  Current Status (6/1/2017 6:00:00 PM): potential for falls  Weekly Goal: no falls this week  Discharge Goal: no falls by discharge    Signed by: Sasha De La Rosa Nurse

## 2017-06-10 NOTE — PLAN OF CARE
Problem: Patient Care Overview (Adult)  Goal: Plan of Care Review  Outcome: Ongoing (interventions implemented as appropriate)  Goal: Adult Individualization and Mutuality  Outcome: Ongoing (interventions implemented as appropriate)  Goal: Discharge Needs Assessment  Outcome: Ongoing (interventions implemented as appropriate)    Problem: Activity Intolerance (Adult)  Goal: Activity Tolerance  Outcome: Ongoing (interventions implemented as appropriate)  Goal: Effective Energy Conservation Techniques  Outcome: Ongoing (interventions implemented as appropriate)    Problem: Fall Risk (Adult)  Goal: Absence of Falls  Outcome: Ongoing (interventions implemented as appropriate)    Problem: Skin Integrity Impairment, Risk/Actual (Adult)  Goal: Skin Integrity/Wound Healing  Outcome: Ongoing (interventions implemented as appropriate)    Problem: Mobility, Physical Impaired (Adult)  Goal: Enhanced Mobility Skills  Outcome: Ongoing (interventions implemented as appropriate)  Goal: Enhanced Functionality Ability  Outcome: Ongoing (interventions implemented as appropriate)

## 2017-06-10 NOTE — PROGRESS NOTES
"     LOS: 9 days     Chief Complaint:  Weakness, debility     Subjective     Interval History:     He continues to require wound care on his buttocks.  No fevers or chills.  Pain control adequate      Objective     Vital Signs  /84 (BP Location: Left arm, Patient Position: Lying)  Pulse 80  Temp 98 °F (36.7 °C) (Oral)   Resp 18  Ht 71\" (180.3 cm)  Wt 245 lb (111 kg)  SpO2 100%  BMI 34.17 kg/m2  Intake & Output (last day)       06/09 0701 - 06/10 0700 06/10 0701 - 06/11 0700    P.O. 1440     Irrigation      Total Intake(mL/kg) 1440 (13)     Urine (mL/kg/hr) 650 (0.2)     Stool 1 (0)     Total Output 651      Net +789                    Physical Exam:     General Appearance:    Alert, cooperative, in no acute distress   Head:    Normocephalic, without obvious abnormality, atraumatic   Eyes:            Lids and lashes normal, conjunctivae and sclerae normal, no   icterus, no pallor, corneas clear, PERRLA   Ears:    Ears appear intact with no abnormalities noted       Neck:   No adenopathy, supple, trachea midline, no thyromegaly, no   carotid bruit, no JVD   Lungs:     Clear to auscultation,respirations regular, even and                  unlabored    Heart:    Regular rhythm and normal rate, normal S1 and S2, no            murmur, no gallop, no rub, no click   Chest Wall:    No abnormalities observed   Abdomen:     Normal bowel sounds, no masses, no organomegaly, soft        non-tender, non-distended, no guarding, no rebound                tenderness   Extremities:   No movement in BLE, no sensation in BLE  no edema, no cyanosis, no redness   Pulses:   Pulses palpable and equal bilaterally   Skin:   No bleeding, bruising or rash                Results Review:    Lab Results   Component Value Date    WBC 8.25 06/07/2017    HGB 9.1 (L) 06/07/2017    HCT 30.3 (L) 06/07/2017    MCV 79.9 (L) 06/07/2017     (H) 06/07/2017       Lab Results   Component Value Date    GLUCOSE 91 06/02/2017    BUN 10 " 06/02/2017    CREATININE 0.51 06/02/2017    EGFRIFNONA >150 06/02/2017    BCR 19.6 06/02/2017     06/02/2017    K 4.2 06/02/2017     06/02/2017    CO2 30.5 06/02/2017    CALCIUM 8.7 06/02/2017    ALBUMIN 3.10 (L) 06/02/2017    LABIL2 1.1 (L) 06/02/2017    AST 15 06/02/2017    ALT 13 06/02/2017     No results found for: INR    No results found for: POCGLU       Medication Review:     Current Facility-Administered Medications:   •  acetaminophen (TYLENOL) tablet 650 mg, 650 mg, Oral, Q4H PRN, Samuel Duane Kreis, MD  •  ALPRAZolam (XANAX) tablet 0.5 mg, 0.5 mg, Oral, TID, Samuel Duane Kreis, MD, 0.5 mg at 06/10/17 0821  •  aluminum-magnesium hydroxide-simethicone (MAALOX MAX) 400-400-40 MG/5ML suspension 15 mL, 15 mL, Oral, Q6H PRN, Samuel Duane Kreis, MD  •  amitriptyline (ELAVIL) tablet 25 mg, 25 mg, Oral, Nightly, Samuel Duane Kreis, MD, 25 mg at 06/09/17 2059  •  aspirin EC tablet 81 mg, 81 mg, Oral, Daily, Samuel Duane Kreis, MD, 81 mg at 06/10/17 0821  •  baclofen (LIORESAL) tablet 20 mg, 20 mg, Oral, Q8H, Samuel Duane Kreis, MD, 20 mg at 06/10/17 0508  •  bisacodyl (DULCOLAX) suppository 10 mg, 10 mg, Rectal, Daily PRN, Samuel Duane Kreis, MD  •  castor oil-balsam peru (VENELEX) ointment, , Topical, Q12H, Samuel Duane Kreis, MD  •  cetirizine (zyrTEC) tablet 10 mg, 10 mg, Oral, Daily, Samuel Duane Kreis, MD, 10 mg at 06/10/17 0821  •  enoxaparin (LOVENOX) syringe 40 mg, 40 mg, Subcutaneous, Daily, Samuel Duane Kreis, MD, 40 mg at 06/10/17 0821  •  fluticasone (FLONASE) 50 MCG/ACT nasal spray 2 spray, 2 spray, Each Nare, Daily, Samuel Duane Kreis, MD, 2 spray at 06/02/17 0859  •  folic acid (FOLVITE) tablet 1 mg, 1 mg, Oral, Daily, Samuel Duane Kreis, MD, 1 mg at 06/10/17 0821  •  gabapentin (NEURONTIN) capsule 800 mg, 800 mg, Oral, Q8H, Samuel Duane Kreis, MD, 800 mg at 06/10/17 0508  •  magnesium hydroxide (MILK OF MAGNESIA) suspension 2400 mg/10mL 10 mL, 10 mL, Oral, Daily PRN, Samuel Duane Kreis,  MD  •  metoprolol tartrate (LOPRESSOR) tablet 25 mg, 25 mg, Oral, Q12H, Samuel Duane Kreis, MD, 25 mg at 06/10/17 0821  •  multivitamin (DAILY BRIAN) tablet 1 tablet, 1 tablet, Oral, Daily, Samuel Duane Kreis, MD, 1 tablet at 06/10/17 0821  •  ondansetron (ZOFRAN) tablet 4 mg, 4 mg, Oral, Q6H PRN, 4 mg at 06/06/17 0835 **OR** ondansetron ODT (ZOFRAN-ODT) disintegrating tablet 4 mg, 4 mg, Oral, Q6H PRN **OR** ondansetron (ZOFRAN) injection 4 mg, 4 mg, Intravenous, Q6H PRN, Samuel Duane Kreis, MD  •  sennosides-docusate sodium (SENOKOT-S) 8.6-50 MG tablet 2 tablet, 2 tablet, Oral, Nightly, Samuel Duane Kreis, MD, 2 tablet at 06/09/17 2058  •  venlafaxine XR (EFFEXOR-XR) 24 hr capsule 75 mg, 75 mg, Oral, Daily With Breakfast, Samuel Duane Kreis, MD, 75 mg at 06/10/17 0821      Assessment/Plan     Debility  Paraplegia  Decubitus ulcers of buttocks      PT and OT ongoing.    Continue gabapentin with when necessary baclofen    Continue indwelling Sheldon    He's doing well        Samuel Duane Kreis, MD  06/10/17  2:21 PM

## 2017-06-10 NOTE — PROGRESS NOTES
Rehabilitation Nursing  Inpatient Rehabilitation Functional Measures Assessment and Plan of Care    Plan of Care/Interventions  Copy from POC    Functional Measures  LYNETTE Eating:  LYNETTE Grooming:  LYNETTE Bathing:  LYNETTE Upper Body Dressing:  LYNETTE Lower Body Dressing:  LYNETTE Toileting:    LYNETTE Bladder Management  Level of Assistance:  Frequency/Number of Accidents this Shift:    LYNETTE Bowel Management  Level of Assistance:  Frequency/Number of Accidents this Shift:    LYNETTE Bed/Chair/Wheelchair Transfer:  LYNETTE Toilet Transfer:  LYNETTE Tub/Shower Transfer:    Previously Documented Mode of Locomotion at Discharge:  LYNETTE Expected Mode of Locomotion at Discharge:  LYNETTE Walk/Wheelchair:  LYNETTE Stairs:    LYNETTE Comprehension:  Auditory comprehension is the usual mode. Comprehension  Score = 7, Independent.  Patient comprehends complex/abstract information in  their primary language.  Patient is completely independent for auditory  comprehension.  There are no activity limitations.  LYNETTE Expression:  Vocal expression is the usual mode. Expression Score = 7,  Independent.  Patient expresses complex/abstract information in their primary  language.  Patient is completely independent for vocal expression.  There are no  activity limitations.  LYNETTE Social Interaction:  Social Interaction Score = 7, Independent. Patient is  completely independent for social interaction.  There are no activity  limitations.  LYNETTE Problem Solving:  Problem Solving Score = 7, Independent.  Patient makes  appropriate decisions in order to solve complex problems.  Patient is completely  independent for problem solving.  There are no activity limitations.  LYNETTE Memory:  Memory Score = 7, Independent.  Patient is completely independent  for memory.  There are no activity limitations.    Body Function Structure    Skin Integrity (Active)  Current Status (6/1/2017 6:00:00 PM): impairment in skin integrity  Weekly Goal: no further skin breakdown this week  Discharge Goal: wounds  showing signs of healing, no s/s infection by discharge    Safety    Potential for Injury (Active)  Current Status (6/1/2017 6:00:00 PM): potential for falls  Weekly Goal: no falls this week  Discharge Goal: no falls by discharge    RN Interventions    Body Function Structure -  RN: skin assess every shift and as needed [RN]  RN: monitor wounds for s/s infection, notify MD if any s/s [, RN]  RN: wound care as per order [RN]  RN: turn every 2 hours when in bed, reposition every hour when up in chair [PT,  OT, RN]  RN: monitor labs and report abnormals to MD [, RN]  RN: heel off bed [OT, RN]  RN: barrier creams as needed [RN]    Safety -  RN: pt up with assist only [PT, OT, RN]  RN: non skid sock [PT, OT, RN]  RN: call light and items in reach [PT, OT, RN]    Signed by: Jossie Keys Nurse

## 2017-06-10 NOTE — PROGRESS NOTES
Inpatient Rehabilitation Functional Measures Assessment    Functional Measures  LYNETTE Eating:  Eating Score = 7. Patient is completely independent for eating.  There are no activity limitations.  LYNETTE Grooming: Activity was not observed.  LYNETTE Bathing:  Activity was not observed.  LYNETTE Upper Body Dressing:  Activity was not observed.  LYNETTE Lower Body Dressing:  Activity was not observed.  LYNETTE Toileting:  Patient requires minimal assistance for adjusting clothing  before using a toilet, commode, bedpan, or urinal. Patient requires minimal  assistance for hygiene. Patient requires minimal assistance for adjusting  clothing after using a toilet, commode, bedpan, or urinal. Patient performs 0 -  24% of toileting tasks.  Toileting Score = 1, Total Assistance. Patient requires  the following assistive device(s): Grab bar. Arm rest of a specialized seat.    LYNETTE Bladder Management  Level of Assistance:  Bladder Score = 1.  Patient is total assistance for  bladder management. Patient has an indwelling/Sheldon catheter.  Frequency/Number of Accidents this Shift:  Bladder accidents this shift:  0 .  Patient has not had an accident, but used a device/medication this shift  requiring: Sheldon catheter .    LYNETTE Bowel Management  Level of Assistance: Bowel Score = 4. Patient performs greater than 75% of tasks  and requires minimal assistance for bowel management requiring assistive  device/method: Colostomy. Patient performs all care. Self-care. .  Frequency/Number of Accidents this Shift: Bowel accidents this shift: 0 .  Patient has not had an accident, but used a device/medication this shift  requiring: Colostomy .    LYNETTE Bed/Chair/Wheelchair Transfer:  Bed/chair/wheelchair Transfer Score = 3.  Patient performs 50-74% of effort and requires moderate assistance (some  lifting) for transferring to and from the bed/chair/wheelchair. Patient requires  the following assistive device(s): Grab bars. Sliding board. Seating system  of  wheelchair.  LYNETTE Toilet Transfer:  Activity was not observed.  LYNETTE Tub/Shower Transfer:  Activity was not observed.    Previously Documented Mode of Locomotion at Discharge:  LYNETTE Expected Mode of Locomotion at Discharge:  LYNETTE Walk/Wheelchair:  LYNETTE Stairs:    LYNETTE Comprehension:  LYNETTE Expression:  LYNETTE Social Interaction:  LYNETTE Problem Solving:  LYNETTE Memory:    Therapy Mode Minutes  Occupational Therapy:  Physical Therapy:  Speech Language Pathology:    Discharge Functional Goals:    Signed by: TALI Dumont

## 2017-06-10 NOTE — PROGRESS NOTES
Inpatient Rehabilitation Functional Measures Assessment    Functional Measures  LYNETTE Eating:  LYNETTE Grooming:  LYNETTE Bathing:  LYNETTE Upper Body Dressing:  LYNETTE Lower Body Dressing:  LYNETTE Toileting:    LYNETTE Bladder Management  Level of Assistance:  Frequency/Number of Accidents this Shift:    LYNETTE Bowel Management  Level of Assistance:  Frequency/Number of Accidents this Shift:    LYNETTE Bed/Chair/Wheelchair Transfer:  LYNETTE Toilet Transfer:  LYNETTE Tub/Shower Transfer:    Previously Documented Mode of Locomotion at Discharge:  LYNETTE Expected Mode of Locomotion at Discharge:  LYNETTE Walk/Wheelchair:  LYNETTE Stairs:    LYNETTE Comprehension:  Auditory comprehension is the usual mode. Comprehension  Score = 6, Modified [place].  Patient comprehends complex/abstract  information in their primary language, requiring:  LYNETTE Expression:  Vocal expression is the usual mode. Expression Score = 6,  Modified Independent.  Patient expresses complex/abstract information in their  primary language, requiring:  LYNETTE Social Interaction:  Social Interaction Score = 6, Modified Independent.  Patient is modified independent for social interaction, requiring:  LYNETTE Problem Solving:  Problem Solving Score = 6, Modified [place].  Patient  makes appropriate decisions in order to solve complex problems, but requires  extra time.  LYNETTE Memory:  Memory Score = 7, Independent.  Patient is completely independent  for memory.  There are no activity limitations.    Therapy Mode Minutes  Occupational Therapy:  Physical Therapy:  Speech Language Pathology:    Discharge Functional Goals:    Signed by: Sasha De La Rosa Nurse

## 2017-06-11 PROCEDURE — 25010000002 ENOXAPARIN PER 10 MG: Performed by: FAMILY MEDICINE

## 2017-06-11 RX ADMIN — ALPRAZOLAM 0.5 MG: 0.5 TABLET ORAL at 09:19

## 2017-06-11 RX ADMIN — VENLAFAXINE HYDROCHLORIDE 75 MG: 75 CAPSULE, EXTENDED RELEASE ORAL at 09:18

## 2017-06-11 RX ADMIN — GABAPENTIN 800 MG: 400 CAPSULE ORAL at 05:37

## 2017-06-11 RX ADMIN — CASTOR OIL AND BALSAM, PERU: 788; 87 OINTMENT TOPICAL at 20:54

## 2017-06-11 RX ADMIN — GABAPENTIN 800 MG: 400 CAPSULE ORAL at 21:02

## 2017-06-11 RX ADMIN — CETIRIZINE HYDROCHLORIDE 10 MG: 10 TABLET ORAL at 09:18

## 2017-06-11 RX ADMIN — CASTOR OIL AND BALSAM, PERU 1 APPLICATION: 788; 87 OINTMENT TOPICAL at 09:22

## 2017-06-11 RX ADMIN — GABAPENTIN 800 MG: 400 CAPSULE ORAL at 13:56

## 2017-06-11 RX ADMIN — METOPROLOL TARTRATE 25 MG: 25 TABLET, FILM COATED ORAL at 09:18

## 2017-06-11 RX ADMIN — FLUTICASONE PROPIONATE 2 SPRAY: 50 SPRAY, METERED NASAL at 09:19

## 2017-06-11 RX ADMIN — ASPIRIN 81 MG: 81 TABLET ORAL at 09:18

## 2017-06-11 RX ADMIN — FOLIC ACID 1 MG: 1 TABLET ORAL at 09:18

## 2017-06-11 RX ADMIN — BACLOFEN 20 MG: 10 TABLET ORAL at 21:02

## 2017-06-11 RX ADMIN — Medication 1 TABLET: at 09:18

## 2017-06-11 RX ADMIN — BACLOFEN 20 MG: 10 TABLET ORAL at 13:56

## 2017-06-11 RX ADMIN — DOCUSATE SODIUM AND SENNA 2 TABLET: 50; 8.6 TABLET, FILM COATED ORAL at 20:54

## 2017-06-11 RX ADMIN — METOPROLOL TARTRATE 25 MG: 25 TABLET, FILM COATED ORAL at 20:54

## 2017-06-11 RX ADMIN — BACLOFEN 20 MG: 10 TABLET ORAL at 05:37

## 2017-06-11 RX ADMIN — ALPRAZOLAM 0.5 MG: 0.5 TABLET ORAL at 17:12

## 2017-06-11 RX ADMIN — AMITRIPTYLINE HYDROCHLORIDE 25 MG: 25 TABLET, FILM COATED ORAL at 20:54

## 2017-06-11 RX ADMIN — ENOXAPARIN SODIUM 40 MG: 40 INJECTION SUBCUTANEOUS at 09:18

## 2017-06-11 NOTE — PROGRESS NOTES
Inpatient Rehabilitation Functional Measures Assessment    Functional Measures  LYNETTE Eating:  LYNETTE Grooming:  LYNETTE Bathing:  LYNETTE Upper Body Dressing:  LYNETTE Lower Body Dressing:  LYNETTE Toileting:    LYNETTE Bladder Management  Level of Assistance:  Frequency/Number of Accidents this Shift:    LYNETTE Bowel Management  Level of Assistance:  Frequency/Number of Accidents this Shift:    LYNETTE Bed/Chair/Wheelchair Transfer:  LYNETTE Toilet Transfer:  LYNETTE Tub/Shower Transfer:    Previously Documented Mode of Locomotion at Discharge:  LYNETTE Expected Mode of Locomotion at Discharge:  LYNETTE Walk/Wheelchair:  LYNETTE Stairs:    LYNETTE Comprehension:  Auditory comprehension is the usual mode. Comprehension  Score = 7, Independent.  Patient comprehends complex/abstract information in  their primary language.  Patient is completely independent for auditory  comprehension.  There are no activity limitations.  LYNETTE Expression:  Vocal expression is the usual mode. Expression Score = 7,  Independent.  Patient expresses complex/abstract information in their primary  language.  Patient is completely independent for vocal expression.  There are no  activity limitations.  LYNETTE Social Interaction:  Social Interaction Score = 7, Independent. Patient is  completely independent for social interaction.  There are no activity  limitations.  LYNETTE Problem Solving:  Problem Solving Score = 6, Modified [place].  Patient  makes appropriate decisions in order to solve complex problems, but requires  extra time.  LYNETTE Memory:  Memory Score = 7, Independent.  Patient is completely independent  for memory.  There are no activity limitations.    Therapy Mode Minutes  Occupational Therapy:  Physical Therapy:  Speech Language Pathology:    Discharge Functional Goals:    Signed by: Nurse Kayli

## 2017-06-11 NOTE — PLAN OF CARE
Problem: Patient Care Overview (Adult)  Goal: Plan of Care Review  Outcome: Ongoing (interventions implemented as appropriate)    06/11/17 1129   Coping/Psychosocial Response Interventions   Plan Of Care Reviewed With patient   Patient Care Overview   Progress improving         Problem: Activity Intolerance (Adult)  Goal: Activity Tolerance  Outcome: Ongoing (interventions implemented as appropriate)    Problem: Fall Risk (Adult)  Goal: Absence of Falls  Outcome: Ongoing (interventions implemented as appropriate)    Problem: Pressure Ulcer (Adult)  Goal: Signs and Symptoms of Listed Potential Problems Will be Absent or Manageable (Pressure Ulcer)  Outcome: Ongoing (interventions implemented as appropriate)    Problem: Skin Integrity Impairment, Risk/Actual (Adult)  Goal: Skin Integrity/Wound Healing  Outcome: Ongoing (interventions implemented as appropriate)    Problem: Mobility, Physical Impaired (Adult)  Goal: Enhanced Functionality Ability  Outcome: Ongoing (interventions implemented as appropriate)

## 2017-06-11 NOTE — PLAN OF CARE
Problem: Patient Care Overview (Adult)  Goal: Plan of Care Review  Outcome: Ongoing (interventions implemented as appropriate)    06/11/17 5295   Coping/Psychosocial Response Interventions   Plan Of Care Reviewed With patient   Patient Care Overview   Progress improving         Problem: Activity Intolerance (Adult)  Goal: Activity Tolerance  Outcome: Ongoing (interventions implemented as appropriate)    Problem: Fall Risk (Adult)  Goal: Absence of Falls  Outcome: Ongoing (interventions implemented as appropriate)    Problem: Pressure Ulcer (Adult)  Goal: Signs and Symptoms of Listed Potential Problems Will be Absent or Manageable (Pressure Ulcer)  Outcome: Ongoing (interventions implemented as appropriate)    Problem: Skin Integrity Impairment, Risk/Actual (Adult)  Goal: Skin Integrity/Wound Healing  Outcome: Ongoing (interventions implemented as appropriate)    Problem: Mobility, Physical Impaired (Adult)  Goal: Enhanced Functionality Ability  Outcome: Ongoing (interventions implemented as appropriate)

## 2017-06-11 NOTE — PROGRESS NOTES
Inpatient Rehabilitation Functional Measures Assessment    Functional Measures  LYNETTE Eating:  Eating Score = 5. Patient is supervision/set-up for eating,  requiring: Opening containers. No assistive devices were required.  LYNETTE Grooming: Grooming Score = 5. Patient is supervision/set-up for grooming,  requiring: Setting out grooming equipment. Initial preparation. Opening  containers. No assistive devices were required.  LYNETTE Bathing:  Patient bathed in bed. Bathing Score = 5.  Patient is  supervision/set-up for bathing, requiring: Setting out bathing equipment.  Preparing the water. Preparing washing materials. No assistive devices were  required.  LYNETTE Upper Body Dressing:  Patient requires total assistance for gathering  clothes. Patient requires minimal/incidental assistance for threading the right  arm through the undergarment. Patient requires minimal/incidental assistance for  threading the left arm through the undergarment. Patient requires  minimal/incidental physical assistance for adjusting undergarment around body  and fastening or placing undershirt overhead. Patient requires  minimal/incidental physical assistance for pulling the undergarment into place  or pulling undershirt down over the trunk. Patient requires minimal/incidental  assistance for threading the right arm through the garment (shirt/sweater).  Patient requires minimal/incidental assistance for threading the left arm  through the garment (shirt/sweater). Patient requires minimal/incidental  physical assistance for pulling an over-head-garment over head or pulling  front-fastening-garment around back. Patient requires minimal/incidental  physical assistance for pulling an over-head-garment down the trunk or  adjusting/fastening together a front-fastening-garment. Patient performs 77.78 %  of upper body dressing tasks. Upper Body Dressing Score = 4, Minimal Assistance.  Patient requires the following assistive device(s): Reacher.  LYNETTE Lower  Body Dressing:  Patient requires total assistance for gathering  clothes. Wearing underwear or an undergarment was not observed for this patient.  Patient requires maximal/significant physical assistance for holding clothing  and/or threading the right leg through the pants/skirt. Patient requires  maximal/significant physical assistance for holding clothing and/or threading  the left leg through the pants/skirt. Patient requires maximal/significant  physical assistance for holding clothing and/or pulling pants/skirt over hips  and adjusting fasteners. Patient requires maximal/significant physical  assistance for holding clothing and/or donning and/or doffing right sock.  Donning and/or doffing left sock is not applicable for this patient. Patient  requires maximal/significant physical assistance for holding clothing and/or  donning and/or doffing right shoe. Donning and/or doffing left shoe is not  applicable for this patient. Patient performs 20.83 -  24% of lower body  dressing tasks. Lower Body Dressing  Score = 1, Total Assistance. Patient  requires the following assistive device(s): Reacher.  LYNETTE Toileting:  Toileting did not occur. Patient has colostomy bag and catheter    LYNETTE Bladder Management  Level of Assistance:  Bladder Score = 6.  Patient is modified independent for  bladder management with indwelling/Sheldon catheter.  Frequency/Number of Accidents this Shift:  Bladder accidents this shift:  0 .  Patient has not had an accident, but used a device/medication this shift  requiring: catheter .    LYNETTE Bowel Management  Level of Assistance: Bowel Score = 4. Patient performs greater than 75% of tasks  and requires minimal assistance for bowel management requiring assistive  device/method: Patient has a colostomy bag .  Frequency/Number of Accidents this Shift: Bowel accidents this shift: 0 .  Patient has not had an accident, but used a device/medication this shift  requiring: Patient has a colostomy bag  .    LYNETTE Bed/Chair/Wheelchair Transfer:  Bed/chair/wheelchair Transfer Score = 3.  Patient performs 50-74% of effort and requires moderate assistance (some  lifting) for transferring to and from the bed/chair/wheelchair. Patient requires  the following assistive device(s): Bed rails. Elevated bed/surface. Sliding  board. Arm rest. Seating system of wheelchair.  LYNETTE Toilet Transfer:  Toilet Transfer did not occur. .  LYNETTE Tub/Shower Transfer:    Previously Documented Mode of Locomotion at Discharge:  [facility] Expected Mode of Locomotion at Discharge:  LYNETTE Walk/Wheelchair:  LYNETTE Stairs:    LYNETTE Comprehension:  LYNETTE Expression:  LYNETTE Social Interaction:  LYNETTE Problem Solving:  LYNETTE Memory:    Therapy Mode Minutes  Occupational Therapy:  Physical Therapy:  Speech Language Pathology:    Discharge Functional Goals:    Signed by: TALI Recinos

## 2017-06-11 NOTE — PROGRESS NOTES
Inpatient Rehabilitation Functional Measures Assessment    Functional Measures  LYNETTE Eating:  LYNETTE Grooming: Grooming Score = 5. Patient is supervision/set-up for grooming,  requiring: Setting out grooming equipment. No assistive devices were required.  LYNETTE Bathing:  Patient bathed in bed. Bathing Score = 5.  Patient is  supervision/set-up for bathing, requiring: Preparing the water. Preparing  washing materials. No assistive devices were required.  LYNETTE Upper Body Dressing:  Upper Body Dressing Score = 5. Patient is supervision  for upper body dressing, requiring: Gathering/setting out clothes. No assistive  devices were required.  LYNETTE Lower Body Dressing:  Lower Body Dressing Score = 5. Patient is  supervision/set-up for lower body dressing, requiring: Gathering/setting out  clothes. No assistive devices were required.  LYNETTE Toileting:    [facility] Bladder Management  Level of Assistance:  Bladder Score = 1.  Patient is total assistance for  bladder management. Patient has an indwelling/Sheldon catheter.  Frequency/Number of Accidents this Shift:    LYNETTE Bowel Management  Level of Assistance: Bowel Score = 5.  Patient is supervision/set-up for bowel  management, requiring: Setting out equipment. Emptying equipment. Patient  requires the following assistive device(s): clostomy .  Frequency/Number of Accidents this Shift:    [facility] Bed/Chair/Wheelchair Transfer:  [facility] Toilet Transfer:  [facility] Tub/Shower Transfer:    Previously Documented Mode of Locomotion at Discharge:  [facility] Expected Mode of Locomotion at Discharge:  [facility] Walk/Wheelchair:  [facility] Stairs:    [facility] Comprehension:  [facility] Expression:  [facility] Social Interaction:  [facility] Problem Solving:  [facility] Memory:    Therapy Mode Minutes  Occupational Therapy:  Physical Therapy:  Speech Language Pathology:    Discharge Functional Goals:    Signed by: TALI Arnold

## 2017-06-12 PROCEDURE — 25010000002 ENOXAPARIN PER 10 MG: Performed by: FAMILY MEDICINE

## 2017-06-12 PROCEDURE — 97110 THERAPEUTIC EXERCISES: CPT

## 2017-06-12 PROCEDURE — 97535 SELF CARE MNGMENT TRAINING: CPT | Performed by: OCCUPATIONAL THERAPIST

## 2017-06-12 PROCEDURE — 97110 THERAPEUTIC EXERCISES: CPT | Performed by: OCCUPATIONAL THERAPIST

## 2017-06-12 PROCEDURE — 97530 THERAPEUTIC ACTIVITIES: CPT

## 2017-06-12 RX ORDER — ALPRAZOLAM 0.5 MG/1
0.5 TABLET ORAL 3 TIMES DAILY
Status: DISCONTINUED | OUTPATIENT
Start: 2017-06-12 | End: 2017-06-16 | Stop reason: HOSPADM

## 2017-06-12 RX ADMIN — CETIRIZINE HYDROCHLORIDE 10 MG: 10 TABLET ORAL at 08:52

## 2017-06-12 RX ADMIN — ACETAMINOPHEN 650 MG: 325 TABLET ORAL at 10:31

## 2017-06-12 RX ADMIN — CASTOR OIL AND BALSAM, PERU: 788; 87 OINTMENT TOPICAL at 08:53

## 2017-06-12 RX ADMIN — CASTOR OIL AND BALSAM, PERU: 788; 87 OINTMENT TOPICAL at 22:28

## 2017-06-12 RX ADMIN — ASPIRIN 81 MG: 81 TABLET ORAL at 08:53

## 2017-06-12 RX ADMIN — FOLIC ACID 1 MG: 1 TABLET ORAL at 08:53

## 2017-06-12 RX ADMIN — ALPRAZOLAM 0.5 MG: 0.5 TABLET ORAL at 16:16

## 2017-06-12 RX ADMIN — ALPRAZOLAM 0.5 MG: 0.5 TABLET ORAL at 22:26

## 2017-06-12 RX ADMIN — BACLOFEN 20 MG: 10 TABLET ORAL at 22:26

## 2017-06-12 RX ADMIN — BACLOFEN 20 MG: 10 TABLET ORAL at 06:02

## 2017-06-12 RX ADMIN — BACLOFEN 20 MG: 10 TABLET ORAL at 14:03

## 2017-06-12 RX ADMIN — GABAPENTIN 800 MG: 400 CAPSULE ORAL at 14:03

## 2017-06-12 RX ADMIN — DOCUSATE SODIUM AND SENNA 2 TABLET: 50; 8.6 TABLET, FILM COATED ORAL at 22:26

## 2017-06-12 RX ADMIN — Medication 1 TABLET: at 08:53

## 2017-06-12 RX ADMIN — ENOXAPARIN SODIUM 40 MG: 40 INJECTION SUBCUTANEOUS at 08:54

## 2017-06-12 RX ADMIN — GABAPENTIN 800 MG: 400 CAPSULE ORAL at 22:26

## 2017-06-12 RX ADMIN — GABAPENTIN 800 MG: 400 CAPSULE ORAL at 06:02

## 2017-06-12 RX ADMIN — AMITRIPTYLINE HYDROCHLORIDE 25 MG: 25 TABLET, FILM COATED ORAL at 22:26

## 2017-06-12 RX ADMIN — METOPROLOL TARTRATE 25 MG: 25 TABLET, FILM COATED ORAL at 22:26

## 2017-06-12 RX ADMIN — VENLAFAXINE HYDROCHLORIDE 75 MG: 75 CAPSULE, EXTENDED RELEASE ORAL at 08:52

## 2017-06-12 RX ADMIN — METOPROLOL TARTRATE 25 MG: 25 TABLET, FILM COATED ORAL at 08:52

## 2017-06-12 NOTE — PROGRESS NOTES
Inpatient Rehabilitation - Occupational Therapy Treatment Note   [place]     Patient Name: Alex Tierney  : 1975  MRN: [id number]  Today's Date: 2017  Onset of Illness/Injury or Date of Surgery Date: 17  Date of Referral to OT: 17  Referring Physician: Dr. Brooks      Admit Date: 2017    Visit Dx:   No diagnosis found.  Patient Active Problem List   Diagnosis   • Chronic allergic rhinitis   • Depression   • Smoker   • T6 spinal cord injury   • History of left lower extremity amputation   • Chronic osteomyelitis of multiple sites   • Debility             Adult Rehabilitation Note       17 1300 17 1500       Rehab Assessment/Intervention    Discipline occupational therapist  - physical therapy assistant  -[facility]     Document Type therapy note (daily note)  - therapy note (daily note)  -AHA     Subjective Information agree to therapy;no complaints  - agree to therapy;no complaints  -[facility]     Patient Effort, Rehab Treatment excellent  - excellent  -AHA     Patient Response to Treatment pt tolerated therapy well, completing all activities with good tolerance  - Patient tolerated treatment session good and was motivated to perform treatment session. Patient demonstrates core weakness.  -AHA     Recorded by [] Michell Silva, OT [AHA] Magda Carney PTA     Pain Assessment    Pain Assessment  No/denies pain  -AHA     Recorded by  [AHA] Magda Carney PTA     Cognitive Assessment/Intervention    Current Cognitive/Communication Assessment  functional  -AHA     Orientation Status  oriented x 4  -AHA     Follows Commands/Answers Questions  100% of the time;able to follow single-step instructions  -[facility]     Personal Safety  decreased awareness, need for assist;decreased awareness, need for safety  -[facility]     Personal Safety Interventions  gait belt;nonskid shoes/slippers when out of bed;fall prevention program maintained  -AHA     Recorded by  [AHA] Magda Ward  SHILPA Carney     Bed Mobility, Assessment/Treatment    Bed Mobility, Assistive Device  draw sheet  -[facility]     Bed Mobility, Roll Left, [place]  minimum assist (75% patient effort)  -[facility]     Bed Mobility, Roll Right, [place]  minimum assist (75% patient effort)  -[facility]     Bed Mob, Supine to Sit, [place]  verbal cues required;nonverbal cues required (demo/gesture);minimum assist (75% patient effort);moderate assist (50% patient effort)  -[facility]     Bed Mob, Sit to Supine, [place]  verbal cues required;nonverbal cues required (demo/gesture);minimum assist (75% patient effort);moderate assist (50% patient effort)  -[facility]     Bed Mobility, Safety Issues  decreased use of legs for bridging/pushing;impaired trunk control for bed mobility  -AHA     Bed Mobility, Impairments  muscle tone abnormal;strength decreased;impaired balance;coordination impaired;sensory feedback impaired   colostomy  -AHA     Recorded by  [AHA] Magda Carney PTA     Transfer Assessment/Treatment    Transfers, Bed-Chair [place] contact guard assist;stand by assist  - stand by assist;contact guard assist  -AHA     Transfers, Chair-Bed [place] verbal cues required  - verbal cues required;nonverbal cues required (demo/gesture);contact guard assist  -[facility]     Transfers, Bed-Chair-Bed, Assist Device sliding board  - sliding board  -[facility]     Transfer, Safety Issues  loses balance backward  -AHA     Recorded by [] Michell Silva OT [AHA] Magda Carney PTA     Gait Assessment/Treatment    Gait, [place] Level  not appropriate to assess  -AHA     Recorded by  [AHA] Magda Carney PTA     Balance Skills Training    Sitting-Level of Assistance  Close supervision  -AHA     Sitting-Balance Support  No upper extremity supported  -AHA     Sitting-Balance Activities  Lateral lean;Ball toss;Reaching for objects;Reaching across midline;Trunk control activities  -AHA     Recorded by  [AHA] Magda Ward  SHILPA Carney     Therapy Exercises    Bilateral Lower Extremities  PROM:;supine;prone  -AHA     Bilateral Upper Extremity AROM:;sitting   coordination and UE ther ex  -      BUE Resistance --   arm bike 3x5 min, rickshaw 20x4 with 20 lbs  -      Recorded by [] Michell Silva, OT [AHA] Magda Carney PTA     Positioning and Restraints    Pre-Treatment Position  other (comment)   W/C in AM and PM session  -AHA     Post Treatment Position  wheelchair   w/c both AM and PM session  -[facility]     In Wheelchair  sitting;call light within reach;encouraged to call for assist;exit alarm on  -AHA     Recorded by  [AHA] Magda Carney PTA       User Key  (r) = Recorded By, (t) = Taken By, (c) = Cosigned By    Initials Name Effective Dates    AHA Magda Carney PTA 07/14/16 -      Michell Silva, STEPHANIE 03/14/16 -                 OT Goals       06/08/17 1624 06/02/17 1554       Transfer Training OT LTG    Transfer Training OT LTG, Date Established  06/02/17  -BF     Transfer Training OT LTG, Time to Achieve  by discharge  -BF     Transfer Training OT LTG, Activity Type  bed to chair /chair to bed;shower chair  -BF     Transfer Training OT LTG, [place] Level  contact guard assist  -BF     Bathing OT STG    Bathing Goal OT STG, Date Established  06/02/17  -BF     Bathing Goal OT STG, Time to Achieve  5 - 7 days  -BF     Bathing Goal OT STG, Activity Type  lower body bathing;upper body bathing  -BF     Bathing Goal OT STG, [place] Level  minimum assist (75% patient effort)  -BF     Bathing Goal OT STG, Outcome goal met  -BF      Bathing OT LTG    Bathing Goal OT LTG, Date Established  06/02/17  -BF     Bathing Goal OT LTG, Time to Achieve  by discharge  -BF     Bathing Goal OT LTG, Activity Type  upper body bathing;lower body bathing  -BF     Bathing Goal OT LTG, [place] Level  set up required;supervision required  -BF     Eating Self-Feeding OT LTG    Eat Self Feeding Goal OT LTG, Date  Established  06/02/17  -BF     Eat Self Feeding Goal OT LTG, Time to Achieve  by discharge  -BF     Eat Self Feed Goal OT LTG, [place] Level  conditional independence  -BF     Grooming OT LTG    Grooming Goal OT LTG, Date Established  06/02/17  -BF     Grooming Goal OT LTG, Time to Achieve  by discharge  -BF     Grooming Goal OT LTG, [place] Level  conditional independence  -BF     LB Dressing OT STG    LB Dressing Goal OT STG, Date Established  06/02/17  -BF     LB Dressing Goal OT STG, Time to Achieve  5 - 7 days  -BF     LB Dressing Goal OT STG, [place] Level  moderate assist (50% patient effort)  -BF     LB Dressing Goal OT STG, Outcome goal met  -BF      LB Dressing OT LTG    LB Dressing Goal OT LTG, Date Established  06/02/17  -BF     LB Dressing Goal OT LTG, Time to Achieve  by discharge  -BF     LB Dressing Goal OT LTG, [place] Level  minimum assist (75% patient effort)  -BF       User Key  (r) = Recorded By, (t) = Taken By, (c) = Cosigned By    Initials Name Provider Type    YARELI Pedro, OT Occupational Therapist          Occupational Therapy Education     Title: PT OT SLP Therapies (Active)     Topic: Occupational Therapy (Active)     Point: ADL training (Active)    Description: Instruct learner(s) on proper safety adaptation and remediation techniques during self care or transfers.   Instruct in proper use of assistive devices.    Learning Progress Summary    Learner Readiness Method Response Comment Documented by Status   Patient Acceptance E,D NR  BF 06/08/17 1624 Active    Acceptance E,D NR  BF 06/07/17 1616 Active    Acceptance E,D NR  BF 06/06/17 1446 Active    Acceptance E,D NR  BF 06/05/17 1231 Active    Acceptance E,D NR  BF 06/02/17 1551 Active    Acceptance E,D NR  BF 06/02/17 1551 Active               Point: Precautions (Active)    Description: Instruct learner(s) on prescribed precautions during self-care and functional transfers.    Learning Progress Summary     Learner Readiness Method Response Comment Documented by Status   Patient Acceptance E,D NR   06/08/17 1624 Active    Acceptance E,D NR   06/07/17 1616 Active    Acceptance E,D NR   06/06/17 1446 Active    Acceptance E,D NR   06/05/17 1231 Active    Acceptance E,D NR   06/02/17 1551 Active    Acceptance E,D NR   06/02/17 1551 Active                      User Key     Initials Effective Dates Name Provider Type [facility] 03/14/16 -  Lucía Pedro OT Occupational Therapist OT                  OT Recommendation and Plan  Anticipated Equipment Needs At Discharge:  (TBD)  Anticipated Discharge Disposition:  (TBD)  Planned Therapy Interventions: activity intolerance, adaptive equipment training, ADL retraining, home exercise program, strengthening, transfer training (Therapeutic groups as beneficial)  Therapy Frequency: 3-5 times/wk          Time Calculation:         Time Calculation- OT       06/12/17 1317          Time Calculation- OT    OT Start Time 0800  -      OT Stop Time 0930  -      OT Time Calculation (min) 90 min  -        User Key  (r) = Recorded By, (t) = Taken By, (c) = Cosigned By    Initials Name Provider Type     Michell Silva OT Occupational Therapist           Therapy Charges for Today     Code Description Service Date Service Provider Modifiers Qty    06637872681 HC OT SELF CARE/MGMT/TRAIN EA 15 MIN 6/12/2017 Michell Silva OT GO 1    [id number]  OT THER PROC EA 15 MIN 6/12/2017 Michell Silva OT GO 5               Michell Silva OT  6/12/2017

## 2017-06-12 NOTE — THERAPY PROGRESS REPORT/RE-CERT
Inpatient Rehabilitation - Physical Therapy Progress Note   [place]     Patient Name: Alex Tierney  : 1975  MRN: [id number]  Today's Date: 2017  Onset of Illness/Injury or Date of Surgery Date: 17  Date of Referral to PT: 17  Referring Physician: Dr. Brooks    Admit Date: 2017    Visit Dx:  No diagnosis found.  Patient Active Problem List   Diagnosis   • Chronic allergic rhinitis   • Depression   • Smoker   • T6 spinal cord injury   • History of left lower extremity amputation   • Chronic osteomyelitis of multiple sites   • Debility               Adult Rehabilitation Note       17 1820 17 1300       Rehab Assessment/Intervention    Discipline physical therapist  -LB occupational therapist  -     Document Type progress note;therapy note (daily note)  -LB therapy note (daily note)  -     Subjective Information agree to therapy;complains of  -LB agree to therapy;no complaints  -     Patient Effort, Rehab Treatment good  -LB excellent  -     Precautions/Limitations fall precautions   < skin integrity; colostomy, junior cath  -LB      Patient Response to Treatment He has tolerated stretching of LE's, balance training, and transfer/bed mobility training. He is participating well in therapy but unfortunately he has not progressed with amount of assist required. This may be because he is at his baseline. He does tolerate up to 15 min of prone lying and he has been educated to importance of changing postions frequently for home maintenance.   -LB pt tolerated therapy well, completing all activities with good tolerance  -AH     Recorded by [LB] Susan Palma, PT [AH] Michell Silva, OT     Cognitive Assessment/Intervention    Current Cognitive/Communication Assessment functional  -LB      Orientation Status oriented x 4  -LB      Follows Commands/Answers Questions 100% of the time  -LB      Personal Safety Interventions supervised activity  -LB      Recorded by [LB]  Susan Palma, PT      Bed Mobility, Assessment/Treatment    Bed Mobility, Roll Left, [place] minimum assist (75% patient effort);moderate assist (50% patient effort);verbal cues required;nonverbal cues required (demo/gesture)  -LB      Bed Mobility, Roll Right, [place] minimum assist (75% patient effort);moderate assist (50% patient effort);verbal cues required;nonverbal cues required (demo/gesture)  -LB      Bed Mobility, Scoot/Bridge, [place] dependent (less than 25% patient effort)   he cannot scoot his hips at all  -LB      Bed Mob, Supine to Sit, [place] minimum assist (75% patient effort);verbal cues required;nonverbal cues required (demo/gesture)   he must have his arms in a position to push up from mat  -LB      Bed Mob, Sit to Supine, [place] moderate assist (50% patient effort);verbal cues required;nonverbal cues required (demo/gesture)   he needs his trunk guided back and legs lifted into bed  -LB      Bed Mobility, Safety Issues decreased use of legs for bridging/pushing;impaired trunk control for bed mobility  -LB      Bed Mobility, Impairments muscle tone abnormal;sensation decreased;strength decreased;impaired balance;coordination impaired;motor control impaired;postural control impaired  -LB      Bed Mobility, Comment He needs slightly more assist on mat table compared with hospital bed with railing.  -LB      Recorded by [LB] Susan Palma, PT      Transfer Assessment/Treatment    Transfers, Bed-Chair [place] stand by assist;contact guard assist;verbal cues required;nonverbal cues required (demo/gesture)  -LB contact guard assist;stand by assist  -AH     Transfers, Chair-Bed [place] stand by assist;contact guard assist;verbal cues required;nonverbal cues required (demo/gesture)  -LB verbal cues required  -AH     Transfers, Bed-Chair-Bed, Assist Device sliding board  -LB sliding board  -AH     Transfer, Safety Issues --   decreased trunk control,  requires UE assist  -LB      Transfer, Impairments muscle tone abnormal;sensation decreased;strength decreased;impaired balance;coordination impaired;motor control impaired;postural control impaired  -LB      Recorded by [LB] Susan Palma, PT [AH] Michell Silva, OT     Balance Skills Training    Sitting-Level of Assistance Close supervision  -LB      Sitting-Balance Support --   requires one extremity to assist balance at eob  -LB      Sitting-Balance Activities --   sitting bean bag toss x2  -LB      Recorded by [LB] Susan Palma, KALI      Therapy Exercises    Bilateral Lower Extremities PROM:;supine;prone   LE stretching, prone lying 15 min bid  -LB      Bilateral Upper Extremity  AROM:;sitting   coordination and UE ther ex  -AH     BUE Resistance  --   arm bike 3x5 min, rickshaw 20x4 with 20 lbs  -AH     Recorded by [LB] Susan Palma, PT [AH] Michell Silva, OT     Positioning and Restraints    Pre-Treatment Position sitting in chair/recliner  -LB      In Wheelchair sitting  -LB      Recorded by [LB] Susan Palma PT        User Key  (r) = Recorded By, (t) = Taken By, (c) = Cosigned By    Initials Name Effective Dates    LB Susan Palma, PT 03/14/16 -      Michell Silva, OT 03/14/16 -                 IP PT Goals       06/12/17 1831 06/02/17 1521       Bed Mobility PT STG    Bed Mobility PT STG, Date Established  06/02/17  -RT     Bed Mobility PT STG, Time to Achieve  1 wk  -RT     Bed Mobility PT STG, Activity Type  all bed mobility  -RT     Bed Mobility PT STG, [place] Level  minimum assist (75% patient effort)  -RT     Bed Mobility PT STG, Date Goal Reviewed 06/12/17  -LB      Bed Mobility PT STG, Outcome goal not met  -LB      Bed Mobility PT STG, Reason Goal Not Met progress slower than expected  -LB      Bed Mobility PT LTG    Bed Mobility PT LTG, Date Established  06/02/17  -RT     Bed Mobility PT LTG, Time to Achieve  2 wks  -RT     Bed  Mobility PT LTG, Activity Type  all bed mobility  -RT     Bed Mobility PT LTG, [place] Level  supervision required  -RT     Transfer Training PT STG    Transfer Training PT STG, Date Established  06/02/17  -RT     Transfer Training PT STG, Time to Achieve  1 wk  -RT     Transfer Training PT STG, Activity Type  all transfers  -RT     Transfer Training PT STG, [place] Level  contact guard assist  -RT     Transfer Training PT STG, Date Goal Reviewed 06/12/17  -LB      Transfer Training PT STG, Outcome goal met  -LB      Transfer Training PT LTG    Transfer Training PT LTG, Date Established  06/02/17  -RT     Transfer Training PT LTG, Time to Achieve  2 wks  -RT     Transfer Training PT LTG, Activity Type  all transfers  -RT     Transfer Training PT LTG, [place] Level  supervision required  -RT     Static Sitting Balance PT STG    Static Sitting Balance PT STG, Date Established  06/02/17  -RT     Static Sitting Balance PT STG, Time to Achieve  1 wk  -RT     Static Sitting Balance PT STG, [place] Level  minimum assist (75% patient effort)  -RT     Static Sitting Balance PT STG, Date Goal Reviewed 06/12/17  -LB      Static Sitting Balance PT STG, Outcome goal met  -LB      Static Sitting Balance PT LTG    Static Sitting Balance PT LTG, Date Established  06/02/17  -RT     Static Sitting Balance PT LTG, Time to Achieve  2 wks  -RT     Static Sitting Balance PT LTG, [place] Level  supervision required  -RT       User Key  (r) = Recorded By, (t) = Taken By, (c) = Cosigned By    Initials Name Provider Type    ANA CRISTINA Palma, PT Physical Therapist    RT Perico Lucas, PT Physical Therapist          Physical Therapy Education     Title: PT OT SLP Therapies (Active)     Topic: Physical Therapy (Done)     Point: Mobility training (Done)    Learning Progress Summary    Learner Readiness Method Response Comment Documented by Status   Patient Acceptance E RAYMUNDO,NR  LB 06/12/17 1856 Done    Acceptance  E VU  TG 06/11/17 2000 Done    Acceptance E VU  TG 06/10/17 1955 Done    Acceptance E VU  TG 06/09/17 2207 Done    Acceptance E VU   06/09/17 1557 Done    Acceptance E VU   06/08/17 2328 Done    Acceptance E VU   06/08/17 1547 Done    Acceptance E VU   06/08/17 0039 Done    Acceptance E,D NR,VU  AG 06/07/17 1810 Done    Acceptance E VU  CT 06/07/17 1737 Done    Acceptance E VU   06/07/17 0050 Done    Acceptance E VU,NR   06/06/17 1710 Done    Acceptance E VU,NR   06/05/17 1521 Done    Acceptance E VU  RT 06/03/17 1236 Done    Acceptance E VU  RT 06/02/17 1519 Done               Point: Home exercise program (Done)    Learning Progress Summary    Learner Readiness Method Response Comment Documented by Status   Patient Acceptance E VU,NR   06/12/17 1831 Done    Acceptance E VU  TG 06/11/17 2000 Done    Acceptance E VU  TG 06/10/17 1955 Done    Acceptance E VU  TG 06/09/17 2207 Done    Acceptance E VU   06/09/17 1557 Done    Acceptance E VU   06/08/17 2328 Done    Acceptance E VU   06/08/17 1547 Done    Acceptance E VU   06/08/17 0039 Done    Acceptance E,D NR,VU  AG 06/07/17 1810 Done    Acceptance E VU  CT 06/07/17 1737 Done    Acceptance E VU   06/07/17 0050 Done    Acceptance E VU,NR   06/06/17 1710 Done    Acceptance E VU,NR   06/05/17 1521 Done    Acceptance E VU  RT 06/03/17 1236 Done    Acceptance E VU  RT 06/02/17 1519 Done               Point: Body mechanics (Done)    Learning Progress Summary    Learner Readiness Method Response Comment Documented by Status   Patient Acceptance E VU,NR  LB 06/12/17 1831 Done    Acceptance E VU  TG 06/11/17 2000 Done    Acceptance E VU  TG 06/10/17 1955 Done    Acceptance E VU  TG 06/09/17 2207 Done    Acceptance E VU   06/09/17 1557 Done    Acceptance E VU   06/08/17 2328 Done    Acceptance E VU   06/08/17 1547 Done    Acceptance E VU   06/08/17 0039 Done    Acceptance E,D NR,VU  AG 06/07/17 1810 Done    Acceptance E RAYMUNDO  CT 06/07/17 1733  Done    Acceptance E VU   06/07/17 0050 Done    Acceptance E VU,NR   06/06/17 1710 Done    Acceptance E VU,NR   06/05/17 1521 Done    Acceptance E VU  RT 06/03/17 1236 Done    Acceptance E VU  RT 06/02/17 1519 Done               Point: Precautions (Done)    Learning Progress Summary    Learner Readiness Method Response Comment Documented by Status   Patient Acceptance E VU,NR   06/12/17 1831 Done    Acceptance E VU  TG 06/11/17 2000 Done    Acceptance E VU   06/10/17 1955 Done    Acceptance E VU  TG 06/09/17 2207 Done    Acceptance E VU   06/09/17 1557 Done    Acceptance E VU   06/08/17 2328 Done    Acceptance E VU   06/08/17 1547 Done    Acceptance E VU   06/08/17 0039 Done    Acceptance E,D NR,VU   06/07/17 1810 Done    Acceptance E VU  CT 06/07/17 1737 Done    Acceptance E VU   06/07/17 0050 Done    Acceptance E VU,NR   06/06/17 1710 Done    Acceptance E VU,NR   06/05/17 1521 Done    Acceptance E VU  RT 06/03/17 1236 Done    Acceptance E VU  RT 06/02/17 1519 Done                      User Key     Initials Effective Dates Name Provider Type Discipline     06/16/16 -  Sasha De La Rosa, RN Registered Nurse Nurse     06/16/16 -  Flores Silva, RN Registered Nurse Nurse     03/14/16 -  Susan Palma, PT Physical Therapist PT     03/14/16 -  Radha Ohara, PT Physical Therapist PT    CT 03/14/16 -  Susu Sheridan, PT Physical Therapist PT     07/14/16 -  Magda Carney, PTA Physical Therapy Assistant PT    RT 03/14/16 -  Perico Lucas, PT Physical Therapist PT                    PT Recommendation and Plan  Planned Therapy Interventions: balance training, bed mobility training, home exercise program, lumbar stabilization, manual therapy techniques, neuromuscular re-education, patient/family education, postural re-education, ROM (Range of Motion), strengthening, stretching, transfer training  PT Frequency: 5 times/wk, 2 times/day, per priority policy            Time  Calculation:         PT Charges       06/12/17 1834 06/12/17 1833       Time Calculation    Start Time 1330  -LB 1045  -LB     Stop Time 1415  -LB 1130  -LB     Time Calculation (min) 45 min  -LB 45 min  -LB     PT Received On  06/12/17  -LB       User Key  (r) = Recorded By, (t) = Taken By, (c) = Cosigned By    Initials Name Provider Type    LB Susan Palma, PT Physical Therapist          Therapy Charges for Today     Code Description Service Date Service Provider Modifiers Qty    89615489180 HC PT THER PROC EA 15 MIN 6/12/2017 Susan Palma, PT GP 4    54432769058 HC PT THER SUPP EA 15 MIN 6/12/2017 Susan Palma, PT GP 2    12711110345 HC PT THERAPEUTIC ACT EA 15 MIN 6/12/2017 Susan Palma, PT GP 2               Susan Palma, PT  6/12/2017

## 2017-06-12 NOTE — SIGNIFICANT NOTE
06/12/17 0955   Case Management/Social Work Plan   Plan SS received voicemail from Salas with [employer] stating order for gil cushion was stalled due to communication issues with pt.  Salas says he is not working today and will contact SS on 6-13-17.  Will follow.

## 2017-06-12 NOTE — PROGRESS NOTES
Inpatient Rehabilitation Functional Measures Assessment    Functional Measures  LYNET[place] Eating:  LYNETTE Grooming:  LYNETTE Bathing:  LYNET[place] Upper Body Dressing:  [place] Lower Body Dressing:  [place] Toileting:    LY[place] Bladder Management  Level of Assistance:  Frequency/Number of Accidents this Shift:    LYNET[place] Bowel Management  Level of Assistance:  Frequency/Number of Accidents this Shift:    [place] Bed/Chair/Wheelchair Transfer:  Bed/chair/wheelchair Transfer Score = 4.  Patient performs 75% or more of effort and minimal assistance (little/incidental  help/lifting of one limb/steadying) for transferring to and from the  bed/chair/wheelchair, requiring: Contact guard. Patient requires the following  assistive device(s): Sliding board.  [place] Toilet Transfer:  [place] Tub/Shower Transfer:    Previously Documented Mode of Locomotion at Discharge:  [facility] Expected Mode of Locomotion at Discharge:  [facility] Walk/Wheelchair:  [facility] Stairs:    [facility] Comprehension:  [facility] Expression:  [facility] Social Interaction:  [place] Problem Solving:  [place] Memory:    Therapy Mode Minutes  Occupational Therapy: Individual: 90 minutes.  Physical Therapy:  Speech Language Pathology:    Discharge Functional Goals:    Signed by: Lilia Silva, Occupational Therapist

## 2017-06-12 NOTE — PROGRESS NOTES
Rehabilitation Nursing  Inpatient Rehabilitation Functional Measures Assessment and Plan of Care    Plan of Care/Interventions  Copy from POC    Functional Measures  LYNETTE Eating:  LYNET[place] Grooming:  [place] Bathing:  [place] Upper Body Dressing:  [place] Lower Body Dressing:  [place] Toileting:    [place] Bladder Management  Level of Assistance:  Frequency/Number of Accidents this Shift:    [place] Bowel Management  Level of Assistance:  Frequency/Number of Accidents this Shift:    [place] Bed/Chair/Wheelchair Transfer:  [place] Toilet Transfer:  [place] Tub/Shower Transfer:    Previously Documented Mode of Locomotion at Discharge:  [facility] Expected Mode of Locomotion at Discharge:  [facility] Walk/Wheelchair:  [facility] Stairs:    [place] Comprehension:  Auditory comprehension is the usual mode. Comprehension  Score = 6, Modified [place].  Patient comprehends complex/abstract  information in their primary language, requiring:  [place] Expression:  Vocal expression is the usual mode. Expression Score = 6,  Modified Independent.  Patient expresses complex/abstract information in their  primary language, requiring:  [place] Social Interaction:  Social Interaction Score = 6, Modified Independent.  Patient is modified independent for social interaction, requiring:  [place] Problem Solving:  Patient does not make appropriate decisions in order to  solve complex problems without assistance from a helper. Problem Solving Score =  5, Supervision.  Patient makes appropriate decisions in order to solve routine  problems with directing only under stressful or unfamiliar conditions, but no  more than 10% of the time, for the following behavior(s):  [place] Memory:  Memory Score = 7, Independent.  Patient is completely independent  for memory.  There are no activity limitations.    Signed by: Sasha De La Rosa Nurse

## 2017-06-12 NOTE — PROGRESS NOTES
Inpatient Rehabilitation Functional Measures Assessment    Functional Measures  LYNETTE Eating:  [place] Grooming: Grooming Score = 5. Patient is supervision/set-up for grooming,  requiring: Setting out grooming equipment. No assistive devices were required.  [place] Bathing:  Patient bathed in bed. Bathing Score = 5.  Patient is  supervision/set-up for bathing, requiring: Preparing the water. No assistive  devices were required.  [place] Upper Body Dressing:  Upper Body Dressing Score = 5. Patient is supervision  for upper body dressing, requiring: Gathering/setting out clothes. No assistive  devices were required.  [place] Lower Body Dressing:  Lower Body Dressing Score = 5. Patient is  supervision/set-up for lower body dressing, requiring: Gathering/setting out  clothes. No assistive devices were required.  [place] Toileting:    [place] Bladder Management  Level of Assistance:  Bladder Score = 1.  Patient is total assistance for  bladder management. Patient has an indwelling/Shledon catheter.  Frequency/Number of Accidents this Shift:    [place] Bowel Management  Level of Assistance: Bowel Score = 4. Patient performs greater than 75% of tasks  and requires minimal assistance for bowel management requiring assistive  device/method: colostomy .  Frequency/Number of Accidents this Shift:    [place] Bed/Chair/Wheelchair Transfer:  [facility] Toilet Transfer:  [place] Tub/Shower Transfer:    Previously Documented Mode of Locomotion at Discharge:  [facility] Expected Mode of Locomotion at Discharge:  [facility] Walk/Wheelchair:  [facility] Stairs:    [facility] Comprehension:  [facility] Expression:  [facility] Social Interaction:  [facility] Problem Solving:  [facility] Memory:    Therapy Mode Minutes  Occupational Therapy:  Physical Therapy:  Speech Language Pathology:    Discharge Functional Goals:    Signed by: TALI Arnold

## 2017-06-12 NOTE — PROGRESS NOTES
Inpatient Rehabilitation Functional Measures Assessment and Plan of Care    Plan of Care      Functional Measures  LYNETTE Eating:  [place] Grooming:  [place] Bathing:  [place] Upper Body Dressing:  [place] Lower Body Dressing:  [place] Toileting:    [place] Bladder Management  Level of Assistance:  Frequency/Number of Accidents this Shift:    [place] Bowel Management  Level of Assistance:  Frequency/Number of Accidents this Shift:    [place] Bed/Chair/Wheelchair Transfer:  Bed/chair/wheelchair Transfer Score = 3.  Patient performs 50-74% of effort and requires moderate assistance (some  lifting)  for transferring to and from the bed/chair/wheelchair, including  assist lifting both legs. Patient requires the following assistive device(s):  Sliding board.  [place] Toilet Transfer:  [place] Tub/Shower Transfer:    Previously Documented Mode of Locomotion at Discharge:  [place] Expected Mode of Locomotion at Discharge:  [place] Walk/Wheelchair:  WHEELCHAIR OBSERVATION   Activity was not observed.    WALK OBSERVATION   Activity was not observed.  [place] Stairs:  Activity was not observed.    [place] Comprehension:  [place] Expression:  [place] Social Interaction:  [place] Problem Solving:  [place] Memory:    Therapy Mode Minutes  Occupational Therapy:  Physical Therapy: Individual: 90 minutes.  Speech Language Pathology:    Discharge Functional Goals:    Signed by: Susan Palma, Physical Therapist

## 2017-06-12 NOTE — SIGNIFICANT NOTE
06/12/17 0826   Case Management/Social Work Plan   Plan SS left message for Salas at [employer] [phone].

## 2017-06-12 NOTE — PROGRESS NOTES
"     LOS: 11 days     Chief Complaint:  Weakness, debility     Subjective     Interval History:     He continues to require wound care on his buttocks.  No fevers or chills.  Pain control adequate      Objective     Vital Signs  /69 (BP Location: Left arm, Patient Position: Lying)  Pulse 88  Temp 97.6 °F (36.4 °C) (Oral)   Resp 16  Ht 71\" (180.3 cm)  Wt 245 lb (111 kg)  SpO2 92%  BMI 34.17 kg/m2  Intake & Output (last day)       06/11 0701 - 06/12 0700 06/12 0701 - 06/13 0700    P.O. 1320 720    Total Intake(mL/kg) 1320 (11.9) 720 (6.5)    Urine (mL/kg/hr) 1600 (0.6)     Stool 1 (0)     Total Output 1601      Net -281 +720                  Physical Exam:     General Appearance:    Alert, cooperative, in no acute distress   Head:    Normocephalic, without obvious abnormality, atraumatic   Eyes:            Lids and lashes normal, conjunctivae and sclerae normal, no   icterus, no pallor, corneas clear, PERRLA   Ears:    Ears appear intact with no abnormalities noted       Neck:   No adenopathy, supple, trachea midline, no thyromegaly, no   carotid bruit, no JVD   Lungs:     Clear to auscultation,respirations regular, even and                  unlabored    Heart:    Regular rhythm and normal rate, normal S1 and S2, no            murmur, no gallop, no rub, no click   Chest Wall:    No abnormalities observed   Abdomen:     Normal bowel sounds, no masses, no organomegaly, soft        non-tender, non-distended, no guarding, no rebound                tenderness   Extremities:   No movement in BLE, no sensation in BLE  no edema, no cyanosis, no redness   Pulses:   Pulses palpable and equal bilaterally   Skin:   No bleeding, bruising or rash                Results Review:    Lab Results   Component Value Date    WBC 8.25 06/07/2017    HGB 9.1 (L) 06/07/2017    HCT 30.3 (L) 06/07/2017    MCV 79.9 (L) 06/07/2017     (H) 06/07/2017       Lab Results   Component Value Date    GLUCOSE 91 06/02/2017    BUN 10 " 06/02/2017    CREATININE 0.51 06/02/2017    EGFRIFNONA >150 06/02/2017    BCR 19.6 06/02/2017     06/02/2017    K 4.2 06/02/2017     06/02/2017    CO2 30.5 06/02/2017    CALCIUM 8.7 06/02/2017    ALBUMIN 3.10 (L) 06/02/2017    LABIL2 1.1 (L) 06/02/2017    AST 15 06/02/2017    ALT 13 06/02/2017     No results found for: INR    No results found for: POCGLU       Medication Review:     Current Facility-Administered Medications:   •  acetaminophen (TYLENOL) tablet 650 mg, 650 mg, Oral, Q4H PRN, Samuel Duane Kreis, MD, 650 mg at 06/12/17 1031  •  ALPRAZolam (XANAX) tablet 0.5 mg, 0.5 mg, Oral, TID, Samuel Duane Kreis, MD, 0.5 mg at 06/12/17 1616  •  aluminum-magnesium hydroxide-simethicone (MAALOX MAX) 400-400-40 MG/5ML suspension 15 mL, 15 mL, Oral, Q6H PRN, Samuel Duane Kreis, MD  •  amitriptyline (ELAVIL) tablet 25 mg, 25 mg, Oral, Nightly, Samuel Duane Kreis, MD, 25 mg at 06/11/17 2054  •  aspirin EC tablet 81 mg, 81 mg, Oral, Daily, Samuel Duane Kreis, MD, 81 mg at 06/12/17 0853  •  baclofen (LIORESAL) tablet 20 mg, 20 mg, Oral, Q8H, Samuel Duane Kreis, MD, 20 mg at 06/12/17 1403  •  bisacodyl (DULCOLAX) suppository 10 mg, 10 mg, Rectal, Daily PRN, Samuel Duane Kreis, MD  •  castor oil-balsam peru (VENELEX) ointment, , Topical, Q12H, Samuel Duane Kreis, MD  •  cetirizine (zyrTEC) tablet 10 mg, 10 mg, Oral, Daily, Samuel Duane Kreis, MD, 10 mg at 06/12/17 0852  •  enoxaparin (LOVENOX) syringe 40 mg, 40 mg, Subcutaneous, Daily, Samuel Duane Kreis, MD, 40 mg at 06/12/17 0854  •  fluticasone (FLONASE) 50 MCG/ACT nasal spray 2 spray, 2 spray, Each Nare, Daily, Samuel Duane Kreis, MD, 2 spray at 06/11/17 0919  •  folic acid (FOLVITE) tablet 1 mg, 1 mg, Oral, Daily, Samuel Duane Kreis, MD, 1 mg at 06/12/17 0853  •  gabapentin (NEURONTIN) capsule 800 mg, 800 mg, Oral, Q8H, Samuel Duane Kreis, MD, 800 mg at 06/12/17 1403  •  magnesium hydroxide (MILK OF MAGNESIA) suspension 2400 mg/10mL 10 mL, 10 mL, Oral, Daily  PRN, Samuel Duane Kreis, MD  •  metoprolol tartrate (LOPRESSOR) tablet 25 mg, 25 mg, Oral, Q12H, Samuel Duane Kreis, MD, 25 mg at 06/12/17 0852  •  multivitamin (DAILY BRIAN) tablet 1 tablet, 1 tablet, Oral, Daily, Samuel Duane Kreis, MD, 1 tablet at 06/12/17 0853  •  ondansetron (ZOFRAN) tablet 4 mg, 4 mg, Oral, Q6H PRN, 4 mg at 06/06/17 0835 **OR** ondansetron ODT (ZOFRAN-ODT) disintegrating tablet 4 mg, 4 mg, Oral, Q6H PRN **OR** ondansetron (ZOFRAN) injection 4 mg, 4 mg, Intravenous, Q6H PRN, Samuel Duane Kreis, MD  •  sennosides-docusate sodium (SENOKOT-S) 8.6-50 MG tablet 2 tablet, 2 tablet, Oral, Nightly, Samuel Duane Kreis, MD, 2 tablet at 06/11/17 2054  •  venlafaxine XR (EFFEXOR-XR) 24 hr capsule 75 mg, 75 mg, Oral, Daily With Breakfast, Samuel Duane Kreis, MD, 75 mg at 06/12/17 0852      Assessment/Plan     Debility  Paraplegia  Decubitus ulcers of buttocks      PT and OT ongoing.    Continue gabapentin with when necessary baclofen    Continue indwelling Sheldon    He's doing well        Samuel Duane Kreis, MD  06/12/17  4:58 PM

## 2017-06-12 NOTE — PROGRESS NOTES
Inpatient Rehabilitation Functional Measures Assessment    Functional Measures  LYNETTE Eating:  Eating Score = 5. Patient is supervision/set-up for eating,  requiring: Opening containers. No assistive devices were required.  LYNETTE Grooming:  LYNETTE Bathing:  LYNETTE Upper Body Dressing:  LYNETTE Lower Body Dressing:  LYNETTE Toileting:  Toileting did not occur. Patient has a colostomy bag and junior    LYNETTE Bladder Management  Level of Assistance:  Bladder Score = 6.  Patient is modified independent for  bladder management with indwelling/Junior catheter.  Frequency/Number of Accidents this Shift:  Bladder accidents this shift:  0 .  Patient has not had an accident, but used a device/medication this shift  requiring: catheter .    LYNETTE Bowel Management  Level of Assistance: Bowel Score = 3. Patient performs 50-74% of tasks and  requires moderate assistance for bowel management requiring assistive  device/method: Patient has a colostomy bag .  Frequency/Number of Accidents this Shift: Bowel accidents this shift: 0 .  Patient has not had an accident, but used a device/medication this shift  requiring: colostomy bag . large    LYNETTE Bed/Chair/Wheelchair Transfer:  Bed/chair/wheelchair Transfer Score = 3.  Patient performs 50-74% of effort and requires moderate assistance (some  lifting) for transferring to and from the bed/chair/wheelchair. Patient requires  the following assistive device(s): Bed rails. Elevated bed/surface. Sliding  board. Arm rest. Seating system of wheelchair.  LYNETTE Toilet Transfer:  Toilet Transfer did not occur. .  LYNETTE Tub/Shower Transfer:    Previously Documented Mode of Locomotion at Discharge:  [facility] Expected Mode of Locomotion at Discharge:  [facility] Walk/Wheelchair:  [facility] Stairs:    [facility] Comprehension:  [facility] Expression:  [facility] Social Interaction:  [facility] Problem Solving:  [facility] Memory:    Therapy Mode Minutes  Occupational Therapy:  Physical Therapy:  Speech Language Pathology:    Discharge Functional Goals:    Signed by: Alba  Paradise, SRNA

## 2017-06-12 NOTE — PLAN OF CARE
Problem: Inpatient Physical Therapy  Goal: Bed Mobility Goal STG- PT    06/12/17 1831   Bed Mobility PT STG   Bed Mobility PT STG, Date Goal Reviewed 06/12/17   Bed Mobility PT STG, Outcome goal not met   Bed Mobility PT STG, Reason Goal Not Met progress slower than expected       Goal: Transfer Training Goal 1 STG- PT    06/12/17 1831   Transfer Training PT STG   Transfer Training PT STG, Date Goal Reviewed 06/12/17   Transfer Training PT STG, Outcome goal partially met       Goal: Static Sitting Balance Goal STG- PT    06/12/17 1831   Static Sitting Balance PT STG   Static Sitting Balance PT STG, Date Goal Reviewed 06/12/17   Static Sitting Balance PT STG, Outcome goal met

## 2017-06-13 PROCEDURE — 97535 SELF CARE MNGMENT TRAINING: CPT | Performed by: OCCUPATIONAL THERAPIST

## 2017-06-13 PROCEDURE — 97530 THERAPEUTIC ACTIVITIES: CPT | Performed by: OCCUPATIONAL THERAPIST

## 2017-06-13 PROCEDURE — 97530 THERAPEUTIC ACTIVITIES: CPT

## 2017-06-13 PROCEDURE — 97110 THERAPEUTIC EXERCISES: CPT

## 2017-06-13 PROCEDURE — 25010000002 ENOXAPARIN PER 10 MG: Performed by: FAMILY MEDICINE

## 2017-06-13 RX ADMIN — BACLOFEN 20 MG: 10 TABLET ORAL at 21:08

## 2017-06-13 RX ADMIN — VENLAFAXINE HYDROCHLORIDE 75 MG: 75 CAPSULE, EXTENDED RELEASE ORAL at 08:45

## 2017-06-13 RX ADMIN — GABAPENTIN 800 MG: 400 CAPSULE ORAL at 21:08

## 2017-06-13 RX ADMIN — Medication 1 TABLET: at 08:45

## 2017-06-13 RX ADMIN — GABAPENTIN 800 MG: 400 CAPSULE ORAL at 05:24

## 2017-06-13 RX ADMIN — FOLIC ACID 1 MG: 1 TABLET ORAL at 08:45

## 2017-06-13 RX ADMIN — AMITRIPTYLINE HYDROCHLORIDE 25 MG: 25 TABLET, FILM COATED ORAL at 21:08

## 2017-06-13 RX ADMIN — ASPIRIN 81 MG: 81 TABLET ORAL at 08:45

## 2017-06-13 RX ADMIN — CASTOR OIL AND BALSAM, PERU: 788; 87 OINTMENT TOPICAL at 21:09

## 2017-06-13 RX ADMIN — METOPROLOL TARTRATE 25 MG: 25 TABLET, FILM COATED ORAL at 21:08

## 2017-06-13 RX ADMIN — CETIRIZINE HYDROCHLORIDE 10 MG: 10 TABLET ORAL at 08:45

## 2017-06-13 RX ADMIN — DOCUSATE SODIUM AND SENNA 2 TABLET: 50; 8.6 TABLET, FILM COATED ORAL at 21:08

## 2017-06-13 RX ADMIN — BACLOFEN 20 MG: 10 TABLET ORAL at 14:24

## 2017-06-13 RX ADMIN — FLUTICASONE PROPIONATE 2 SPRAY: 50 SPRAY, METERED NASAL at 08:45

## 2017-06-13 RX ADMIN — ALPRAZOLAM 0.5 MG: 0.5 TABLET ORAL at 17:04

## 2017-06-13 RX ADMIN — BACLOFEN 20 MG: 10 TABLET ORAL at 05:23

## 2017-06-13 RX ADMIN — ALPRAZOLAM 0.5 MG: 0.5 TABLET ORAL at 08:45

## 2017-06-13 RX ADMIN — ALPRAZOLAM 0.5 MG: 0.5 TABLET ORAL at 21:08

## 2017-06-13 RX ADMIN — ENOXAPARIN SODIUM 40 MG: 40 INJECTION SUBCUTANEOUS at 08:45

## 2017-06-13 RX ADMIN — GABAPENTIN 800 MG: 400 CAPSULE ORAL at 14:24

## 2017-06-13 NOTE — SIGNIFICANT NOTE
06/13/17 1535   Case Management/Social Work Plan   Plan SS received call from Salas with [employer] who says he talked to his processor who says insurance has an authorization for power W/C on file and does not show pt receiving custom standard W/C in their records.  Processor will work on getting authorization cancelled for power W/C.  Will follow.

## 2017-06-13 NOTE — PROGRESS NOTES
Case Management  Inpatient Rehabilitation Team Conference    Conference Date/Time: 6/13/2017 5:57:39 AM    Team Conference Attendees:  MD Skylar Hilario, RN  Radha Ohara, PT  Lakshmi Barba, OT  LAZ Torres    Demographics            Age: 42Y            Gender: Male    Admission Date: 6/1/2017 5:52:30 PM  Rehabilitation Diagnosis:  s/p debility  Comorbidities:      Plan of Care  Anticipated Discharge Date/Estimated Length of Stay: 6-16-17  Anticipated Discharge Destination: Community discharge with assistance  Discharge Plan : Pt plans to return home with father and step-mother at  discharge.  Medical Necessity Expected Level Rationale: good  Intensity and Duration: an average of 3 hours/5 days per week  Medical Supervision and 24 Hour Rehab Nursing: x  Physical Therapy: x  PT Intensity/Duration: PT 1.5 hours per day/5 days per week  Occupational Therapy: x  OT Intensity/Duration: OT 1.5 hours per day/5 days per week  Social Work: x  Therapeutic Recreation: x  Updated (if changes indicated)    Anticipated Discharge Date/Estimated Length of Stay:   6-16-17      Discharge Plan of Care:Home Health Services Physical Therapy strengthening,  transfer training, balance, bed mobility, home safety, ROM 2x per week for 4  weeks Occupational Therapy ADL re-training, home safety, functional mobility,  strengthening 2x per week for 4 weeks Nursing   wound care left thigh/back of right thigh: clean with NS, Venolex and Mepilex  BID, 3 areas on buttocks:  pack with Nugauze wet to dry BID, incision on hips  and thighs, bilateral staples:  clean with betadine, mepilex daily, L LE venous  stasis: Vaseline gauze and margo BID 2x/week for 4 weeks   Other:  roho cushion    Based on the patient's medical and functional status, their prognosis and  expected level of functional improvement is: good      Interdisciplinary Problem/Goals/Status  Body Function Structure    [RN] Skin Integrity(Active)  Current  Status(06/01/2017): impairment in skin integrity  Weekly Goal(06/14/2017): no further skin breakdown this week  Discharge Goal: wounds showing signs of healing, no s/s infection by discharge        Mobility    [PT] Bed/Chair/Wheelchair(Active)  Current Status(06/12/2017): w/c to bed CGA with SB  Weekly Goal(06/19/2017): cga  Discharge Goal: spv    [PT] Bed Mobility(Active)  Current Status(06/12/2017): mod A on mat table, no rails  Weekly Goal(06/19/2017): min assist  Discharge Goal: spv        Safety    [RN] Potential for Injury(Active)  Current Status(06/01/2017): potential for falls  Weekly Goal(06/14/2017): no falls this week  Discharge Goal: no falls by discharge        Self Care    [OT] Bathing(Active)  Current Status(06/05/2017): Min A  Weekly Goal(06/13/2017): Set-up/supervision  Discharge Goal: Set-up/supervision    [OT] Dressing (Lower)(Active)  Current Status(06/05/2017): Max A  Weekly Goal(06/13/2017): Mod A  Discharge Goal: Min A    Comments: Pt plans to return home with dad.    Signed by: LAZ Torres    Physician CoSigned By: Rojas Brooks 06/13/2017 09:10:12

## 2017-06-13 NOTE — SIGNIFICANT NOTE
06/13/17 1535   Case Management/Social Work Plan   Plan SS received call from Salas with [employer] who says he talked to his processor who says insurance has an authorization for power W/C on file which pt did not receive and does not show pt receiving custom standard W/C in their records.  Processor will work on getting authorization cancelled for power W/C.  Will follow.

## 2017-06-13 NOTE — PLAN OF CARE
Problem: Patient Care Overview (Adult)  Goal: Plan of Care Review  Outcome: Ongoing (interventions implemented as appropriate)    06/13/17 1017   Coping/Psychosocial Response Interventions   Plan Of Care Reviewed With patient   Patient Care Overview   Progress improving         Problem: Activity Intolerance (Adult)  Goal: Effective Energy Conservation Techniques  Outcome: Ongoing (interventions implemented as appropriate)    Problem: Fall Risk (Adult)  Goal: Absence of Falls  Outcome: Ongoing (interventions implemented as appropriate)    Problem: Pressure Ulcer (Adult)  Goal: Signs and Symptoms of Listed Potential Problems Will be Absent or Manageable (Pressure Ulcer)  Outcome: Ongoing (interventions implemented as appropriate)    Problem: Skin Integrity Impairment, Risk/Actual (Adult)  Goal: Skin Integrity/Wound Healing  Outcome: Ongoing (interventions implemented as appropriate)    Problem: Mobility, Physical Impaired (Adult)  Goal: Enhanced Functionality Ability  Outcome: Ongoing (interventions implemented as appropriate)

## 2017-06-13 NOTE — PROGRESS NOTES
Inpatient Rehabilitation Functional Measures Assessment    Functional Measures  LYNETTE Eating:  LYNETTE Grooming: Grooming Score = 5. Patient is supervision/set-up for grooming,  requiring: Setting out grooming equipment. No assistive devices were required.  LYNETTE Bathing:  LYNETTE Upper Body Dressing:  LYNETTE Lower Body Dressing:  LYNETTE Toileting:    LYNETTE Bladder Management  Level of Assistance:  Frequency/Number of Accidents this Shift:    LYNETTE Bowel Management  Level of Assistance:  Frequency/Number of Accidents this Shift:    LYNETTE Bed/Chair/Wheelchair Transfer:  [facility] Toilet Transfer:  [facility] Tub/Shower Transfer:    Previously Documented Mode of Locomotion at Discharge:  [facility] Expected Mode of Locomotion at Discharge:  [facility] Walk/Wheelchair:  [facility] Stairs:    [facility]NETTE Comprehension:  [facility]TE Expression:  LYNETTE Social Interaction:  [facility] Problem Solving:  LYNETTE Memory:    Therapy Mode Minutes  Occupational Therapy: Individual: 90 minutes.  Physical Therapy:  Speech Language Pathology:    Discharge Functional Goals:    Signed by: Lucía Pedro OT

## 2017-06-13 NOTE — PROGRESS NOTES
Inpatient Rehabilitation Functional Measures Assessment    Functional Measures  LYNETTE Eating:  Eating Score = 5. Patient is supervision/set-up for eating,  requiring: Opening containers. No assistive devices were required.  LYNETTE Grooming: Grooming did not occur because patient refused. .  LYNETTE Bathing:  Bathing did not occur because patient refused.  LYNETTE Upper Body Dressing:  Upper Body Dressing was not observed this shift  because patient dressed/undressed in pajamas/gown only. .  LYNETTE Lower Body Dressing:  Lower Body Dressing was not observed this shift  because patient dressed/undressed in pajamas/gown only.  LYNETTE Toileting:  Toileting did not occur. Patient has catheter and colostomy bag.      LYNETTE Bladder Management  Level of Assistance:  Bladder Score = 6.  Patient is modified independent for  bladder management with indwelling/Sheldon catheter.  Frequency/Number of Accidents this Shift:  Bladder accidents this shift:  0 .  Patient has not had an accident, but used a device/medication this shift  requiring: catheter .    LYNETTE Bowel Management  Level of Assistance: Bowel Score = 1. Patient performs less than 25% of tasks  and requires total assistance for bowel management or two or more people  required for bowel management requiring assistive device/method: Patient has a  colostomy bag .  Frequency/Number of Accidents this Shift: Bowel accidents this shift: 0 .  Patient has not had an accident, but used a device/medication this shift  requiring: colostomy bag .    LYNETTE Bed/Chair/Wheelchair Transfer:  Bed/chair/wheelchair Transfer Score = 3.  Patient performs 50-74% of effort and requires moderate assistance (some  lifting) for transferring to and from the bed/chair/wheelchair. Patient requires  the following assistive device(s): Bed rails. Elevated bed/surface. Arm rest.  Sliding board. Seating system of wheelchair.  LYNETTE Toilet Transfer:  Toilet Transfer did not occur. .  LYNETTE Tub/Shower Transfer:    Previously Documented  Mode of Locomotion at Discharge:  [facility] Expected Mode of Locomotion at Discharge:  [facility] Walk/Wheelchair:  [facility]TE Stairs:    [facility]TE Comprehension:  [facility]NETTE Expression:  LYNETTE Social Interaction:  [facility] Problem Solving:  LYNETTE Memory:    Therapy Mode Minutes  Occupational Therapy:  Physical Therapy:  Speech Language Pathology:    Discharge Functional Goals:    Signed by: TALI Recinos

## 2017-06-13 NOTE — PROGRESS NOTES
Inpatient Rehabilitation Functional Measures Assessment    Functional Measures  LYENTTE Eating:  LYNETTE Grooming:  LYNETTE Bathing:  LYNETTE Upper Body Dressing:  LYNETTE Lower Body Dressing:  [facility]TE Toileting:    [facility]NETTE Bladder Management  Level of Assistance:  Frequency/Number of Accidents this Shift:    [facility]TE Bowel Management  Level of Assistance:  Frequency/Number of Accidents this Shift:    [facility]TE Bed/Chair/Wheelchair Transfer:  Bed/chair/wheelchair Transfer Score = 3.  Patient performs 50-74% of effort and requires moderate assistance (some  lifting)  for transferring to and from the bed/chair/wheelchair, including  assist lifting both legs. Patient requires the following assistive device(s):  Sliding board.  [facility]NETTE Toilet Transfer:  [facility] Tub/Shower Transfer:    Previously Documented Mode of Locomotion at Discharge:  [facility] Expected Mode of Locomotion at Discharge:  [facility] Walk/Wheelchair:  WHEELCHAIR OBSERVATION   Activity was not observed.    WALK OBSERVATION   Activity was not observed.  LYNETTE Stairs:  Activity was not observed.    LYNETTE Comprehension:  [facility]TE Expression:  [facility]TE Social Interaction:  [facility] Problem Solving:  [facility]NETTE Memory:    Therapy Mode Minutes  Occupational Therapy:  Physical Therapy: Individual: 90 minutes.  Speech Language Pathology:    Discharge Functional Goals:    Signed by: Susan Palma, Physical Therapist

## 2017-06-13 NOTE — OR NURSING
MULTIPLE SCARS AND OPEN WOUNDS NOTED ON CHEST, ABDOMEN AND BOTH LEGS. LBKA SCAR NOTED. MULTIPLE PRESSURE ULCERS ON COCCYX, BUTTOCKS, AND BACK OF LEGS.

## 2017-06-13 NOTE — PROGRESS NOTES
Inpatient Rehabilitation Functional Measures Assessment    Functional Measures  LYNETTE Eating:  Eating Score = 5. Patient is supervision/set-up for eating,  requiring: Opening containers. No assistive devices were required.  LYNETTE Grooming:  LYNETTE Bathing:  LYNETTE Upper Body Dressing:  LYNETTE Lower Body Dressing:  [facility]TE Toileting:  Activity was not observed. pt has a junior cath & colostomy    LYNETTE Bladder Management  Level of Assistance:  Bladder Score = 1.  Patient is total assistance for  bladder management. Patient has an indwelling/Junior catheter. pt has a junior  cath  Frequency/Number of Accidents this Shift:  Bladder accidents this shift:  0 .  Patient has not had an accident this shift.    LYNETTE Bowel Management  Level of Assistance: Bowel Score = 4. Patient performs greater than 75% of tasks  and requires minimal assistance for bowel management requiring assistive  device/method: pt does his own colostomy care .  Frequency/Number of Accidents this Shift: Bowel accidents this shift: 0 .  Patient has not had an accident this shift.    LYNETTE Bed/Chair/Wheelchair Transfer:  Bed/chair/wheelchair Transfer Score = 4.  Patient performs 75% or more of effort and minimal assistance (little/incidental  help/lifting of one limb/steadying) for transferring to and from the  bed/chair/wheelchair, requiring: Steadying. Patient requires the following  assistive device(s): Sliding board. Seating system of wheelchair.  LYNETTE Toilet Transfer:  Activity was not observed. pt has a junior cath & colostomy    LYNETTE Tub/Shower Transfer:    Previously Documented Mode of Locomotion at Discharge:  [facility] Expected Mode of Locomotion at Discharge:  [facility]TE Walk/Wheelchair:  LYNETTE Stairs:    [facility] Comprehension:  LYNETTE Expression:  [facility] Social Interaction:  [facility] Problem Solving:  LYNETTE Memory:    Therapy Mode Minutes  Occupational Therapy:  Physical Therapy:  Speech Language Pathology:    Discharge Functional Goals:    Signed by: TALI Kilgore

## 2017-06-13 NOTE — THERAPY TREATMENT NOTE
Inpatient Rehabilitation - Physical Therapy Treatment Note   [place]     Patient Name: Alex Tierney  : 1975  MRN: [id number]  Today's Date: 2017  Onset of Illness/Injury or Date of Surgery Date: 17  Date of Referral to PT: 17  Referring Physician: Dr. Brooks    Admit Date: 2017    Visit Dx:  No diagnosis found.  Patient Active Problem List   Diagnosis   • Chronic allergic rhinitis   • Depression   • Smoker   • T6 spinal cord injury   • History of left lower extremity amputation   • Chronic osteomyelitis of multiple sites   • Debility               Adult Rehabilitation Note       17 1528 17 1527 17 1820    Rehab Assessment/Intervention    Discipline occupational therapist  -BF physical therapist  -LB physical therapist  -LB    Document Type therapy note (daily note)  -BF therapy note (daily note)  -LB progress note;therapy note (daily note)  -LB    Subjective Information agree to therapy;no complaints  -BF agree to therapy  -LB agree to therapy;complains of  -LB    Patient Effort, Rehab Treatment good  -BF  good  -LB    Precautions/Limitations fall precautions;other (see comments)   < skin integrity; junior cath; colostomy  -BF fall precautions   < skin integrity; colostomy, junior cath  -LB fall precautions   < skin integrity; colostomy, junior cath  -LB    Patient Response to Treatment  He tolerated sitting dynamic balance activities, LE stretching, and prone lying for hip flexor stretching.  -LB He has tolerated stretching of LE's, balance training, and transfer/bed mobility training. He is participating well in therapy but unfortunately he has not progressed with amount of assist required. This may be because he is at his baseline. He does tolerate up to 15 min of prone lying and he has been educated to importance of changing postions frequently for home maintenance.   -LB    Recorded by [BF] Lucía Pedro, OT [LB] Susan Palma, PT [LB] Susan Robledo  Louie, KALI    Pain Assessment    Pain Assessment  No/denies pain  -LB     Recorded by  [LB] Susan Palma, PT     Cognitive Assessment/Intervention    Current Cognitive/Communication Assessment functional  -BF functional  -LB functional  -LB    Orientation Status oriented x 4  -BF  oriented x 4  -LB    Follows Commands/Answers Questions 100% of the time;able to follow single-step instructions  -% of the time  -% of the time  -LB    Personal Safety Interventions  supervised activity  -LB supervised activity  -LB    Recorded by [BF] Lucía Pedro, OT [LB] Susan Palma, PT [LB] Susan Palma, PT    Bed Mobility, Assessment/Treatment    Bed Mobility, Roll Left, [place]  minimum assist (75% patient effort);moderate assist (50% patient effort);verbal cues required;nonverbal cues required (demo/gesture)  -LB minimum assist (75% patient effort);moderate assist (50% patient effort);verbal cues required;nonverbal cues required (demo/gesture)  -LB    Bed Mobility, Roll Right, [place]  minimum assist (75% patient effort);moderate assist (50% patient effort);verbal cues required;nonverbal cues required (demo/gesture)  -LB minimum assist (75% patient effort);moderate assist (50% patient effort);verbal cues required;nonverbal cues required (demo/gesture)  -LB    Bed Mobility, Scoot/Bridge, [place]  dependent (less than 25% patient effort)   he cannot scoot his hips at all  -LB dependent (less than 25% patient effort)   he cannot scoot his hips at all  -LB    Bed Mob, Supine to Sit, [place]  minimum assist (75% patient effort);verbal cues required;nonverbal cues required (demo/gesture)   he must have his arms in a position to push up from mat  -LB minimum assist (75% patient effort);verbal cues required;nonverbal cues required (demo/gesture)   he must have his arms in a position to push up from mat  -LB    Bed Mob, Sit to Supine, [place]  moderate assist (50%  patient effort);verbal cues required;nonverbal cues required (demo/gesture)   he needs his trunk guided back and legs lifted into bed  -LB moderate assist (50% patient effort);verbal cues required;nonverbal cues required (demo/gesture)   he needs his trunk guided back and legs lifted into bed  -LB    Bed Mobility, Safety Issues  decreased use of legs for bridging/pushing;impaired trunk control for bed mobility  -LB decreased use of legs for bridging/pushing;impaired trunk control for bed mobility  -LB    Bed Mobility, Impairments  muscle tone abnormal;sensation decreased;strength decreased;impaired balance;coordination impaired;motor control impaired;postural control impaired  -LB muscle tone abnormal;sensation decreased;strength decreased;impaired balance;coordination impaired;motor control impaired;postural control impaired  -LB    Bed Mobility, Comment   He needs slightly more assist on mat table compared with hospital bed with railing.  -LB    Recorded by  [LB] Susan Palma, PT [LB] Susan Palma, PT    Transfer Assessment/Treatment    Transfers, Bed-Chair [place]  stand by assist;contact guard assist;verbal cues required;nonverbal cues required (demo/gesture)  -LB stand by assist;contact guard assist;verbal cues required;nonverbal cues required (demo/gesture)  -LB    Transfers, Chair-Bed [place]  stand by assist;contact guard assist;verbal cues required;nonverbal cues required (demo/gesture)  -LB stand by assist;contact guard assist;verbal cues required;nonverbal cues required (demo/gesture)  -LB    Transfers, Bed-Chair-Bed, Assist Device  sliding board  -LB sliding board  -LB    Transfer, Safety Issues  --   decreased trunk control, requires UE assist  -LB --   decreased trunk control, requires UE assist  -LB    Transfer, Impairments  muscle tone abnormal;sensation decreased;strength decreased;impaired balance;coordination impaired;motor control impaired;postural control impaired  -LB  muscle tone abnormal;sensation decreased;strength decreased;impaired balance;coordination impaired;motor control impaired;postural control impaired  -LB    Recorded by  [LB] Susan Palma, PT [LB] Susan Palma, PT    Grooming Assessment/Training    Grooming Assess/Train, Position sitting  -BF      Grooming Assess/Train, Indepen Level set up required;supervision required  -BF      Recorded by [BF] Lucía Pedro OT      Balance Skills Training    Sitting-Level of Assistance  Close supervision  -LB Close supervision  -LB    Sitting-Balance Support  --   requires one extremity to assist balance at eob  -LB --   requires one extremity to assist balance at eob  -LB    Sitting-Balance Activities  --   sitting bean bag toss x2  -LB --   sitting bean bag toss x2  -LB    Recorded by  [LB] Susan Palma, PT [LB] Susan Palma, PT    Therapy Exercises    Bilateral Lower Extremities  PROM:;supine;prone   LE stretching, prone lying 15 min bid  -LB PROM:;supine;prone   LE stretching, prone lying 15 min bid  -LB    Bilateral Upper Extremity AROM:;sitting   BUE GMC/FMC ther ex; strengthening  -BF      BUE Resistance other (comment)   Rickshaw 25 lbs X20X4; Arm bike 4 minsX3; WristrollsX3  -BF      Recorded by [BF] Lucía Pedro, OT [LB] Susan Palma, PT [LB] Susan Palma, PT    Positioning and Restraints    Pre-Treatment Position sitting in chair/recliner  -BF sitting in chair/recliner  -LB sitting in chair/recliner  -LB    Post Treatment Position wheelchair  -BF      In Wheelchair sitting;with PT  -BF sitting  -LB sitting  -LB    Recorded by [BF] Lucía Pedro, OT [LB] Susan Palma, PT [LB] Susan Palma, PT      06/12/17 1300          Rehab Assessment/Intervention    Discipline occupational therapist  -      Document Type therapy note (daily note)  -      Subjective Information agree to therapy;no complaints  -      Patient Effort, Rehab  Treatment excellent  -      Patient Response to Treatment pt tolerated therapy well, completing all activities with good tolerance  -AH      Recorded by [AH] Michell Silva, OT      Transfer Assessment/Treatment    Transfers, Bed-Chair [place] contact guard assist;stand by assist  -      Transfers, Chair-Bed [place] verbal cues required  -      Transfers, Bed-Chair-Bed, Assist Device sliding board  -AH      Recorded by [AH] Michell Silva, OT      Therapy Exercises    Bilateral Upper Extremity AROM:;sitting   coordination and UE ther ex  -      BUE Resistance --   arm bike 3x5 min, rickshaw 20x4 with 20 lbs  -AH      Recorded by [AH] Michell Silva, OT        User Key  (r) = Recorded By, (t) = Taken By, (c) = Cosigned By    Initials Name Effective Dates    LB Susan Palma, PT 03/14/16 -     BF Lucía Pedro, OT 03/14/16 -     AH Michell Silva, OT 03/14/16 -                 IP PT Goals       06/12/17 1831 06/02/17 1521       Bed Mobility PT STG    Bed Mobility PT STG, Date Established  06/02/17  -RT     Bed Mobility PT STG, Time to Achieve  1 wk  -RT     Bed Mobility PT STG, Activity Type  all bed mobility  -RT     Bed Mobility PT STG, [place] Level  minimum assist (75% patient effort)  -RT     Bed Mobility PT STG, Date Goal Reviewed 06/12/17  -LB      Bed Mobility PT STG, Outcome goal not met  -LB      Bed Mobility PT STG, Reason Goal Not Met progress slower than expected  -LB      Bed Mobility PT LTG    Bed Mobility PT LTG, Date Established  06/02/17  -RT     Bed Mobility PT LTG, Time to Achieve  2 wks  -RT     Bed Mobility PT LTG, Activity Type  all bed mobility  -RT     Bed Mobility PT LTG, [place] Level  supervision required  -RT     Transfer Training PT STG    Transfer Training PT STG, Date Established  06/02/17  -RT     Transfer Training PT STG, Time to Achieve  1 wk  -RT     Transfer Training PT STG, Activity Type  all transfers  -RT      Transfer Training PT STG, [place] Level  contact guard assist  -RT     Transfer Training PT STG, Date Goal Reviewed 06/12/17  -LB      Transfer Training PT STG, Outcome goal met  -LB      Transfer Training PT LTG    Transfer Training PT LTG, Date Established  06/02/17  -RT     Transfer Training PT LTG, Time to Achieve  2 wks  -RT     Transfer Training PT LTG, Activity Type  all transfers  -RT     Transfer Training PT LTG, [place] Level  supervision required  -RT     Static Sitting Balance PT STG    Static Sitting Balance PT STG, Date Established  06/02/17  -RT     Static Sitting Balance PT STG, Time to Achieve  1 wk  -RT     Static Sitting Balance PT STG, [place] Level  minimum assist (75% patient effort)  -RT     Static Sitting Balance PT STG, Date Goal Reviewed 06/12/17  -LB      Static Sitting Balance PT STG, Outcome goal met  -LB      Static Sitting Balance PT LTG    Static Sitting Balance PT LTG, Date Established  06/02/17  -RT     Static Sitting Balance PT LTG, Time to Achieve  2 wks  -RT     Static Sitting Balance PT LTG, [place] Level  supervision required  -RT       User Key  (r) = Recorded By, (t) = Taken By, (c) = Cosigned By    Initials Name Provider Type    LB Susan Palma, KALI Physical Therapist    RT Perico Lucas PT Physical Therapist          Physical Therapy Education     Title: PT OT SLP Therapies (Active)     Topic: Physical Therapy (Done)     Point: Mobility training (Done)    Learning Progress Summary    Learner Readiness Method Response Comment Documented by Status   Patient Acceptance E VU,NR   06/13/17 1530 Done    Acceptance E VU,NR   06/12/17 1831 Done    Acceptance E VU   06/11/17 2000 Done    Acceptance E VU   06/10/17 1955 Done    Acceptance E VU   06/09/17 2207 Done    Acceptance E VU   06/09/17 1557 Done    Acceptance E VU   06/08/17 2328 Done    Acceptance E VU   06/08/17 1547 Done    Acceptance E VU   06/08/17 0039 Done    Acceptance  E,D NR,VU   06/07/17 1810 Done    Acceptance E VU  CT 06/07/17 1737 Done    Acceptance E VU   06/07/17 0050 Done    Acceptance E VU,NR   06/06/17 1710 Done    Acceptance E VU,NR   06/05/17 1521 Done    Acceptance E VU  RT 06/03/17 1236 Done    Acceptance E VU  RT 06/02/17 1519 Done               Point: Home exercise program (Done)    Learning Progress Summary    Learner Readiness Method Response Comment Documented by Status   Patient Acceptance E VU,NR   06/13/17 1530 Done    Acceptance E VU,NR   06/12/17 1831 Done    Acceptance E VU  TG 06/11/17 2000 Done    Acceptance E VU  TG 06/10/17 1955 Done    Acceptance E VU  TG 06/09/17 2207 Done    Acceptance E VU   06/09/17 1557 Done    Acceptance E VU   06/08/17 2328 Done    Acceptance E VU   06/08/17 1547 Done    Acceptance E VU   06/08/17 0039 Done    Acceptance E,D NR,VU   06/07/17 1810 Done    Acceptance E VU  CT 06/07/17 1737 Done    Acceptance E VU   06/07/17 0050 Done    Acceptance E VU,NR   06/06/17 1710 Done    Acceptance E VU,NR   06/05/17 1521 Done    Acceptance E VU  RT 06/03/17 1236 Done    Acceptance E VU  RT 06/02/17 1519 Done               Point: Body mechanics (Done)    Learning Progress Summary    Learner Readiness Method Response Comment Documented by Status   Patient Acceptance E VU,NR   06/13/17 1530 Done    Acceptance E VU,NR   06/12/17 1831 Done    Acceptance E VU   06/11/17 2000 Done    Acceptance E VU  TG 06/10/17 1955 Done    Acceptance E VU   06/09/17 2207 Done    Acceptance E VU   06/09/17 1557 Done    Acceptance E VU   06/08/17 2328 Done    Acceptance E VU   06/08/17 1547 Done    Acceptance E VU   06/08/17 0039 Done    Acceptance E,D NR,VU   06/07/17 1810 Done    Acceptance E VU  CT 06/07/17 1737 Done    Acceptance E VU   06/07/17 0050 Done    Acceptance E VU,NR   06/06/17 1710 Done    Acceptance E VU,NR   06/05/17 1521 Done    Acceptance E VU  RT 06/03/17 1236 Done    Acceptance E VU  RT  06/02/17 1519 Done               Point: Precautions (Done)    Learning Progress Summary    Learner Readiness Method Response Comment Documented by Status   Patient Acceptance E VU,NR   06/13/17 1530 Done    Acceptance E VU,NR   06/12/17 1831 Done    Acceptance E VU  TG 06/11/17 2000 Done    Acceptance E VU  TG 06/10/17 1955 Done    Acceptance E VU  TG 06/09/17 2207 Done    Acceptance E VU   06/09/17 1557 Done    Acceptance E VU   06/08/17 2328 Done    Acceptance E VU   06/08/17 1547 Done    Acceptance E VU   06/08/17 0039 Done    Acceptance E,D NR,VU  AG 06/07/17 1810 Done    Acceptance E VU  CT 06/07/17 1737 Done    Acceptance E VU   06/07/17 0050 Done    Acceptance E VU,NR   06/06/17 1710 Done    Acceptance E VU,NR   06/05/17 1521 Done    Acceptance E VU  RT 06/03/17 1236 Done    Acceptance E VU  RT 06/02/17 1519 Done                      User Key     Initials Effective Dates Name Provider Type Discipline     06/16/16 -  Sasha De La Rosa, RN Registered Nurse Nurse     06/16/16 -  Flores Silva, RN Registered Nurse Nurse     03/14/16 -  Susan Palma, PT Physical Therapist PT     03/14/16 -  Radha Ohaar, PT Physical Therapist PT    CT 03/14/16 -  Susu Sheridan, PT Physical Therapist PT     07/14/16 -  Magda Carney, PTA Physical Therapy Assistant PT    RT 03/14/16 -  Perico Lucas, PT Physical Therapist PT                    PT Recommendation and Plan  Planned Therapy Interventions: balance training, bed mobility training, home exercise program, lumbar stabilization, manual therapy techniques, neuromuscular re-education, patient/family education, postural re-education, ROM (Range of Motion), strengthening, stretching, transfer training  PT Frequency: 5 times/wk, 2 times/day, per priority policy            Time Calculation:         PT Charges       06/13/17 1531 06/13/17 1530       Time Calculation    Start Time 1500  -LB 1045  -LB     Stop Time 1545  -LB 1130  -LB      Time Calculation (min) 45 min  -LB 45 min  -LB     PT Received On  06/13/17  -LB     PT Goal Re-Cert Due Date  06/16/17  -LB       User Key  (r) = Recorded By, (t) = Taken By, (c) = Cosigned By    Initials Name Provider Type    ANA CRISTINA Palma PT Physical Therapist          Therapy Charges for Today     Code Description Service Date Service Provider Modifiers Qty    44017673760 HC PT THER PROC EA 15 MIN 6/12/2017 Susan Palma, PT GP 4    84122267381 HC PT THER SUPP EA 15 MIN 6/12/2017 Susan Palma, PT GP 2    27595507523 HC PT THERAPEUTIC ACT EA 15 MIN 6/12/2017 Susan Palma, PT GP 2    40248851263 HC PT THER SUPP EA 15 MIN 6/13/2017 Susan Palma, PT GP 2    69669828493 HC PT THERAPEUTIC ACT EA 15 MIN 6/13/2017 Susan Palma, PT GP 2    09694585938 HC PT THER PROC EA 15 MIN 6/13/2017 Susan Palma, PT GP 4               Susan Palma, PT  6/13/2017

## 2017-06-13 NOTE — SIGNIFICANT NOTE
06/13/17 1522   Case Management/Social Work Plan   Plan Pt discussed in team conference this am.  Pt discharge is planned for 6-16-17.  Contacted Salas at [facility] [facility] [phone] about discharge on 6-16-17 and roho cushion.  Salas states pt was approved for roho cushion in 2016 and they were unable to contact pt.  New order needed for roho cushion which takes 6-8- weeks for approval.  Salas says he may have loaner roho cushion he can provide pt but will have to check office and call SS with an answer.  Salas says pt's insurance shows he has power W/C.  Pt has custom standard W/C that he uses in rehab.  PT does not recommend pt use power W/C.  Spoke to pt and father Mark Anthony Tierney [phone] about plans for discharge on 6-16-17, HH PT,OT, nursing for wound care and roho cushion.  Informed pt physical therapist does not recommend power W/C; pt agreeable to recommendation and says he does not want this type of chair.  Pt will return home with father at discharge.  Father is agreeable to assist with wound care.  Father states he is making his home handicap accessible for pt.   Father will provide transportation home at discharge.  Will follow.

## 2017-06-13 NOTE — PROGRESS NOTES
Inpatient Rehabilitation Functional Measures Assessment    Functional Measures  LYNETTE Eating:  LYNETTE Grooming:  LYNETTE Bathing:  LYNETTE Upper Body Dressing:  LYNETTE Lower Body Dressing:  LYENTTE Toileting:    LYNETTE Bladder Management  Level of Assistance:  Frequency/Number of Accidents this Shift:    LYNETTE Bowel Management  Level of Assistance:  Frequency/Number of Accidents this Shift:    LYNETTE Bed/Chair/Wheelchair Transfer:  LYNETTE Toilet Transfer:  LYNETTE Tub/Shower Transfer:    Previously Documented Mode of Locomotion at Discharge:  LYNETTE Expected Mode of Locomotion at Discharge:  LY[place] Walk/Wheelchair:  LY[place] Stairs:    [place] Comprehension:  Auditory comprehension is the usual mode. Comprehension  Score = 7, Independent.  Patient comprehends complex/abstract information in  their primary language.  Patient is completely independent for auditory  comprehension.  There are no activity limitations.  LY[place] Expression:  Vocal expression is the usual mode. Expression Score = 7,  Independent.  Patient expresses complex/abstract information in their primary  language.  Patient is completely independent for vocal expression.  There are no  activity limitations.  LYNETTE Social Interaction:  Social Interaction Score = 7, Independent. Patient is  completely independent for social interaction.  There are no activity  limitations.  LYNETTE Problem Solving:  Problem Solving Score = 7, Independent.  Patient makes  appropriate decisions in order to solve complex problems.  Patient is completely  independent for problem solving.  There are no activity limitations.  LYNETTE Memory:  Memory Score = 7, Independent.  Patient is completely independent  for memory.  There are no activity limitations.    Therapy Mode Minutes  Occupational Therapy:  Physical Therapy:  Speech Language Pathology:    Discharge Functional Goals:    Signed by: Flores Silva, Nurse

## 2017-06-13 NOTE — THERAPY TREATMENT NOTE
Inpatient Rehabilitation - Occupational Therapy Treatment Note   [facility]     Patient Name: Alex Tierney  : 1975  MRN: [id number]  Today's Date: 2017  Onset of Illness/Injury or Date of Surgery Date: 17  Date of Referral to OT: 17  Referring Physician: Dr. Brooks      Admit Date: 2017    Visit Dx:   No diagnosis found.  Patient Active Problem List   Diagnosis   • Chronic allergic rhinitis   • Depression   • Smoker   • T6 spinal cord injury   • History of left lower extremity amputation   • Chronic osteomyelitis of multiple sites   • Debility             Adult Rehabilitation Note       17 1528 17 1527 17 1820    Rehab Assessment/Intervention    Discipline occupational therapist  -BF physical therapist  -LB physical therapist  -LB    Document Type therapy note (daily note)  -BF therapy note (daily note)  -LB progress note;therapy note (daily note)  -LB    Subjective Information agree to therapy;no complaints  -BF agree to therapy  -LB agree to therapy;complains of  -LB    Patient Effort, Rehab Treatment good  -BF  good  -LB    Precautions/Limitations fall precautions;other (see comments)   < skin integrity; junior cath; colostomy  -BF fall precautions   < skin integrity; colostomy, junior cath  -LB fall precautions   < skin integrity; colostomy, junior cath  -LB    Patient Response to Treatment  He tolerated sitting dynamic balance activities, LE stretching, and prone lying for hip flexor stretching.  -LB He has tolerated stretching of LE's, balance training, and transfer/bed mobility training. He is participating well in therapy but unfortunately he has not progressed with amount of assist required. This may be because he is at his baseline. He does tolerate up to 15 min of prone lying and he has been educated to importance of changing postions frequently for home maintenance.   -LB    Recorded by [BF] Lucía Pedro, OT [LB] Susan Palma, PT [LB] Susan  Venkat Palma, PT    Pain Assessment    Pain Assessment  No/denies pain  -LB     Recorded by  [LB] Susan Palma, PT     Cognitive Assessment/Intervention    Current Cognitive/Communication Assessment functional  -BF functional  -LB functional  -LB    Orientation Status oriented x 4  -BF  oriented x 4  -LB    Follows Commands/Answers Questions 100% of the time;able to follow single-step instructions  -% of the time  -% of the time  -LB    Personal Safety Interventions  supervised activity  -LB supervised activity  -LB    Recorded by [BF] Lucía Pedro, OT [LB] Susan Palma, PT [LB] Susan Palma, PT    Bed Mobility, Assessment/Treatment    Bed Mobility, Roll Left, [place]  minimum assist (75% patient effort);moderate assist (50% patient effort);verbal cues required;nonverbal cues required (demo/gesture)  -LB minimum assist (75% patient effort);moderate assist (50% patient effort);verbal cues required;nonverbal cues required (demo/gesture)  -LB    Bed Mobility, Roll Right, [place]  minimum assist (75% patient effort);moderate assist (50% patient effort);verbal cues required;nonverbal cues required (demo/gesture)  -LB minimum assist (75% patient effort);moderate assist (50% patient effort);verbal cues required;nonverbal cues required (demo/gesture)  -LB    Bed Mobility, Scoot/Bridge, [place]  dependent (less than 25% patient effort)   he cannot scoot his hips at all  -LB dependent (less than 25% patient effort)   he cannot scoot his hips at all  -LB    Bed Mob, Supine to Sit, [place]  minimum assist (75% patient effort);verbal cues required;nonverbal cues required (demo/gesture)   he must have his arms in a position to push up from mat  -LB minimum assist (75% patient effort);verbal cues required;nonverbal cues required (demo/gesture)   he must have his arms in a position to push up from mat  -LB    Bed Mob, Sit to Supine, [place]  moderate assist  (50% patient effort);verbal cues required;nonverbal cues required (demo/gesture)   he needs his trunk guided back and legs lifted into bed  -LB moderate assist (50% patient effort);verbal cues required;nonverbal cues required (demo/gesture)   he needs his trunk guided back and legs lifted into bed  -LB    Bed Mobility, Safety Issues  decreased use of legs for bridging/pushing;impaired trunk control for bed mobility  -LB decreased use of legs for bridging/pushing;impaired trunk control for bed mobility  -LB    Bed Mobility, Impairments  muscle tone abnormal;sensation decreased;strength decreased;impaired balance;coordination impaired;motor control impaired;postural control impaired  -LB muscle tone abnormal;sensation decreased;strength decreased;impaired balance;coordination impaired;motor control impaired;postural control impaired  -LB    Bed Mobility, Comment   He needs slightly more assist on mat table compared with hospital bed with railing.  -LB    Recorded by  [LB] Susan Palma, PT [LB] Susan Palma, PT    Transfer Assessment/Treatment    Transfers, Bed-Chair [place]  stand by assist;contact guard assist;verbal cues required;nonverbal cues required (demo/gesture)  -LB stand by assist;contact guard assist;verbal cues required;nonverbal cues required (demo/gesture)  -LB    Transfers, Chair-Bed [place]  stand by assist;contact guard assist;verbal cues required;nonverbal cues required (demo/gesture)  -LB stand by assist;contact guard assist;verbal cues required;nonverbal cues required (demo/gesture)  -LB    Transfers, Bed-Chair-Bed, Assist Device  sliding board  -LB sliding board  -LB    Transfer, Safety Issues  --   decreased trunk control, requires UE assist  -LB --   decreased trunk control, requires UE assist  -LB    Transfer, Impairments  muscle tone abnormal;sensation decreased;strength decreased;impaired balance;coordination impaired;motor control impaired;postural control impaired   -LB muscle tone abnormal;sensation decreased;strength decreased;impaired balance;coordination impaired;motor control impaired;postural control impaired  -LB    Recorded by  [LB] Susan Palma, PT [LB] Susan Palma, PT    Grooming Assessment/Training    Grooming Assess/Train, Position sitting  -BF      Grooming Assess/Train, Indepen Level set up required;supervision required  -BF      Recorded by [BF] Lucía Pedro OT      Balance Skills Training    Sitting-Level of Assistance  Close supervision  -LB Close supervision  -LB    Sitting-Balance Support  --   requires one extremity to assist balance at eob  -LB --   requires one extremity to assist balance at eob  -LB    Sitting-Balance Activities  --   sitting bean bag toss x2  -LB --   sitting bean bag toss x2  -LB    Recorded by  [LB] Susan Palma, PT [LB] Susan Palma, PT    Therapy Exercises    Bilateral Lower Extremities  PROM:;supine;prone   LE stretching, prone lying 15 min bid  -LB PROM:;supine;prone   LE stretching, prone lying 15 min bid  -LB    Bilateral Upper Extremity AROM:;sitting   BUE GMC/FMC ther ex; strengthening  -BF      BUE Resistance other (comment)   Rickshaw 25 lbs X20X4; Arm bike 4 minsX3; WristrollsX3  -BF      Recorded by [BF] Lucía Pedro, OT [LB] Susan Palma, PT [LB] Susan Palma, PT    Positioning and Restraints    Pre-Treatment Position sitting in chair/recliner  -BF sitting in chair/recliner  -LB sitting in chair/recliner  -LB    Post Treatment Position wheelchair  -BF      In Wheelchair sitting;with PT  -BF sitting  -LB sitting  -LB    Recorded by [BF] Lucía Pedro, OT [LB] Susan Palma, PT [LB] Susan Palma, PT      06/12/17 1300          Rehab Assessment/Intervention    Discipline occupational therapist  -      Document Type therapy note (daily note)  -      Subjective Information agree to therapy;no complaints  -      Patient Effort, Rehab  Treatment excellent  -      Patient Response to Treatment pt tolerated therapy well, completing all activities with good tolerance  -AH      Recorded by [AH] Michell Silva, OT      Transfer Assessment/Treatment    Transfers, Bed-Chair [place] contact guard assist;stand by assist  -      Transfers, Chair-Bed [place] verbal cues required  -      Transfers, Bed-Chair-Bed, Assist Device sliding board  -AH      Recorded by [AH] Michell Silva, OT      Therapy Exercises    Bilateral Upper Extremity AROM:;sitting   coordination and UE ther ex  -      BUE Resistance --   arm bike 3x5 min, rickshaw 20x4 with 20 lbs  -AH      Recorded by [AH] Michell Silva, OT        User Key  (r) = Recorded By, (t) = Taken By, (c) = Cosigned By    Initials Name Effective Dates    LB Susan Palma, PT 03/14/16 -     BF Lucía Pedro, OT 03/14/16 -     AH Michell Silva, OT 03/14/16 -                 OT Goals       06/08/17 1624 06/02/17 1554       Transfer Training OT LTG    Transfer Training OT LTG, Date Established  06/02/17  -BF     Transfer Training OT LTG, Time to Achieve  by discharge  -BF     Transfer Training OT LTG, Activity Type  bed to chair /chair to bed;shower chair  -BF     Transfer Training OT LTG, [place] Level  contact guard assist  -BF     Bathing OT STG    Bathing Goal OT STG, Date Established  06/02/17  -BF     Bathing Goal OT STG, Time to Achieve  5 - 7 days  -BF     Bathing Goal OT STG, Activity Type  lower body bathing;upper body bathing  -BF     Bathing Goal OT STG, [place] Level  minimum assist (75% patient effort)  -BF     Bathing Goal OT STG, Outcome goal met  -BF      Bathing OT LTG    Bathing Goal OT LTG, Date Established  06/02/17  -BF     Bathing Goal OT LTG, Time to Achieve  by discharge  -BF     Bathing Goal OT LTG, Activity Type  upper body bathing;lower body bathing  -BF     Bathing Goal OT LTG, [place] Level  set up  required;supervision required  -BF     Eating Self-Feeding OT LTG    Eat Self Feeding Goal OT LTG, Date Established  06/02/17  -BF     Eat Self Feeding Goal OT LTG, Time to Achieve  by discharge  -BF     Eat Self Feed Goal OT LTG, [place] Level  conditional independence  -BF     Grooming OT LTG    Grooming Goal OT LTG, Date Established  06/02/17  -BF     Grooming Goal OT LTG, Time to Achieve  by discharge  -BF     Grooming Goal OT LTG, [place] Level  conditional independence  -BF     LB Dressing OT STG    LB Dressing Goal OT STG, Date Established  06/02/17  -BF     LB Dressing Goal OT STG, Time to Achieve  5 - 7 days  -BF     LB Dressing Goal OT STG, [place] Level  moderate assist (50% patient effort)  -BF     LB Dressing Goal OT STG, Outcome goal met  -BF      LB Dressing OT LTG    LB Dressing Goal OT LTG, Date Established  06/02/17  -BF     LB Dressing Goal OT LTG, Time to Achieve  by discharge  -BF     LB Dressing Goal OT LTG, [place] Level  minimum assist (75% patient effort)  -BF       User Key  (r) = Recorded By, (t) = Taken By, (c) = Cosigned By    Initials Name Provider Type    YARELI Pedro, OT Occupational Therapist          Occupational Therapy Education     Title: PT OT SLP Therapies (Active)     Topic: Occupational Therapy (Active)     Point: ADL training (Active)    Description: Instruct learner(s) on proper safety adaptation and remediation techniques during self care or transfers.   Instruct in proper use of assistive devices.    Learning Progress Summary    Learner Readiness Method Response Comment Documented by Status   Patient Acceptance E,D NR  BF 06/13/17 1530 Active    Acceptance E,D NR  BF 06/08/17 1624 Active    Acceptance E,D NR  BF 06/07/17 1616 Active    Acceptance E,D NR  BF 06/06/17 1446 Active    Acceptance E,D NR  BF 06/05/17 1231 Active    Acceptance E,D NR  BF 06/02/17 1551 Active    Acceptance E,D NR  BF 06/02/17 1551 Active               Point:  Precautions (Active)    Description: Instruct learner(s) on prescribed precautions during self-care and functional transfers.    Learning Progress Summary    Learner Readiness Method Response Comment Documented by Status   Patient Acceptance E,D NR   06/13/17 1530 Active    Acceptance E,D NR  BF 06/08/17 1624 Active    Acceptance E,D NR  BF 06/07/17 1616 Active    Acceptance E,D NR  BF 06/06/17 1446 Active    Acceptance E,D NR  BF 06/05/17 1231 Active    Acceptance E,D NR  BF 06/02/17 1551 Active    Acceptance E,D NR  BF 06/02/17 1551 Active                      User Key     Initials Effective Dates Name Provider Type Discipline     03/14/16 -  Lucía Pedro OT Occupational Therapist OT                  OT Recommendation and Plan  Anticipated Equipment Needs At Discharge:  (TBD)  Anticipated Discharge Disposition:  (TBD)  Planned Therapy Interventions: activity intolerance, adaptive equipment training, ADL retraining, home exercise program, strengthening, transfer training (Therapeutic groups as beneficial)  Therapy Frequency: 3-5 times/wk          Time Calculation:         Time Calculation- OT       06/13/17 1531          Time Calculation- OT    OT Start Time 1330  -BF      OT Stop Time 1500  -BF      OT Time Calculation (min) 90 min  -BF      Total Timed Code Minutes- OT 90 minute(s)  -BF      OT Non-Billable Time (min) 20 min  -BF        User Key  (r) = Recorded By, (t) = Taken By, (c) = Cosigned By    Initials Name Provider Type     Lucía Pedro OT Occupational Therapist           Therapy Charges for Today     Code Description Service Date Service Provider Modifiers Qty    15779893422 HC OT THER SUPP EA 15 MIN 6/13/2017 Lucía Pedro OT GO 1    57927164715 HC OT THERAPEUTIC ACT EA 15 MIN 6/13/2017 Lucía Pedro OT GO 4    45768845861 HC OT SELF CARE/MGMT/TRAIN EA 15 MIN 6/13/2017 Lucía Pedro OT GO 2               Lucía Pedro OT  6/13/2017

## 2017-06-13 NOTE — PLAN OF CARE
Problem: Patient Care Overview (Adult)  Goal: Plan of Care Review  Outcome: Ongoing (interventions implemented as appropriate)    06/13/17 0127   Coping/Psychosocial Response Interventions   Plan Of Care Reviewed With patient   Patient Care Overview   Progress improving       Goal: Adult Individualization and Mutuality  Outcome: Ongoing (interventions implemented as appropriate)  Goal: Discharge Needs Assessment  Outcome: Ongoing (interventions implemented as appropriate)    Problem: Activity Intolerance (Adult)  Goal: Activity Tolerance  Outcome: Ongoing (interventions implemented as appropriate)  Goal: Effective Energy Conservation Techniques  Outcome: Ongoing (interventions implemented as appropriate)    Problem: Fall Risk (Adult)  Goal: Absence of Falls  Outcome: Ongoing (interventions implemented as appropriate)    Problem: Pressure Ulcer (Adult)  Goal: Signs and Symptoms of Listed Potential Problems Will be Absent or Manageable (Pressure Ulcer)  Outcome: Ongoing (interventions implemented as appropriate)    Problem: Skin Integrity Impairment, Risk/Actual (Adult)  Goal: Skin Integrity/Wound Healing  Outcome: Ongoing (interventions implemented as appropriate)    Problem: Mobility, Physical Impaired (Adult)  Goal: Enhanced Mobility Skills  Outcome: Ongoing (interventions implemented as appropriate)  Goal: Enhanced Functionality Ability  Outcome: Ongoing (interventions implemented as appropriate)

## 2017-06-13 NOTE — PROGRESS NOTES
"     LOS: 12 days     Chief Complaint:  Weakness, debility     Subjective     Interval History:     He continues to require PT / OT efforts.  He has improved overall with his status.  No SOA / cough or fevers / chills      Objective     Vital Signs  /86 (BP Location: Left arm, Patient Position: Lying)  Pulse 118  Temp 98.7 °F (37.1 °C) (Oral)   Resp 20  Ht 71\" (180.3 cm)  Wt 245 lb (111 kg)  SpO2 100%  BMI 34.17 kg/m2  Intake & Output (last day)       06/12 0701 - 06/13 0700 06/13 0701 - 06/14 0700    P.O. 1200     Total Intake(mL/kg) 1200 (10.8)     Urine (mL/kg/hr) 1450 (0.5)     Stool      Total Output 1450      Net -250                    Physical Exam:     General Appearance:    Alert, cooperative, in no acute distress   Head:    Normocephalic, without obvious abnormality, atraumatic   Eyes:            Lids and lashes normal, conjunctivae and sclerae normal, no   icterus, no pallor, corneas clear, PERRLA   Ears:    Ears appear intact with no abnormalities noted       Neck:   No adenopathy, supple, trachea midline, no thyromegaly, no   carotid bruit, no JVD   Lungs:     Clear to auscultation,respirations regular, even and                  unlabored    Heart:    Regular rhythm and normal rate, normal S1 and S2, no            murmur, no gallop, no rub, no click   Chest Wall:    No abnormalities observed   Abdomen:     Normal bowel sounds, no masses, no organomegaly, soft        non-tender, non-distended, no guarding, no rebound                tenderness   Extremities:   No movement in BLE, no sensation in BLE  no edema, no cyanosis, no redness   Pulses:   Pulses palpable and equal bilaterally   Skin:   No bleeding, bruising or rash                Results Review:    Lab Results   Component Value Date    WBC 8.25 06/07/2017    HGB 9.1 (L) 06/07/2017    HCT 30.3 (L) 06/07/2017    MCV 79.9 (L) 06/07/2017     (H) 06/07/2017       Lab Results   Component Value Date    GLUCOSE 91 06/02/2017    BUN 10 " 06/02/2017    CREATININE 0.51 06/02/2017    EGFRIFNONA >150 06/02/2017    BCR 19.6 06/02/2017     06/02/2017    K 4.2 06/02/2017     06/02/2017    CO2 30.5 06/02/2017    CALCIUM 8.7 06/02/2017    ALBUMIN 3.10 (L) 06/02/2017    LABIL2 1.1 (L) 06/02/2017    AST 15 06/02/2017    ALT 13 06/02/2017     No results found for: INR    No results found for: POCGLU       Medication Review:     Current Facility-Administered Medications:   •  acetaminophen (TYLENOL) tablet 650 mg, 650 mg, Oral, Q4H PRN, Samuel Duane Kreis, MD, 650 mg at 06/12/17 1031  •  ALPRAZolam (XANAX) tablet 0.5 mg, 0.5 mg, Oral, TID, Samuel Duane Kreis, MD, 0.5 mg at 06/13/17 0845  •  aluminum-magnesium hydroxide-simethicone (MAALOX MAX) 400-400-40 MG/5ML suspension 15 mL, 15 mL, Oral, Q6H PRN, Samuel Duane Kreis, MD  •  amitriptyline (ELAVIL) tablet 25 mg, 25 mg, Oral, Nightly, Samuel Duane Kreis, MD, 25 mg at 06/12/17 2226  •  aspirin EC tablet 81 mg, 81 mg, Oral, Daily, Samuel Duane Kreis, MD, 81 mg at 06/13/17 0845  •  baclofen (LIORESAL) tablet 20 mg, 20 mg, Oral, Q8H, Samuel Duane Kreis, MD, 20 mg at 06/13/17 0523  •  bisacodyl (DULCOLAX) suppository 10 mg, 10 mg, Rectal, Daily PRN, Samuel Duane Kreis, MD  •  castor oil-balsam peru (VENELEX) ointment, , Topical, Q12H, Samuel Duane Kreis, MD  •  cetirizine (zyrTEC) tablet 10 mg, 10 mg, Oral, Daily, Samuel Duane Kreis, MD, 10 mg at 06/13/17 0845  •  enoxaparin (LOVENOX) syringe 40 mg, 40 mg, Subcutaneous, Daily, Samuel Duane Kreis, MD, 40 mg at 06/13/17 0845  •  fluticasone (FLONASE) 50 MCG/ACT nasal spray 2 spray, 2 spray, Each Nare, Daily, Samuel Duane Kreis, MD, 2 spray at 06/13/17 0845  •  folic acid (FOLVITE) tablet 1 mg, 1 mg, Oral, Daily, Samuel Duane Kreis, MD, 1 mg at 06/13/17 0845  •  gabapentin (NEURONTIN) capsule 800 mg, 800 mg, Oral, Q8H, Samuel Duane Kreis, MD, 800 mg at 06/13/17 0524  •  magnesium hydroxide (MILK OF MAGNESIA) suspension 2400 mg/10mL 10 mL, 10 mL, Oral, Daily  PRN, Samuel Duane Kreis, MD  •  metoprolol tartrate (LOPRESSOR) tablet 25 mg, 25 mg, Oral, Q12H, Samuel Duane Kreis, MD, 25 mg at 06/12/17 2226  •  multivitamin (DAILY BRIAN) tablet 1 tablet, 1 tablet, Oral, Daily, Samuel Duane Kreis, MD, 1 tablet at 06/13/17 0845  •  ondansetron (ZOFRAN) tablet 4 mg, 4 mg, Oral, Q6H PRN, 4 mg at 06/06/17 0835 **OR** ondansetron ODT (ZOFRAN-ODT) disintegrating tablet 4 mg, 4 mg, Oral, Q6H PRN **OR** ondansetron (ZOFRAN) injection 4 mg, 4 mg, Intravenous, Q6H PRN, Samuel Duane Kreis, MD  •  sennosides-docusate sodium (SENOKOT-S) 8.6-50 MG tablet 2 tablet, 2 tablet, Oral, Nightly, Samuel Duane Kreis, MD, 2 tablet at 06/12/17 2226  •  venlafaxine XR (EFFEXOR-XR) 24 hr capsule 75 mg, 75 mg, Oral, Daily With Breakfast, Samuel Duane Kreis, MD, 75 mg at 06/13/17 0845      Assessment/Plan     Debility  Paraplegia  Decubitus ulcers of buttocks      PT and OT ongoing.    Continue gabapentin with when necessary baclofen    Continue indwelling [place]    Refer to team conference notes    CBC, BMP in AM        Samuel Duane Kreis, MD  06/13/17  9:14 AM

## 2017-06-14 LAB
ANION GAP SERPL CALCULATED.3IONS-SCNC: 9 MMOL/L (ref 3.6–11.2)
BASOPHILS # BLD AUTO: 0.03 10*3/MM3 (ref 0–0.3)
BASOPHILS NFR BLD AUTO: 0.5 % (ref 0–2)
BUN BLD-MCNC: 14 MG/DL (ref 7–21)
BUN/CREAT SERPL: 27.5 (ref 7–25)
CALCIUM SPEC-SCNC: 9.1 MG/DL (ref 7.7–10)
CHLORIDE SERPL-SCNC: 109 MMOL/L (ref 99–112)
CO2 SERPL-SCNC: 24 MMOL/L (ref 24.3–31.9)
CREAT BLD-MCNC: 0.51 MG/DL (ref 0.43–1.29)
DEPRECATED RDW RBC AUTO: 57.8 FL (ref 37–54)
EOSINOPHIL # BLD AUTO: 0.31 10*3/MM3 (ref 0–0.7)
EOSINOPHIL NFR BLD AUTO: 4.9 % (ref 0–5)
ERYTHROCYTE [DISTWIDTH] IN BLOOD BY AUTOMATED COUNT: 20 % (ref 11.5–14.5)
GFR SERPL CREATININE-BSD FRML MDRD: >150 ML/MIN/1.73
GLUCOSE BLD-MCNC: 104 MG/DL (ref 70–110)
HCT VFR BLD AUTO: 29.9 % (ref 42–52)
HGB BLD-MCNC: 8.7 G/DL (ref 14–18)
IMM GRANULOCYTES # BLD: 0.06 10*3/MM3 (ref 0–0.03)
IMM GRANULOCYTES NFR BLD: 1 % (ref 0–0.5)
LYMPHOCYTES # BLD AUTO: 1.71 10*3/MM3 (ref 1–3)
LYMPHOCYTES NFR BLD AUTO: 27.3 % (ref 21–51)
MCH RBC QN AUTO: 22.9 PG (ref 27–33)
MCHC RBC AUTO-ENTMCNC: 29.1 G/DL (ref 33–37)
MCV RBC AUTO: 78.7 FL (ref 80–94)
MONOCYTES # BLD AUTO: 1 10*3/MM3 (ref 0.1–0.9)
MONOCYTES NFR BLD AUTO: 15.9 % (ref 0–10)
NEUTROPHILS # BLD AUTO: 3.16 10*3/MM3 (ref 1.4–6.5)
NEUTROPHILS NFR BLD AUTO: 50.4 % (ref 30–70)
NRBC BLD MANUAL-RTO: 0.3 /100 WBC (ref 0–0)
OSMOLALITY SERPL CALC.SUM OF ELEC: 283.9 MOSM/KG (ref 273–305)
PLATELET # BLD AUTO: 475 10*3/MM3 (ref 130–400)
PMV BLD AUTO: 9.5 FL (ref 6–10)
POTASSIUM BLD-SCNC: 3.7 MMOL/L (ref 3.5–5.3)
RBC # BLD AUTO: 3.8 10*6/MM3 (ref 4.7–6.1)
SODIUM BLD-SCNC: 142 MMOL/L (ref 135–153)
WBC NRBC COR # BLD: 6.27 10*3/MM3 (ref 4.5–12.5)

## 2017-06-14 PROCEDURE — 85025 COMPLETE CBC W/AUTO DIFF WBC: CPT | Performed by: FAMILY MEDICINE

## 2017-06-14 PROCEDURE — 25010000002 ENOXAPARIN PER 10 MG: Performed by: FAMILY MEDICINE

## 2017-06-14 PROCEDURE — 97110 THERAPEUTIC EXERCISES: CPT

## 2017-06-14 PROCEDURE — 97530 THERAPEUTIC ACTIVITIES: CPT | Performed by: OCCUPATIONAL THERAPIST

## 2017-06-14 PROCEDURE — 97530 THERAPEUTIC ACTIVITIES: CPT

## 2017-06-14 PROCEDURE — 80048 BASIC METABOLIC PNL TOTAL CA: CPT | Performed by: FAMILY MEDICINE

## 2017-06-14 PROCEDURE — 97535 SELF CARE MNGMENT TRAINING: CPT | Performed by: OCCUPATIONAL THERAPIST

## 2017-06-14 RX ADMIN — GABAPENTIN 800 MG: 400 CAPSULE ORAL at 05:13

## 2017-06-14 RX ADMIN — CASTOR OIL AND BALSAM, PERU: 788; 87 OINTMENT TOPICAL at 09:13

## 2017-06-14 RX ADMIN — BACLOFEN 20 MG: 10 TABLET ORAL at 21:04

## 2017-06-14 RX ADMIN — METOPROLOL TARTRATE 25 MG: 25 TABLET, FILM COATED ORAL at 09:13

## 2017-06-14 RX ADMIN — BACLOFEN 20 MG: 10 TABLET ORAL at 05:13

## 2017-06-14 RX ADMIN — GABAPENTIN 800 MG: 400 CAPSULE ORAL at 13:04

## 2017-06-14 RX ADMIN — BACLOFEN 20 MG: 10 TABLET ORAL at 13:04

## 2017-06-14 RX ADMIN — GABAPENTIN 800 MG: 400 CAPSULE ORAL at 21:04

## 2017-06-14 RX ADMIN — DOCUSATE SODIUM AND SENNA 2 TABLET: 50; 8.6 TABLET, FILM COATED ORAL at 21:04

## 2017-06-14 RX ADMIN — FOLIC ACID 1 MG: 1 TABLET ORAL at 09:13

## 2017-06-14 RX ADMIN — METOPROLOL TARTRATE 25 MG: 25 TABLET, FILM COATED ORAL at 21:04

## 2017-06-14 RX ADMIN — FLUTICASONE PROPIONATE 2 SPRAY: 50 SPRAY, METERED NASAL at 09:13

## 2017-06-14 RX ADMIN — ASPIRIN 81 MG: 81 TABLET ORAL at 09:13

## 2017-06-14 RX ADMIN — Medication 1 TABLET: at 09:14

## 2017-06-14 RX ADMIN — AMITRIPTYLINE HYDROCHLORIDE 25 MG: 25 TABLET, FILM COATED ORAL at 21:05

## 2017-06-14 RX ADMIN — ALPRAZOLAM 0.5 MG: 0.5 TABLET ORAL at 17:19

## 2017-06-14 RX ADMIN — CETIRIZINE HYDROCHLORIDE 10 MG: 10 TABLET ORAL at 09:13

## 2017-06-14 RX ADMIN — ALPRAZOLAM 0.5 MG: 0.5 TABLET ORAL at 09:13

## 2017-06-14 RX ADMIN — ENOXAPARIN SODIUM 40 MG: 40 INJECTION SUBCUTANEOUS at 09:12

## 2017-06-14 RX ADMIN — CASTOR OIL AND BALSAM, PERU: 788; 87 OINTMENT TOPICAL at 21:05

## 2017-06-14 RX ADMIN — ALPRAZOLAM 0.5 MG: 0.5 TABLET ORAL at 21:04

## 2017-06-14 RX ADMIN — VENLAFAXINE HYDROCHLORIDE 75 MG: 75 CAPSULE, EXTENDED RELEASE ORAL at 09:13

## 2017-06-14 NOTE — THERAPY TREATMENT NOTE
Inpatient Rehabilitation - Physical Therapy Treatment Note   [place]     Patient Name: Alex Tierney  : 1975  MRN: [id number]  Today's Date: 2017  Onset of Illness/Injury or Date of Surgery Date: 17  Date of Referral to PT: 17  Referring Physician: Dr. Brooks    Admit Date: 2017    Visit Dx:  No diagnosis found.  Patient Active Problem List   Diagnosis   • Chronic allergic rhinitis   • Depression   • Smoker   • T6 spinal cord injury   • History of left lower extremity amputation   • Chronic osteomyelitis of multiple sites   • Debility               Adult Rehabilitation Note       17 1644 17 1500 17 1441    Rehab Assessment/Intervention    Discipline physical therapist  -AG physical therapy assistant  -NICOLE occupational therapist  -BF    Document Type therapy note (daily note)   PM note  -AG therapy note (daily note)   AM session  -NICOLE therapy note (daily note)  -BF    Subjective Information  agree to therapy;no complaints  -NICOLE agree to therapy;no complaints  -BF    Patient Effort, Rehab Treatment good  -AG good  -NICOLE good  -BF    Precautions/Limitations fall precautions;other (see comments)   decr skin integrity  -AG fall precautions;other (see comments)   < skin integrity, junior cath, colostomy  -NICOLE fall precautions;other (see comments)   < skin integrity; junior cath; colostomy  -BF    Specific Treatment Considerations responded well; no signficant functional changes with transfers.  -AG      Patient Response to Treatment  Pt tolerated tx well today and continues to progress with balance activites, LE stretching and prone lying.   -NICOLE     Recorded by [AG] Radha Ohara, PT [NICOLE] Scarlett Caba, SHILPA [BF] Lucía Pedro OT    Pain Assessment    Pain Assessment No/denies pain  -AG No/denies pain  -NICOLE     Recorded by [AG] Radha Ohara, PT [NICOLE] Scarlett Caba, SHILPA     Cognitive Assessment/Intervention    Current Cognitive/Communication Assessment   functional  -NICOLE functional  -BF    Orientation Status  oriented x 4  -NICOLE oriented x 4  -BF    Follows Commands/Answers Questions  100% of the time;able to follow single-step instructions  -NICOLE 100% of the time;able to follow single-step instructions  -BF    Personal Safety Interventions  supervised activity  -NICOLE     Recorded by  [NICOLE] Scarlett Caba PTA [BF] Lucíaem Pedro, OT    Bed Mobility, Assessment/Treatment    Bed Mobility, Assistive Device bed rails  -AG      Bed Mobility, Roll Left, [place]  minimum assist (75% patient effort);moderate assist (50% patient effort);verbal cues required;nonverbal cues required (demo/gesture)  -NICOLE     Bed Mobility, Roll Right, [place]  minimum assist (75% patient effort);moderate assist (50% patient effort);verbal cues required;nonverbal cues required (demo/gesture)  -NICOLE     Bed Mobility, Scoot/Bridge, [place] maximum assist (25% patient effort)  -AG      Bed Mob, Supine to Sit, [place] minimum assist (75% patient effort)  -AG minimum assist (75% patient effort);verbal cues required;nonverbal cues required (demo/gesture)  -NICOLE     Bed Mob, Sit to Supine, [place] moderate assist (50% patient effort)  -AG moderate assist (50% patient effort);verbal cues required;nonverbal cues required (demo/gesture)  -NICOLE     Bed Mobility, Safety Issues decreased use of arms for pushing/pulling;decreased use of legs for bridging/pushing;impaired trunk control for bed mobility  -AG decreased use of legs for bridging/pushing;impaired trunk control for bed mobility  -NICOLE     Bed Mobility, Impairments muscle tone abnormal;ROM decreased;strength decreased;impaired balance;coordination impaired;motor control impaired;postural control impaired  -AG muscle tone abnormal;sensation decreased;strength decreased;impaired balance;coordination impaired;motor control impaired;postural control impaired  -NICOLE     Recorded by [AG] Radha Ohara, PT [NICOLE] Scarlett Caba, PTA      Transfer Assessment/Treatment    Transfers, Bed-Chair [place] verbal cues required;nonverbal cues required (demo/gesture);contact guard assist  -AG contact guard assist;verbal cues required;nonverbal cues required (demo/gesture)  -NICOLE contact guard assist;verbal cues required;nonverbal cues required (demo/gesture)  -BF    Transfers, Chair-Bed [place] verbal cues required;nonverbal cues required (demo/gesture);contact guard assist  -AG contact guard assist;verbal cues required;nonverbal cues required (demo/gesture)  -NICOLE contact guard assist;verbal cues required;nonverbal cues required (demo/gesture)  -BF    Transfers, Bed-Chair-Bed, Assist Device sliding board  -AG sliding board  -NICOLE sliding board  -BF    Transfer, Safety Issues  --   decreased trunk control  -NICOLE     Transfer, Impairments muscle tone abnormal;ROM decreased;strength decreased;impaired balance;coordination impaired;motor control impaired;postural control impaired  -AG muscle tone abnormal;sensation decreased;strength decreased;impaired balance;coordination impaired;motor control impaired;postural control impaired  -NICOLE     Recorded by [AG] Radha Ohara, PT [NICOLE] Scarlett Caba, PTA [BF] Lucía Pedro OT    Lower Body Bathing Assessment/Training    LB Bathing Assess/Train, Position   supine  -BF    LB Bathing Assess/Train, Comment   TB bathing: Min A  -BF    Recorded by   [BF] Lucía Pedro OT    Upper Body Dressing Assessment/Training    UB Dressing Assess/Train, Position   sitting  -BF    UB Dressing Assess/Train, [place]   set up required;supervision required  -BF    UB Dressing Assess/Train, Comment   Set-up/supervision  -BF    Recorded by   [BF] Lucía Pedro OT    Lower Body Dressing Assessment/Training    LB Dressing Assess/Train, Position   supine;sitting  -BF    LB Dressing Assess/Train, Comment   Mod A  -BF    Recorded by   [BF] Lucía Pedro OT    Grooming Assessment/Training     Grooming Assess/Train, Position   sitting  -BF    Grooming Assess/Train, Indepen Level   set up required  -BF    Recorded by   [BF] Lucía Pedro OT    Motor Skills/Interventions    Additional Documentation Balance Skills Training (Group)  -AG      Recorded by [AG] Radha Ohara PT      Balance Skills Training    Sitting-Level of Assistance Close supervision;Contact guard  -AG Close supervision  -NICOLE     Sitting-Balance Support Right upper extremity supported;Left upper extremity supported  -AG      Sitting-Balance Activities Lateral lean;Trunk control activities  -AG      Sitting # of Minutes 15  -AG      Recorded by [AG] Radha Ohara, PT [NICOLE] Scarlett Caba PTA     Therapy Exercises    Bilateral Lower Extremities PROM:;supine  -AG PROM:;supine;prone   B) LE stretching; prone lying 15 min AM session  -NICOLE     Bilateral Upper Extremity   AROM:;sitting   BUE GMC/FMC ther ex; strengthening  -BF    BUE Resistance   other (comment)   Arm bike 4 minsX3; Rickshaw 25 [id number]; WristrollsX3  -BF    Recorded by [AG] Radha Ohara, PT [NICOLE] Scarlett Caba PTA [BF] Lucía Pedro, STEPHANIE    Positioning and Restraints    Pre-Treatment Position in bed  -AG in bed  -NICOLE     Post Treatment Position wheelchair  -AG chair  -NICOLE     In Bed   supine;call light within reach;encouraged to call for assist   In PM  -BF    In Wheelchair sitting;with OT  -AG sitting;patient within staff view   following AM session  -NICOLE sitting;patient within staff view   In AM  -BF    Recorded by [AG] Radha Ohara, PT [NICOLE] Scarlett Caba PTA [BF] Lucía Pedro, STEPHANIE      06/13/17 1528 06/13/17 1527 06/12/17 1820    Rehab Assessment/Intervention    Discipline occupational therapist  -BF physical therapist  -LB physical therapist  -LB    Document Type therapy note (daily note)  -BF therapy note (daily note)  -LB progress note;therapy note (daily note)  -LB    Subjective Information agree to therapy;no complaints  -BF  agree to therapy  -LB agree to therapy;complains of  -LB    Patient Effort, Rehab Treatment good  -BF  good  -LB    Precautions/Limitations fall precautions;other (see comments)   < skin integrity; junior cath; colostomy  -BF fall precautions   < skin integrity; colostomy, junior cath  -LB fall precautions   < skin integrity; colostomy, junior cath  -LB    Patient Response to Treatment  He tolerated sitting dynamic balance activities, LE stretching, and prone lying for hip flexor stretching.  -LB He has tolerated stretching of LE's, balance training, and transfer/bed mobility training. He is participating well in therapy but unfortunately he has not progressed with amount of assist required. This may be because he is at his baseline. He does tolerate up to 15 min of prone lying and he has been educated to importance of changing postions frequently for home maintenance.   -LB    Recorded by [BF] Lucía Pedro, OT [LB] Susan Palma, PT [LB] Susan Palma, PT    Pain Assessment    Pain Assessment  No/denies pain  -LB     Recorded by  [LB] Susan Palma, PT     Cognitive Assessment/Intervention    Current Cognitive/Communication Assessment functional  -BF functional  -LB functional  -LB    Orientation Status oriented x 4  -BF  oriented x 4  -LB    Follows Commands/Answers Questions 100% of the time;able to follow single-step instructions  -% of the time  -% of the time  -LB    Personal Safety Interventions  supervised activity  -LB supervised activity  -LB    Recorded by [BF] Lucía Pedro, OT [LB] Susan Palma, PT [LB] Susan Palma, PT    Bed Mobility, Assessment/Treatment    Bed Mobility, Roll Left, [place]  minimum assist (75% patient effort);moderate assist (50% patient effort);verbal cues required;nonverbal cues required (demo/gesture)  -LB minimum assist (75% patient effort);moderate assist (50% patient effort);verbal cues required;nonverbal cues  required (demo/gesture)  -LB    Bed Mobility, Roll Right, [place]  minimum assist (75% patient effort);moderate assist (50% patient effort);verbal cues required;nonverbal cues required (demo/gesture)  -LB minimum assist (75% patient effort);moderate assist (50% patient effort);verbal cues required;nonverbal cues required (demo/gesture)  -LB    Bed Mobility, Scoot/Bridge, [place]  dependent (less than 25% patient effort)   he cannot scoot his hips at all  -LB dependent (less than 25% patient effort)   he cannot scoot his hips at all  -LB    Bed Mob, Supine to Sit, [place]  minimum assist (75% patient effort);verbal cues required;nonverbal cues required (demo/gesture)   he must have his arms in a position to push up from mat  -LB minimum assist (75% patient effort);verbal cues required;nonverbal cues required (demo/gesture)   he must have his arms in a position to push up from mat  -LB    Bed Mob, Sit to Supine, [place]  moderate assist (50% patient effort);verbal cues required;nonverbal cues required (demo/gesture)   he needs his trunk guided back and legs lifted into bed  -LB moderate assist (50% patient effort);verbal cues required;nonverbal cues required (demo/gesture)   he needs his trunk guided back and legs lifted into bed  -LB    Bed Mobility, Safety Issues  decreased use of legs for bridging/pushing;impaired trunk control for bed mobility  -LB decreased use of legs for bridging/pushing;impaired trunk control for bed mobility  -LB    Bed Mobility, Impairments  muscle tone abnormal;sensation decreased;strength decreased;impaired balance;coordination impaired;motor control impaired;postural control impaired  -LB muscle tone abnormal;sensation decreased;strength decreased;impaired balance;coordination impaired;motor control impaired;postural control impaired  -LB    Bed Mobility, Comment   He needs slightly more assist on mat table compared with hospital bed with railing.  -LB    Recorded by   [LB] Susan Palma, PT [LB] Susan Palma, PT    Transfer Assessment/Treatment    Transfers, Bed-Chair [place]  stand by assist;contact guard assist;verbal cues required;nonverbal cues required (demo/gesture)  -LB stand by assist;contact guard assist;verbal cues required;nonverbal cues required (demo/gesture)  -LB    Transfers, Chair-Bed [place]  stand by assist;contact guard assist;verbal cues required;nonverbal cues required (demo/gesture)  -LB stand by assist;contact guard assist;verbal cues required;nonverbal cues required (demo/gesture)  -LB    Transfers, Bed-Chair-Bed, Assist Device  sliding board  -LB sliding board  -LB    Transfer, Safety Issues  --   decreased trunk control, requires UE assist  -LB --   decreased trunk control, requires UE assist  -LB    Transfer, Impairments  muscle tone abnormal;sensation decreased;strength decreased;impaired balance;coordination impaired;motor control impaired;postural control impaired  -LB muscle tone abnormal;sensation decreased;strength decreased;impaired balance;coordination impaired;motor control impaired;postural control impaired  -LB    Recorded by  [LB] Susan Palma, PT [LB] Susan Palma PT    Grooming Assessment/Training    Grooming Assess/Train, Position sitting  -BF      Grooming Assess/Train, Indepen Level set up required;supervision required  -BF      Recorded by [BF] Lucía Pedro OT      Balance Skills Training    Sitting-Level of Assistance  Close supervision  -LB Close supervision  -LB    Sitting-Balance Support  --   requires one extremity to assist balance at eob  -LB --   requires one extremity to assist balance at eob  -LB    Sitting-Balance Activities  --   sitting bean bag toss x2  -LB --   sitting bean bag toss x2  -LB    Recorded by  [LB] Susan Palma, PT [LB] Susan Palma PT    Therapy Exercises    Bilateral Lower Extremities  PROM:;supine;prone   LE stretching, prone lying 15  min bid  -LB PROM:;supine;prone   LE stretching, prone lying 15 min bid  -LB    Bilateral Upper Extremity AROM:;sitting   BUE GMC/FMC ther ex; strengthening  -BF      BUE Resistance other (comment)   Rickshaw 25 lbs X20X4; Arm bike 4 minsX3; WristrollsX3  -BF      Recorded by [BF] Lucía Pedro, OT [LB] Susan Palma, PT [LB] Susan Palma, PT    Positioning and Restraints    Pre-Treatment Position sitting in chair/recliner  -BF sitting in chair/recliner  -LB sitting in chair/recliner  -LB    Post Treatment Position wheelchair  -BF      In Wheelchair sitting;with PT  -BF sitting  -LB sitting  -LB    Recorded by [BF] Lucía Pedro, OT [LB] Susan Palma, PT [LB] Susan Palma, PT      06/12/17 1300          Rehab Assessment/Intervention    Discipline occupational therapist  -      Document Type therapy note (daily note)  -      Subjective Information agree to therapy;no complaints  -      Patient Effort, Rehab Treatment excellent  -      Patient Response to Treatment pt tolerated therapy well, completing all activities with good tolerance  -      Recorded by [] Michell Silva OT      Transfer Assessment/Treatment    Transfers, Bed-Chair [place] contact guard assist;stand by assist  -      Transfers, Chair-Bed [place] verbal cues required  -      Transfers, Bed-Chair-Bed, Assist Device sliding board  -      Recorded by [] Michell Silva OT      Therapy Exercises    Bilateral Upper Extremity AROM:;sitting   coordination and UE ther ex  -      BUE Resistance --   arm bike 3x5 min, rickshaw 20x4 with 20 lbs  -AH      Recorded by [] Michell Silva OT        User Key  (r) = Recorded By, (t) = Taken By, (c) = Cosigned By    Initials Name Effective Dates    LB Susan Palma, PT 03/14/16 -     BF Lucía Pedro, OT 03/14/16 -     AG Radha Ohara, PT 03/14/16 -     NICOLE Scarlett Caba, PTA 05/02/16 -       Michell Silva, OT 03/14/16 -                 IP PT Goals       06/12/17 1831 06/02/17 1521       Bed Mobility PT STG    Bed Mobility PT STG, Date Established  06/02/17  -RT     Bed Mobility PT STG, Time to Achieve  1 wk  -RT     Bed Mobility PT STG, Activity Type  all bed mobility  -RT     Bed Mobility PT STG, [place] Level  minimum assist (75% patient effort)  -RT     Bed Mobility PT STG, Date Goal Reviewed 06/12/17  -LB      Bed Mobility PT STG, Outcome goal not met  -LB      Bed Mobility PT STG, Reason Goal Not Met progress slower than expected  -LB      Bed Mobility PT LTG    Bed Mobility PT LTG, Date Established  06/02/17  -RT     Bed Mobility PT LTG, Time to Achieve  2 wks  -RT     Bed Mobility PT LTG, Activity Type  all bed mobility  -RT     Bed Mobility PT LTG, [place] Level  supervision required  -RT     Transfer Training PT STG    Transfer Training PT STG, Date Established  06/02/17  -RT     Transfer Training PT STG, Time to Achieve  1 wk  -RT     Transfer Training PT STG, Activity Type  all transfers  -RT     Transfer Training PT STG, [place] Level  contact guard assist  -RT     Transfer Training PT STG, Date Goal Reviewed 06/12/17  -LB      Transfer Training PT STG, Outcome goal met  -LB      Transfer Training PT LTG    Transfer Training PT LTG, Date Established  06/02/17  -RT     Transfer Training PT LTG, Time to Achieve  2 wks  -RT     Transfer Training PT LTG, Activity Type  all transfers  -RT     Transfer Training PT LTG, [place] Level  supervision required  -RT     Static Sitting Balance PT STG    Static Sitting Balance PT STG, Date Established  06/02/17  -RT     Static Sitting Balance PT STG, Time to Achieve  1 wk  -RT     Static Sitting Balance PT STG, [place] Level  minimum assist (75% patient effort)  -RT     Static Sitting Balance PT STG, Date Goal Reviewed 06/12/17  -LB      Static Sitting Balance PT STG, Outcome goal met  -LB      Static Sitting Balance PT LTG     Static Sitting Balance PT LTG, Date Established  06/02/17  -RT     Static Sitting Balance PT LTG, Time to Achieve  2 wks  -RT     Static Sitting Balance PT LTG, [place] Level  supervision required  -RT       User Key  (r) = Recorded By, (t) = Taken By, (c) = Cosigned By    Initials Name Provider Type    LB Susan Palma, PT Physical Therapist    RT Perico Lucas, PT Physical Therapist          Physical Therapy Education     Title: PT OT SLP Therapies (Active)     Topic: Physical Therapy (Done)     Point: Mobility training (Done)    Learning Progress Summary    Learner Readiness Method Response Comment Documented by Status   Patient Acceptance E VU  NICOLE 06/14/17 1609 Done    Acceptance E VU,NR  LB 06/13/17 1530 Done    Acceptance E VU,NR   06/12/17 1831 Done    Acceptance E VU  TG 06/11/17 2000 Done    Acceptance E VU  TG 06/10/17 1955 Done    Acceptance E VU  TG 06/09/17 2207 Done    Acceptance E VU  AH 06/09/17 1557 Done    Acceptance E VU   06/08/17 2328 Done    Acceptance E VU   06/08/17 1547 Done    Acceptance E VU   06/08/17 0039 Done    Acceptance E,D NR,VU  AG 06/07/17 1810 Done    Acceptance E VU  CT 06/07/17 1737 Done    Acceptance E VU  FH 06/07/17 0050 Done    Acceptance E VU,NR  LB 06/06/17 1710 Done    Acceptance E VU,NR   06/05/17 1521 Done    Acceptance E VU  RT 06/03/17 1236 Done    Acceptance E VU  RT 06/02/17 1519 Done               Point: Home exercise program (Done)    Learning Progress Summary    Learner Readiness Method Response Comment Documented by Status   Patient Acceptance E VU  NICOLE 06/14/17 1609 Done    Acceptance E VU,NR  LB 06/13/17 1530 Done    Acceptance E VU,NR   06/12/17 1831 Done    Acceptance E VU  TG 06/11/17 2000 Done    Acceptance E VU  TG 06/10/17 1955 Done    Acceptance E VU  TG 06/09/17 2207 Done    Acceptance E VU  AH 06/09/17 1557 Done    Acceptance E VU  FH 06/08/17 2328 Done    Acceptance E VU  AH 06/08/17 1547 Done    Acceptance E VU    06/08/17 0039 Done    Acceptance E,D NR,VU  AG 06/07/17 1810 Done    Acceptance E VU  CT 06/07/17 1737 Done    Acceptance E VU  FH 06/07/17 0050 Done    Acceptance E VU,NR   06/06/17 1710 Done    Acceptance E VU,NR   06/05/17 1521 Done    Acceptance E VU  RT 06/03/17 1236 Done    Acceptance E VU  RT 06/02/17 1519 Done               Point: Body mechanics (Done)    Learning Progress Summary    Learner Readiness Method Response Comment Documented by Status   Patient Acceptance E VU  NICOLE 06/14/17 1609 Done    Acceptance E VU,NR   06/13/17 1530 Done    Acceptance E VU,NR   06/12/17 1831 Done    Acceptance E VU  TG 06/11/17 2000 Done    Acceptance E VU  TG 06/10/17 1955 Done    Acceptance E VU  TG 06/09/17 2207 Done    Acceptance E VU  AH 06/09/17 1557 Done    Acceptance E VU   06/08/17 2328 Done    Acceptance E VU  AH 06/08/17 1547 Done    Acceptance E VU   06/08/17 0039 Done    Acceptance E,D NR,VU  AG 06/07/17 1810 Done    Acceptance E VU  CT 06/07/17 1737 Done    Acceptance E VU   06/07/17 0050 Done    Acceptance E VU,NR   06/06/17 1710 Done    Acceptance E VU,NR   06/05/17 1521 Done    Acceptance E VU  RT 06/03/17 1236 Done    Acceptance E VU  RT 06/02/17 1519 Done               Point: Precautions (Done)    Learning Progress Summary    Learner Readiness Method Response Comment Documented by Status   Patient Acceptance E VU  NICOLE 06/14/17 1609 Done    Acceptance E VU,NR   06/13/17 1530 Done    Acceptance E VU,NR   06/12/17 1831 Done    Acceptance E VU  TG 06/11/17 2000 Done    Acceptance E VU  TG 06/10/17 1955 Done    Acceptance E VU  TG 06/09/17 2207 Done    Acceptance E VU  AH 06/09/17 1557 Done    Acceptance E VU   06/08/17 2328 Done    Acceptance E VU  AH 06/08/17 1547 Done    Acceptance E VU  FH 06/08/17 0039 Done    Acceptance E,D NR,VU  AG 06/07/17 1810 Done    Acceptance E VU  CT 06/07/17 1737 Done    Acceptance E VU  FH 06/07/17 0050 Done    Acceptance E RAYMUNDO,NR   06/06/17 1710 Done     Acceptance E VU,NR  LB 06/05/17 1521 Done    Acceptance E VU  RT 06/03/17 1236 Done    Acceptance E VU  RT 06/02/17 1519 Done                      User Key     Initials Effective Dates Name Provider Type Discipline    TG 06/16/16 -  Sasha De La Rosa, RN Registered Nurse Nurse     06/16/16 -  Flores Silva, RN Registered Nurse Nurse    LB 03/14/16 -  Susan Palma, PT Physical Therapist PT    AG 03/14/16 -  Radha Ohara, PT Physical Therapist PT    CT 03/14/16 -  Susu Sheridan, PT Physical Therapist PT    NICOLE 05/02/16 -  Scarlett Caba, PTA Physical Therapy Assistant PT    AH 07/14/16 -  Magda Carney, PTA Physical Therapy Assistant PT    RT 03/14/16 -  Perico Lucas, PT Physical Therapist PT                    PT Recommendation and Plan  Planned Therapy Interventions: balance training, bed mobility training, home exercise program, lumbar stabilization, manual therapy techniques, neuromuscular re-education, patient/family education, postural re-education, ROM (Range of Motion), strengthening, stretching, transfer training  PT Frequency: 5 times/wk, 2 times/day, per priority policy            Time Calculation:         PT Charges       06/14/17 1650 06/14/17 1609       Time Calculation    Start Time 1250  -AG 0835  -NICOLE     Stop Time 1335  - 0920  -NICOLE     Time Calculation (min) 45 min  -AG 45 min  -NICOLE     PT Non-Billable Time (min)  15 min  -NICOLE     PT Received On  06/14/17  -NICOLE     PT - Next Appointment  06/15/17  -NICOLE     PT Goal Re-Cert Due Date  06/16/17  -NICOLE       User Key  (r) = Recorded By, (t) = Taken By, (c) = Cosigned By    Initials Name Provider Type    AG Radha Ohara, PT Physical Therapist    NICOLE Scarlett Caba, PTA Physical Therapy Assistant          Therapy Charges for Today     Code Description Service Date Service Provider Modifiers Qty    96081662049 HC PT THER SUPP EA 15 MIN 6/14/2017 Radha Ohara, PT GP 1    22331318123 HC PT THERAPEUTIC ACT EA 15 MIN 6/14/2017  Radha Ohara, PT GP 1    [id number]  PT THER PROC EA 15 MIN 6/14/2017 Radha Ohara, PT GP 2               Radha Ohara, PT  6/14/2017

## 2017-06-14 NOTE — THERAPY TREATMENT NOTE
Inpatient Rehabilitation - Occupational Therapy Treatment Note   [place]     Patient Name: Alex Tierney  : 1975  MRN: [id number]  Today's Date: 2017  Onset of Illness/Injury or Date of Surgery Date: 17  Date of Referral to OT: 17  Referring Physician: Dr. Brooks      Admit Date: 2017    Visit Dx:   No diagnosis found.  Patient Active Problem List   Diagnosis   • Chronic allergic rhinitis   • Depression   • Smoker   • T6 spinal cord injury   • History of left lower extremity amputation   • Chronic osteomyelitis of multiple sites   • Debility             Adult Rehabilitation Note       17 1441 17 1528 17 1527    Rehab Assessment/Intervention    Discipline occupational therapist  -BF occupational therapist  -BF physical therapist  -LB    Document Type therapy note (daily note)  -BF therapy note (daily note)  -BF therapy note (daily note)  -LB    Subjective Information agree to therapy;no complaints  -BF agree to therapy;no complaints  -BF agree to therapy  -LB    Patient Effort, Rehab Treatment good  -BF good  -BF     Precautions/Limitations fall precautions;other (see comments)   < skin integrity; junior cath; colostomy  -BF fall precautions;other (see comments)   < skin integrity; junior cath; colostomy  -BF fall precautions   < skin integrity; colostomy, junior cath  -LB    Patient Response to Treatment   He tolerated sitting dynamic balance activities, LE stretching, and prone lying for hip flexor stretching.  -LB    Recorded by [BF] Lucía Pedro, OT [BF] Lucía Pedro, OT [LB] Susan Palma, PT    Pain Assessment    Pain Assessment   No/denies pain  -LB    Recorded by   [LB] Susan Palma, PT    Cognitive Assessment/Intervention    Current Cognitive/Communication Assessment functional  -BF functional  -BF functional  -LB    Orientation Status oriented x 4  -BF oriented x 4  -BF     Follows Commands/Answers Questions 100% of the time;able to  follow single-step instructions  -% of the time;able to follow single-step instructions  -% of the time  -LB    Personal Safety Interventions   supervised activity  -LB    Recorded by [BF] Lucía Pedro OT [BF] Lucía Pedro OT [LB] Susan Palma PT    Bed Mobility, Assessment/Treatment    Bed Mobility, Roll Left, [place]   minimum assist (75% patient effort);moderate assist (50% patient effort);verbal cues required;nonverbal cues required (demo/gesture)  -LB    Bed Mobility, Roll Right, [place]   minimum assist (75% patient effort);moderate assist (50% patient effort);verbal cues required;nonverbal cues required (demo/gesture)  -LB    Bed Mobility, Scoot/Bridge, [place]   dependent (less than 25% patient effort)   he cannot scoot his hips at all  -LB    Bed Mob, Supine to Sit, [place]   minimum assist (75% patient effort);verbal cues required;nonverbal cues required (demo/gesture)   he must have his arms in a position to push up from mat  -LB    Bed Mob, Sit to Supine, [place]   moderate assist (50% patient effort);verbal cues required;nonverbal cues required (demo/gesture)   he needs his trunk guided back and legs lifted into bed  -LB    Bed Mobility, Safety Issues   decreased use of legs for bridging/pushing;impaired trunk control for bed mobility  -LB    Bed Mobility, Impairments   muscle tone abnormal;sensation decreased;strength decreased;impaired balance;coordination impaired;motor control impaired;postural control impaired  -LB    Recorded by   [LB] Susan Palma, KALI    Transfer Assessment/Treatment    Transfers, Bed-Chair [place] contact guard assist;verbal cues required;nonverbal cues required (demo/gesture)  -BF  stand by assist;contact guard assist;verbal cues required;nonverbal cues required (demo/gesture)  -LB    Transfers, Chair-Bed [place] contact guard assist;verbal cues required;nonverbal cues required (demo/gesture)   -BF  stand by assist;contact guard assist;verbal cues required;nonverbal cues required (demo/gesture)  -LB    Transfers, Bed-Chair-Bed, Assist Device sliding board  -BF  sliding board  -LB    Transfer, Safety Issues   --   decreased trunk control, requires UE assist  -LB    Transfer, Impairments   muscle tone abnormal;sensation decreased;strength decreased;impaired balance;coordination impaired;motor control impaired;postural control impaired  -LB    Recorded by [BF] Lucía Pedro, OT  [LB] Susan Palma, PT    Lower Body Bathing Assessment/Training    LB Bathing Assess/Train, Position supine  -BF      LB Bathing Assess/Train, Comment TB bathing: Min A  -BF      Recorded by [BF] Lucía Pedro OT      Upper Body Dressing Assessment/Training    UB Dressing Assess/Train, Position sitting  -BF      UB Dressing Assess/Train, [place] set up required;supervision required  -BF      UB Dressing Assess/Train, Comment Set-up/supervision  -BF      Recorded by [BF] Lucía Pedro OT      Lower Body Dressing Assessment/Training    LB Dressing Assess/Train, Position supine;sitting  -BF      LB Dressing Assess/Train, Comment Mod A  -BF      Recorded by [BF] Lucía Pedro OT      Grooming Assessment/Training    Grooming Assess/Train, Position sitting  -BF sitting  -BF     Grooming Assess/Train, [place] Level set up required  -BF set up required;supervision required  -BF     Recorded by [BF] Lucía Pedro OT [BF] Lucía Pedro, STEPHANIE     Balance Skills Training    Sitting-Level of Assistance   Close supervision  -LB    Sitting-Balance Support   --   requires one extremity to assist balance at eob  -LB    Sitting-Balance Activities   --   sitting bean bag toss x2  -LB    Recorded by   [LB] Susan Palma, PT    Therapy Exercises    Bilateral Lower Extremities   PROM:;supine;prone   LE stretching, prone lying 15 min bid  -LB    Bilateral Upper Extremity AROM:;sitting    BUE GMC/FMC ther ex; strengthening  -BF AROM:;sitting   BUE GMC/FMC ther ex; strengthening  -BF     BUE Resistance other (comment)   Arm bike 4 minsX3; Rickshaw 25 [id number]; WristrollsX3  -BF other (comment)   Rickshaw 25 lbs X20X4; Arm bike 4 minsX3; WristrollsX3  -BF     Recorded by [BF] Lucía Pedro OT [BF] Lucía Pedro, OT [LB] Susan Palma, PT    Positioning and Restraints    Pre-Treatment Position  sitting in chair/recliner  -BF sitting in chair/recliner  -LB    Post Treatment Position  wheelchair  -BF     In Bed supine;call light within reach;encouraged to call for assist   In PM  -BF      In Wheelchair sitting;patient within staff view   In AM  -BF sitting;with PT  -BF sitting  -LB    Recorded by [BF] Lucía Pedro OT [BF] Lucía Pedro, STEPHANIE [LB] Susan Palma, PT      06/12/17 1820 06/12/17 1300       Rehab Assessment/Intervention    Discipline physical therapist  -LB occupational therapist  -     Document Type progress note;therapy note (daily note)  -LB therapy note (daily note)  -     Subjective Information agree to therapy;complains of  -LB agree to therapy;no complaints  -     Patient Effort, Rehab Treatment good  -LB excellent  -     Precautions/Limitations fall precautions   < skin integrity; colostomy, junior cath  -LB      Patient Response to Treatment He has tolerated stretching of LE's, balance training, and transfer/bed mobility training. He is participating well in therapy but unfortunately he has not progressed with amount of assist required. This may be because he is at his baseline. He does tolerate up to 15 min of prone lying and he has been educated to importance of changing postions frequently for home maintenance.   -LB pt tolerated therapy well, completing all activities with good tolerance  -AH     Recorded by [LB] Susan Palma, PT [AH] Michell Silva OT     Cognitive Assessment/Intervention    Current  Cognitive/Communication Assessment functional  -LB      Orientation Status oriented x 4  -LB      Follows Commands/Answers Questions 100% of the time  -LB      Personal Safety Interventions supervised activity  -LB      Recorded by [LB] Susan Palma, PT      Bed Mobility, Assessment/Treatment    Bed Mobility, Roll Left, [place] minimum assist (75% patient effort);moderate assist (50% patient effort);verbal cues required;nonverbal cues required (demo/gesture)  -LB      Bed Mobility, Roll Right, [place] minimum assist (75% patient effort);moderate assist (50% patient effort);verbal cues required;nonverbal cues required (demo/gesture)  -LB      Bed Mobility, Scoot/Bridge, [place] dependent (less than 25% patient effort)   he cannot scoot his hips at all  -LB      Bed Mob, Supine to Sit, [place] minimum assist (75% patient effort);verbal cues required;nonverbal cues required (demo/gesture)   he must have his arms in a position to push up from mat  -LB      Bed Mob, Sit to Supine, [place] moderate assist (50% patient effort);verbal cues required;nonverbal cues required (demo/gesture)   he needs his trunk guided back and legs lifted into bed  -LB      Bed Mobility, Safety Issues decreased use of legs for bridging/pushing;impaired trunk control for bed mobility  -LB      Bed Mobility, Impairments muscle tone abnormal;sensation decreased;strength decreased;impaired balance;coordination impaired;motor control impaired;postural control impaired  -LB      Bed Mobility, Comment He needs slightly more assist on mat table compared with hospital bed with railing.  -LB      Recorded by [LB] Susan Palma, PT      Transfer Assessment/Treatment    Transfers, Bed-Chair [place] stand by assist;contact guard assist;verbal cues required;nonverbal cues required (demo/gesture)  -LB contact guard assist;stand by assist  -AH     Transfers, Chair-Bed [place] stand by assist;contact guard  assist;verbal cues required;nonverbal cues required (demo/gesture)  -LB verbal cues required  -     Transfers, Bed-Chair-Bed, Assist Device sliding board  -LB sliding board  -AH     Transfer, Safety Issues --   decreased trunk control, requires UE assist  -LB      Transfer, Impairments muscle tone abnormal;sensation decreased;strength decreased;impaired balance;coordination impaired;motor control impaired;postural control impaired  -LB      Recorded by [LB] Susan Palma, PT [AH] Michell Silva, OT     Balance Skills Training    Sitting-Level of Assistance Close supervision  -LB      Sitting-Balance Support --   requires one extremity to assist balance at eob  -LB      Sitting-Balance Activities --   sitting bean bag toss x2  -LB      Recorded by [LB] Susan Palma, PT      Therapy Exercises    Bilateral Lower Extremities PROM:;supine;prone   LE stretching, prone lying 15 min bid  -LB      Bilateral Upper Extremity  AROM:;sitting   coordination and UE ther ex  -     BUE Resistance  --   arm bike 3x5 min, rickshaw 20x4 with 20 lbs  -AH     Recorded by [LB] Susan Palma, PT [AH] Michell Silva, OT     Positioning and Restraints    Pre-Treatment Position sitting in chair/recliner  -LB      In Wheelchair sitting  -LB      Recorded by [LB] Susan Palma, KALI        User Key  (r) = Recorded By, (t) = Taken By, (c) = Cosigned By    Initials Name Effective Dates    LB Susan Palma, PT 03/14/16 -     BF Lucía Roxanne Romeroris, OT 03/14/16 -      Michell Silva, OT 03/14/16 -                 OT Goals       06/08/17 1624 06/02/17 1554       Transfer Training OT LTG    Transfer Training OT LTG, Date Established  06/02/17  -BF     Transfer Training OT LTG, Time to Achieve  by discharge  -BF     Transfer Training OT LTG, Activity Type  bed to chair /chair to bed;shower chair  -BF     Transfer Training OT LTG, [place] Level  contact guard assist  -BF     Bathing OT  STG    Bathing Goal OT STG, Date Established  06/02/17  -BF     Bathing Goal OT STG, Time to Achieve  5 - 7 days  -BF     Bathing Goal OT STG, Activity Type  lower body bathing;upper body bathing  -BF     Bathing Goal OT STG, [place] Level  minimum assist (75% patient effort)  -BF     Bathing Goal OT STG, Outcome goal met  -BF      Bathing OT LTG    Bathing Goal OT LTG, Date Established  06/02/17  -BF     Bathing Goal OT LTG, Time to Achieve  by discharge  -BF     Bathing Goal OT LTG, Activity Type  upper body bathing;lower body bathing  -BF     Bathing Goal OT LTG, [place] Level  set up required;supervision required  -BF     Eating Self-Feeding OT LTG    Eat Self Feeding Goal OT LTG, Date Established  06/02/17  -BF     Eat Self Feeding Goal OT LTG, Time to Achieve  by discharge  -BF     Eat Self Feed Goal OT LTG, [place] Level  conditional independence  -BF     Grooming OT LTG    Grooming Goal OT LTG, Date Established  06/02/17  -BF     Grooming Goal OT LTG, Time to Achieve  by discharge  -BF     Grooming Goal OT LTG, [place] Level  conditional independence  -BF     LB Dressing OT STG    LB Dressing Goal OT STG, Date Established  06/02/17  -BF     LB Dressing Goal OT STG, Time to Achieve  5 - 7 days  -BF     LB Dressing Goal OT STG, [place] Level  moderate assist (50% patient effort)  -BF     LB Dressing Goal OT STG, Outcome goal met  -BF      LB Dressing OT LTG    LB Dressing Goal OT LTG, Date Established  06/02/17  -BF     LB Dressing Goal OT LTG, Time to Achieve  by discharge  -BF     LB Dressing Goal OT LTG, [place] Level  minimum assist (75% patient effort)  -BF       User Key  (r) = Recorded By, (t) = Taken By, (c) = Cosigned By    Initials Name Provider Type    YARELI Pedro, OT Occupational Therapist          Occupational Therapy Education     Title: PT OT SLP Therapies (Active)     Topic: Occupational Therapy (Active)     Point: ADL training (Active)    Description:  Instruct learner(s) on proper safety adaptation and remediation techniques during self care or transfers.   Instruct in proper use of assistive devices.    Learning Progress Summary    Learner Readiness Method Response Comment Documented by Status   Patient Acceptance E,D NR   06/14/17 1445 Active    Acceptance E,D NR  BF 06/13/17 1530 Active    Acceptance E,D NR  BF 06/08/17 1624 Active    Acceptance E,D NR  BF 06/07/17 1616 Active    Acceptance E,D NR  BF 06/06/17 1446 Active    Acceptance E,D NR  BF 06/05/17 1231 Active    Acceptance E,D NR  BF 06/02/17 1551 Active    Acceptance E,D NR  BF 06/02/17 1551 Active               Point: Precautions (Active)    Description: Instruct learner(s) on prescribed precautions during self-care and functional transfers.    Learning Progress Summary    Learner Readiness Method Response Comment Documented by Status   Patient Acceptance E,D NR   06/14/17 1445 Active    Acceptance E,D NR  BF 06/13/17 1530 Active    Acceptance E,D NR  BF 06/08/17 1624 Active    Acceptance E,D NR  BF 06/07/17 1616 Active    Acceptance E,D NR  BF 06/06/17 1446 Active    Acceptance E,D NR  BF 06/05/17 1231 Active    Acceptance E,D NR   06/02/17 1551 Active    Acceptance E,D NR   06/02/17 1551 Active                      User Key     Initials Effective Dates Name Provider Type Discipline     03/14/16 -  Lucía Pedro, OT Occupational Therapist OT                  OT Recommendation and Plan  Anticipated Equipment Needs At Discharge:  (TBD)  Anticipated Discharge Disposition:  (TBD)  Planned Therapy Interventions: activity intolerance, adaptive equipment training, ADL retraining, home exercise program, strengthening, transfer training (Therapeutic groups as beneficial)  Therapy Frequency: 3-5 times/wk          Time Calculation:         Time Calculation- OT       06/14/17 1445 06/14/17 0920       Time Calculation- OT    OT Start Time 1335  -BF 0920  -BF     OT Stop Time 1420  -BF 1005  -BF      OT Time Calculation (min) 45 min  -BF 45 min  -BF     Total Timed Code Minutes- OT 45 minute(s)  -BF 45 minute(s)  -BF     OT Non-Billable Time (min)  20 min  -BF       User Key  (r) = Recorded By, (t) = Taken By, (c) = Cosigned By    Initials Name Provider Type    BF Lucía Pedro, OT Occupational Therapist           Therapy Charges for Today     Code Description Service Date Service Provider Modifiers Qty    43089701695 HC OT THER SUPP EA 15 MIN 6/13/2017 Lucía Pedro OT GO 1    95070090794 HC OT THERAPEUTIC ACT EA 15 MIN 6/13/2017 Lucía Pedro OT GO 4    06754958588 HC OT SELF CARE/MGMT/TRAIN EA 15 MIN 6/13/2017 Lucía Pedro OT GO 2    36956696416 HC OT THERAPEUTIC ACT EA 15 MIN 6/14/2017 Lucía Pedro OT GO 3    56565178267 HC OT SELF CARE/MGMT/TRAIN EA 15 MIN 6/14/2017 Lucía Pedro OT GO 3    17833888805 HC OT THER SUPP EA 15 MIN 6/14/2017 Lucía Pedro OT GO 1               Lucía Pedro OT  6/14/2017

## 2017-06-14 NOTE — PROGRESS NOTES
Rehabilitation Nursing  Inpatient Rehabilitation Functional Measures Assessment and Plan of Care    Plan of Care/Interventions  Body Function Structure    Skin Integrity (Active)  Current Status (6/1/2017 6:00:00 PM): impairment in skin integrity  Weekly Goal: no further skin breakdown this week  Discharge Goal: wounds showing signs of healing, no s/s infection by discharge    Safety    Potential for Injury (Active)  Current Status (6/1/2017 6:00:00 PM): potential for falls  Weekly Goal: no falls this week  Discharge Goal: no falls by discharge    Functional Measures  LYNETTE Eating:  LYNETTE Grooming:  LYNETTE Bathing:  LYNETTE Upper Body Dressing:  LYNETTE Lower Body Dressing:  LYNETTE Toileting:    LYNETTE Bladder Management  Level of Assistance:  Frequency/Number of Accidents this Shift:    LYNETTE Bowel Management  Level of Assistance:  Frequency/Number of Accidents this Shift:    LYNETTE Bed/Chair/Wheelchair Transfer:  LYNETTE Toilet Transfer:  LYNETTE Tub/Shower Transfer:    Previously Documented Mode of Locomotion at Discharge:  [place]NET[place] Expected Mode of Locomotion at Discharge:  [place] Walk/Wheelchair:  [place] Stairs:    [place] Comprehension:  Both ( auditory and visual) modes of comprehension are used  equally. Comprehension Score = 7, Independent.  Patient comprehends  complex/abstract information in their primary language.  Patient is completely  independent for auditory and visual comprehension.  There are no activity  limitations.  LYNETTE Expression:  Both ( vocal and non-vocal) modes of expression are used  equally. Expression Score = 7, Independent. Patient expresses complex/abstract  information in their primary language.  Patient is completely independent for  vocal and non-vocal expression.  There are no activity limitations.  LYNETTE Social Interaction:  Social Interaction Score = 7, Independent. Patient is  completely independent for social interaction.  There are no activity  limitations.  LYNETTE Problem Solving:  Problem Solving Score = 7, Independent.   Patient makes  appropriate decisions in order to solve complex problems.  Patient is completely  independent for problem solving.  There are no activity limitations.  LYNETTE Memory:  Memory Score = 7, Independent.  Patient is completely independent  for memory.  There are no activity limitations.    Signed by: Magda Pimentel Nurse

## 2017-06-14 NOTE — PROGRESS NOTES
Inpatient Rehabilitation Functional Measures Assessment    Functional Measures  LYNETTE Eating:  Eating Score = 5. Patient is supervision/set-up for eating,  requiring: Opening containers. No assistive devices were required.  LYNETTE Grooming:  LYNETTE Bathing:  LYNETTE Upper Body Dressing:  LY[place] Lower Body Dressing:  LYNETTE Toileting:  Activity was not observed. pt has a junior cath and colostomy    LYNETTE Bladder Management  Level of Assistance:  Bladder Score = 1.  Patient is total assistance for  bladder management. Patient has an indwelling/Junior catheter.  Frequency/Number of Accidents this Shift:  Bladder accidents this shift:  0 .  Patient has not had an accident this shift.    LYNETTE Bowel Management  Level of Assistance: Bowel Score = 1. Patient performs less than 25% of tasks  and requires total assistance for bowel management or two or more people  required for bowel management requiring assistive device/method: pt does own  colostomy care .  Frequency/Number of Accidents this Shift: Bowel accidents this shift: 0 .  Patient has not had an accident this shift.    LYNETTE Bed/Chair/Wheelchair Transfer:  Bed/chair/wheelchair Transfer Score = 4.  Patient performs 75% or more of effort and minimal assistance (little/incidental  help/lifting of one limb/steadying) for transferring to and from the  bed/chair/wheelchair, requiring: Steadying. Patient requires the following  assistive device(s): Seating system of wheelchair. Sliding board. safety  LYNETTE Toilet Transfer:  Activity was not observed. junior cath and colostomy  LYNETTE Tub/Shower Transfer:    Previously Documented Mode of Locomotion at Discharge:  [facility] Expected Mode of Locomotion at Discharge:  LYNETTE Walk/Wheelchair:  LYNETTE Stairs:    [facility] Comprehension:  LYNETTE Expression:  [facility] Social Interaction:  [facility] Problem Solving:  [facility] Memory:    Therapy Mode Minutes  Occupational Therapy:  Physical Therapy:  Speech Language Pathology:    Discharge Functional Goals:    Signed by: TALI Kilgore

## 2017-06-14 NOTE — PROGRESS NOTES
Inpatient Rehabilitation Functional Measures Assessment    Functional Measures  LYNETTE Eating:  LYNETTE Grooming: Grooming Score = 5. Patient is supervision/set-up for grooming,  requiring: Setting out grooming equipment. No assistive devices were required.  LYNETTE Bathing:  Patient took sponge bath. Patient requires no physical assistance  for washing, rinsing, and drying the right arm. Patient requires no physical  assistance for washing, rinsing, and drying the left arm. Patient requires no  physical assistance for washing, rinsing, and drying the chest. Patient requires  no physical assistance for washing, rinsing, and drying the abdomen. Patient  requires no physical assistance for washing, rinsing, and drying the perineal  area. Patient requires no physical assistance for washing, rinsing, and drying  the buttocks. Patient requires no physical assistance for washing, rinsing, and  drying the right upper leg. Patient requires no physical assistance for washing,  rinsing, and drying the left upper leg. Patient requires total assistance for  washing, rinsing, or drying the right lower leg, including the foot. Patient  requires no physical assistance for washing, rinsing, and drying the left lower  leg, including the foot. Patient performs 90 % of bathing tasks. Bathing Score =  4, Minimal Assistance. Patient requires the following assistive device(s): Grab  bar/arm rest to maintain balance.  LYNETTE Upper Body Dressing:  Upper Body Dressing Score = 5. Patient is supervision  for upper body dressing, requiring: Gathering/setting out clothes. No assistive  devices were required.  LYNETTE Lower Body Dressing:  Patient requires total assistance for gathering  clothes. Wearing underwear or an undergarment is not applicable for this  patient. Patient requires maximal/significant physical assistance for holding  clothing and/or threading the right leg through the pants/skirt. Patient  requires no physical assistance for donning and/or  doffing pants/skirt threading  left leg. Patient requires no physical assistance for donning and/or doffing  pants/skirt over hips and adjusting fastener. Patient requires  minimal/incidental physical assistance for donning and/or doffing right sock.  Patient requires no physical assistance for donning and/or doffing left sock.  Donning and/or doffing right shoe is not applicable for this patient. Donning  and/or doffing left shoe is not applicable for this patient. Patient performs  66.67 % of lower body dressing tasks. Lower Body Dressing Score = 3, Moderate  Assistance. No assistive devices were required.  LYNETTE Toileting:    LYNETTE Bladder Management  Level of Assistance:  Frequency/Number of Accidents this Shift:    LYNETTE Bowel Management  Level of Assistance:  Frequency/Number of Accidents this Shift:    LYNETTE Bed/Chair/Wheelchair Transfer:  Bed/chair/wheelchair Transfer Score = 4.  Patient performs 75% or more of effort and minimal assistance (little/incidental  help/lifting of one limb/steadying) for transferring to and from the  bed/chair/wheelchair, requiring: Contact guard. Patient requires the following  assistive device(s): Sliding board.  LYNETTE Toilet Transfer:  LYNETTE Tub/Shower Transfer:    Previously Documented Mode of Locomotion at Discharge:  [facility] Expected Mode of Locomotion at Discharge:  LYNETTE Walk/Wheelchair:  LYNETTE Stairs:    [facility] Comprehension:  LYNETTE Expression:  LYNETTE Social Interaction:  LYNETTE Problem Solving:  LYNETTE Memory:    Therapy Mode Minutes  Occupational Therapy: Individual: 90 minutes.  Physical Therapy:  Speech Language Pathology:    Discharge Functional Goals:    Signed by: Lucía Pedro OT

## 2017-06-14 NOTE — PLAN OF CARE
Problem: Patient Care Overview (Adult)  Goal: Plan of Care Review  Outcome: Ongoing (interventions implemented as appropriate)    06/14/17 0811   Coping/Psychosocial Response Interventions   Plan Of Care Reviewed With patient   Patient Care Overview   Progress improving       Goal: Adult Individualization and Mutuality  Outcome: Ongoing (interventions implemented as appropriate)  Goal: Discharge Needs Assessment  Outcome: Ongoing (interventions implemented as appropriate)    Problem: Activity Intolerance (Adult)  Goal: Activity Tolerance  Outcome: Ongoing (interventions implemented as appropriate)  Goal: Effective Energy Conservation Techniques  Outcome: Ongoing (interventions implemented as appropriate)    Problem: Fall Risk (Adult)  Goal: Absence of Falls  Outcome: Ongoing (interventions implemented as appropriate)    Problem: Pressure Ulcer (Adult)  Goal: Signs and Symptoms of Listed Potential Problems Will be Absent or Manageable (Pressure Ulcer)  Outcome: Ongoing (interventions implemented as appropriate)    Problem: Skin Integrity Impairment, Risk/Actual (Adult)  Goal: Skin Integrity/Wound Healing  Outcome: Ongoing (interventions implemented as appropriate)    Problem: Mobility, Physical Impaired (Adult)  Goal: Enhanced Mobility Skills  Outcome: Ongoing (interventions implemented as appropriate)  Goal: Enhanced Functionality Ability  Outcome: Ongoing (interventions implemented as appropriate)

## 2017-06-14 NOTE — PROGRESS NOTES
"     LOS: 13 days     Chief Complaint:  Weakness, debility     Subjective     Interval History:     He continues require wound care of his decubitus ulcers.  Otherwise he feels like he's doing pretty well.  No fevers.  No chills.      Objective     Vital Signs  /64 (BP Location: Right arm, Patient Position: Sitting)  Pulse 86  Temp 96.8 °F (36 °C) (Oral)   Resp 16  Ht 71\" (180.3 cm)  Wt 245 lb (111 kg)  SpO2 97%  BMI 34.17 kg/m2  Intake & Output (last day)       06/13 0701 - 06/14 0700 06/14 0701 - 06/15 0700    P.O. 1440     Total Intake(mL/kg) 1440 (13)     Urine (mL/kg/hr) 2600 (1)     Total Output 2600      Net -1160                    Physical Exam:     General Appearance:    Alert, cooperative, in no acute distress   Head:    Normocephalic, without obvious abnormality, atraumatic   Eyes:            Lids and lashes normal, conjunctivae and sclerae normal, no   icterus, no pallor, corneas clear, PERRLA   Ears:    Ears appear intact with no abnormalities noted       Neck:   No adenopathy, supple, trachea midline, no thyromegaly, no   carotid bruit, no JVD   Lungs:     Clear to auscultation,respirations regular, even and                  unlabored    Heart:    Regular rhythm and normal rate, normal S1 and S2, no            murmur, no gallop, no rub, no click   Chest Wall:    No abnormalities observed   Abdomen:     Normal bowel sounds, no masses, no organomegaly, soft        non-tender, non-distended, no guarding, no rebound                tenderness   Extremities:   No movement in BLE, no sensation in BLE  no edema, no cyanosis, no redness   Pulses:   Pulses palpable and equal bilaterally   Skin:   No bleeding, bruising or rash                Results Review:    Lab Results   Component Value Date    WBC 6.27 06/14/2017    HGB 8.7 (L) 06/14/2017    HCT 29.9 (L) 06/14/2017    MCV 78.7 (L) 06/14/2017     (H) 06/14/2017       Lab Results   Component Value Date    GLUCOSE 104 06/14/2017    BUN 14 " 06/14/2017    CREATININE 0.51 06/14/2017    EGFRIFNONA >150 06/14/2017    BCR 27.5 (H) 06/14/2017     06/14/2017    K 3.7 06/14/2017     06/14/2017    CO2 24.0 (L) 06/14/2017    CALCIUM 9.1 06/14/2017    ALBUMIN 3.10 (L) 06/02/2017    LABIL2 1.1 (L) 06/02/2017    AST 15 06/02/2017    ALT 13 06/02/2017     No results found for: INR    No results found for: POCGLU       Medication Review:     Current Facility-Administered Medications:   •  acetaminophen (TYLENOL) tablet 650 mg, 650 mg, Oral, Q4H PRN, Samuel Duane Kreis, MD, 650 mg at 06/12/17 1031  •  ALPRAZolam (XANAX) tablet 0.5 mg, 0.5 mg, Oral, TID, Samuel Duane Kreis, MD, 0.5 mg at 06/13/17 2108  •  aluminum-magnesium hydroxide-simethicone (MAALOX MAX) 400-400-40 MG/5ML suspension 15 mL, 15 mL, Oral, Q6H PRN, Samuel Duane Kreis, MD  •  amitriptyline (ELAVIL) tablet 25 mg, 25 mg, Oral, Nightly, Samuel Duane Kreis, MD, 25 mg at 06/13/17 2108  •  aspirin EC tablet 81 mg, 81 mg, Oral, Daily, Samuel Duane Kreis, MD, 81 mg at 06/13/17 0845  •  baclofen (LIORESAL) tablet 20 mg, 20 mg, Oral, Q8H, Samuel Duane Kreis, MD, 20 mg at 06/14/17 0513  •  bisacodyl (DULCOLAX) suppository 10 mg, 10 mg, Rectal, Daily PRN, Samuel Duane Kreis, MD  •  castor oil-balsam peru (VENELEX) ointment, , Topical, Q12H, Samuel Duane Kreis, MD  •  cetirizine (zyrTEC) tablet 10 mg, 10 mg, Oral, Daily, Samuel Duane Kreis, MD, 10 mg at 06/13/17 0845  •  enoxaparin (LOVENOX) syringe 40 mg, 40 mg, Subcutaneous, Daily, Samuel Duane Kreis, MD, 40 mg at 06/13/17 0845  •  fluticasone (FLONASE) 50 MCG/ACT nasal spray 2 spray, 2 spray, Each Nare, Daily, Samuel Duane Kreis, MD, 2 spray at 06/13/17 0845  •  folic acid (FOLVITE) tablet 1 mg, 1 mg, Oral, Daily, Samuel Duane Kreis, MD, 1 mg at 06/13/17 0845  •  gabapentin (NEURONTIN) capsule 800 mg, 800 mg, Oral, Q8H, Samuel Duane Kreis, MD, 800 mg at 06/14/17 0513  •  magnesium hydroxide (MILK OF MAGNESIA) suspension 2400 mg/10mL 10 mL, 10 mL,  Oral, Daily PRN, Samuel Duane Kreis, MD  •  metoprolol tartrate (LOPRESSOR) tablet 25 mg, 25 mg, Oral, Q12H, Samuel Duane Kreis, MD, 25 mg at 06/13/17 2108  •  multivitamin (DAILY BRIAN) tablet 1 tablet, 1 tablet, Oral, Daily, Samuel Duane Kreis, MD, 1 tablet at 06/13/17 0845  •  ondansetron (ZOFRAN) tablet 4 mg, 4 mg, Oral, Q6H PRN, 4 mg at 06/06/17 0835 **OR** ondansetron ODT (ZOFRAN-ODT) disintegrating tablet 4 mg, 4 mg, Oral, Q6H PRN **OR** ondansetron (ZOFRAN) injection 4 mg, 4 mg, Intravenous, Q6H PRN, Samuel Duane Kreis, MD  •  sennosides-docusate sodium (SENOKOT-S) 8.6-50 MG tablet 2 tablet, 2 tablet, Oral, Nightly, Samuel Duane Kreis, MD, 2 tablet at 06/13/17 2108  •  venlafaxine XR (EFFEXOR-XR) 24 hr capsule 75 mg, 75 mg, Oral, Daily With Breakfast, Samuel Duane Kreis, MD, 75 mg at 06/13/17 0845      Assessment/Plan     Debility  Paraplegia  Decubitus ulcers of buttocks      PT and OT ongoing.    Continue gabapentin with when necessary baclofen    Continue indwelling Sheldon    Plan home Friday      Samuel Duane Kreis, MD  06/14/17  8:50 AM

## 2017-06-14 NOTE — PROGRESS NOTES
Inpatient Rehabilitation Functional Measures Assessment    Functional Measures  LYNETTE Eating:  Eating Score = 5. Patient is supervision/set-up for eating,  requiring: Opening containers. No assistive devices were required.  LYNETTE Grooming:  LYNETTE Bathing:  LYNETTE Upper Body Dressing:  LYNETTE Lower Body Dressing:  LYNETTE Toileting:  Toileting did not occur. Patient has a colostomy bag and  catheter    LYNETTE Bladder Management  Level of Assistance:  Bladder Score = 6.  Patient is modified independent for  bladder management with indwelling/Sheldon catheter.  Frequency/Number of Accidents this Shift:  Bladder accidents this shift:  0 .  Patient has not had an accident, but used a device/medication this shift  requiring: catheter .    LYNETTE Bowel Management  Level of Assistance: Bowel Score = 1. Patient performs less than 25% of tasks  and requires total assistance for bowel management or two or more people  required for bowel management requiring assistive device/method: colostomy bag .    Frequency/Number of Accidents this Shift: Bowel accidents this shift: 0 .  Patient has not had an accident, but used a device/medication this shift  requiring: colostomy bag .    LYNETTE Bed/Chair/Wheelchair Transfer:  Bed/chair/wheelchair Transfer Score = 3.  Patient performs 50-74% of effort and requires moderate assistance (some  lifting) for transferring to and from the bed/chair/wheelchair. Patient requires  the following assistive device(s): Bed rails. Elevated bed/surface. Sliding  board. Arm rest. Seating system of wheelchair.  LYNETTE Toilet Transfer:  Toilet Transfer did not occur. .  LYNETTE Tub/Shower Transfer:    Previously Documented Mode of Locomotion at Discharge:  [facility] Expected Mode of Locomotion at Discharge:  [facility] Walk/Wheelchair:  [facility] Stairs:    [facility] Comprehension:  LYNETTE Expression:  [facility] Social Interaction:  [facility] Problem Solving:  LYNETTE Memory:    Therapy Mode Minutes  Occupational Therapy:  Physical Therapy:  Speech Language Pathology:    Discharge  Functional Goals:    Signed by: Alba Ghotra, SRNA

## 2017-06-14 NOTE — SIGNIFICANT NOTE
06/14/17 1020   Case Management/Social Work Plan   Plan SS spoke to MD warner ruth about order for roho cushion who is agreeable.  Spoke to Salas at [employer] [phone] about order for low-profile roho cushion.  [employer] will ship loaner cushion that mimics roho cushion with air cells out today until roho cushion is approved by insurance.  Faxed DWO, face sheet, H&P, PT notes and wound care nurse note to [phone].  SS reviewed this information with pt and discussed HH services and provider.  Pt will talk to his father about HH agency and inform SS which agency he wants.

## 2017-06-14 NOTE — PROGRESS NOTES
Inpatient Rehabilitation Functional Measures Assessment    Functional Measures  LYNETTE Eating:  LYNETTE Grooming:  LYNETTE Bathing:  LYNETTE Upper Body Dressing:  LYNETTE Lower Body Dressing:  LYNETTE Toileting:    LYNETTE Bladder Management  Level of Assistance:  Frequency/Number of Accidents this Shift:    LYNETTE Bowel Management  Level of Assistance:  Frequency/Number of Accidents this Shift:    LYNETTE Bed/Chair/Wheelchair Transfer:  LYNETTE Toilet Transfer:  LYNETTE Tub/Shower Transfer:    Previously Documented Mode of Locomotion at Discharge:  LYNETTE Expected Mode of Locomotion at Discharge:  LYNETTE Walk/Wheelchair:  LYNETTE Stairs:    LYNETTE Comprehension:  Both ( auditory and visual) modes of comprehension are used  equally. Comprehension Score = 7, Independent.  Patient comprehends  complex/abstract information in their primary language.  Patient is completely  independent for auditory and visual comprehension.  There are no activity  limitations.  LYNETTE Expression:  Vocal expression is the usual mode. Expression Score = 6,  Modified Independent.  Patient expresses complex/abstract information in their  primary language, requiring: Additional time.  LYNETTE Social Interaction:  Social Interaction Score = 6, Modified Independent.  Patient is modified independent for social interaction, requiring: Requires  additional time.  LYNETTE Problem Solving:  Problem Solving Score = 6, Modified [place].  Patient  makes appropriate decisions in order to solve complex problems, but requires  extra time.  LYNETTE Memory:  Memory Score = 6, Modified [place].  Patient is modified  independent for memory, requiring: Requires additional time.    Therapy Mode Minutes  Occupational Therapy:  Physical Therapy:  Speech Language Pathology:    Discharge Functional Goals:    Signed by: Minnie Pickard, Nurse

## 2017-06-14 NOTE — THERAPY TREATMENT NOTE
Inpatient Rehabilitation - Physical Therapy Treatment Note   [place]     Patient Name: Alex Tierney  : 1975  MRN: [id number]  Today's Date: 2017  Onset of Illness/Injury or Date of Surgery Date: 17  Date of Referral to PT: 17  Referring Physician: Dr. Brooks    Admit Date: 2017    Visit Dx:  No diagnosis found.  Patient Active Problem List   Diagnosis   • Chronic allergic rhinitis   • Depression   • Smoker   • T6 spinal cord injury   • History of left lower extremity amputation   • Chronic osteomyelitis of multiple sites   • Debility               Adult Rehabilitation Note       17 1500 17 1441 17 1528    Rehab Assessment/Intervention    Discipline physical therapy assistant  -NICOLE occupational therapist  -BF occupational therapist  -BF    Document Type therapy note (daily note)   AM session  -NICOLE therapy note (daily note)  -BF therapy note (daily note)  -BF    Subjective Information agree to therapy;no complaints  -NICOLE agree to therapy;no complaints  -BF agree to therapy;no complaints  -BF    Patient Effort, Rehab Treatment good  -NICOLE good  -BF good  -BF    Precautions/Limitations fall precautions;other (see comments)   < skin integrity, junior cath, colostomy  -NICOLE fall precautions;other (see comments)   < skin integrity; junior cath; colostomy  -BF fall precautions;other (see comments)   < skin integrity; junior cath; colostomy  -BF    Patient Response to Treatment Pt tolerated tx well today and continues to progress with balance activites, LE stretching and prone lying.   -NICOLE      Recorded by [NICOLE] Scarlett Caba PTA [BF] Lucía Pedro OT [BF] Lucía Pedro OT    Pain Assessment    Pain Assessment No/denies pain  -NICOLE      Recorded by [NICOLE] Scarlett Caba PTA      Cognitive Assessment/Intervention    Current Cognitive/Communication Assessment functional  -NICOLE functional  -BF functional  -BF    Orientation Status oriented x 4  -NICOLE oriented x 4   -BF oriented x 4  -BF    Follows Commands/Answers Questions 100% of the time;able to follow single-step instructions  -NICOLE 100% of the time;able to follow single-step instructions  -% of the time;able to follow single-step instructions  -BF    Personal Safety Interventions supervised activity  -NICOLE      Recorded by [NICOLE] Scarlett Caba PTA [BF] Lucía Pedro, STEPHANIE [BF] Lucía Pedro, OT    Bed Mobility, Assessment/Treatment    Bed Mobility, Roll Left, [place] minimum assist (75% patient effort);moderate assist (50% patient effort);verbal cues required;nonverbal cues required (demo/gesture)  -NICOLE      Bed Mobility, Roll Right, [place] minimum assist (75% patient effort);moderate assist (50% patient effort);verbal cues required;nonverbal cues required (demo/gesture)  -NICOLE      Bed Mob, Supine to Sit, [place] minimum assist (75% patient effort);verbal cues required;nonverbal cues required (demo/gesture)  -NICOLE      Bed Mob, Sit to Supine, [place] moderate assist (50% patient effort);verbal cues required;nonverbal cues required (demo/gesture)  -NICOLE      Bed Mobility, Safety Issues decreased use of legs for bridging/pushing;impaired trunk control for bed mobility  -NICOLE      Bed Mobility, Impairments muscle tone abnormal;sensation decreased;strength decreased;impaired balance;coordination impaired;motor control impaired;postural control impaired  -NICOLE      Recorded by [NICOLE] Scarlett Caba PTA      Transfer Assessment/Treatment    Transfers, Bed-Chair [place] contact guard assist;verbal cues required;nonverbal cues required (demo/gesture)  -NICOLE contact guard assist;verbal cues required;nonverbal cues required (demo/gesture)  -BF     Transfers, Chair-Bed [place] contact guard assist;verbal cues required;nonverbal cues required (demo/gesture)  -NICOLE contact guard assist;verbal cues required;nonverbal cues required (demo/gesture)  -BF     Transfers, Bed-Chair-Bed, Assist  Device sliding board  -NICOLE sliding board  -BF     Transfer, Safety Issues --   decreased trunk control  -NICOLE      Transfer, Impairments muscle tone abnormal;sensation decreased;strength decreased;impaired balance;coordination impaired;motor control impaired;postural control impaired  -NICOLE      Recorded by [NICOLE] Scarlett Caba PTA [BF] Lucía Pedro OT     Lower Body Bathing Assessment/Training    LB Bathing Assess/Train, Position  supine  -BF     LB Bathing Assess/Train, Comment  TB bathing: Min A  -BF     Recorded by  [BF] Lucía Pedro OT     Upper Body Dressing Assessment/Training    UB Dressing Assess/Train, Position  sitting  -BF     UB Dressing Assess/Train, [place]  set up required;supervision required  -BF     UB Dressing Assess/Train, Comment  Set-up/supervision  -BF     Recorded by  [BF] Lucía Pedro OT     Lower Body Dressing Assessment/Training    LB Dressing Assess/Train, Position  supine;sitting  -BF     LB Dressing Assess/Train, Comment  Mod A  -BF     Recorded by  [BF] Lucía Pedro OT     Grooming Assessment/Training    Grooming Assess/Train, Position  sitting  -BF sitting  -BF    Grooming Assess/Train, Indepen Level  set up required  -BF set up required;supervision required  -BF    Recorded by  [BF] Lucía Pedro OT [BF] Lucía Pedro OT    Balance Skills Training    Sitting-Level of Assistance Close supervision  -NICOLE      Recorded by [NICOLE] Scarlett Caba PTA      Therapy Exercises    Bilateral Lower Extremities PROM:;supine;prone   B) LE stretching; prone lying 15 min AM session  -NICOLE      Bilateral Upper Extremity  AROM:;sitting   BUE GMC/FMC ther ex; strengthening  -BF AROM:;sitting   BUE GMC/FMC ther ex; strengthening  -BF    BUE Resistance  other (comment)   Arm bike 4 minsX3; Rickshaw 25 qvjC24T2; WristrollsX3  -BF other (comment)   Rickshaw 25 lbs X20X4; Arm bike 4 minsX3; WristrollsX3  -BF    Recorded by [NICOLE] Scarlett  Tiffany Caba, SHILPA [BF] Lucía Pedro, OT [BF] Lucía Pedro, STEPHANIE    Positioning and Restraints    Pre-Treatment Position in bed  -NICOLE  sitting in chair/recliner  -BF    Post Treatment Position chair  -NICOLE  wheelchair  -BF    In Bed  supine;call light within reach;encouraged to call for assist   In PM  -BF     In Wheelchair sitting;patient within staff view   following AM session  -NICOLE sitting;patient within staff view   In AM  -BF sitting;with PT  -BF    Recorded by [NICOLE] Scarlett Caba, SHILPA [BF] Lucía Pedro, STEPHANIE [BF] Lucía Pedro OT      06/13/17 1527 06/12/17 1820 06/12/17 1300    Rehab Assessment/Intervention    Discipline physical therapist  -LB physical therapist  -LB occupational therapist  -    Document Type therapy note (daily note)  -LB progress note;therapy note (daily note)  -LB therapy note (daily note)  -    Subjective Information agree to therapy  -LB agree to therapy;complains of  -LB agree to therapy;no complaints  -    Patient Effort, Rehab Treatment  good  -LB excellent  -AH    Precautions/Limitations fall precautions   < skin integrity; colostomy, junior cath  -LB fall precautions   < skin integrity; colostomy, junior cath  -LB     Patient Response to Treatment He tolerated sitting dynamic balance activities, LE stretching, and prone lying for hip flexor stretching.  -LB He has tolerated stretching of LE's, balance training, and transfer/bed mobility training. He is participating well in therapy but unfortunately he has not progressed with amount of assist required. This may be because he is at his baseline. He does tolerate up to 15 min of prone lying and he has been educated to importance of changing postions frequently for home maintenance.   -LB pt tolerated therapy well, completing all activities with good tolerance  -AH    Recorded by [LB] Susan Palma, PT [LB] Susan Palma, PT [AH] Michell Silva, STEPHANIE    Pain Assessment    Pain  Assessment No/denies pain  -LB      Recorded by [LB] Susan Palma, PT      Cognitive Assessment/Intervention    Current Cognitive/Communication Assessment functional  -LB functional  -LB     Orientation Status  oriented x 4  -LB     Follows Commands/Answers Questions 100% of the time  -% of the time  -LB     Personal Safety Interventions supervised activity  -LB supervised activity  -LB     Recorded by [LB] Susan Palma PT [LB] Susan Palma, PT     Bed Mobility, Assessment/Treatment    Bed Mobility, Roll Left, [place] minimum assist (75% patient effort);moderate assist (50% patient effort);verbal cues required;nonverbal cues required (demo/gesture)  -LB minimum assist (75% patient effort);moderate assist (50% patient effort);verbal cues required;nonverbal cues required (demo/gesture)  -LB     Bed Mobility, Roll Right, [place] minimum assist (75% patient effort);moderate assist (50% patient effort);verbal cues required;nonverbal cues required (demo/gesture)  -LB minimum assist (75% patient effort);moderate assist (50% patient effort);verbal cues required;nonverbal cues required (demo/gesture)  -LB     Bed Mobility, Scoot/Bridge, [place] dependent (less than 25% patient effort)   he cannot scoot his hips at all  -LB dependent (less than 25% patient effort)   he cannot scoot his hips at all  -LB     Bed Mob, Supine to Sit, [place] minimum assist (75% patient effort);verbal cues required;nonverbal cues required (demo/gesture)   he must have his arms in a position to push up from mat  -LB minimum assist (75% patient effort);verbal cues required;nonverbal cues required (demo/gesture)   he must have his arms in a position to push up from mat  -LB     Bed Mob, Sit to Supine, [place] moderate assist (50% patient effort);verbal cues required;nonverbal cues required (demo/gesture)   he needs his trunk guided back and legs lifted into bed  -LB moderate assist (50% patient  effort);verbal cues required;nonverbal cues required (demo/gesture)   he needs his trunk guided back and legs lifted into bed  -LB     Bed Mobility, Safety Issues decreased use of legs for bridging/pushing;impaired trunk control for bed mobility  -LB decreased use of legs for bridging/pushing;impaired trunk control for bed mobility  -LB     Bed Mobility, Impairments muscle tone abnormal;sensation decreased;strength decreased;impaired balance;coordination impaired;motor control impaired;postural control impaired  -LB muscle tone abnormal;sensation decreased;strength decreased;impaired balance;coordination impaired;motor control impaired;postural control impaired  -LB     Bed Mobility, Comment  He needs slightly more assist on mat table compared with hospital bed with railing.  -LB     Recorded by [LB] Susan Palma, PT [LB] Susan Palma, PT     Transfer Assessment/Treatment    Transfers, Bed-Chair [place] stand by assist;contact guard assist;verbal cues required;nonverbal cues required (demo/gesture)  -LB stand by assist;contact guard assist;verbal cues required;nonverbal cues required (demo/gesture)  -LB contact guard assist;stand by assist  -AH    Transfers, Chair-Bed [place] stand by assist;contact guard assist;verbal cues required;nonverbal cues required (demo/gesture)  -LB stand by assist;contact guard assist;verbal cues required;nonverbal cues required (demo/gesture)  -LB verbal cues required  -AH    Transfers, Bed-Chair-Bed, Assist Device sliding board  -LB sliding board  -LB sliding board  -AH    Transfer, Safety Issues --   decreased trunk control, requires UE assist  -LB --   decreased trunk control, requires UE assist  -LB     Transfer, Impairments muscle tone abnormal;sensation decreased;strength decreased;impaired balance;coordination impaired;motor control impaired;postural control impaired  -LB muscle tone abnormal;sensation decreased;strength decreased;impaired  balance;coordination impaired;motor control impaired;postural control impaired  -LB     Recorded by [LB] Susan Palma, PT [LB] Susan Palma, PT [AH] Michell Silva, OT    Balance Skills Training    Sitting-Level of Assistance Close supervision  -LB Close supervision  -LB     Sitting-Balance Support --   requires one extremity to assist balance at eob  -LB --   requires one extremity to assist balance at eob  -LB     Sitting-Balance Activities --   sitting bean bag toss x2  -LB --   sitting bean bag toss x2  -LB     Recorded by [LB] Susan Palma, PT [LB] Susan Palma, KALI     Therapy Exercises    Bilateral Lower Extremities PROM:;supine;prone   LE stretching, prone lying 15 min bid  -LB PROM:;supine;prone   LE stretching, prone lying 15 min bid  -LB     Bilateral Upper Extremity   AROM:;sitting   coordination and UE ther ex  -AH    BUE Resistance   --   arm bike 3x5 min, rickshaw 20x4 with 20 lbs  -AH    Recorded by [LB] Susan Palma, PT [LB] Susan Palma, PT [AH] Michell Silva, OT    Positioning and Restraints    Pre-Treatment Position sitting in chair/recliner  -LB sitting in chair/recliner  -LB     In Wheelchair sitting  -LB sitting  -LB     Recorded by [LB] Susan Palma, PT [LB] Susan Palma, KALI       User Key  (r) = Recorded By, (t) = Taken By, (c) = Cosigned By    Initials Name Effective Dates    LB Susan Palma, PT 03/14/16 -     BF Lucía Pedro, OT 03/14/16 -     NICOLE Scarlett Caba, PTA 05/02/16 -      Michell Silva, OT 03/14/16 -                 IP PT Goals       06/12/17 1831 06/02/17 1521       Bed Mobility PT STG    Bed Mobility PT STG, Date Established  06/02/17  -RT     Bed Mobility PT STG, Time to Achieve  1 wk  -RT     Bed Mobility PT STG, Activity Type  all bed mobility  -RT     Bed Mobility PT STG, [place] Level  minimum assist (75% patient effort)  -RT     Bed Mobility PT STG, Date Goal  Reviewed 06/12/17  -LB      Bed Mobility PT STG, Outcome goal not met  -LB      Bed Mobility PT STG, Reason Goal Not Met progress slower than expected  -LB      Bed Mobility PT LTG    Bed Mobility PT LTG, Date Established  06/02/17  -RT     Bed Mobility PT LTG, Time to Achieve  2 wks  -RT     Bed Mobility PT LTG, Activity Type  all bed mobility  -RT     Bed Mobility PT LTG, [place] Level  supervision required  -RT     Transfer Training PT STG    Transfer Training PT STG, Date Established  06/02/17  -RT     Transfer Training PT STG, Time to Achieve  1 wk  -RT     Transfer Training PT STG, Activity Type  all transfers  -RT     Transfer Training PT STG, [place] Level  contact guard assist  -RT     Transfer Training PT STG, Date Goal Reviewed 06/12/17  -LB      Transfer Training PT STG, Outcome goal met  -LB      Transfer Training PT LTG    Transfer Training PT LTG, Date Established  06/02/17  -RT     Transfer Training PT LTG, Time to Achieve  2 wks  -RT     Transfer Training PT LTG, Activity Type  all transfers  -RT     Transfer Training PT LTG, [place] Level  supervision required  -RT     Static Sitting Balance PT STG    Static Sitting Balance PT STG, Date Established  06/02/17  -RT     Static Sitting Balance PT STG, Time to Achieve  1 wk  -RT     Static Sitting Balance PT STG, [place] Level  minimum assist (75% patient effort)  -RT     Static Sitting Balance PT STG, Date Goal Reviewed 06/12/17  -LB      Static Sitting Balance PT STG, Outcome goal met  -LB      Static Sitting Balance PT LTG    Static Sitting Balance PT LTG, Date Established  06/02/17  -RT     Static Sitting Balance PT LTG, Time to Achieve  2 wks  -RT     Static Sitting Balance PT LTG, [place] Level  supervision required  -RT       User Key  (r) = Recorded By, (t) = Taken By, (c) = Cosigned By    Initials Name Provider Type    ANA CRISTINA Palma, PT Physical Therapist    RT Perico Lucas, PT Physical Therapist           Physical Therapy Education     Title: PT OT SLP Therapies (Active)     Topic: Physical Therapy (Done)     Point: Mobility training (Done)    Learning Progress Summary    Learner Readiness Method Response Comment Documented by Status   Patient Acceptance E VU  NICOLE 06/14/17 1609 Done    Acceptance E VU,NR   06/13/17 1530 Done    Acceptance E VU,NR   06/12/17 1831 Done    Acceptance E VU  TG 06/11/17 2000 Done    Acceptance E VU   06/10/17 1955 Done    Acceptance E VU   06/09/17 2207 Done    Acceptance E VU   06/09/17 1557 Done    Acceptance E VU   06/08/17 2328 Done    Acceptance E VU   06/08/17 1547 Done    Acceptance E VU   06/08/17 0039 Done    Acceptance E,D NR,VU  AG 06/07/17 1810 Done    Acceptance E VU  CT 06/07/17 1737 Done    Acceptance E VU   06/07/17 0050 Done    Acceptance E VU,NR   06/06/17 1710 Done    Acceptance E VU,NR   06/05/17 1521 Done    Acceptance E VU  RT 06/03/17 1236 Done    Acceptance E VU  RT 06/02/17 1519 Done               Point: Home exercise program (Done)    Learning Progress Summary    Learner Readiness Method Response Comment Documented by Status   Patient Acceptance E VU  NICOLE 06/14/17 1609 Done    Acceptance E VU,NR   06/13/17 1530 Done    Acceptance E VU,NR   06/12/17 1831 Done    Acceptance E VU   06/11/17 2000 Done    Acceptance E VU   06/10/17 1955 Done    Acceptance E VU   06/09/17 2207 Done    Acceptance E VU   06/09/17 1557 Done    Acceptance E VU   06/08/17 2328 Done    Acceptance E VU   06/08/17 1547 Done    Acceptance E VU   06/08/17 0039 Done    Acceptance E,D NR,VU  AG 06/07/17 1810 Done    Acceptance E VU  CT 06/07/17 1737 Done    Acceptance E VU   06/07/17 0050 Done    Acceptance E VU,NR   06/06/17 1710 Done    Acceptance E VU,NR   06/05/17 1521 Done    Acceptance E VU  RT 06/03/17 1236 Done    Acceptance E VU  RT 06/02/17 1519 Done               Point: Body mechanics (Done)    Learning Progress Summary    Learner  Readiness Method Response Comment Documented by Status   Patient Acceptance E VU  NICOLE 06/14/17 1609 Done    Acceptance E VU,NR   06/13/17 1530 Done    Acceptance E VU,NR   06/12/17 1831 Done    Acceptance E VU  TG 06/11/17 2000 Done    Acceptance E VU  TG 06/10/17 1955 Done    Acceptance E VU  TG 06/09/17 2207 Done    Acceptance E VU   06/09/17 1557 Done    Acceptance E VU   06/08/17 2328 Done    Acceptance E VU   06/08/17 1547 Done    Acceptance E VU   06/08/17 0039 Done    Acceptance E,D NR,VU  AG 06/07/17 1810 Done    Acceptance E VU  CT 06/07/17 1737 Done    Acceptance E VU   06/07/17 0050 Done    Acceptance E VU,NR   06/06/17 1710 Done    Acceptance E VU,NR   06/05/17 1521 Done    Acceptance E VU  RT 06/03/17 1236 Done    Acceptance E VU  RT 06/02/17 1519 Done               Point: Precautions (Done)    Learning Progress Summary    Learner Readiness Method Response Comment Documented by Status   Patient Acceptance E VU  NICOLE 06/14/17 1609 Done    Acceptance E VU,NR   06/13/17 1530 Done    Acceptance E VU,NR   06/12/17 1831 Done    Acceptance E VU  TG 06/11/17 2000 Done    Acceptance E VU  TG 06/10/17 1955 Done    Acceptance E VU  TG 06/09/17 2207 Done    Acceptance E VU   06/09/17 1557 Done    Acceptance E VU   06/08/17 2328 Done    Acceptance E VU   06/08/17 1547 Done    Acceptance E VU   06/08/17 0039 Done    Acceptance E,D NR,VU  AG 06/07/17 1810 Done    Acceptance E VU  CT 06/07/17 1737 Done    Acceptance E VU   06/07/17 0050 Done    Acceptance E VU,NR   06/06/17 1710 Done    Acceptance E VU,NR   06/05/17 1521 Done    Acceptance E VU  RT 06/03/17 1236 Done    Acceptance E VU  RT 06/02/17 1519 Done                      User Key     Initials Effective Dates Name Provider Type Discipline     06/16/16 -  Sasha De L aRosa, RN Registered Nurse Nurse     06/16/16 -  Flores Silva, RN Registered Nurse Nurse     03/14/16 -  Susan Palma, PT Physical Therapist PT    AG  03/14/16 -  Radha Ohara, PT Physical Therapist PT    CT 03/14/16 -  Susu Sheridan, PT Physical Therapist PT    NICOLE 05/02/16 -  Scarlett Caba, SHILPA Physical Therapy Assistant PT    AH 07/14/16 -  Magda Carney, PTA Physical Therapy Assistant PT    RT 03/14/16 -  Perico Lucas, PT Physical Therapist PT                    PT Recommendation and Plan  Planned Therapy Interventions: balance training, bed mobility training, home exercise program, lumbar stabilization, manual therapy techniques, neuromuscular re-education, patient/family education, postural re-education, ROM (Range of Motion), strengthening, stretching, transfer training  PT Frequency: 5 times/wk, 2 times/day, per priority policy            Time Calculation:         PT Charges       06/14/17 1609          Time Calculation    Start Time 0835  -NICOLE      Stop Time 0920  -NICOLE      Time Calculation (min) 45 min  -NICOLE      PT Non-Billable Time (min) 15 min  -NICOLE      PT Received On 06/14/17  -NICOLE      PT - Next Appointment 06/15/17  -NICOLE      PT Goal Re-Cert Due Date 06/16/17  -NICOLE        User Key  (r) = Recorded By, (t) = Taken By, (c) = Cosigned By    Initials Name Provider Type    NICOLE Scarlett Caba PTA Physical Therapy Assistant          Therapy Charges for Today     Code Description Service Date Service Provider Modifiers Qty    34852135958 HC PT THER PROC EA 15 MIN 6/14/2017 cSarlett Caba, SHILPA GP 2    02182313266 HC PT THERAPEUTIC ACT EA 15 MIN 6/14/2017 Scarlett Caba, SHILPA GP 1    79363504323 HC PT THER SUPP EA 15 MIN 6/14/2017 Scarlett Caba PTA GP 2               Scarlett Caba PTA  6/14/2017

## 2017-06-14 NOTE — PROGRESS NOTES
Inpatient Rehabilitation Functional Measures Assessment    Functional Measures  LYNETTE Eating:  LYNETTE Grooming:  LYNETTE Bathing:  LYNETTE Upper Body Dressing:  LYNETTE Lower Body Dressing:  LYNETTE Toileting:    LYNETTE Bladder Management  Level of Assistance:  Frequency/Number of Accidents this Shift:    LYNETTE Bowel Management  Level of Assistance:  Frequency/Number of Accidents this Shift:    LYNETTE Bed/Chair/Wheelchair Transfer:  Bed/chair/wheelchair Transfer Score = 3.  Patient performs 50-74% of effort and requires moderate assistance (some  lifting)  for transferring to and from the bed/chair/wheelchair, including  assist lifting both legs. Patient requires the following assistive device(s):  Sliding board.  LYNETTE Toilet Transfer:  LYNETTE Tub/Shower Transfer:    Previously Documented Mode of Locomotion at Discharge:  LYNETTE Expected Mode of Locomotion at Discharge:  LYNETTE Walk/Wheelchair:  WHEELCHAIR OBSERVATION   Activity was not observed.    WALK OBSERVATION   Activity was not observed.  LYNETTE Stairs:  Activity was not observed.    LYNETTE Comprehension:  LYNETTE Expression:  LYNETTE Social Interaction:  LYNETTE Problem Solving:  LYNETTE Memory:    Therapy Mode Minutes  Occupational Therapy:  Physical Therapy: Individual: 90 minutes.  Speech Language Pathology:    Discharge Functional Goals:    Signed by: Scarlett Caba Physical Therapy Assistant

## 2017-06-14 NOTE — PROGRESS NOTES
Rehabilitation Nursing  Inpatient Rehabilitation Plan of Care Note    Plan of Care  Copy from POC  Body Function Structure    Skin Integrity (Active)  Current Status (6/1/2017 6:00:00 PM): impairment in skin integrity  Weekly Goal: no further skin breakdown this week  Discharge Goal: wounds showing signs of healing, no s/s infection by discharge    Safety    Potential for Injury (Active)  Current Status (6/1/2017 6:00:00 PM): potential for falls  Weekly Goal: no falls this week  Discharge Goal: no falls by discharge    Signed by: Minnie Pickard, Nurse

## 2017-06-15 PROCEDURE — 97530 THERAPEUTIC ACTIVITIES: CPT

## 2017-06-15 PROCEDURE — 97110 THERAPEUTIC EXERCISES: CPT

## 2017-06-15 PROCEDURE — 97535 SELF CARE MNGMENT TRAINING: CPT

## 2017-06-15 PROCEDURE — 25010000002 ENOXAPARIN PER 10 MG: Performed by: FAMILY MEDICINE

## 2017-06-15 RX ADMIN — ALPRAZOLAM 0.5 MG: 0.5 TABLET ORAL at 21:24

## 2017-06-15 RX ADMIN — ALPRAZOLAM 0.5 MG: 0.5 TABLET ORAL at 15:30

## 2017-06-15 RX ADMIN — CASTOR OIL AND BALSAM, PERU: 788; 87 OINTMENT TOPICAL at 09:09

## 2017-06-15 RX ADMIN — DOCUSATE SODIUM AND SENNA 2 TABLET: 50; 8.6 TABLET, FILM COATED ORAL at 21:24

## 2017-06-15 RX ADMIN — BACLOFEN 20 MG: 10 TABLET ORAL at 05:48

## 2017-06-15 RX ADMIN — METOPROLOL TARTRATE 25 MG: 25 TABLET, FILM COATED ORAL at 21:24

## 2017-06-15 RX ADMIN — ALPRAZOLAM 0.5 MG: 0.5 TABLET ORAL at 09:09

## 2017-06-15 RX ADMIN — VENLAFAXINE HYDROCHLORIDE 75 MG: 75 CAPSULE, EXTENDED RELEASE ORAL at 09:08

## 2017-06-15 RX ADMIN — METOPROLOL TARTRATE 25 MG: 25 TABLET, FILM COATED ORAL at 09:08

## 2017-06-15 RX ADMIN — AMITRIPTYLINE HYDROCHLORIDE 25 MG: 25 TABLET, FILM COATED ORAL at 21:24

## 2017-06-15 RX ADMIN — CASTOR OIL AND BALSAM, PERU: 788; 87 OINTMENT TOPICAL at 21:24

## 2017-06-15 RX ADMIN — BACLOFEN 20 MG: 10 TABLET ORAL at 13:18

## 2017-06-15 RX ADMIN — ENOXAPARIN SODIUM 40 MG: 40 INJECTION SUBCUTANEOUS at 09:09

## 2017-06-15 RX ADMIN — FOLIC ACID 1 MG: 1 TABLET ORAL at 09:08

## 2017-06-15 RX ADMIN — Medication 1 TABLET: at 09:08

## 2017-06-15 RX ADMIN — GABAPENTIN 800 MG: 400 CAPSULE ORAL at 05:48

## 2017-06-15 RX ADMIN — CETIRIZINE HYDROCHLORIDE 10 MG: 10 TABLET ORAL at 09:08

## 2017-06-15 RX ADMIN — ASPIRIN 81 MG: 81 TABLET ORAL at 09:08

## 2017-06-15 RX ADMIN — BACLOFEN 20 MG: 10 TABLET ORAL at 21:24

## 2017-06-15 RX ADMIN — GABAPENTIN 800 MG: 400 CAPSULE ORAL at 13:18

## 2017-06-15 RX ADMIN — GABAPENTIN 800 MG: 400 CAPSULE ORAL at 21:24

## 2017-06-15 NOTE — THERAPY TREATMENT NOTE
Inpatient Rehabilitation - Occupational Therapy Treatment Note   [place]     Patient Name: Alex Tierney  : 1975  MRN: [id number]  Today's Date: 6/15/2017  Onset of Illness/Injury or Date of Surgery Date: 17  Date of Referral to OT: 17  Referring Physician: Dr. Brooks      Admit Date: 2017    Visit Dx:   No diagnosis found.  Patient Active Problem List   Diagnosis   • Chronic allergic rhinitis   • Depression   • Smoker   • T6 spinal cord injury   • History of left lower extremity amputation   • Chronic osteomyelitis of multiple sites   • Debility             Adult Rehabilitation Note       06/15/17 1444 06/15/17 1200 17 1700    Rehab Assessment/Intervention    Discipline occupational therapist  -CJ physical therapy assistant  -NICOLE     Document Type therapy note (daily note)  -CJ therapy note (daily note)   BID treatment session  -NICOLE     Subjective Information agree to therapy  -CJ agree to therapy;no complaints  -NICOLE     Patient Effort, Rehab Treatment  good  -NICOLE     Precautions/Limitations fall precautions;other (see comments)   decreased skin integrity, junior cath, colostomy  -CJ fall precautions;other (see comments)   <skin integrity, junior cath, colostomy  -NICOLE     Patient Response to Treatment Pt tolerated tx sessions well with rest breaks.  - Patient responded well to today's treatment, however continues to require assist w/ transfers secondary to decreased trunk control.  Pt performed core strengthening and dynamic sitting balance activities in second session w/one episode of LOB posteriorly.  Pt continues to progress towards established goals, and plans to D/C home w/ family tomorrow.    -NICOLE     Recorded by [CJ] Elvia Tolbert OT [NICOLE] Scarlett Caba PTA     Pain Assessment    Pain Assessment  No/denies pain  -NICOLE     Recorded by  [NICOLE] Scarlett Caba PTA     Cognitive Assessment/Intervention    Current Cognitive/Communication Assessment functional  - functional   -NICOLE     Orientation Status  oriented x 4  -NICOLE     Follows Commands/Answers Questions  100% of the time;able to follow single-step instructions  -NICOLE     Personal Safety decreased awareness, need for safety;decreased awareness, need for assist  -CJ decreased awareness, need for assist;decreased awareness, need for safety  -NICOLE     Personal Safety Interventions  supervised activity;gait belt  -NICOLE     Recorded by [CJ] Elvia Tolbert OT [NICOLE] Scarlett Caba PTA     Bed Mobility, Assessment/Treatment    Bed Mobility, Assistive Device  bed rails  -NICOLE     Bed Mobility, Roll Left, [place]  minimum assist (75% patient effort)  -NICOLE     Bed Mobility, Roll Right, [place]  minimum assist (75% patient effort)  -NICOLE     Bed Mob, Supine to Sit, [place]  minimum assist (75% patient effort)  -NICOLE     Bed Mob, Sit to Supine, [place]  moderate assist (50% patient effort)  -NICOLE     Bed Mobility, Safety Issues  decreased use of arms for pushing/pulling;decreased use of legs for bridging/pushing;impaired trunk control for bed mobility  -NICOLE     Bed Mobility, Impairments  muscle tone abnormal;ROM decreased;strength decreased;impaired balance;coordination impaired;motor control impaired;postural control impaired  -NICOLE     Recorded by  [NICOLE] Scarlett Caba PTA     Transfer Assessment/Treatment    Transfers, Bed-Chair [place]  verbal cues required;nonverbal cues required (demo/gesture);contact guard assist  -NICOLE     Transfers, Chair-Bed [place]  verbal cues required;nonverbal cues required (demo/gesture);contact guard assist  -NICOLE     Transfers, Bed-Chair-Bed, Assist Device  sliding board  -NICOLE     Transfer, Impairments  muscle tone abnormal;ROM decreased;strength decreased;impaired balance;coordination impaired;motor control impaired;postural control impaired  -NICOLE     Recorded by  [NICOLE] Scarlett Caba PTA     Grooming Assessment/Training    Grooming Assess/Train, Position sitting  -      Grooming  Assess/Train, Indepen Level set up required  -CJ      Recorded by [CJ] Elvia Tolbert OT      Balance Skills Training    Sitting-Level of Assistance  Close supervision;Contact guard  -NICOLE     Sitting-Balance Support  Right upper extremity supported;Left upper extremity supported  -NICOLE     Sitting-Balance Activities  Lateral lean;Trunk control activities  -NIOCLE     Sitting # of Minutes  20 min  -NICOLE     Recorded by  [NICOLE] Scarlett Caba PTA     Therapy Exercises    Bilateral Lower Extremities  PROM:;supine;prone   prone lying to stretch hip flexors; session one/ and two  -NICOLE PROM:;supine;prone   prone lying to stretch iliopsoas  -AG    Bilateral Upper Extremity AROM:   BUE ther ex/act, bilat coord ex, strengthening  -CJ      BUE Other Reps --   dowel ex- 20 reps X 2 sets, arm bike-5 minsX 4, 0 resistance  -CJ      BUE Resistance --   sybil-hutchins- 25 lbs, 20 reps X 5 sets  -CJ      Recorded by [CJ] Elvia Tolbert OT [NICOLE] Scarlett Caba PTA [AG] Radha Ohara PT    Positioning and Restraints    Pre-Treatment Position  in bed  -NICOLE     Post Treatment Position  wheelchair  -NICOLE     In Wheelchair sitting;call light within reach;encouraged to call for assist   post both sessions  -CJ sitting;patient within staff view;with OT   w/ OT following session one; w/in view of staff    -NICOLE     Recorded by [CJ] Elvia Tolbert OT [NICOLE] Scarlett Caba PTA       06/14/17 1644 06/14/17 1500 06/14/17 1441    Rehab Assessment/Intervention    Discipline physical therapist  -AG physical therapy assistant  -NICOLE occupational therapist  -BF    Document Type therapy note (daily note)   PM note  -AG therapy note (daily note)   AM session  -NICOLE therapy note (daily note)  -BF    Subjective Information  agree to therapy;no complaints  -NICOLE agree to therapy;no complaints  -BF    Patient Effort, Rehab Treatment good  -AG good  -NICOLE good  -BF    Precautions/Limitations fall precautions;other (see comments)   decr skin integrity  -AG  fall precautions;other (see comments)   < skin integrity, junior cath, colostomy  -NICOLE fall precautions;other (see comments)   < skin integrity; junior cath; colostomy  -BF    Specific Treatment Considerations responded well; no signficant functional changes with transfers.  -AG      Patient Response to Treatment  Pt tolerated tx well today and continues to progress with balance activites, LE stretching and prone lying.   -NICOLE     Recorded by [AG] Radha Ohara, PT [NICOLE] Scarlett Caba, PTA [BF] Lucía Pedro, STEPHANIE    Pain Assessment    Pain Assessment No/denies pain  -AG No/denies pain  -NICOLE     Recorded by [AG] Radha Ohara, PT [NICOLE] Scarlett Caba PTA     Cognitive Assessment/Intervention    Current Cognitive/Communication Assessment  functional  -NICOLE functional  -BF    Orientation Status  oriented x 4  -NICOLE oriented x 4  -BF    Follows Commands/Answers Questions  100% of the time;able to follow single-step instructions  -NICOLE 100% of the time;able to follow single-step instructions  -BF    Personal Safety Interventions  supervised activity  -NICOLE     Recorded by  [NICOLE] Scarlett Caba, PTA [BF] Lucía Pedro, STEPHANIE    Bed Mobility, Assessment/Treatment    Bed Mobility, Assistive Device bed rails  -AG      Bed Mobility, Roll Left, [place] minimum assist (75% patient effort)  -AG minimum assist (75% patient effort);moderate assist (50% patient effort);verbal cues required;nonverbal cues required (demo/gesture)  -NICOLE     Bed Mobility, Roll Right, [place] minimum assist (75% patient effort)  -AG minimum assist (75% patient effort);moderate assist (50% patient effort);verbal cues required;nonverbal cues required (demo/gesture)  -NICOLE     Bed Mobility, Scoot/Bridge, [place] maximum assist (25% patient effort)  -AG      Bed Mob, Supine to Sit, [place] minimum assist (75% patient effort)  -AG minimum assist (75% patient effort);verbal cues required;nonverbal cues required  (demo/gesture)  -NICOLE     Bed Mob, Sit to Supine, [place] moderate assist (50% patient effort)  -AG moderate assist (50% patient effort);verbal cues required;nonverbal cues required (demo/gesture)  -NICOLE     Bed Mobility, Safety Issues decreased use of arms for pushing/pulling;decreased use of legs for bridging/pushing;impaired trunk control for bed mobility  -AG decreased use of legs for bridging/pushing;impaired trunk control for bed mobility  -NICOLE     Bed Mobility, Impairments muscle tone abnormal;ROM decreased;strength decreased;impaired balance;coordination impaired;motor control impaired;postural control impaired  -AG muscle tone abnormal;sensation decreased;strength decreased;impaired balance;coordination impaired;motor control impaired;postural control impaired  -NICOLE     Recorded by [AG] Radha Ohara, PT [NICOLE] Scarlett Caba PTA     Transfer Assessment/Treatment    Transfers, Bed-Chair [place] verbal cues required;nonverbal cues required (demo/gesture);contact guard assist  -AG contact guard assist;verbal cues required;nonverbal cues required (demo/gesture)  -NICOLE contact guard assist;verbal cues required;nonverbal cues required (demo/gesture)  -BF    Transfers, Chair-Bed [place] verbal cues required;nonverbal cues required (demo/gesture);contact guard assist  -AG contact guard assist;verbal cues required;nonverbal cues required (demo/gesture)  -NICOLE contact guard assist;verbal cues required;nonverbal cues required (demo/gesture)  -BF    Transfers, Bed-Chair-Bed, Assist Device sliding board  -AG sliding board  -NICOLE sliding board  -BF    Transfer, Safety Issues  --   decreased trunk control  -NICOLE     Transfer, Impairments muscle tone abnormal;ROM decreased;strength decreased;impaired balance;coordination impaired;motor control impaired;postural control impaired  -AG muscle tone abnormal;sensation decreased;strength decreased;impaired balance;coordination impaired;motor control impaired;postural  control impaired  -NICOLE     Recorded by [AG] Radha Ohara, PT [NICOLE] Scarlett Caba, PTA [BF] Lucía Pedro, OT    Lower Body Bathing Assessment/Training    LB Bathing Assess/Train, Position   supine  -BF    LB Bathing Assess/Train, Comment   TB bathing: Min A  -BF    Recorded by   [BF] Lucía Pedro, OT    Upper Body Dressing Assessment/Training    UB Dressing Assess/Train, Position   sitting  -BF    UB Dressing Assess/Train, [place]   set up required;supervision required  -BF    UB Dressing Assess/Train, Comment   Set-up/supervision  -BF    Recorded by   [BF] Lucía Pedro, OT    Lower Body Dressing Assessment/Training    LB Dressing Assess/Train, Position   supine;sitting  -BF    LB Dressing Assess/Train, Comment   Mod A  -BF    Recorded by   [BF] Lucía Pedro, OT    Grooming Assessment/Training    Grooming Assess/Train, Position   sitting  -BF    Grooming Assess/Train, Indepen Level   set up required  -BF    Recorded by   [BF] Lucía Pedro, STEPHANIE    Motor Skills/Interventions    Additional Documentation Balance Skills Training (Group)  -AG      Recorded by [AG] Radha Ohara, PT      Balance Skills Training    Sitting-Level of Assistance Close supervision;Contact guard  -AG Close supervision  -NICOLE     Sitting-Balance Support Right upper extremity supported;Left upper extremity supported  -AG      Sitting-Balance Activities Lateral lean;Trunk control activities  -AG      Sitting # of Minutes 15  -AG      Recorded by [AG] Radha Ohara, PT [NICOLE] Scarlett Caba, SHILPA     Therapy Exercises    Bilateral Lower Extremities PROM:;supine;prone   prone lying to stretnch iliopsoas  -AG PROM:;supine;prone   B) LE stretching; prone lying 15 min AM session  -NICOLE     Bilateral Upper Extremity   AROM:;sitting   BUE GMC/FMC ther ex; strengthening  -BF    BUE Resistance   other (comment)   Arm bike 4 minsX3; Rickshaw 25 [id number]; WristrollsX3  -BF    Recorded by [AG] Radha Murray  Lev PT [NICOLE] Scarlett Caba, SHILPA [BF] Lucía Pedro, OT    Positioning and Restraints    Pre-Treatment Position in bed  -AG in bed  -NICOLE     Post Treatment Position wheelchair  -AG chair  -NICOLE     In Bed   supine;call light within reach;encouraged to call for assist   In PM  -BF    In Wheelchair sitting;with OT  -AG sitting;patient within staff view   following AM session  -NICOLE sitting;patient within staff view   In AM  -BF    Recorded by [AG] Radha Ohara PT [NICOLE] Scarlett Caba PTA [BF] Lucía Pedro, OT      06/13/17 1528 06/13/17 1527 06/12/17 1820    Rehab Assessment/Intervention    Discipline occupational therapist  -BF physical therapist  -LB physical therapist  -LB    Document Type therapy note (daily note)  -BF therapy note (daily note)  -LB progress note;therapy note (daily note)  -LB    Subjective Information agree to therapy;no complaints  -BF agree to therapy  -LB agree to therapy;complains of  -LB    Patient Effort, Rehab Treatment good  -BF  good  -LB    Precautions/Limitations fall precautions;other (see comments)   < skin integrity; junior cath; colostomy  -BF fall precautions   < skin integrity; colostomy, juinor cath  -LB fall precautions   < skin integrity; colostomy, junior cath  -LB    Patient Response to Treatment  He tolerated sitting dynamic balance activities, LE stretching, and prone lying for hip flexor stretching.  -LB He has tolerated stretching of LE's, balance training, and transfer/bed mobility training. He is participating well in therapy but unfortunately he has not progressed with amount of assist required. This may be because he is at his baseline. He does tolerate up to 15 min of prone lying and he has been educated to importance of changing postions frequently for home maintenance.   -LB    Recorded by [BF] Lucía Pedro, OT [LB] Susan Palma, PT [LB] Susan Palma, PT    Pain Assessment    Pain Assessment  No/denies pain  -LB      Recorded by  [LB] Susan Palma PT     Cognitive Assessment/Intervention    Current Cognitive/Communication Assessment functional  -BF functional  -LB functional  -LB    Orientation Status oriented x 4  -BF  oriented x 4  -LB    Follows Commands/Answers Questions 100% of the time;able to follow single-step instructions  -% of the time  -% of the time  -LB    Personal Safety Interventions  supervised activity  -LB supervised activity  -LB    Recorded by [BF] Lucía Pedro, OT [LB] Susan Palma, PT [LB] Susan Palma, PT    Bed Mobility, Assessment/Treatment    Bed Mobility, Roll Left, [place]  minimum assist (75% patient effort);moderate assist (50% patient effort);verbal cues required;nonverbal cues required (demo/gesture)  -LB minimum assist (75% patient effort);moderate assist (50% patient effort);verbal cues required;nonverbal cues required (demo/gesture)  -LB    Bed Mobility, Roll Right, [place]  minimum assist (75% patient effort);moderate assist (50% patient effort);verbal cues required;nonverbal cues required (demo/gesture)  -LB minimum assist (75% patient effort);moderate assist (50% patient effort);verbal cues required;nonverbal cues required (demo/gesture)  -LB    Bed Mobility, Scoot/Bridge, [place]  dependent (less than 25% patient effort)   he cannot scoot his hips at all  -LB dependent (less than 25% patient effort)   he cannot scoot his hips at all  -LB    Bed Mob, Supine to Sit, [place]  minimum assist (75% patient effort);verbal cues required;nonverbal cues required (demo/gesture)   he must have his arms in a position to push up from mat  -LB minimum assist (75% patient effort);verbal cues required;nonverbal cues required (demo/gesture)   he must have his arms in a position to push up from mat  -LB    Bed Mob, Sit to Supine, [place]  moderate assist (50% patient effort);verbal cues required;nonverbal cues required (demo/gesture)    he needs his trunk guided back and legs lifted into bed  -LB moderate assist (50% patient effort);verbal cues required;nonverbal cues required (demo/gesture)   he needs his trunk guided back and legs lifted into bed  -LB    Bed Mobility, Safety Issues  decreased use of legs for bridging/pushing;impaired trunk control for bed mobility  -LB decreased use of legs for bridging/pushing;impaired trunk control for bed mobility  -LB    Bed Mobility, Impairments  muscle tone abnormal;sensation decreased;strength decreased;impaired balance;coordination impaired;motor control impaired;postural control impaired  -LB muscle tone abnormal;sensation decreased;strength decreased;impaired balance;coordination impaired;motor control impaired;postural control impaired  -LB    Bed Mobility, Comment   He needs slightly more assist on mat table compared with hospital bed with railing.  -LB    Recorded by  [LB] Susan Palma, PT [LB] Susan Palma, PT    Transfer Assessment/Treatment    Transfers, Bed-Chair [place]  stand by assist;contact guard assist;verbal cues required;nonverbal cues required (demo/gesture)  -LB stand by assist;contact guard assist;verbal cues required;nonverbal cues required (demo/gesture)  -LB    Transfers, Chair-Bed [place]  stand by assist;contact guard assist;verbal cues required;nonverbal cues required (demo/gesture)  -LB stand by assist;contact guard assist;verbal cues required;nonverbal cues required (demo/gesture)  -LB    Transfers, Bed-Chair-Bed, Assist Device  sliding board  -LB sliding board  -LB    Transfer, Safety Issues  --   decreased trunk control, requires UE assist  -LB --   decreased trunk control, requires UE assist  -LB    Transfer, Impairments  muscle tone abnormal;sensation decreased;strength decreased;impaired balance;coordination impaired;motor control impaired;postural control impaired  -LB muscle tone abnormal;sensation decreased;strength decreased;impaired  balance;coordination impaired;motor control impaired;postural control impaired  -LB    Recorded by  [LB] Susan Palma, PT [LB] Susan Palma, PT    Grooming Assessment/Training    Grooming Assess/Train, Position sitting  -BF      Grooming Assess/Train, Indepen Level set up required;supervision required  -BF      Recorded by [BF] Lucía Pedro, OT      Balance Skills Training    Sitting-Level of Assistance  Close supervision  -LB Close supervision  -LB    Sitting-Balance Support  --   requires one extremity to assist balance at eob  -LB --   requires one extremity to assist balance at eob  -LB    Sitting-Balance Activities  --   sitting bean bag toss x2  -LB --   sitting bean bag toss x2  -LB    Recorded by  [LB] Susan Palma, PT [LB] Susan Palma PT    Therapy Exercises    Bilateral Lower Extremities  PROM:;supine;prone   LE stretching, prone lying 15 min bid  -LB PROM:;supine;prone   LE stretching, prone lying 15 min bid  -LB    Bilateral Upper Extremity AROM:;sitting   BUE GMC/FMC ther ex; strengthening  -BF      BUE Resistance other (comment)   Rickshaw 25 lbs X20X4; Arm bike 4 minsX3; WristrollsX3  -BF      Recorded by [BF] Lucía Pedro, OT [LB] Susan Palma, PT [LB] Susan Palma, PT    Positioning and Restraints    Pre-Treatment Position sitting in chair/recliner  -BF sitting in chair/recliner  -LB sitting in chair/recliner  -LB    Post Treatment Position wheelchair  -BF      In Wheelchair sitting;with PT  -BF sitting  -LB sitting  -LB    Recorded by [BF] Lucía Pedro, OT [LB] Susan Palma, PT [LB] Susan Palma, PT      User Key  (r) = Recorded By, (t) = Taken By, (c) = Cosigned By    Initials Name Effective Dates    LB Ssuan Palma, PT 03/14/16 -     BF Lucía Pedro, OT 03/14/16 -     AG Radha Ohara, PT 03/14/16 -     CJ Elvia Tolbert, OT 03/14/16 -     NICOLE Scarlett Caba, PTA 05/02/16 -                  OT Goals       06/08/17 1624 06/02/17 1554       Transfer Training OT LTG    Transfer Training OT LTG, Date Established  06/02/17  -BF     Transfer Training OT LTG, Time to Achieve  by discharge  -BF     Transfer Training OT LTG, Activity Type  bed to chair /chair to bed;shower chair  -BF     Transfer Training OT LTG, [place] Level  contact guard assist  -BF     Bathing OT STG    Bathing Goal OT STG, Date Established  06/02/17  -BF     Bathing Goal OT STG, Time to Achieve  5 - 7 days  -BF     Bathing Goal OT STG, Activity Type  lower body bathing;upper body bathing  -BF     Bathing Goal OT STG, [place] Level  minimum assist (75% patient effort)  -BF     Bathing Goal OT STG, Outcome goal met  -BF      Bathing OT LTG    Bathing Goal OT LTG, Date Established  06/02/17  -BF     Bathing Goal OT LTG, Time to Achieve  by discharge  -BF     Bathing Goal OT LTG, Activity Type  upper body bathing;lower body bathing  -BF     Bathing Goal OT LTG, [place] Level  set up required;supervision required  -BF     Eating Self-Feeding OT LTG    Eat Self Feeding Goal OT LTG, Date Established  06/02/17  -BF     Eat Self Feeding Goal OT LTG, Time to Achieve  by discharge  -BF     Eat Self Feed Goal OT LTG, [place] Level  conditional independence  -BF     Grooming OT LTG    Grooming Goal OT LTG, Date Established  06/02/17  -BF     Grooming Goal OT LTG, Time to Achieve  by discharge  -BF     Grooming Goal OT LTG, [place] Level  conditional independence  -BF     LB Dressing OT STG    LB Dressing Goal OT STG, Date Established  06/02/17  -BF     LB Dressing Goal OT STG, Time to Achieve  5 - 7 days  -BF     LB Dressing Goal OT STG, [place] Level  moderate assist (50% patient effort)  -BF     LB Dressing Goal OT STG, Outcome goal met  -BF      LB Dressing OT LTG    LB Dressing Goal OT LTG, Date Established  06/02/17  -BF     LB Dressing Goal OT LTG, Time to Achieve  by discharge  -BF     LB Dressing Goal  OT LTG, [place] Level  minimum assist (75% patient effort)  -       User Key  (r) = Recorded By, (t) = Taken By, (c) = Cosigned By    Initials Name Provider Type     Lucía Pedro, OT Occupational Therapist          Occupational Therapy Education     Title: PT OT SLP Therapies (Done)     Topic: Occupational Therapy (Done)     Point: ADL training (Done)    Description: Instruct learner(s) on proper safety adaptation and remediation techniques during self care or transfers.   Instruct in proper use of assistive devices.    Learning Progress Summary    Learner Readiness Method Response Comment Documented by Status   Patient Acceptance E,D VU,NR   06/15/17 1455 Done    Acceptance E,D NR   06/14/17 1445 Active    Acceptance E,D NR   06/13/17 1530 Active    Acceptance E,D NR   06/08/17 1624 Active    Acceptance E,D NR   06/07/17 1616 Active    Acceptance E,D NR   06/06/17 1446 Active    Acceptance E,D NR   06/05/17 1231 Active    Acceptance E,D NR   06/02/17 1551 Active    Acceptance E,D NR   06/02/17 1551 Active               Point: Precautions (Done)    Description: Instruct learner(s) on prescribed precautions during self-care and functional transfers.    Learning Progress Summary    Learner Readiness Method Response Comment Documented by Status   Patient Acceptance E,D VU,NR   06/15/17 1455 Done    Acceptance E,D NR   06/14/17 1445 Active    Acceptance E,D NR   06/13/17 1530 Active    Acceptance E,D NR   06/08/17 1624 Active    Acceptance E,D NR   06/07/17 1616 Active    Acceptance E,D NR   06/06/17 1446 Active    Acceptance E,D NR   06/05/17 1231 Active    Acceptance E,D NR   06/02/17 1551 Active    Acceptance E,D NR   06/02/17 1551 Active                      User Key     Initials Effective Dates Name Provider Type Discipline     03/14/16 -  Lucía Pedro, OT Occupational Therapist OT     03/14/16 -  Elvia Tolbert, OT Occupational Therapist OT                   OT Recommendation and Plan  Anticipated Equipment Needs At Discharge:  (TBD)  Anticipated Discharge Disposition:  (TBD)  Planned Therapy Interventions: activity intolerance, adaptive equipment training, ADL retraining, home exercise program, strengthening, transfer training (Therapeutic groups as beneficial)  Therapy Frequency: 3-5 times/wk          Time Calculation:         Time Calculation- OT       06/15/17 1456 06/15/17 0915       Time Calculation- OT    OT Start Time 1245  - 0915  -     OT Stop Time 1330  - 1000  -     OT Time Calculation (min) 45 min  - 45 min  -     Total Timed Code Minutes- OT 45 minute(s)  - 45 minute(s)  -     OT Non-Billable Time (min)  20 min  -       User Key  (r) = Recorded By, (t) = Taken By, (c) = Cosigned By    Initials Name Provider Type     Elvia Tolbert OT Occupational Therapist           Therapy Charges for Today     Code Description Service Date Service Provider Modifiers Qty    [id number]  OT SELF CARE/MGMT/TRAIN EA 15 MIN 6/15/2017 Elvia Tolbert OT GO 1    [id number]  OT THERAPEUTIC ACT EA 15 MIN 6/15/2017 Elvia Tolbert OT GO 5               Elvia Tolbert OT  6/15/2017

## 2017-06-15 NOTE — PROGRESS NOTES
Inpatient Rehabilitation Functional Measures Assessment    Functional Measures  LYNETTE Eating:  LYNETTE Grooming:  LYNETTE Bathing:  LYNETTE Upper Body Dressing:  LYNETTE Lower Body Dressing:  LYNETTE Toileting:    LYNETTE Bladder Management  Level of Assistance:  Frequency/Number of Accidents this Shift:    LYNETTE Bowel Management  Level of Assistance:  Frequency/Number of Accidents this Shift:    LYNETTE Bed/Chair/Wheelchair Transfer:  LYNETTE Toilet Transfer:  LYNETTE Tub/Shower Transfer:    Previously Documented Mode of Locomotion at Discharge:  LYNETTE Expected Mode of Locomotion at Discharge:  LYNETTE Walk/Wheelchair:  LYNETTE Stairs:    LYNETTE Comprehension:  Both ( auditory and visual) modes of comprehension are used  equally. Comprehension Score = 7, Independent.  Patient comprehends  complex/abstract information in their primary language.  Patient is completely  independent for auditory and visual comprehension.  There are no activity  limitations.  LYNETTE Expression:  Both ( vocal and non-vocal) modes of expression are used  equally. Expression Score = 7, Independent. Patient expresses complex/abstract  information in their primary language.  Patient is completely independent for  vocal and non-vocal expression.  There are no activity limitations.  LYNETTE Social Interaction:  Social Interaction Score = 7, Independent. Patient is  completely independent for social interaction.  There are no activity  limitations.  LYNETTE Problem Solving:  Problem Solving Score = 7, Independent.  Patient makes  appropriate decisions in order to solve complex problems.  Patient is completely  independent for problem solving.  There are no activity limitations.  LYNETTE Memory:  Memory Score = 7, Independent.  Patient is completely independent  for memory.  There are no activity limitations.    Therapy Mode Minutes  Occupational Therapy:  Physical Therapy:  Speech Language Pathology:    Discharge Functional Goals:    Signed by: Magda Pimentel Nurse

## 2017-06-15 NOTE — PROGRESS NOTES
"     LOS: 14 days     Chief Complaint:  Weakness, debility     Subjective     Interval History:     He continues require wound care of his decubitus ulcers.  Otherwise he feels like he's doing pretty well.  No fevers.  No chills.      Objective     Vital Signs  /70 (BP Location: Left arm, Patient Position: Lying)  Pulse 102  Temp 98.5 °F (36.9 °C) (Oral)   Resp 16  Ht 71\" (180.3 cm)  Wt 245 lb (111 kg)  SpO2 99%  BMI 34.17 kg/m2  Intake & Output (last day)       06/14 0701 - 06/15 0700 06/15 0701 - 06/16 0700    P.O. 1560     Total Intake(mL/kg) 1560 (14)     Urine (mL/kg/hr) 2550 (1)     Total Output 2550      Net -990                    Physical Exam:     General Appearance:    Alert, cooperative, in no acute distress   Head:    Normocephalic, without obvious abnormality, atraumatic   Eyes:            Lids and lashes normal, conjunctivae and sclerae normal, no   icterus, no pallor, corneas clear, PERRLA   Ears:    Ears appear intact with no abnormalities noted       Neck:   No adenopathy, supple, trachea midline, no thyromegaly, no   carotid bruit, no JVD   Lungs:     Clear to auscultation,respirations regular, even and                  unlabored    Heart:    Regular rhythm and normal rate, normal S1 and S2, no            murmur, no gallop, no rub, no click   Chest Wall:    No abnormalities observed   Abdomen:     Normal bowel sounds, no masses, no organomegaly, soft        non-tender, non-distended, no guarding, no rebound                tenderness   Extremities:   No movement in BLE, no sensation in BLE  no edema, no cyanosis, no redness   Pulses:   Pulses palpable and equal bilaterally   Skin:   No bleeding, bruising or rash                Results Review:    Lab Results   Component Value Date    WBC 6.27 06/14/2017    HGB 8.7 (L) 06/14/2017    HCT 29.9 (L) 06/14/2017    MCV 78.7 (L) 06/14/2017     (H) 06/14/2017       Lab Results   Component Value Date    GLUCOSE 104 06/14/2017    BUN 14 " 06/14/2017    CREATININE 0.51 06/14/2017    EGFRIFNONA >150 06/14/2017    BCR 27.5 (H) 06/14/2017     06/14/2017    K 3.7 06/14/2017     06/14/2017    CO2 24.0 (L) 06/14/2017    CALCIUM 9.1 06/14/2017    ALBUMIN 3.10 (L) 06/02/2017    LABIL2 1.1 (L) 06/02/2017    AST 15 06/02/2017    ALT 13 06/02/2017     No results found for: INR    No results found for: POCGLU       Medication Review:     Current Facility-Administered Medications:   •  acetaminophen (TYLENOL) tablet 650 mg, 650 mg, Oral, Q4H PRN, Samuel Duane Kreis, MD, 650 mg at 06/12/17 1031  •  ALPRAZolam (XANAX) tablet 0.5 mg, 0.5 mg, Oral, TID, Samuel Duane Kreis, MD, 0.5 mg at 06/15/17 0909  •  aluminum-magnesium hydroxide-simethicone (MAALOX MAX) 400-400-40 MG/5ML suspension 15 mL, 15 mL, Oral, Q6H PRN, Samuel Duane Kreis, MD  •  amitriptyline (ELAVIL) tablet 25 mg, 25 mg, Oral, Nightly, Samuel Duane Kreis, MD, 25 mg at 06/14/17 2105  •  aspirin EC tablet 81 mg, 81 mg, Oral, Daily, Samuel Duane Kreis, MD, 81 mg at 06/15/17 0908  •  baclofen (LIORESAL) tablet 20 mg, 20 mg, Oral, Q8H, Samuel Duane Kreis, MD, 20 mg at 06/15/17 0548  •  bisacodyl (DULCOLAX) suppository 10 mg, 10 mg, Rectal, Daily PRN, Samuel Duane Kreis, MD  •  castor oil-balsam peru (VENELEX) ointment, , Topical, Q12H, Samuel Duane Kreis, MD  •  cetirizine (zyrTEC) tablet 10 mg, 10 mg, Oral, Daily, Samuel Duane Kreis, MD, 10 mg at 06/15/17 0908  •  enoxaparin (LOVENOX) syringe 40 mg, 40 mg, Subcutaneous, Daily, Samuel Duane Kreis, MD, 40 mg at 06/15/17 0909  •  fluticasone (FLONASE) 50 MCG/ACT nasal spray 2 spray, 2 spray, Each Nare, Daily, Samuel Duane Kreis, MD, 2 spray at 06/14/17 0913  •  folic acid (FOLVITE) tablet 1 mg, 1 mg, Oral, Daily, Samuel Duane Kreis, MD, 1 mg at 06/15/17 0908  •  gabapentin (NEURONTIN) capsule 800 mg, 800 mg, Oral, Q8H, Samuel Duane Kreis, MD, 800 mg at 06/15/17 0548  •  magnesium hydroxide (MILK OF MAGNESIA) suspension 2400 mg/10mL 10 mL, 10 mL,  Oral, Daily PRN, Samuel Duane Kreis, MD  •  metoprolol tartrate (LOPRESSOR) tablet 25 mg, 25 mg, Oral, Q12H, Samuel Duane Kreis, MD, 25 mg at 06/15/17 0908  •  multivitamin (DAILY BRIAN) tablet 1 tablet, 1 tablet, Oral, Daily, Samuel Duane Kreis, MD, 1 tablet at 06/15/17 0908  •  ondansetron (ZOFRAN) tablet 4 mg, 4 mg, Oral, Q6H PRN, 4 mg at 06/06/17 0835 **OR** ondansetron ODT (ZOFRAN-ODT) disintegrating tablet 4 mg, 4 mg, Oral, Q6H PRN **OR** ondansetron (ZOFRAN) injection 4 mg, 4 mg, Intravenous, Q6H PRN, Samuel Duane Kreis, MD  •  sennosides-docusate sodium (SENOKOT-S) 8.6-50 MG tablet 2 tablet, 2 tablet, Oral, Nightly, Samuel Duane Kreis, MD, 2 tablet at 06/14/17 2104  •  venlafaxine XR (EFFEXOR-XR) 24 hr capsule 75 mg, 75 mg, Oral, Daily With Breakfast, Samuel Duane Kreis, MD, 75 mg at 06/15/17 0908      Assessment/Plan     Debility  Paraplegia  Decubitus ulcers of buttocks      PT and OT ongoing.    Continue gabapentin with when necessary baclofen    Continue indwelling Sheldon    Plan home tomorrow      Samuel Duane Kreis, MD  06/15/17  9:17 AM

## 2017-06-15 NOTE — DISCHARGE INSTR - OTHER ORDERS
[employer] [phone] for low-profile roho cushion.    [facility] [phone] for PT and OT 2 x week x 4 weeks and nursing 2 x week x 4 weeks.

## 2017-06-15 NOTE — PROGRESS NOTES
Inpatient Rehabilitation Functional Measures Assessment    Functional Measures  LYNETTE Eating:  LYNETTE Grooming:  LYNETTE Bathing:  LYNETTE Upper Body Dressing:  LYNETTE Lower Body Dressing:  LYNETTE Toileting:    LYNETTE Bladder Management  Level of Assistance:  Bladder Score = 1.  Patient is total assistance for  bladder management. Patient has an indwelling/Sheldon catheter.  Frequency/Number of Accidents this Shift:  Bladder accidents this shift:  0 .  Patient has not had an accident, but used a device/medication this shift  requiring: catheter .    LYNETTE Bowel Management  Level of Assistance: Bowel Score = 5.  Patient is supervision/set-up for bowel  management, requiring: colostomy . No assistive devices were required.  Frequency/Number of Accidents this Shift: Bowel accidents this shift: 0 .  Patient has not had an accident, but used a device/medication this shift  requiring: colostomy .    LYNETTE Bed/Chair/Wheelchair Transfer:  [facility] Toilet Transfer:  [facility] Tub/Shower Transfer:    Previously Documented Mode of Locomotion at Discharge:  [facility] Expected Mode of Locomotion at Discharge:  [facility] Walk/Wheelchair:  [facility] Stairs:    [facility] Comprehension:  [facility] Expression:  [facility] Social Interaction:  [facility] Problem Solving:  [facility] Memory:    Therapy Mode Minutes  Occupational Therapy:  Physical Therapy:  Speech Language Pathology:    Discharge Functional Goals:    Signed by: TALI Jacobs

## 2017-06-15 NOTE — PROGRESS NOTES
Inpatient Rehabilitation Functional Measures Assessment    Functional Measures  LYNETTE Eating:  LYNETTE Grooming: Grooming Score = 5. Patient is supervision/set-up for grooming,  requiring: Initial preparation. No assistive devices were required.  LYNETTE Bathing:  LYNETTE Upper Body Dressing:  LYNETTE Lower Body Dressing:  LYNETTE Toileting:    LYNETTE Bladder Management  Level of Assistance:  Frequency/Number of Accidents this Shift:    LYNETTE Bowel Management  Level of Assistance:  Frequency/Number of Accidents this Shift:    LYNETTE Bed/Chair/Wheelchair Transfer:  [facility] Toilet Transfer:  [facility] Tub/Shower Transfer:    Previously Documented Mode of Locomotion at Discharge:  [facility] Expected Mode of Locomotion at Discharge:  [facility] Walk/Wheelchair:  [facility] Stairs:    [facility] Comprehension:  LYNETTE Expression:  LYNETTE Social Interaction:  [facility] Problem Solving:  LYNETTE Memory:    Therapy Mode Minutes  Occupational Therapy: Individual: 90 minutes.  Physical Therapy:  Speech Language Pathology:    Discharge Functional Goals:    Signed by: Elvia Tolbert Occupational Therapist

## 2017-06-15 NOTE — SIGNIFICANT NOTE
06/15/17 1019   Case Management/Social Work Plan   Plan Spoke to pt who states he prefers [facility] for  services instead of [facility].  Contacted [phone] per Florence who states pt was discharged from  on 6-11-17.  Contacted [facility] [phone] per preference per Laura with referral, discharge on 6-16-17 and orders for PT 2 x wk x 4 wks for strengthening, transfer training, balance, bed mobility, home safety, ROM, OT 2 x wk x 4 wks for ADL re-training, home safety, functional mobility, strengthening, nursing 2 x wk x 4 wks for wound care L thigh/back of R thigh: clean with NS, Venolex and Mepilex BID, 3 areas on buttocks: pack with Nugauze wet to dry BID, incision on hips/thighs/bilateral staples: clean with betadine, Mepilex daily, L LE venous stasis ulcer:  Vaseline gauze and margo BID.  Faxed  referral with orders, face sheet, H&P, MD progress notes, and PT/OT notes to  [phone].   to be contacted at discharge.  Will follow.

## 2017-06-15 NOTE — THERAPY TREATMENT NOTE
Inpatient Rehabilitation - Physical Therapy Treatment Note   [place]     Patient Name: Alex Tierney  : 1975  MRN: [id number]  Today's Date: 6/15/2017  Onset of Illness/Injury or Date of Surgery Date: 17  Date of Referral to PT: 17  Referring Physician: Dr. Brooks    Admit Date: 2017    Visit Dx:  No diagnosis found.  Patient Active Problem List   Diagnosis   • Chronic allergic rhinitis   • Depression   • Smoker   • T6 spinal cord injury   • History of left lower extremity amputation   • Chronic osteomyelitis of multiple sites   • Debility               Adult Rehabilitation Note       06/15/17 1200 17 1700 17 1644    Rehab Assessment/Intervention    Discipline physical therapy assistant  -NICOLE  physical therapist  -AG    Document Type therapy note (daily note)   BID treatment session  -NICOLE  therapy note (daily note)   PM note  -AG    Subjective Information agree to therapy;no complaints  -NICOLE      Patient Effort, Rehab Treatment good  -NICOLE  good  -AG    Precautions/Limitations fall precautions;other (see comments)   <skin integrity, junior cath, colostomy  -NICOLE  fall precautions;other (see comments)   decr skin integrity  -AG    Specific Treatment Considerations   responded well; no signficant functional changes with transfers.  -AG    Patient Response to Treatment Patient responded well to today's treatment, however continues to require assist w/ transfers secondary to decreased trunk control.  Pt performed core strengthening and dynamic sitting balance activities in second session w/one episode of LOB posteriorly.  Pt continues to progress towards established goals, and plans to D/C home w/ family tomorrow.    -NICOLE      Recorded by [NICOLE] Scarlett Caba PTA  [AG] Radha Ohara, PT    Pain Assessment    Pain Assessment No/denies pain  -NICOLE  No/denies pain  -AG    Recorded by [NICOLE] Scarlett Caba PTA  [AG] Radha Ohara, PT    Cognitive Assessment/Intervention    Current  Cognitive/Communication Assessment functional  -NICOLE      Orientation Status oriented x 4  -NICOLE      Follows Commands/Answers Questions 100% of the time;able to follow single-step instructions  -NICOLE      Personal Safety decreased awareness, need for assist;decreased awareness, need for safety  -NICOLE      Personal Safety Interventions supervised activity;gait belt  -NICOLE      Recorded by [NICOLE] Scarlett Caba PTA      Bed Mobility, Assessment/Treatment    Bed Mobility, Assistive Device bed rails  -NICOLE  bed rails  -AG    Bed Mobility, Roll Left, [place] minimum assist (75% patient effort)  -NICOLE  minimum assist (75% patient effort)  -AG    Bed Mobility, Roll Right, [place] minimum assist (75% patient effort)  -NICOLE  minimum assist (75% patient effort)  -AG    Bed Mobility, Scoot/Bridge, [place]   maximum assist (25% patient effort)  -AG    Bed Mob, Supine to Sit, [place] minimum assist (75% patient effort)  -NICOLE  minimum assist (75% patient effort)  -AG    Bed Mob, Sit to Supine, [place] moderate assist (50% patient effort)  -NICOLE  moderate assist (50% patient effort)  -AG    Bed Mobility, Safety Issues decreased use of arms for pushing/pulling;decreased use of legs for bridging/pushing;impaired trunk control for bed mobility  -NICOLE  decreased use of arms for pushing/pulling;decreased use of legs for bridging/pushing;impaired trunk control for bed mobility  -AG    Bed Mobility, Impairments muscle tone abnormal;ROM decreased;strength decreased;impaired balance;coordination impaired;motor control impaired;postural control impaired  -NICOLE  muscle tone abnormal;ROM decreased;strength decreased;impaired balance;coordination impaired;motor control impaired;postural control impaired  -AG    Recorded by [NICOLE] Scarlett Caba PTA  [AG] Radha Ohara, PT    Transfer Assessment/Treatment    Transfers, Bed-Chair [place] verbal cues required;nonverbal cues required (demo/gesture);contact guard assist  -NICOLE   verbal cues required;nonverbal cues required (demo/gesture);contact guard assist  -AG    Transfers, Chair-Bed [place] verbal cues required;nonverbal cues required (demo/gesture);contact guard assist  -NICOLE  verbal cues required;nonverbal cues required (demo/gesture);contact guard assist  -AG    Transfers, Bed-Chair-Bed, Assist Device sliding board  -NICOLE  sliding board  -AG    Transfer, Impairments muscle tone abnormal;ROM decreased;strength decreased;impaired balance;coordination impaired;motor control impaired;postural control impaired  -NICOLE  muscle tone abnormal;ROM decreased;strength decreased;impaired balance;coordination impaired;motor control impaired;postural control impaired  -AG    Recorded by [NICOLE] Scarlett Caba PTA  [AG] Radha Ohara PT    Motor Skills/Interventions    Additional Documentation   Balance Skills Training (Group)  -AG    Recorded by   [AG] Radha Ohara PT    Balance Skills Training    Sitting-Level of Assistance Close supervision;Contact guard  -NICOLE  Close supervision;Contact guard  -AG    Sitting-Balance Support Right upper extremity supported;Left upper extremity supported  -NICOLE  Right upper extremity supported;Left upper extremity supported  -AG    Sitting-Balance Activities Lateral lean;Trunk control activities  -NICOLE  Lateral lean;Trunk control activities  -AG    Sitting # of Minutes 20 min  -NICOLE  15  -AG    Recorded by [NICOLE] Scarlett Caba PTA  [AG] Radha Ohara PT    Therapy Exercises    Bilateral Lower Extremities PROM:;supine;prone   prone lying to stretch hip flexors; session one/ and two  -NICOLE PROM:;supine;prone   prone lying to stretch iliopsoas  -AG PROM:;supine;prone   prone lying to stretnch iliopsoas  -AG    Recorded by [NICOLE] Scarlett Caba PTA [AG] Radha Ohara, PT [AG] Radha Ohara, PT    Positioning and Restraints    Pre-Treatment Position in bed  -NICOLE  in bed  -AG    Post Treatment Position wheelchair  -NICOLE  wheelchair  -AG    In Wheelchair  sitting;patient within staff view;with OT   w/ OT following session one; w/in view of staff    -NICOLE  sitting;with OT  -AG    Recorded by [NICOLE] Scarlett Caba PTA  [AG] Radha Murray Lev, PT      06/14/17 1500 06/14/17 1441 06/13/17 1528    Rehab Assessment/Intervention    Discipline physical therapy assistant  -NICOLE occupational therapist  -BF occupational therapist  -BF    Document Type therapy note (daily note)   AM session  -NICOLE therapy note (daily note)  -BF therapy note (daily note)  -BF    Subjective Information agree to therapy;no complaints  -NICOLE agree to therapy;no complaints  -BF agree to therapy;no complaints  -BF    Patient Effort, Rehab Treatment good  -NICOLE good  -BF good  -BF    Precautions/Limitations fall precautions;other (see comments)   < skin integrity, junior cath, colostomy  -NICOLE fall precautions;other (see comments)   < skin integrity; junior cath; colostomy  -BF fall precautions;other (see comments)   < skin integrity; junior cath; colostomy  -BF    Patient Response to Treatment Pt tolerated tx well today and continues to progress with balance activites, LE stretching and prone lying.   -NICOLE      Recorded by [NICOLE] Scarlett Caba PTA [BF] Lucía Pedro, OT [BF] Lucía Pedro OT    Pain Assessment    Pain Assessment No/denies pain  -NICOLE      Recorded by [NICOLE] Scarlett Caba PTA      Cognitive Assessment/Intervention    Current Cognitive/Communication Assessment functional  -NICOLE functional  -BF functional  -BF    Orientation Status oriented x 4  -NICOLE oriented x 4  -BF oriented x 4  -BF    Follows Commands/Answers Questions 100% of the time;able to follow single-step instructions  -NICOLE 100% of the time;able to follow single-step instructions  -% of the time;able to follow single-step instructions  -BF    Personal Safety Interventions supervised activity  -NICOLE      Recorded by [NICOLE] Scarlett Caba PTA [BF] Lucía Pedro, STEPHANIE [BF] Lucía Pedro OT     Bed Mobility, Assessment/Treatment    Bed Mobility, Roll Left, [place] minimum assist (75% patient effort);moderate assist (50% patient effort);verbal cues required;nonverbal cues required (demo/gesture)  -NICOLE      Bed Mobility, Roll Right, [place] minimum assist (75% patient effort);moderate assist (50% patient effort);verbal cues required;nonverbal cues required (demo/gesture)  -NICOLE      Bed Mob, Supine to Sit, [place] minimum assist (75% patient effort);verbal cues required;nonverbal cues required (demo/gesture)  -NICOLE      Bed Mob, Sit to Supine, [place] moderate assist (50% patient effort);verbal cues required;nonverbal cues required (demo/gesture)  -NICOLE      Bed Mobility, Safety Issues decreased use of legs for bridging/pushing;impaired trunk control for bed mobility  -NICOLE      Bed Mobility, Impairments muscle tone abnormal;sensation decreased;strength decreased;impaired balance;coordination impaired;motor control impaired;postural control impaired  -NICOLE      Recorded by [NICOLE] Scarlett Caba PTA      Transfer Assessment/Treatment    Transfers, Bed-Chair [place] contact guard assist;verbal cues required;nonverbal cues required (demo/gesture)  -NICOLE contact guard assist;verbal cues required;nonverbal cues required (demo/gesture)  -BF     Transfers, Chair-Bed [place] contact guard assist;verbal cues required;nonverbal cues required (demo/gesture)  -NICOLE contact guard assist;verbal cues required;nonverbal cues required (demo/gesture)  -BF     Transfers, Bed-Chair-Bed, Assist Device sliding board  -NICOLE sliding board  -BF     Transfer, Safety Issues --   decreased trunk control  -NICOLE      Transfer, Impairments muscle tone abnormal;sensation decreased;strength decreased;impaired balance;coordination impaired;motor control impaired;postural control impaired  -NICOLE      Recorded by [NICOLE] Scarlett Caba PTA [BF] Lucía Pedro, OT     Lower Body Bathing Assessment/Training    LB  Bathing Assess/Train, Position  supine  -BF     LB Bathing Assess/Train, Comment  TB bathing: Min A  -BF     Recorded by  [BF] Lucía Pedro OT     Upper Body Dressing Assessment/Training    UB Dressing Assess/Train, Position  sitting  -BF     UB Dressing Assess/Train, [place]  set up required;supervision required  -BF     UB Dressing Assess/Train, Comment  Set-up/supervision  -BF     Recorded by  [BF] Lucía Pedro OT     Lower Body Dressing Assessment/Training    LB Dressing Assess/Train, Position  supine;sitting  -BF     LB Dressing Assess/Train, Comment  Mod A  -BF     Recorded by  [BF] Lucía Pedro OT     Grooming Assessment/Training    Grooming Assess/Train, Position  sitting  -BF sitting  -BF    Grooming Assess/Train, [place] Level  set up required  -BF set up required;supervision required  -BF    Recorded by  [BF] Lucía Pedro OT [BF] Lucía Pedro OT    Balance Skills Training    Sitting-Level of Assistance Close supervision  -NICOLE      Recorded by [NICOLE] Scarlett Caba PTA      Therapy Exercises    Bilateral Lower Extremities PROM:;supine;prone   B) LE stretching; prone lying 15 min AM session  -NICOLE      Bilateral Upper Extremity  AROM:;sitting   BUE GMC/FMC ther ex; strengthening  -BF AROM:;sitting   BUE GMC/FMC ther ex; strengthening  -BF    BUE Resistance  other (comment)   Arm bike 4 minsX3; Rickshaw 25 [id number]; WristrollsX3  -BF other (comment)   Rickshaw 25 lbs X20X4; Arm bike 4 minsX3; WristrollsX3  -BF    Recorded by [NICOLE] Scarlett Caba PTA [BF] Lucía Pedro OT [BF] Lucía Pedro OT    Positioning and Restraints    Pre-Treatment Position in bed  -NICOLE  sitting in chair/recliner  -BF    Post Treatment Position chair  -NICOLE  wheelchair  -BF    In Bed  supine;call light within reach;encouraged to call for assist   In PM  -BF     In Wheelchair sitting;patient within staff view   following AM session  -NICOLE sitting;patient  within staff view   In AM  -BF sitting;with PT  -BF    Recorded by [NICOLE] Scarlett Caba, SHILPA [BF] Lucía Pedro, OT [BF] Lucía Pedro, OT      06/13/17 1527 06/12/17 1820 06/12/17 1300    Rehab Assessment/Intervention    Discipline physical therapist  -LB physical therapist  -LB occupational therapist  -AH    Document Type therapy note (daily note)  -LB progress note;therapy note (daily note)  -LB therapy note (daily note)  -    Subjective Information agree to therapy  -LB agree to therapy;complains of  -LB agree to therapy;no complaints  -AH    Patient Effort, Rehab Treatment  good  -LB excellent  -AH    Precautions/Limitations fall precautions   < skin integrity; colostomy, junior cath  -LB fall precautions   < skin integrity; colostomy, junior cath  -LB     Patient Response to Treatment He tolerated sitting dynamic balance activities, LE stretching, and prone lying for hip flexor stretching.  -LB He has tolerated stretching of LE's, balance training, and transfer/bed mobility training. He is participating well in therapy but unfortunately he has not progressed with amount of assist required. This may be because he is at his baseline. He does tolerate up to 15 min of prone lying and he has been educated to importance of changing postions frequently for home maintenance.   -LB pt tolerated therapy well, completing all activities with good tolerance  -AH    Recorded by [LB] Susan Palma, PT [LB] Susan Palma, PT [AH] Michell Silva, OT    Pain Assessment    Pain Assessment No/denies pain  -LB      Recorded by [LB] Susan Palma, PT      Cognitive Assessment/Intervention    Current Cognitive/Communication Assessment functional  -LB functional  -LB     Orientation Status  oriented x 4  -LB     Follows Commands/Answers Questions 100% of the time  -% of the time  -LB     Personal Safety Interventions supervised activity  -LB supervised activity  -LB     Recorded  by [LB] Susan Palma, PT [LB] Susan Palma, PT     Bed Mobility, Assessment/Treatment    Bed Mobility, Roll Left, [place] minimum assist (75% patient effort);moderate assist (50% patient effort);verbal cues required;nonverbal cues required (demo/gesture)  -LB minimum assist (75% patient effort);moderate assist (50% patient effort);verbal cues required;nonverbal cues required (demo/gesture)  -LB     Bed Mobility, Roll Right, [place] minimum assist (75% patient effort);moderate assist (50% patient effort);verbal cues required;nonverbal cues required (demo/gesture)  -LB minimum assist (75% patient effort);moderate assist (50% patient effort);verbal cues required;nonverbal cues required (demo/gesture)  -LB     Bed Mobility, Scoot/Bridge, [place] dependent (less than 25% patient effort)   he cannot scoot his hips at all  -LB dependent (less than 25% patient effort)   he cannot scoot his hips at all  -LB     Bed Mob, Supine to Sit, [place] minimum assist (75% patient effort);verbal cues required;nonverbal cues required (demo/gesture)   he must have his arms in a position to push up from mat  -LB minimum assist (75% patient effort);verbal cues required;nonverbal cues required (demo/gesture)   he must have his arms in a position to push up from mat  -LB     Bed Mob, Sit to Supine, [place] moderate assist (50% patient effort);verbal cues required;nonverbal cues required (demo/gesture)   he needs his trunk guided back and legs lifted into bed  -LB moderate assist (50% patient effort);verbal cues required;nonverbal cues required (demo/gesture)   he needs his trunk guided back and legs lifted into bed  -LB     Bed Mobility, Safety Issues decreased use of legs for bridging/pushing;impaired trunk control for bed mobility  -LB decreased use of legs for bridging/pushing;impaired trunk control for bed mobility  -LB     Bed Mobility, Impairments muscle tone abnormal;sensation  decreased;strength decreased;impaired balance;coordination impaired;motor control impaired;postural control impaired  -LB muscle tone abnormal;sensation decreased;strength decreased;impaired balance;coordination impaired;motor control impaired;postural control impaired  -LB     Bed Mobility, Comment  He needs slightly more assist on mat table compared with hospital bed with railing.  -LB     Recorded by [LB] Susan Palma, PT [LB] Susan Palma, PT     Transfer Assessment/Treatment    Transfers, Bed-Chair [place] stand by assist;contact guard assist;verbal cues required;nonverbal cues required (demo/gesture)  -LB stand by assist;contact guard assist;verbal cues required;nonverbal cues required (demo/gesture)  -LB contact guard assist;stand by assist  -    Transfers, Chair-Bed [place] stand by assist;contact guard assist;verbal cues required;nonverbal cues required (demo/gesture)  -LB stand by assist;contact guard assist;verbal cues required;nonverbal cues required (demo/gesture)  -LB verbal cues required  -    Transfers, Bed-Chair-Bed, Assist Device sliding board  -LB sliding board  -LB sliding board  -AH    Transfer, Safety Issues --   decreased trunk control, requires UE assist  -LB --   decreased trunk control, requires UE assist  -LB     Transfer, Impairments muscle tone abnormal;sensation decreased;strength decreased;impaired balance;coordination impaired;motor control impaired;postural control impaired  -LB muscle tone abnormal;sensation decreased;strength decreased;impaired balance;coordination impaired;motor control impaired;postural control impaired  -LB     Recorded by [LB] Susan Palma, PT [LB] Susan Palma, PT [AH] Michell Silva, OT    Balance Skills Training    Sitting-Level of Assistance Close supervision  -LB Close supervision  -LB     Sitting-Balance Support --   requires one extremity to assist balance at eob  -LB --   requires one extremity to  assist balance at eob  -LB     Sitting-Balance Activities --   sitting bean bag toss x2  -LB --   sitting bean bag toss x2  -LB     Recorded by [LB] Susan Palma, PT [LB] Susan Palma, PT     Therapy Exercises    Bilateral Lower Extremities PROM:;supine;prone   LE stretching, prone lying 15 min bid  -LB PROM:;supine;prone   LE stretching, prone lying 15 min bid  -LB     Bilateral Upper Extremity   AROM:;sitting   coordination and UE ther ex  -AH    BUE Resistance   --   arm bike 3x5 min, rickshaw 20x4 with 20 lbs  -AH    Recorded by [LB] Susan Palma, PT [LB] Susan Palma, PT [AH] Michell Silva, OT    Positioning and Restraints    Pre-Treatment Position sitting in chair/recliner  -LB sitting in chair/recliner  -LB     In Wheelchair sitting  -LB sitting  -LB     Recorded by [LB] Susan Palma, PT [LB] Susan Palma, PT       User Key  (r) = Recorded By, (t) = Taken By, (c) = Cosigned By    Initials Name Effective Dates    LB Susan Palma, PT 03/14/16 -     BF Lucía Oliveira Pedro, OT 03/14/16 -     AG Radha Ohara, PT 03/14/16 -     NICOLE Scarlett Caba, PTA 05/02/16 -     AH Michell Silva, OT 03/14/16 -                 IP PT Goals       06/12/17 1831 06/02/17 1521       Bed Mobility PT STG    Bed Mobility PT STG, Date Established  06/02/17  -RT     Bed Mobility PT STG, Time to Achieve  1 wk  -RT     Bed Mobility PT STG, Activity Type  all bed mobility  -RT     Bed Mobility PT STG, [place] Level  minimum assist (75% patient effort)  -RT     Bed Mobility PT STG, Date Goal Reviewed 06/12/17  -LB      Bed Mobility PT STG, Outcome goal not met  -LB      Bed Mobility PT STG, Reason Goal Not Met progress slower than expected  -LB      Bed Mobility PT LTG    Bed Mobility PT LTG, Date Established  06/02/17  -RT     Bed Mobility PT LTG, Time to Achieve  2 wks  -RT     Bed Mobility PT LTG, Activity Type  all bed mobility  -RT     Bed Mobility PT  LTG, [place] Level  supervision required  -RT     Transfer Training PT STG    Transfer Training PT STG, Date Established  06/02/17  -RT     Transfer Training PT STG, Time to Achieve  1 wk  -RT     Transfer Training PT STG, Activity Type  all transfers  -RT     Transfer Training PT STG, [place] Level  contact guard assist  -RT     Transfer Training PT STG, Date Goal Reviewed 06/12/17  -LB      Transfer Training PT STG, Outcome goal met  -LB      Transfer Training PT LTG    Transfer Training PT LTG, Date Established  06/02/17  -RT     Transfer Training PT LTG, Time to Achieve  2 wks  -RT     Transfer Training PT LTG, Activity Type  all transfers  -RT     Transfer Training PT LTG, [place] Level  supervision required  -RT     Static Sitting Balance PT STG    Static Sitting Balance PT STG, Date Established  06/02/17  -RT     Static Sitting Balance PT STG, Time to Achieve  1 wk  -RT     Static Sitting Balance PT STG, [place] Level  minimum assist (75% patient effort)  -RT     Static Sitting Balance PT STG, Date Goal Reviewed 06/12/17  -LB      Static Sitting Balance PT STG, Outcome goal met  -LB      Static Sitting Balance PT LTG    Static Sitting Balance PT LTG, Date Established  06/02/17  -RT     Static Sitting Balance PT LTG, Time to Achieve  2 wks  -RT     Static Sitting Balance PT LTG, [place] Level  supervision required  -RT       User Key  (r) = Recorded By, (t) = Taken By, (c) = Cosigned By    Initials Name Provider Type    ANA CRISTINA Palma, PT Physical Therapist    RT Perico Lucas, PT Physical Therapist          Physical Therapy Education     Title: PT OT SLP Therapies (Active)     Topic: Physical Therapy (Done)     Point: Mobility training (Done)    Learning Progress Summary    Learner Readiness Method Response Comment Documented by Status   Patient Acceptance E VU  NICOLE 06/14/17 1609 Done    Acceptance E VU,NR  LB 06/13/17 1530 Done    Acceptance E VU,NR  LB 06/12/17 1831 Done     Acceptance E VU  TG 06/11/17 2000 Done    Acceptance E VU  TG 06/10/17 1955 Done    Acceptance E VU  TG 06/09/17 2207 Done    Acceptance E VU   06/09/17 1557 Done    Acceptance E VU   06/08/17 2328 Done    Acceptance E VU   06/08/17 1547 Done    Acceptance E VU   06/08/17 0039 Done    Acceptance E,D NR,VU  AG 06/07/17 1810 Done    Acceptance E VU  CT 06/07/17 1737 Done    Acceptance E VU   06/07/17 0050 Done    Acceptance E VU,NR   06/06/17 1710 Done    Acceptance E VU,NR   06/05/17 1521 Done    Acceptance E VU  RT 06/03/17 1236 Done    Acceptance E VU  RT 06/02/17 1519 Done               Point: Home exercise program (Done)    Learning Progress Summary    Learner Readiness Method Response Comment Documented by Status   Patient Acceptance E VU  NICOLE 06/14/17 1609 Done    Acceptance E VU,NR   06/13/17 1530 Done    Acceptance E VU,NR   06/12/17 1831 Done    Acceptance E VU  TG 06/11/17 2000 Done    Acceptance E VU  TG 06/10/17 1955 Done    Acceptance E VU  TG 06/09/17 2207 Done    Acceptance E VU   06/09/17 1557 Done    Acceptance E VU   06/08/17 2328 Done    Acceptance E VU   06/08/17 1547 Done    Acceptance E VU   06/08/17 0039 Done    Acceptance E,D NR,VU  AG 06/07/17 1810 Done    Acceptance E VU  CT 06/07/17 1737 Done    Acceptance E VU   06/07/17 0050 Done    Acceptance E VU,NR   06/06/17 1710 Done    Acceptance E VU,NR   06/05/17 1521 Done    Acceptance E VU  RT 06/03/17 1236 Done    Acceptance E VU  RT 06/02/17 1519 Done               Point: Body mechanics (Done)    Learning Progress Summary    Learner Readiness Method Response Comment Documented by Status   Patient Acceptance E VU  NICOLE 06/14/17 1609 Done    Acceptance E VU,NR   06/13/17 1530 Done    Acceptance E VU,NR   06/12/17 1831 Done    Acceptance E VU  TG 06/11/17 2000 Done    Acceptance E VU  TG 06/10/17 1955 Done    Acceptance E VU  TG 06/09/17 2207 Done    Acceptance E VU  AH 06/09/17 1557 Done    Acceptance E VU    06/08/17 2328 Done    Acceptance E VU   06/08/17 1547 Done    Acceptance E VU   06/08/17 0039 Done    Acceptance E,D NR,VU  AG 06/07/17 1810 Done    Acceptance E VU  CT 06/07/17 1737 Done    Acceptance E VU   06/07/17 0050 Done    Acceptance E VU,NR   06/06/17 1710 Done    Acceptance E VU,NR   06/05/17 1521 Done    Acceptance E VU  RT 06/03/17 1236 Done    Acceptance E VU  RT 06/02/17 1519 Done               Point: Precautions (Done)    Learning Progress Summary    Learner Readiness Method Response Comment Documented by Status   Patient Acceptance E VU  NICOLE 06/14/17 1609 Done    Acceptance E VU,NR   06/13/17 1530 Done    Acceptance E VU,NR   06/12/17 1831 Done    Acceptance E VU  TG 06/11/17 2000 Done    Acceptance E VU  TG 06/10/17 1955 Done    Acceptance E VU  TG 06/09/17 2207 Done    Acceptance E VU   06/09/17 1557 Done    Acceptance E VU   06/08/17 2328 Done    Acceptance E VU   06/08/17 1547 Done    Acceptance E VU   06/08/17 0039 Done    Acceptance E,D NR,VU  AG 06/07/17 1810 Done    Acceptance E VU  CT 06/07/17 1737 Done    Acceptance E VU   06/07/17 0050 Done    Acceptance E VU,NR   06/06/17 1710 Done    Acceptance E VU,NR   06/05/17 1521 Done    Acceptance E VU  RT 06/03/17 1236 Done    Acceptance E VU  RT 06/02/17 1519 Done                      User Key     Initials Effective Dates Name Provider Type Discipline    TG 06/16/16 -  Sasha De La Rosa, RN Registered Nurse Nurse     06/16/16 -  Flores Silva, RN Registered Nurse Nurse     03/14/16 -  Susan Palma, PT Physical Therapist PT    AG 03/14/16 -  Radha Ohara, PT Physical Therapist PT    CT 03/14/16 -  Susu Sheridan, PT Physical Therapist PT    NICOLE 05/02/16 -  Scarlett Caba, PTA Physical Therapy Assistant PT    AH 07/14/16 -  Magda Carney, PTA Physical Therapy Assistant PT    RT 03/14/16 -  Perico S Lance, PT Physical Therapist PT                    PT Recommendation and Plan  Planned Therapy  Interventions: balance training, bed mobility training, home exercise program, lumbar stabilization, manual therapy techniques, neuromuscular re-education, patient/family education, postural re-education, ROM (Range of Motion), strengthening, stretching, transfer training  PT Frequency: 5 times/wk, 2 times/day, per priority policy            Time Calculation:         PT Charges       06/15/17 1223 06/15/17 1222       Time Calculation    Start Time 1045  -NICOLE 0835  -NICOLE     Stop Time 1135  -NICOLE 0915  -NICOLE     Time Calculation (min) 50 min  -NICOLE 40 min  -NICOLE     PT Non-Billable Time (min) 15 min  -NICOLE      PT Received On 06/15/17  -NICOLE 06/15/17  -NICOLE     PT - Next Appointment 06/16/17  -NICOLE 06/16/17  -NICOLE     PT Goal Re-Cert Due Date 06/16/17  -NICOLE 06/16/17  -NICOLE       User Key  (r) = Recorded By, (t) = Taken By, (c) = Cosigned By    Initials Name Provider Type    NICOLE Caba PTA Physical Therapy Assistant          Therapy Charges for Today     Code Description Service Date Service Provider Modifiers Qty    00009136451 HC PT THER PROC EA 15 MIN 6/14/2017 Scarlett Caba, SHILPA GP 2    26831016579 HC PT THERAPEUTIC ACT EA 15 MIN 6/14/2017 Scarlett Caba, SHILPA GP 1    01241669838 HC PT THER SUPP EA 15 MIN 6/14/2017 Scarlett Caba PTA GP 2    72026894712 HC PT THER PROC EA 15 MIN 6/15/2017 Scarlett Caba PTA GP 4    69966449592 HC PT THERAPEUTIC ACT EA 15 MIN 6/15/2017 Scarlett Caba PTA GP 2    92741503675 HC PT THER SUPP EA 15 MIN 6/15/2017 Scarlett Caba PTA GP 2               Scarlett Caba PTA  6/15/2017

## 2017-06-15 NOTE — PLAN OF CARE
Problem: Patient Care Overview (Adult)  Goal: Plan of Care Review  Outcome: Ongoing (interventions implemented as appropriate)    06/15/17 0750   Coping/Psychosocial Response Interventions   Plan Of Care Reviewed With patient   Patient Care Overview   Progress improving         Problem: Activity Intolerance (Adult)  Goal: Activity Tolerance  Outcome: Ongoing (interventions implemented as appropriate)    Problem: Fall Risk (Adult)  Goal: Absence of Falls  Outcome: Ongoing (interventions implemented as appropriate)    Problem: Pressure Ulcer (Adult)  Goal: Signs and Symptoms of Listed Potential Problems Will be Absent or Manageable (Pressure Ulcer)  Outcome: Ongoing (interventions implemented as appropriate)    Problem: Skin Integrity Impairment, Risk/Actual (Adult)  Goal: Skin Integrity/Wound Healing  Outcome: Ongoing (interventions implemented as appropriate)    Problem: Mobility, Physical Impaired (Adult)  Goal: Enhanced Mobility Skills  Outcome: Ongoing (interventions implemented as appropriate)

## 2017-06-15 NOTE — PROGRESS NOTES
Inpatient Rehabilitation Functional Measures Assessment    Functional Measures  LYNETTE Eating:  LYNETTE Grooming:  LYNETTE Bathing:  LYNETTE Upper Body Dressing:  LYNETTE Lower Body Dressing:  LYNETTE Toileting:    LYNETTE Bladder Management  Level of Assistance:  Frequency/Number of Accidents this Shift:    LYNETTE Bowel Management  Level of Assistance:  Frequency/Number of Accidents this Shift:    LYNETTE Bed/Chair/Wheelchair Transfer:  LYNETTE Toilet Transfer:  LYNETTE Tub/Shower Transfer:    Previously Documented Mode of Locomotion at Discharge:  LYNETTE Expected Mode of Locomotion at Discharge:  LYNETTE Walk/Wheelchair:  LYNETTE Stairs:    LYNETTE Comprehension:  Auditory comprehension is the usual mode. Comprehension  Score = 6, Modified [place].  Patient comprehends complex/abstract  information in their primary language, requiring: Additional time.  LYNETTE Expression:  Both ( vocal and non-vocal) modes of expression are used  equally. Expression Score = 6,  Modified Independent. Patient expresses  complex/abstract information in their primary language, requiring: Additional  time.  LYNETTE Social Interaction:  Social Interaction Score = 6, Modified Independent.  Patient is modified independent for social interaction, requiring: Requires  additional time.  LYNETTE Problem Solving:  Problem Solving Score = 6, Modified [place].  Patient  makes appropriate decisions in order to solve complex problems, but requires  extra time.  LYNETTE Memory:  Memory Score = 6, Modified [place].  Patient is modified  independent for memory, requiring: Requires additional time.    Therapy Mode Minutes  Occupational Therapy:  Physical Therapy:  Speech Language Pathology:    Discharge Functional Goals:    Signed by: Sabino Kaufman RN

## 2017-06-15 NOTE — PROGRESS NOTES
Inpatient Rehabilitation Functional Measures Assessment    Functional Measures  LYNETTE Eating:  Eating Score = 7. Patient is completely independent for eating.  There are no activity limitations.  LYNETTE Grooming: Activity was not observed.  LYNETTE Bathing:  Activity was not observed.  LYNETTE Upper Body Dressing:  Activity was not observed.  LYNETTE Lower Body Dressing:  Activity was not observed.  LYNETTE Toileting:  Patient requires minimal assistance for adjusting clothing  before using a toilet, commode, bedpan, or urinal. Patient requires minimal  assistance for hygiene. Patient requires minimal assistance for adjusting  clothing after using a toilet, commode, bedpan, or urinal. Patient performs 0 -  24% of toileting tasks.  Toileting Score = 1, Total Assistance. No assistive  devices were required.    LYNETTE Bladder Management  Level of Assistance:  Bladder Score = 1.  Patient is total assistance for  bladder management. Patient has an indwelling/Junior catheter.  Frequency/Number of Accidents this Shift:  Bladder accidents this shift:  0 .  Patient has not had an accident, but used a device/medication this shift  requiring: junior catheter .    LYNETTE Bowel Management  Level of Assistance: Bowel Score = 1. Patient performs less than 25% of tasks  and requires total assistance for bowel management or two or more people  required for bowel management requiring assistive device/method: Patient has  Colostomy .  Frequency/Number of Accidents this Shift: Bowel accidents this shift: 0 .  Patient has not had an accident, but used a device/medication this shift  requiring: Colostomy .    LYNETTE Bed/Chair/Wheelchair Transfer:  Bed/chair/wheelchair Transfer Score = 4.  Patient performs 75% or more of effort and minimal assistance (little/incidental  help/lifting of one limb/steadying) for transferring to and from the  bed/chair/wheelchair, requiring: Steadying. Patient requires the following  assistive device(s): Sliding board. Grab bars. Seating  system of wheelchair.  LYNETTE Toilet Transfer:  Activity was not observed.  LYNETTE Tub/Shower Transfer:  Activity was not observed.    Previously Documented Mode of Locomotion at Discharge:  LYNETTE Expected Mode of Locomotion at Discharge:  Psychiatric Walk/Wheelchair:  LYNETTE Stairs:    Psychiatric Comprehension:  LYNETTE Expression:  LYNETTE Social Interaction:  Psychiatric Problem Solving:  LYNETTE Memory:    Therapy Mode Minutes  Occupational Therapy:  Physical Therapy:  Speech Language Pathology:    Discharge Functional Goals:    Signed by: TALI Dumont

## 2017-06-16 VITALS
HEIGHT: 71 IN | DIASTOLIC BLOOD PRESSURE: 78 MMHG | TEMPERATURE: 98.3 F | BODY MASS INDEX: 34.3 KG/M2 | SYSTOLIC BLOOD PRESSURE: 123 MMHG | OXYGEN SATURATION: 98 % | HEART RATE: 106 BPM | RESPIRATION RATE: 18 BRPM | WEIGHT: 245 LBS

## 2017-06-16 PROCEDURE — 97535 SELF CARE MNGMENT TRAINING: CPT | Performed by: OCCUPATIONAL THERAPIST

## 2017-06-16 PROCEDURE — 25010000002 ENOXAPARIN PER 10 MG: Performed by: FAMILY MEDICINE

## 2017-06-16 RX ADMIN — ASPIRIN 81 MG: 81 TABLET ORAL at 08:41

## 2017-06-16 RX ADMIN — VENLAFAXINE HYDROCHLORIDE 75 MG: 75 CAPSULE, EXTENDED RELEASE ORAL at 08:41

## 2017-06-16 RX ADMIN — ENOXAPARIN SODIUM 40 MG: 40 INJECTION SUBCUTANEOUS at 08:42

## 2017-06-16 RX ADMIN — CASTOR OIL AND BALSAM, PERU: 788; 87 OINTMENT TOPICAL at 08:44

## 2017-06-16 RX ADMIN — GABAPENTIN 800 MG: 400 CAPSULE ORAL at 05:06

## 2017-06-16 RX ADMIN — CETIRIZINE HYDROCHLORIDE 10 MG: 10 TABLET ORAL at 08:41

## 2017-06-16 RX ADMIN — METOPROLOL TARTRATE 25 MG: 25 TABLET, FILM COATED ORAL at 08:41

## 2017-06-16 RX ADMIN — BACLOFEN 20 MG: 10 TABLET ORAL at 05:06

## 2017-06-16 RX ADMIN — Medication 1 TABLET: at 08:41

## 2017-06-16 RX ADMIN — ALPRAZOLAM 0.5 MG: 0.5 TABLET ORAL at 08:41

## 2017-06-16 RX ADMIN — FOLIC ACID 1 MG: 1 TABLET ORAL at 08:41

## 2017-06-16 NOTE — PLAN OF CARE
Problem: Patient Care Overview (Adult)  Goal: Plan of Care Review  Outcome: Outcome(s) achieved Date Met:  06/16/17 06/16/17 0907   Coping/Psychosocial Response Interventions   Plan Of Care Reviewed With patient       Goal: Adult Individualization and Mutuality  Outcome: Outcome(s) achieved Date Met:  06/16/17  Goal: Discharge Needs Assessment  Outcome: Outcome(s) achieved Date Met:  06/16/17    Problem: Activity Intolerance (Adult)  Goal: Activity Tolerance  Outcome: Outcome(s) achieved Date Met:  06/16/17  Goal: Effective Energy Conservation Techniques  Outcome: Outcome(s) achieved Date Met:  06/16/17    Problem: Fall Risk (Adult)  Goal: Absence of Falls  Outcome: Outcome(s) achieved Date Met:  06/16/17    Problem: Pressure Ulcer (Adult)  Goal: Signs and Symptoms of Listed Potential Problems Will be Absent or Manageable (Pressure Ulcer)  Outcome: Outcome(s) achieved Date Met:  06/16/17    Problem: Skin Integrity Impairment, Risk/Actual (Adult)  Goal: Skin Integrity/Wound Healing  Outcome: Outcome(s) achieved Date Met:  06/16/17    Problem: Mobility, Physical Impaired (Adult)  Goal: Enhanced Mobility Skills  Outcome: Outcome(s) achieved Date Met:  06/16/17  Goal: Enhanced Functionality Ability  Outcome: Outcome(s) achieved Date Met:  06/16/17

## 2017-06-16 NOTE — PROGRESS NOTES
Inpatient Rehabilitation Functional Measures Assessment and Plan of Care    Plan of Care    Body Function Structure    Skin Integrity (Active)  Current Status (6/1/2017 6:00:00 PM): impairment in skin integrity  Weekly Goal: no further skin breakdown this week  Discharge Goal: wounds showing signs of healing, no s/s infection by discharge    Safety    Potential for Injury (Active)  Current Status (6/1/2017 6:00:00 PM): potential for falls  Weekly Goal: no falls this week  Discharge Goal: no falls by discharge  Functional Measures  LYNETTE Eating:  LYNETTE Grooming:  LYNETTE Bathing:  LYNETTE Upper Body Dressing:  LYNETTE Lower Body Dressing:  LYNETTE Toileting:    LYNETTE Bladder Management  Level of Assistance:  Frequency/Number of Accidents this Shift:    LYNETTE Bowel Management  Level of Assistance:  Frequency/Number of Accidents this Shift:    LYNETTE Bed/Chair/Wheelchair Transfer:  LYNETTE Toilet Transfer:  LYNETTE Tub/Shower Transfer:    Previously Documented Mode of Locomotion at Discharge:  [facility] Expected Mode of Locomotion at Discharge:  LYNETTE Walk/Wheelchair:  LYNETTE Stairs:    LYNETTE Comprehension:  Both ( auditory and visual) modes of comprehension are used  equally. Comprehension Score = 6, Modified [place].  Patient comprehends  complex/abstract information in their primary language, requiring: Additional  time.  LYNETTE Expression:  Both ( vocal and non-vocal) modes of expression are used  equally. Expression Score = 6,  Modified Independent. Patient expresses  complex/abstract information in their primary language, requiring: Additional  time.  LYNETTE Social Interaction:  Social Interaction Score = 6, Modified Independent.  Patient is modified independent for social interaction, requiring: Requires  additional time.  LYNETTE Problem Solving:  Problem Solving Score = 6, Modified [place].  Patient  makes appropriate decisions in order to solve complex problems, but requires  extra time.  LYNETTE Memory:  Memory Score = 6, Modified [place].  Patient is  modified  independent for memory, requiring: Requires additional time.    Therapy Mode Minutes  Occupational Therapy:  Physical Therapy:  Speech Language Pathology:    Discharge Functional Goals:    Signed by: Sabino Kaufman RN

## 2017-06-16 NOTE — PROGRESS NOTES
Inpatient Rehabilitation Functional Measures Assessment    Functional Measures  LYNETTE Eating:  LYNETTE Grooming:  LYNETTE Bathing:  LYNETTE Upper Body Dressing:  LYNETTE Lower Body Dressing:  LYNETTE Toileting:    LYNETTE Bladder Management  Level of Assistance:  Frequency/Number of Accidents this Shift:    LYNETTE Bowel Management  Level of Assistance:  Frequency/Number of Accidents this Shift:    LYNETTE Bed/Chair/Wheelchair Transfer:  LYNETTE Toilet Transfer:  LYNETTE Tub/Shower Transfer:    Previously Documented Mode of Locomotion at Discharge:  LYNETTE Expected Mode of Locomotion at Discharge:  LYNETTE Walk/Wheelchair:  LYNETTE Stairs:    LYNETTE Comprehension:  Auditory comprehension is the usual mode. Comprehension  Score = 6, Modified [place].  Patient comprehends complex/abstract  information in their primary language, requiring: Additional time.  LYNETTE Expression:  Vocal expression is the usual mode. Expression Score = 6,  Modified Independent.  Patient expresses complex/abstract information in their  primary language, requiring: Additional time.  LYNETTE Social Interaction:  Social Interaction Score = 6, Modified Independent.  Patient is modified independent for social interaction, requiring: Requires  additional time.  LYNETTE Problem Solving:  Problem Solving Score = 6, Modified [place].  Patient  makes appropriate decisions in order to solve complex problems, but requires  extra time.  LYNETTE Memory:  Memory Score = 6, Modified [place].  Patient is modified  independent for memory, requiring: Requires additional time.    Therapy Mode Minutes  Occupational Therapy:  Physical Therapy:  Speech Language Pathology:    Discharge Functional Goals:    Signed by: Nurse Dee

## 2017-06-16 NOTE — PROGRESS NOTES
Inpatient Rehabilitation Functional Measures Assessment and Plan of Care    Plan of Care      Functional Measures  LYNETTE Eating:  LYNETTE Grooming:  LYNETTE Bathing:  LYNETTE Upper Body Dressing:  LYNETTE Lower Body Dressing:  LYNETTE Toileting:    LYNETTE Bladder Management  Level of Assistance:  Frequency/Number of Accidents this Shift:    LYNETTE Bowel Management  Level of Assistance:  Frequency/Number of Accidents this Shift:    LYNETTE Bed/Chair/Wheelchair Transfer:  LYNETTE Toilet Transfer:  LYNETTE Tub/Shower Transfer:    Previously Documented Mode of Locomotion at Discharge:  LYNETTE Expected Mode of Locomotion at Discharge:  LYNETTE Walk/Wheelchair:  LYNETTE Stairs:    LYNETTE Comprehension:  Both ( auditory and visual) modes of comprehension are used  equally. Comprehension Score = 6, Modified [place].  Patient comprehends  complex/abstract information in their primary language, requiring: Additional  time.  LYNETTE Expression:  Both ( vocal and non-vocal) modes of expression are used  equally. Expression Score = 6,  Modified Independent. Patient expresses  complex/abstract information in their primary language, requiring: Additional  time.  LYNETTE Social Interaction:  Social Interaction Score = 6, Modified Independent.  Patient is modified independent for social interaction, requiring: Requires  additional time.  LYNETTE Problem Solving:  Problem Solving Score = 6, Modified [place].  Patient  makes appropriate decisions in order to solve complex problems, but requires  extra time.  LYNETTE Memory:  Memory Score = 6, Modified [place].  Patient is modified  independent for memory, requiring: Requires additional time.    Therapy Mode Minutes  Occupational Therapy:  Physical Therapy:  Speech Language Pathology:    Discharge Functional Goals:    Signed by: Sabino Kaufman RN

## 2017-06-16 NOTE — PROGRESS NOTES
Rehabilitation Nursing  Inpatient Rehabilitation Plan of Care Note    Plan of Care  Copy from POCBody Function Structure    Skin Integrity (Active)  Current Status (6/1/2017 6:00:00 PM): impairment in skin integrity  Weekly Goal: no further skin breakdown this week  Discharge Goal: wounds showing signs of healing, no s/s infection by discharge    Safety    Potential for Injury (Active)  Current Status (6/1/2017 6:00:00 PM): potential for falls  Weekly Goal: no falls this week  Discharge Goal: no falls by discharge    Signed by: Lore Ruiz, Nurse

## 2017-06-16 NOTE — PLAN OF CARE
Problem: Patient Care Overview (Adult)  Goal: Plan of Care Review  Outcome: Ongoing (interventions implemented as appropriate)  Goal: Adult Individualization and Mutuality  Outcome: Ongoing (interventions implemented as appropriate)  Goal: Discharge Needs Assessment  Outcome: Ongoing (interventions implemented as appropriate)    Problem: Activity Intolerance (Adult)  Goal: Activity Tolerance  Outcome: Ongoing (interventions implemented as appropriate)  Goal: Effective Energy Conservation Techniques  Outcome: Ongoing (interventions implemented as appropriate)    Problem: Fall Risk (Adult)  Goal: Absence of Falls  Outcome: Ongoing (interventions implemented as appropriate)    Problem: Pressure Ulcer (Adult)  Goal: Signs and Symptoms of Listed Potential Problems Will be Absent or Manageable (Pressure Ulcer)  Outcome: Ongoing (interventions implemented as appropriate)    Problem: Skin Integrity Impairment, Risk/Actual (Adult)  Goal: Skin Integrity/Wound Healing  Outcome: Ongoing (interventions implemented as appropriate)    Problem: Mobility, Physical Impaired (Adult)  Goal: Enhanced Mobility Skills  Outcome: Ongoing (interventions implemented as appropriate)  Goal: Enhanced Functionality Ability  Outcome: Ongoing (interventions implemented as appropriate)

## 2017-06-16 NOTE — PLAN OF CARE
Problem: Inpatient Occupational Therapy  Goal: Transfer Training Goal 1 LTG- OT  Outcome: Outcome(s) achieved Date Met:  06/16/17 06/16/17 1319   Transfer Training OT LTG   Transfer Training OT LTG, Outcome goal met       Goal: Bathing Goal LTG- OT  Outcome: Unable to achieve outcome(s) by discharge Date Met:  06/16/17 06/16/17 1319   Bathing OT LTG   Bathing Goal OT LTG, Outcome goal partially met   Bathing Goal OT LTG, Reason Goal Not Met discharged from facility       Goal: Eating Self-Feeding Goal LTG- OT  Outcome: Outcome(s) achieved Date Met:  06/16/17 06/16/17 1319   Eating Self-Feeding OT LTG   Eat Self Feeding Goal OT LTG, Outcome goal met       Goal: Grooming Goal LTG- OT  Outcome: Outcome(s) achieved Date Met:  06/16/17 06/16/17 1319   Grooming OT LTG   Grooming Goal OT LTG, Outcome goal met       Goal: LB Dressing Goal LTG- OT  Outcome: Unable to achieve outcome(s) by discharge Date Met:  06/16/17 06/16/17 1319   LB Dressing OT LTG   LB Dressing Goal OT LTG, Outcome goal partially met   LB Dressing Goal OT LTG, Reason Goal Not Met discharged from facility

## 2017-06-16 NOTE — PROGRESS NOTES
Inpatient Rehabilitation Functional Measures Assessment    Functional Measures  LYNETTE Eating:  Eating Score = 6. Patient is modified independent for eating,  requiring: Safety considerations.  LYNETTE Grooming: Grooming Score = 6.  Patient is modified independent for grooming,  requiring: Safety considerations.  LYNETTE Bathing:  Patient took sponge bath. Patient requires no physical assistance  for washing, rinsing, and drying the right arm. Patient requires no physical  assistance for washing, rinsing, and drying the left arm. Patient requires no  physical assistance for washing, rinsing, and drying the chest. Patient requires  no physical assistance for washing, rinsing, and drying the abdomen. Patient  requires no physical assistance for washing, rinsing, and drying the perineal  area. Patient requires no physical assistance for washing, rinsing, and drying  the buttocks. Patient requires no physical assistance for washing, rinsing, and  drying the right upper leg. Patient requires no physical assistance for washing,  rinsing, and drying the left upper leg. Patient requires maximal assistance for  washing, rinsing, or drying the right lower leg, including the foot. Patient  requires no physical assistance for washing, rinsing, and drying the left lower  leg, including the foot. Patient performs 90 % of bathing tasks. Bathing Score =  4, Minimal Assistance. Patient requires the following assistive device(s): Grab  bar/arm rest to maintain balance. Performed while supine in bed  LYNETTE Upper Body Dressing:  Upper Body Dressing Score = 5. Patient is supervision  for upper body dressing, requiring: Gathering/setting out clothes. Stand by  assistance. No assistive devices were required.  LYNETTE Lower Body Dressing:  Patient requires total assistance for gathering  clothes. Wearing underwear or an undergarment is not applicable for this  patient. Patient requires maximal/significant physical assistance for holding  clothing and/or  threading the right leg through the pants/skirt. Patient  requires no physical assistance for donning and/or doffing pants/skirt threading  left leg. Patient requires no physical assistance for donning and/or doffing  pants/skirt over hips and adjusting fastener. Patient requires  minimal/incidental physical assistance for donning and/or doffing right sock.  Patient requires no physical assistance for donning and/or doffing left sock.  Donning and/or doffing right shoe is not applicable for this patient. Donning  and/or doffing left shoe is not applicable for this patient. Patient performs  66.67 % of lower body dressing tasks. Lower Body Dressing Score = 3, Moderate  Assistance. Patient requires the following assistive device(s): [place].  Performed while supine & sitting  [facility] Toileting:  Toileting Score = 5.  Patient is supervision/set-up for  toileting, requiring: Setting out toileting equipment. Patient requires the  following assistive device(s): Colostomy & catheter .    LYNETTE Bladder Management  Level of Assistance:  Frequency/Number of Accidents this Shift:    LYNETTE Bowel Management  Level of Assistance:  Frequency/Number of Accidents this Shift:    LYNETTE Bed/Chair/Wheelchair Transfer:  Bed/chair/wheelchair Transfer Score = 4.  Patient performs 75% or more of effort and minimal assistance (little/incidental  help/lifting of one limb/steadying) for transferring to and from the  bed/chair/wheelchair, requiring: Contact guard. Patient requires the following  assistive device(s): Sliding board.  LYNETTE Toilet Transfer:  Toilet Transfer did not occur. Pt has colostomy & junior  catheter .  LYNETTE Tub/Shower Transfer:  Shower Transfer Score = 4. Patient performs 75% or  more of effort and minimal assistance (little/incidental help/lifting of one  limb/steadying) for transferring to and from the shower, requiring: Steadying.  Patient requires the following assistive device(s): Grab bars. Shower chair.  Sliding  board.    Previously Documented Mode of Locomotion at Discharge:  LYNETTE Expected Mode of Locomotion at Discharge:  LYNETTE Walk/Wheelchair:  LYNETTE Stairs:    LYNETTE Comprehension:  LYNETTE Expression:  LYNETTE Social Interaction:  LYNETTE Problem Solving:  LYNETTE Memory:    Therapy Mode Minutes  Occupational Therapy: Individual: 15 minutes.  Physical Therapy:  Speech Language Pathology:    Discharge Functional Goals:    Signed by: Lucía Pedro OT

## 2017-06-16 NOTE — PROGRESS NOTES
Patient Assessment Instrument  Quality Indicators - Discharge    Section GG. Self-Care Performance      Section GG. Mobility Performance      Section J. Health Conditions Discharge  Fall(s) Since Admission:  No    Section M. Skin Conditions Discharge  Unhealed Pressure Ulcer(s) at Stage 1 or Higher:  Yes.    . Current Number of Unhealed Pressure Ulcers    Number of Unhealed Stage 2: 2  Number of Unhealed Stage 2 at Admission: 2  Number of Unhealed Stage 3: 0  Number of Unhealed Stage 4: 0    . Worsening in Pressure Ulcer Status Since Admission      . Healed Pressure Ulcer(s)  (Voluntary)    Number of Healed Stage 1: 0  Number of Healed Stage 2: 0  Number of Healed Stage 3: 0  Number of Healed Stage 4: 0    Section O. Special Treatments, Procedures, and Programs Discharge  . Influenza Vaccine - Discharge  Received in this facility for this year's influenza vaccination season:  No.  Influenza Vaccine Not Received Due To: Offered and declined.    Date Influenza Vaccine Received (enter date ONLY if received in this unit;  otherwise, skip this entry)      OPTIONAL BRANCH FOR UNPLANNED DISCHARGES  Unplanned Discharge:    Signed by: Sabino Kaufman RN

## 2017-06-16 NOTE — PROGRESS NOTES
Inpatient Rehabilitation Functional Measures Assessment    Functional Measures  LYNETTE Eating:  Eating Score = 7. Patient is completely independent for eating.  There are no activity limitations.  LYNETTE Grooming: Activity was not observed.  LYNETTE Bathing:  Activity was not observed.  LYNETTE Upper Body Dressing:  Activity was not observed.  LYNETTE Lower Body Dressing:  Activity was not observed.  LYNETTE Toileting:  Patient requires minimal assistance for adjusting clothing  before using a toilet, commode, bedpan, or urinal. Patient requires no physical  assistance for hygiene. Patient requires no physical assistance for adjusting  clothing after using a toilet, commode, bedpan, or urinal. Patient performs  66.67 % of toileting tasks. Toileting Score = 3, Moderate Assistance. Patient  requires the following assistive device(s): Grab bar. Arm rest of a specialized  seat.    LYNETTE Bladder Management  Level of Assistance:  Bladder Score = 1.  Patient is total assistance for  bladder management. Patient has an indwelling/Sheldon catheter.  Frequency/Number of Accidents this Shift:  Bladder accidents this shift:  0 .  Patient has not had an accident, but used a device/medication this shift  requiring: Sheldon Catheter .    LYNETTE Bowel Management  Level of Assistance: Bowel Score = 1. Patient performs less than 25% of tasks  and requires total assistance for bowel management or two or more people  required for bowel management requiring assistive device/method: Colostomy .  Frequency/Number of Accidents this Shift: Bowel accidents this shift: 0 .  Patient has not had an accident, but used a device/medication this shift  requiring: Colostomy .    LYNETTE Bed/Chair/Wheelchair Transfer:  Bed/chair/wheelchair Transfer Score = 4.  Patient performs 75% or more of effort and minimal assistance (little/incidental  help/lifting of one limb/steadying) for transferring to and from the  bed/chair/wheelchair, requiring: Steadying. Patient requires the  following  assistive device(s): Sliding board. Grab bars. Seating system of wheelchair.  LYNETTE Toilet Transfer:  Activity was not observed.  LYNETTE Tub/Shower Transfer:  Activity was not observed.    Previously Documented Mode of Locomotion at Discharge:  LYNETTE Expected Mode of Locomotion at Discharge:  LYNETTE Walk/Wheelchair:  LYNETTE Stairs:    LYNETTE Comprehension:  LYNETTE Expression:  LYNETTE Social Interaction:  LYNETTE Problem Solving:  LYNETTE Memory:    Therapy Mode Minutes  Occupational Therapy:  Physical Therapy:  Speech Language Pathology:    Discharge Functional Goals:    Signed by: TALI Dumont

## 2017-06-16 NOTE — PROGRESS NOTES
Patient Assessment Instrument  Quality Indicators - Discharge    Section GG. Self-Care Performance      Section GG. Mobility Performance     Roll Left and Right: Ivor does less than half the effort. Ivor lifts, holds  or supports trunk or limbs but provides less than half the effort.   Sit to Lying: Ivor does less than half the effort. Ivor lifts, holds or  supports trunk or limbs but provides less than half the effort.   Lying to Sitting on Side of Bed: Ivor does less than half the effort. Ivor  lifts, holds or supports trunk or limbs but provides less than half the effort.   Sit to Stand: Not attempted due to medical or safety concerns.   Chair/Bed to Chair Transfer: Ivor provides verbal cues or touching/steadying  assistance as patient completes activity.   Toilet Transfer Not attempted due to medical or safety concerns.   Car Transfer: Not attempted due to medical or safety concerns.    Patient Walks:  No   Uses Wheelchair/Scooter: Yes  Wheel 50 Feet with Two Turns: Patient completed the activities by him/herself  with no assistance from a helper.  Type of Wheelchair/Scooter Used: Manual  Wheel 150 Feet: Patient completed the activities by him/herself with no  assistance from a helper.  Type of Wheelchair/Scooter Used: Manual    Section J. Health Conditions Discharge      Section M. Skin Conditions Discharge      . Current Number of Unhealed Pressure Ulcers      . Worsening in Pressure Ulcer Status Since Admission      . Healed Pressure Ulcer(s)  (Voluntary)      Section O. Special Treatments, Procedures, and Programs Discharge  . Influenza Vaccine - Discharge      Date Influenza Vaccine Received (enter date ONLY if received in this unit;  otherwise, skip this entry)      OPTIONAL BRANCH FOR UNPLANNED DISCHARGES  Unplanned Discharge:    Signed by: Susan Palma, Physical Therapist
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
## 2017-06-16 NOTE — PROGRESS NOTES
Inpatient Rehabilitation Functional Measures Assessment    Functional Measures  LYNETTE Eating:  LYNETTE Grooming:  LYNETTE Bathing:  LYNETTE Upper Body Dressing:  LYNETTE Lower Body Dressing:  LYNETTE Toileting:    LYNETTE Bladder Management  Level of Assistance:  Bladder Score = 1.  Patient is total assistance for  bladder management. Patient has an indwelling/Sheldon catheter.  Frequency/Number of Accidents this Shift:    LYNETTE Bowel Management  Level of Assistance: Bowel Score = 5.  Patient is supervision/set-up for bowel  management, requiring: colostomy . No assistive devices were required.  Frequency/Number of Accidents this Shift:    LYNETTE Bed/Chair/Wheelchair Transfer:  [facility] Toilet Transfer:  LYNETTE Tub/Shower Transfer:    Previously Documented Mode of Locomotion at Discharge:  [facility] Expected Mode of Locomotion at Discharge:  [facility] Walk/Wheelchair:  [facility] Stairs:    [facility] Comprehension:  [facility] Expression:  [facility] Social Interaction:  [facility] Problem Solving:  [facility] Memory:    Therapy Mode Minutes  Occupational Therapy:  Physical Therapy:  Speech Language Pathology:    Discharge Functional Goals:    Signed by: TALI Arnold

## 2017-06-16 NOTE — THERAPY DISCHARGE NOTE
Inpatient Rehabilitation - Occupational Therapy Progress Note/Discharge   [place]     Patient Name: Alex Tierney  : 1975  MRN: [id number]  Today's Date: 2017  Onset of Illness/Injury or Date of Surgery Date: 17  Date of Referral to OT: 17  Referring Physician: Dr. Brooks      Admit Date: 2017    Visit Dx:     ICD-10-CM ICD-9-CM   1. T6 spinal cord injury, sequela S24.102S 907.2     Patient Active Problem List   Diagnosis   • Chronic allergic rhinitis   • Depression   • Smoker   • T6 spinal cord injury   • History of left lower extremity amputation   • Chronic osteomyelitis of multiple sites   • Debility             Adult Rehabilitation Note       17 1311 06/15/17 1444 06/15/17 1200    Rehab Assessment/Intervention    Discipline occupational therapist  -BF occupational therapist  -CJ physical therapy assistant  -NICOLE    Document Type discharge summary;therapy note (daily note)  -BF therapy note (daily note)  -CJ therapy note (daily note)   BID treatment session  -NICOLE    Subjective Information agree to therapy;no complaints  -BF agree to therapy  -CJ agree to therapy;no complaints  -NICOLE    Patient Effort, Rehab Treatment good  -BF  good  -NICOLE    Precautions/Limitations fall precautions;other (see comments)   < skin integrity; junior cath; colostomy  -BF fall precautions;other (see comments)   decreased skin integrity, junior cath, colostomy  -CJ fall precautions;other (see comments)   <skin integrity, junior cath, colostomy  -NICOLE    Patient Response to Treatment Pt & father educated on self-care/assistance needed at home. 24 hour supervision recommended. No questions/concerns expressed from pt/father at this time.    -BF Pt tolerated tx sessions well with rest breaks.  -CJ Patient responded well to today's treatment, however continues to require assist w/ transfers secondary to decreased trunk control.  Pt performed core strengthening and dynamic sitting balance activities in second session  w/one episode of LOB posteriorly.  Pt continues to progress towards established goals, and plans to D/C home w/ family tomorrow.    -NICOLE    Recorded by [BF] Lucía Pedro OT [CJ] Elvia Tolbert OT [NICOLE] Scarlett Caba PTA    Pain Assessment    Pain Assessment   No/denies pain  -NICOLE    Recorded by   [NICOLE] Scarlett Caba PTA    Cognitive Assessment/Intervention    Current Cognitive/Communication Assessment functional  -BF functional  -CJ functional  -NICOLE    Orientation Status oriented x 4  -BF  oriented x 4  -NICOLE    Follows Commands/Answers Questions 100% of the time;able to follow single-step instructions  -BF  100% of the time;able to follow single-step instructions  -NICOLE    Personal Safety  decreased awareness, need for safety;decreased awareness, need for assist  -CJ decreased awareness, need for assist;decreased awareness, need for safety  -NICOLE    Personal Safety Interventions   supervised activity;gait belt  -NICOLE    Recorded by [BF] Lucía Pedro OT [CJ] Elvia Tolbert OT [NICOLE] Scarlett Caba PTA    ROM (Range of Motion)    General ROM no range of motion deficits identified   BUE  -BF      General ROM Detail BUE AROM WFL  -BF      Recorded by [BF] Lucía Pedro OT      MMT (Manual Muscle Testing)    General MMT Assessment no strength deficits identified  -BF      General MMT Assessment Detail BUE MMT score 4+/5  -BF      Recorded by [BF] Lucía Pedro OT      Bed Mobility, Assessment/Treatment    Bed Mobility, Assistive Device   bed rails  -NICOLE    Bed Mobility, Roll Left, [place]   minimum assist (75% patient effort)  -NICOLE    Bed Mobility, Roll Right, [place]   minimum assist (75% patient effort)  -NICOLE    Bed Mob, Supine to Sit, [place]   minimum assist (75% patient effort)  -NICOLE    Bed Mob, Sit to Supine, [place]   moderate assist (50% patient effort)  -NICOLE    Bed Mobility, Safety Issues   decreased use of arms for pushing/pulling;decreased  use of legs for bridging/pushing;impaired trunk control for bed mobility  -NICOLE    Bed Mobility, Impairments   muscle tone abnormal;ROM decreased;strength decreased;impaired balance;coordination impaired;motor control impaired;postural control impaired  -NICOLE    Recorded by   [NICOLE] Scarlett Caba, SHILPA    Transfer Assessment/Treatment    Transfers, Bed-Chair [place]   verbal cues required;nonverbal cues required (demo/gesture);contact guard assist  -NICOLE    Transfers, Chair-Bed [place]   verbal cues required;nonverbal cues required (demo/gesture);contact guard assist  -NICOLE    Transfers, Bed-Chair-Bed, Assist Device   sliding board  -NICOLE    Transfer, Impairments   muscle tone abnormal;ROM decreased;strength decreased;impaired balance;coordination impaired;motor control impaired;postural control impaired  -NICOLE    Transfer, Comment CGA  -BF      Recorded by [BF] Lucía Pedro OT  [NICOLE] Scarlett Caba, PTA    ADL Assessment/Intervention    Additional Documentation Self-Feeding Assessment/Training (Group)  -BF      Recorded by [BF] Lucía Pedro OT      Lower Body Bathing Assessment/Training    LB Bathing Assess/Train, Position supine  -BF      LB Bathing Assess/Train, Comment TB bathing: Min A  -BF      Recorded by [BF] Lucía Pedro OT      Upper Body Dressing Assessment/Training    UB Dressing Assess/Train, Position supine  -BF      UB Dressing Assess/Train, Comment Set-up  -BF      Recorded by [BF] Lucía Pedro OT      Lower Body Dressing Assessment/Training    LB Dressing Assess/Train, Assist Device reacher  -BF      LB Dressing Assess/Train, Position supine;sitting  -BF      LB Dressing Assess/Train, Comment Mod A  -BF      Recorded by [BF] Lucía Pedro OT      Toileting Assessment/Training    Toileting Assess/Train, Comment Set-up  -BF      Recorded by [BF] Lucía Pedro OT      Grooming Assessment/Training    Grooming Assess/Train, Position   sitting  -CJ     Grooming Assess/Train, Indepen Level  set up required  -     Grooming Assess/Train, Comment Mod I  -BF      Recorded by [BF] Lucía Pedro OT [CJ] Elvia Tolbert OT     Self-Feeding Assessment/Training    Self-Feeding Assess/Train, Comment Mod I  -BF      Recorded by [BF] Lucía Pedro OT      Balance Skills Training    Sitting-Level of Assistance   Close supervision;Contact guard  -NICOLE    Sitting-Balance Support   Right upper extremity supported;Left upper extremity supported  -NICOLE    Sitting-Balance Activities   Lateral lean;Trunk control activities  -NICOLE    Sitting # of Minutes   20 min  -NICOLE    Recorded by   [NICOLE] Scarlett Caba PTA    Therapy Exercises    Bilateral Lower Extremities   PROM:;supine;prone   prone lying to stretch hip flexors; session one/ and two  -NICOLE    Bilateral Upper Extremity  AROM:   BUE ther ex/act, bilat coord ex, strengthening  -     BUE Other Reps  --   dowel ex- 20 reps X 2 sets, arm bike-5 minsX 4, 0 resistance  -     BUE Resistance  --   sybil-hutchins- 25 lbs, 20 reps X 5 sets  -CJ     Recorded by  [CJ] Elvia Tolbert OT [NICOLE] Scarlett Caba PTA    Functional Endurance    Detail (Functional Endurance) Good  -BF      Recorded by [BF] Lucía Pedro OT      Positioning and Restraints    Pre-Treatment Position sitting in chair/recliner  -BF  in bed  -NICOLE    Post Treatment Position wheelchair  -BF  wheelchair  -NICOLE    In Wheelchair sitting;call light within reach;encouraged to call for assist;with family/caregiver  -BF sitting;call light within reach;encouraged to call for assist   post both sessions  - sitting;patient within staff view;with OT   w/ OT following session one; w/in view of staff    -NICOLE    Recorded by [BF] Lucía Pedro OT [CJ] Elvia Tolbert OT [NICOLE] Scarlett Caba PTA      06/14/17 1700 06/14/17 1644 06/14/17 1500    Rehab Assessment/Intervention    Discipline  physical therapist  -AG physical  therapy assistant  -NICOLE    Document Type  therapy note (daily note)   PM note  -AG therapy note (daily note)   AM session  -NICOLE    Subjective Information   agree to therapy;no complaints  -NICOLE    Patient Effort, Rehab Treatment  good  -AG good  -NICOLE    Precautions/Limitations  fall precautions;other (see comments)   decr skin integrity  -AG fall precautions;other (see comments)   < skin integrity, junior cath, colostomy  -NICOLE    Specific Treatment Considerations  responded well; no signficant functional changes with transfers.  -AG     Patient Response to Treatment   Pt tolerated tx well today and continues to progress with balance activites, LE stretching and prone lying.   -NICOLE    Recorded by  [AG] Radha Ohara, PT [NICOLE] Scarlett Caba, SHILPA    Pain Assessment    Pain Assessment  No/denies pain  -AG No/denies pain  -NICOLE    Recorded by  [AG] Radha Ohara, PT [NICOLE] Scarlett Caba PTA    Cognitive Assessment/Intervention    Current Cognitive/Communication Assessment   functional  -NICOLE    Orientation Status   oriented x 4  -NICOLE    Follows Commands/Answers Questions   100% of the time;able to follow single-step instructions  -NICOLE    Personal Safety Interventions   supervised activity  -NICOLE    Recorded by   [NICOLE] Scarlett Caba PTA    Bed Mobility, Assessment/Treatment    Bed Mobility, Assistive Device  bed rails  -AG     Bed Mobility, Roll Left, [place]  minimum assist (75% patient effort)  -AG minimum assist (75% patient effort);moderate assist (50% patient effort);verbal cues required;nonverbal cues required (demo/gesture)  -NICOLE    Bed Mobility, Roll Right, [place]  minimum assist (75% patient effort)  -AG minimum assist (75% patient effort);moderate assist (50% patient effort);verbal cues required;nonverbal cues required (demo/gesture)  -NICOLE    Bed Mobility, Scoot/Bridge, [place]  maximum assist (25% patient effort)  -AG     Bed Mob, Supine to Sit, [place]  minimum assist (75% patient  effort)  -AG minimum assist (75% patient effort);verbal cues required;nonverbal cues required (demo/gesture)  -NICOLE    Bed Mob, Sit to Supine, [place]  moderate assist (50% patient effort)  -AG moderate assist (50% patient effort);verbal cues required;nonverbal cues required (demo/gesture)  -NICOLE    Bed Mobility, Safety Issues  decreased use of arms for pushing/pulling;decreased use of legs for bridging/pushing;impaired trunk control for bed mobility  -AG decreased use of legs for bridging/pushing;impaired trunk control for bed mobility  -NICOLE    Bed Mobility, Impairments  muscle tone abnormal;ROM decreased;strength decreased;impaired balance;coordination impaired;motor control impaired;postural control impaired  -AG muscle tone abnormal;sensation decreased;strength decreased;impaired balance;coordination impaired;motor control impaired;postural control impaired  -NICOLE    Recorded by  [AG] Radha Ohara, PT [NICOLE] Scarlett Caba, PTA    Transfer Assessment/Treatment    Transfers, Bed-Chair [place]  verbal cues required;nonverbal cues required (demo/gesture);contact guard assist  -AG contact guard assist;verbal cues required;nonverbal cues required (demo/gesture)  -NICOLE    Transfers, Chair-Bed [place]  verbal cues required;nonverbal cues required (demo/gesture);contact guard assist  -AG contact guard assist;verbal cues required;nonverbal cues required (demo/gesture)  -NICOLE    Transfers, Bed-Chair-Bed, Assist Device  sliding board  -AG sliding board  -NICOLE    Transfer, Safety Issues   --   decreased trunk control  -NICOLE    Transfer, Impairments  muscle tone abnormal;ROM decreased;strength decreased;impaired balance;coordination impaired;motor control impaired;postural control impaired  -AG muscle tone abnormal;sensation decreased;strength decreased;impaired balance;coordination impaired;motor control impaired;postural control impaired  -NICOLE    Recorded by  [AG] Radha Ohara, PT [NICOLE] Scarlett Caba, PTA     Motor Skills/Interventions    Additional Documentation  Balance Skills Training (Group)  -AG     Recorded by  [AG] Radha Ohara, PT     Balance Skills Training    Sitting-Level of Assistance  Close supervision;Contact guard  -AG Close supervision  -NICOLE    Sitting-Balance Support  Right upper extremity supported;Left upper extremity supported  -AG     Sitting-Balance Activities  Lateral lean;Trunk control activities  -AG     Sitting # of Minutes  15  -AG     Recorded by  [AG] Radha Ohara, PT [NICOLE] Scarlett Caba PTA    Therapy Exercises    Bilateral Lower Extremities PROM:;supine;prone   prone lying to stretch iliopsoas  -AG PROM:;supine;prone   prone lying to stretnch iliopsoas  -AG PROM:;supine;prone   B) LE stretching; prone lying 15 min AM session  -NICOLE    Recorded by [AG] Radha Ohara, PT [AG] Radha Ohara, PT [NICOLE] Scarlett Caba PTA    Positioning and Restraints    Pre-Treatment Position  in bed  -AG in bed  -NICOLE    Post Treatment Position  wheelchair  -AG chair  -NICOLE    In Wheelchair  sitting;with OT  -AG sitting;patient within staff view   following AM session  -NICOLE    Recorded by  [AG] Radha Ohara, PT [NICOLE] Scarlett Caba PTA      06/14/17 1441 06/13/17 1528 06/13/17 1527    Rehab Assessment/Intervention    Discipline occupational therapist  -BF occupational therapist  -BF physical therapist  -LB    Document Type therapy note (daily note)  -BF therapy note (daily note)  -BF therapy note (daily note)  -LB    Subjective Information agree to therapy;no complaints  -BF agree to therapy;no complaints  -BF agree to therapy  -LB    Patient Effort, Rehab Treatment good  -BF good  -BF     Precautions/Limitations fall precautions;other (see comments)   < skin integrity; junior cath; colostomy  -BF fall precautions;other (see comments)   < skin integrity; junior cath; colostomy  -BF fall precautions   < skin integrity; colostomy, junior cath  -LB    Patient Response to Treatment   He tolerated  sitting dynamic balance activities, LE stretching, and prone lying for hip flexor stretching.  -LB    Recorded by [BF] Lucía Pedro OT [BF] Lucía Pedro OT [LB] Susan Palma, PT    Pain Assessment    Pain Assessment   No/denies pain  -LB    Recorded by   [LB] Susan Palma PT    Cognitive Assessment/Intervention    Current Cognitive/Communication Assessment functional  -BF functional  -BF functional  -LB    Orientation Status oriented x 4  -BF oriented x 4  -BF     Follows Commands/Answers Questions 100% of the time;able to follow single-step instructions  -% of the time;able to follow single-step instructions  -% of the time  -LB    Personal Safety Interventions   supervised activity  -LB    Recorded by [BF] Lucía Pedro OT [BF] Lucía Pedro OT [LB] Susan Palma, PT    Bed Mobility, Assessment/Treatment    Bed Mobility, Roll Left, [place]   minimum assist (75% patient effort);moderate assist (50% patient effort);verbal cues required;nonverbal cues required (demo/gesture)  -LB    Bed Mobility, Roll Right, [place]   minimum assist (75% patient effort);moderate assist (50% patient effort);verbal cues required;nonverbal cues required (demo/gesture)  -LB    Bed Mobility, Scoot/Bridge, [place]   dependent (less than 25% patient effort)   he cannot scoot his hips at all  -LB    Bed Mob, Supine to Sit, [place]   minimum assist (75% patient effort);verbal cues required;nonverbal cues required (demo/gesture)   he must have his arms in a position to push up from mat  -LB    Bed Mob, Sit to Supine, [place]   moderate assist (50% patient effort);verbal cues required;nonverbal cues required (demo/gesture)   he needs his trunk guided back and legs lifted into bed  -LB    Bed Mobility, Safety Issues   decreased use of legs for bridging/pushing;impaired trunk control for bed mobility  -LB    Bed Mobility, Impairments   muscle tone  abnormal;sensation decreased;strength decreased;impaired balance;coordination impaired;motor control impaired;postural control impaired  -LB    Recorded by   [LB] Susan Palma, PT    Transfer Assessment/Treatment    Transfers, Bed-Chair [place] contact guard assist;verbal cues required;nonverbal cues required (demo/gesture)  -BF  stand by assist;contact guard assist;verbal cues required;nonverbal cues required (demo/gesture)  -LB    Transfers, Chair-Bed [place] contact guard assist;verbal cues required;nonverbal cues required (demo/gesture)  -BF  stand by assist;contact guard assist;verbal cues required;nonverbal cues required (demo/gesture)  -LB    Transfers, Bed-Chair-Bed, Assist Device sliding board  -BF  sliding board  -LB    Transfer, Safety Issues   --   decreased trunk control, requires UE assist  -LB    Transfer, Impairments   muscle tone abnormal;sensation decreased;strength decreased;impaired balance;coordination impaired;motor control impaired;postural control impaired  -LB    Recorded by [BF] Lucía Pedro, OT  [LB] Susan Palma, PT    Lower Body Bathing Assessment/Training    LB Bathing Assess/Train, Position supine  -BF      LB Bathing Assess/Train, Comment TB bathing: Min A  -BF      Recorded by [BF] Lucía Pedro, OT      Upper Body Dressing Assessment/Training    UB Dressing Assess/Train, Position sitting  -BF      UB Dressing Assess/Train, [place] set up required;supervision required  -BF      UB Dressing Assess/Train, Comment Set-up/supervision  -BF      Recorded by [BF] Lucía Pedro, STEPHANIE      Lower Body Dressing Assessment/Training    LB Dressing Assess/Train, Position supine;sitting  -BF      LB Dressing Assess/Train, Comment Mod A  -BF      Recorded by [BF] Lucía Pedro, OT      Grooming Assessment/Training    Grooming Assess/Train, Position sitting  -BF sitting  -BF     Grooming Assess/Train, Indepen Level set up required  -BF set  up required;supervision required  -BF     Recorded by [BF] Lucía Pedro, OT [BF] Lucía Pedro, OT     Balance Skills Training    Sitting-Level of Assistance   Close supervision  -LB    Sitting-Balance Support   --   requires one extremity to assist balance at eob  -LB    Sitting-Balance Activities   --   sitting bean bag toss x2  -LB    Recorded by   [LB] Susan Palma, KALI    Therapy Exercises    Bilateral Lower Extremities   PROM:;supine;prone   LE stretching, prone lying 15 min bid  -LB    Bilateral Upper Extremity AROM:;sitting   BUE GMC/FMC ther ex; strengthening  -BF AROM:;sitting   BUE GMC/FMC ther ex; strengthening  -BF     BUE Resistance other (comment)   Arm bike 4 minsX3; [place]ickshaw 25 [id number]; WristrollsX3  -BF other (comment)   [place] 25 lbs X20X4; Arm bike 4 minsX3; WristrollsX3  -BF     Recorded by [BF] Lucía Pedro, OT [BF] Lucía Pedro, OT [LB] Susan Palma, KALI    Positioning and Restraints    Pre-Treatment Position  sitting in chair/recliner  -BF sitting in chair/recliner  -LB    Post Treatment Position  wheelchair  -BF     In Bed supine;call light within reach;encouraged to call for assist   In PM  -BF      In Wheelchair sitting;patient within staff view   In AM  -BF sitting;with PT  -BF sitting  -LB    Recorded by [BF] Lucía Pedro, OT [BF] Lucía Pedro, OT [LB] Susan Palma, PT      User Key  (r) = Recorded By, (t) = Taken By, (c) = Cosigned By    Initials Name Effective Dates    LB Susan Palam, PT 03/14/16 -     BF Lucía Pedro, OT 03/14/16 -     AG Radha Ohara, PT 03/14/16 -     CJ Elvia Tolbert, OT 03/14/16 -     NICOLE Scarlett Caba, PTA 05/02/16 -                 OT Goals       06/16/17 1319 06/08/17 1624       Transfer Training OT LTG    Transfer Training OT LTG, Outcome goal met  -BF      Bathing OT STG    Bathing Goal OT STG, Outcome  goal met  -BF     Bathing OT LTG    Bathing Goal OT  LTG, Outcome goal partially met  -BF      Bathing Goal OT LTG, Reason Goal Not Met discharged from facility  -BF      Eating Self-Feeding OT LTG    Eat Self Feeding Goal OT LTG, Outcome goal met  -BF      Grooming OT LTG    Grooming Goal OT LTG, Outcome goal met  -BF      LB Dressing OT STG    LB Dressing Goal OT STG, Outcome  goal met  -BF     LB Dressing OT LTG    LB Dressing Goal OT LTG, Outcome goal partially met  -BF      LB Dressing Goal OT LTG, Reason Goal Not Met discharged from facility  -BF        User Key  (r) = Recorded By, (t) = Taken By, (c) = Cosigned By    Initials Name Provider Type    BF Lucía Pedro, OT Occupational Therapist          Occupational Therapy Education     Title: PT OT SLP Therapies (Resolved)     Topic: Occupational Therapy (Resolved)     Point: ADL training (Resolved)    Description: Instruct learner(s) on proper safety adaptation and remediation techniques during self care or transfers.   Instruct in proper use of assistive devices.    Learning Progress Summary    Learner Readiness Method Response Comment Documented by Status   Patient Acceptance E VU Pt/father educated on self-care/assistance needed at home. 24 hour supervision recommended. No questions/concerns expressed from pt/father at this time.  06/16/17 1316 Done    Acceptance E,D VU,NR   06/15/17 1455 Done    Acceptance E,D NR  BF 06/14/17 1445 Active    Acceptance E,D NR   06/13/17 1530 Active    Acceptance E,D NR  BF 06/08/17 1624 Active    Acceptance E,D NR  BF 06/07/17 1616 Active    Acceptance E,D NR  BF 06/06/17 1446 Active    Acceptance E,D NR  BF 06/05/17 1231 Active    Acceptance E,D NR  BF 06/02/17 1551 Active    Acceptance E,D NR  BF 06/02/17 1551 Active   Family Acceptance E VU Pt/father educated on self-care/assistance needed at home. 24 hour supervision recommended. No questions/concerns expressed from pt/father at this time.  06/16/17 1316 Done               Point: Precautions (Resolved)     Description: Instruct learner(s) on prescribed precautions during self-care and functional transfers.    Learning Progress Summary    Learner Readiness Method Response Comment Documented by Status   Patient Acceptance E VU Pt/father educated on self-care/assistance needed at home. 24 hour supervision recommended. No questions/concerns expressed from pt/father at this time.  06/16/17 1316 Done    Acceptance E,D VU,NR   06/15/17 1455 Done    Acceptance E,D NR   06/14/17 1445 Active    Acceptance E,D NR   06/13/17 1530 Active    Acceptance E,D NR   06/08/17 1624 Active    Acceptance E,D NR   06/07/17 1616 Active    Acceptance E,D NR   06/06/17 1446 Active    Acceptance E,D NR   06/05/17 1231 Active    Acceptance E,D NR   06/02/17 1551 Active    Acceptance E,D NR   06/02/17 1551 Active   Family Acceptance E VU Pt/father educated on self-care/assistance needed at home. 24 hour supervision recommended. No questions/concerns expressed from pt/father at this time.  06/16/17 1316 Done                      User Key     Initials Effective Dates Name Provider Type Discipline     03/14/16 -  Lucía Pedro, OT Occupational Therapist OT     03/14/16 -  Elvia Tolbert, OT Occupational Therapist OT                OT Recommendation and Plan  Anticipated Equipment Needs At Discharge:  (TBD)  Anticipated Discharge Disposition:  (TBD)  Planned Therapy Interventions: activity intolerance, adaptive equipment training, ADL retraining, home exercise program, strengthening, transfer training (Therapeutic groups as beneficial)  Therapy Frequency: 3-5 times/wk            Time Calculation:          Time Calculation- OT       06/16/17 1320          Time Calculation- OT    OT Start Time 1005  -BF      OT Stop Time 1020  -BF      OT Time Calculation (min) 15 min  -BF      Total Timed Code Minutes- OT 15 minute(s)  -BF      OT Non-Billable Time (min) 30 min  -BF        User Key  (r) = Recorded By, (t) = Taken By,  (c) = Cosigned By    Initials Name Provider Type    BF Lucía Pedro OT Occupational Therapist          Therapy Charges for Today     Code Description Service Date Service Provider Modifiers Qty    [id number] HC OT SELF CARE/MGMT/TRAIN EA 15 MIN 6/16/2017 Lucía Pedro OT GO 1               OT Discharge Summary  Anticipated Discharge Disposition:  (TBD)  Reason for Discharge: Discharge from facility  Outcomes Achieved: Refer to plan of care for updates on goals achieved  Discharge Destination: Home with assist, Home with home health    Lucía Pedro OT  6/16/2017

## 2017-06-16 NOTE — SIGNIFICANT NOTE
06/16/17 1135   Case Management/Social Work Plan   Plan Contacted [facility] [phone] per Crystal about discharge.  Faxed discharge summary to [phone].  HH will start care on 6-17-17.  Spoke to pt and father about discharge and HH to visit on 6-17-17.  Provided phone number for HH.  Pt is going home with father and step-mother.  Father will assist with wound care at home.  Father will provide transportation home.

## 2017-06-16 NOTE — PROGRESS NOTES
Patient Assessment Instrument  Quality Indicators - Discharge    Section GG. Self-Care Performance     Eating: Patient completed the activities by him/herself with no assistance from  a helper.   Oral Hygiene: Patient completed the activities by him/herself with no  assistance from a helper.   Toileting Hygiene: : [place] sets up or cleans up; patient completes activity.  [place] assists only prior to or following the activity.   Shower/Bathe Self: [place] provides verbal cues or touching/steadying assistance  as patient completes activity.   Upper Body Dressing: [place] sets up or cleans up; patient completes activity.  [place] assists only prior to or following the activity.   Lower Body Dressing: [place] does less than half the effort. [place] lifts, holds  or supports trunk or limbs but provides less than half the effort.   Putting On/Taking Off Footwear: [place] does less than half the effort. [place]  lifts, holds or supports trunk or limbs but provides less than half the effort.    Section GG. Mobility Performance      Section J. Health Conditions Discharge      Section M. Skin Conditions Discharge      . Current Number of Unhealed Pressure Ulcers      . Worsening in Pressure Ulcer Status Since Admission      . Healed Pressure Ulcer(s)  (Voluntary)      Section O. Special Treatments, Procedures, and Programs Discharge  . Influenza Vaccine - Discharge      Date Influenza Vaccine Received (enter date ONLY if received in this unit;  otherwise, skip this entry)      OPTIONAL BRANCH FOR UNPLANNED DISCHARGES  Unplanned Discharge:    Signed by: Lucía Pedro OT

## 2017-06-16 NOTE — PROGRESS NOTES
Patient Assessment Instrument  Quality Indicators - Discharge    Section GG. Self-Care Performance      Section GG. Mobility Performance      Section J. Health Conditions Discharge  Fall(s) Since Admission:  Non M. Skin Conditions Discharge  Unhealed Pressure Ulcer(s) at Stage 1 or Higher:  No    . Current Number of Unhealed Pressure Ulcers      . Worsening in Pressure Ulcer Status Since Admission      . Healed Pressure Ulcer(s)  (Voluntary)    Number of Healed Stage 1: 0  Number of Healed Stage 2: 0  Number of Healed Stage 3: 0  Number of Healed Stage 4: 0    Section O. Special Treatments, Procedures, and Programs Discharge  . Influenza Vaccine - Discharge  Received in this facility for this year's influenza vaccination season:  Yes.    Date Influenza Vaccine Received (enter date ONLY if received in this unit;  otherwise, skip this entry)      OPTIONAL BRANCH FOR UNPLANNED DISCHARGES  Unplanned Discharge:    Signed by: Sabino Kaufman RN

## 2017-06-16 NOTE — THERAPY DISCHARGE NOTE
Inpatient Rehabilitation - Physical Therapy Discharge Summary   [place]       Patient Name: Alex Tierney  : 1975  MRN: [id number]    Today's Date: 2017  Onset of Illness/Injury or Date of Surgery Date: 17    Date of Referral to PT: 17  Referring Physician: Dr. Brooks      Admit Date: 2017      PT Recommendation and Plan    Visit Dx:  No diagnosis found.                    IP PT Goals       17 1831          Bed Mobility PT STG    Bed Mobility PT STG, Date Goal Reviewed 17  -LB      Bed Mobility PT STG, Outcome goal not met  -LB      Bed Mobility PT STG, Reason Goal Not Met progress slower than expected  -LB      Transfer Training PT STG    Transfer Training PT STG, Date Goal Reviewed 17  -LB      Transfer Training PT STG, Outcome goal met  -LB      Static Sitting Balance PT STG    Static Sitting Balance PT STG, Date Goal Reviewed 17  -LB      Static Sitting Balance PT STG, Outcome goal met  -LB        User Key  (r) = Recorded By, (t) = Taken By, (c) = Cosigned By    Initials Name Provider Type    ANA CRISTINA Palma, PT Physical Therapist              PT Discharge Summary  Reason for Discharge: Discharge from facility  Outcomes Achieved: Patient able to partially acheive established goals  Discharge Destination: Home with assist      Susan Palma, PT   2017

## 2017-06-16 NOTE — PLAN OF CARE
Problem: Patient Care Overview (Adult)  Goal: Plan of Care Review  Outcome: Ongoing (interventions implemented as appropriate)    06/16/17 0740   Coping/Psychosocial Response Interventions   Plan Of Care Reviewed With patient   Patient Care Overview   Progress improving         Problem: Activity Intolerance (Adult)  Goal: Activity Tolerance  Outcome: Ongoing (interventions implemented as appropriate)    Problem: Fall Risk (Adult)  Goal: Absence of Falls  Outcome: Ongoing (interventions implemented as appropriate)    Problem: Pressure Ulcer (Adult)  Goal: Signs and Symptoms of Listed Potential Problems Will be Absent or Manageable (Pressure Ulcer)  Outcome: Ongoing (interventions implemented as appropriate)    Problem: Skin Integrity Impairment, Risk/Actual (Adult)  Goal: Skin Integrity/Wound Healing  Outcome: Ongoing (interventions implemented as appropriate)    Problem: Mobility, Physical Impaired (Adult)  Goal: Enhanced Mobility Skills  Outcome: Ongoing (interventions implemented as appropriate)

## 2017-06-19 NOTE — PROGRESS NOTES
Patient Assessment Instrument  Quality Indicators - Discharge    Section GG. Self-Care Performance      Section GG. Mobility Performance      Section J. Health Conditions Discharge  Fall(s) Since Admission:  No    Section M. Skin Conditions Discharge  Unhealed Pressure Ulcer(s) at Stage 1 or Higher:  Yes.    . Current Number of Unhealed Pressure Ulcers    Number of Unhealed Stage 1: 0  Number of Unhealed Stage 2: 0  Number of Unhealed Stage 3: 3  Number of Unhealed Stage 3 at Admission: 3  Number of Unhealed Stage 4: 2  Number of Unhealed Stage 4 at Admission: 2  Number of Unhealed Unstageable Due to Non-removable Dressin  Number of Unhealed Unstageable Due to Slough/Eschar: 0  Number of Unhealed Unstageable Due to Suspected Deep Tissue Injury: 3  Number of Unhealed Unstageable Due to Suspected Deep Tissue Injury at Admission:  3    . Worsening in Pressure Ulcer Status Since Admission    Number of Worsening Stage 2: 0  Number of Worsening Stage 3: 0  Number of Worsening Stage 4: 0  Number of Worsening Unstageable Due to Non-removable Dressin  Number of Worsening Unstageable Due to Slough/Eschar: 0  Number of Worsening Unstageable Due to Suspected Deep Tissue Injury: 0    . Healed Pressure Ulcer(s)  (Voluntary)    Number of Healed Stage 1: 0  Number of Healed Stage 2: 0  Number of Healed Stage 3: 0  Number of Healed Stage 4: 0    Section O. Special Treatments, Procedures, and Programs Discharge  . Influenza Vaccine - Discharge  Received in this facility for this year's influenza vaccination season:  No.  Influenza Vaccine Not Received Due To: Patient not in this facility during this  year's influenza vaccination season.    Date Influenza Vaccine Received (enter date ONLY if received in this unit;  otherwise, skip this entry)      OPTIONAL BRANCH FOR UNPLANNED DISCHARGES  Unplanned Discharge: No    Signed by: Skylar Dias, Supervisor

## 2017-06-20 ENCOUNTER — OFFICE VISIT (OUTPATIENT)
Dept: FAMILY MEDICINE CLINIC | Facility: CLINIC | Age: 42
End: 2017-06-20

## 2017-06-20 ENCOUNTER — DOCUMENTATION (OUTPATIENT)
Dept: FAMILY MEDICINE CLINIC | Facility: CLINIC | Age: 42
End: 2017-06-20

## 2017-06-20 VITALS
HEIGHT: 71 IN | HEART RATE: 103 BPM | SYSTOLIC BLOOD PRESSURE: 120 MMHG | OXYGEN SATURATION: 97 % | TEMPERATURE: 98.5 F | DIASTOLIC BLOOD PRESSURE: 85 MMHG

## 2017-06-20 DIAGNOSIS — Z97.8 FOLEY CATHETER IN PLACE: ICD-10-CM

## 2017-06-20 DIAGNOSIS — S24.102S T6 SPINAL CORD INJURY, SEQUELA (HCC): ICD-10-CM

## 2017-06-20 DIAGNOSIS — F32.89 OTHER DEPRESSION: Primary | ICD-10-CM

## 2017-06-20 PROCEDURE — 99214 OFFICE O/P EST MOD 30 MIN: CPT | Performed by: NURSE PRACTITIONER

## 2017-06-20 RX ORDER — VENLAFAXINE HYDROCHLORIDE 75 MG/1
75 CAPSULE, EXTENDED RELEASE ORAL DAILY
Qty: 30 CAPSULE | Refills: 3 | Status: SHIPPED | OUTPATIENT
Start: 2017-06-20 | End: 2017-08-04 | Stop reason: SDUPTHER

## 2017-06-20 RX ORDER — GABAPENTIN 800 MG/1
800 TABLET ORAL 3 TIMES DAILY
Qty: 90 TABLET | Refills: 2 | Status: SHIPPED | OUTPATIENT
Start: 2017-06-20 | End: 2017-08-04 | Stop reason: SDUPTHER

## 2017-06-20 NOTE — PROGRESS NOTES
Fax order to [facility] for patient and faxed order for PAC flush to [facility]. PKF

## 2017-06-20 NOTE — PROGRESS NOTES
"Subjective   Alex Tierney is a 42 y.o. male.     Chief Complaint   Patient presents with   • Follow up [facility]       History of Present Illness     Surgery follow up-For some ligament release in hips. He has an appt on 6/26 with Dr Montilla.  Dr DELANEY is who performed his surgery.    [facility] rehab follow up-for surgery.  He reports he received mostly stretching he does continue to have staples bilaterally on his hip area.  Decubitus-currently under care of  for his wound care.  Does need supplies for home health.      The following portions of the patient's history were reviewed and updated as appropriate: allergies, current medications, past family history, past medical history, past social history, past surgical history and problem list.    Review of Systems   Constitutional: Negative for appetite change (is pushing high protein diet for healing) and fever.   Respiratory: Negative for cough and shortness of breath.    Cardiovascular: Negative for chest pain and palpitations.   Gastrointestinal: Negative for constipation (using stool softner) and diarrhea.   Genitourinary:        Currently with indwelling junior cath.  No odor or discolorations.  Pushing fluids and cranberry juice   Musculoskeletal:        Left mid calf amputation  In W/C for mobility   Neurological: Negative for dizziness and headaches.        Burning, stinging nerve pain from breast line down length of body.   Psychiatric/Behavioral: The patient is nervous/anxious.    All other systems reviewed and are negative.      Objective     /85 (BP Location: Left arm, Patient Position: Sitting)  Pulse 103  Temp 98.5 °F (36.9 °C) (Tympanic)   Ht 71\" (180.3 cm)  SpO2 97%    Physical Exam   Constitutional: He is oriented to person, place, and time. He appears well-developed and well-nourished. He is cooperative. He does not have a sickly appearance. No distress.   Sitting in a wheelchair.  Status post left BKA.  Stump revision well healed   HENT:   Head: " Atraumatic.   Right Ear: Tympanic membrane, external ear and ear canal normal.   Left Ear: Tympanic membrane, external ear and ear canal normal.   Mouth/Throat: Oropharynx is clear and moist.   Eyes: EOM are normal. Pupils are equal, round, and reactive to light. No scleral icterus.   Neck: No JVD present. Carotid bruit is not present. No tracheal deviation present. No thyromegaly present.   Cardiovascular: Normal rate, regular rhythm, S1 normal, S2 normal, normal heart sounds and intact distal pulses.  Exam reveals no gallop.    No murmur heard.  Pulmonary/Chest: Breath sounds normal. He has no wheezes. He has no rales.   PAC to right chest wall   Abdominal: Soft. Normal aorta and bowel sounds are normal. He exhibits no abdominal bruit and no mass. There is no hepatosplenomegaly. There is no tenderness. No hernia.   Colostomy bag to left abdominal wall   Musculoskeletal: He exhibits no tenderness or deformity.   Lymphadenopathy:        Head (right side): No submandibular adenopathy present.        Head (left side): No submandibular adenopathy present.     He has no cervical adenopathy.   Neurological: He is alert and oriented to person, place, and time. No cranial nerve deficit.   Spastic paraplegia   Skin: Skin is warm and dry. No rash noted. He is not diaphoretic. No pallor. Nails show no clubbing.   3-4 small circular areas to right shin area.  Scabbing noted.  No drainage.   Psychiatric: He has a normal mood and affect. His speech is normal and behavior is normal. Thought content normal.   Vitals reviewed.        Assessment/Plan     Problem List Items Addressed This Visit        Nervous and Auditory    T6 spinal cord injury    Relevant Medications    gabapentin (NEURONTIN) 800 MG tablet    Other Relevant Orders    Ambulatory Referral to Urology       Other    Depression - Primary    Relevant Medications    venlafaxine XR (EFFEXOR-XR) 75 MG 24 hr capsule      Other Visit Diagnoses     Sheldon catheter in place         Relevant Orders    Ambulatory Referral to Urology        Will send order for supplies to [facility] provide  with orders to flush PAC monthly  Hospital records reviewed.  Medications reconciled.  Refill on routine maintenance meds today.   Understands disease processes and need for medications.  Understands reasons for urgent and emergent care.  Patient (& family) verbalized agreement for treatment plan.   RTC PRN 3-5 days for worsening or non resolving symptoms  Otherwise keep sched follow up with PCP     This document has been electronically signed by:  ROBINA Girard, GOLDP-C

## 2017-07-13 RX ORDER — ALPRAZOLAM 0.5 MG/1
0.5 TABLET ORAL NIGHTLY
Qty: 30 TABLET | Refills: 0 | Status: SHIPPED | OUTPATIENT
Start: 2017-07-13 | End: 2017-08-04 | Stop reason: SDUPTHER

## 2017-08-04 ENCOUNTER — OFFICE VISIT (OUTPATIENT)
Dept: FAMILY MEDICINE CLINIC | Facility: CLINIC | Age: 42
End: 2017-08-04

## 2017-08-04 DIAGNOSIS — S24.102S T6 SPINAL CORD INJURY, SEQUELA (HCC): ICD-10-CM

## 2017-08-04 DIAGNOSIS — F33.41 RECURRENT MAJOR DEPRESSIVE DISORDER, IN PARTIAL REMISSION (HCC): ICD-10-CM

## 2017-08-04 DIAGNOSIS — F17.200 SMOKER: ICD-10-CM

## 2017-08-04 DIAGNOSIS — J30.9 CHRONIC ALLERGIC RHINITIS: Primary | ICD-10-CM

## 2017-08-04 DIAGNOSIS — F32.89 OTHER DEPRESSION: ICD-10-CM

## 2017-08-04 DIAGNOSIS — Z00.00 HEALTHCARE MAINTENANCE: ICD-10-CM

## 2017-08-04 DIAGNOSIS — Z89.612 HISTORY OF LEFT LOWER EXTREMITY AMPUTATION (HCC): ICD-10-CM

## 2017-08-04 DIAGNOSIS — M86.69: ICD-10-CM

## 2017-08-04 PROCEDURE — 99214 OFFICE O/P EST MOD 30 MIN: CPT | Performed by: GENERAL PRACTICE

## 2017-08-04 PROCEDURE — 99406 BEHAV CHNG SMOKING 3-10 MIN: CPT | Performed by: GENERAL PRACTICE

## 2017-08-04 RX ORDER — ALPRAZOLAM 0.5 MG/1
0.5 TABLET ORAL NIGHTLY
Qty: 30 TABLET | Refills: 0 | Status: SHIPPED | OUTPATIENT
Start: 2017-08-04 | End: 2017-09-15 | Stop reason: SDUPTHER

## 2017-08-04 RX ORDER — VENLAFAXINE HYDROCHLORIDE 75 MG/1
75 CAPSULE, EXTENDED RELEASE ORAL DAILY
Qty: 30 CAPSULE | Refills: 5 | Status: SHIPPED | OUTPATIENT
Start: 2017-08-04 | End: 2017-08-05 | Stop reason: SDUPTHER

## 2017-08-04 RX ORDER — GABAPENTIN 800 MG/1
800 TABLET ORAL 3 TIMES DAILY
Qty: 90 TABLET | Refills: 2 | Status: SHIPPED | OUTPATIENT
Start: 2017-08-04 | End: 2017-11-06 | Stop reason: SDUPTHER

## 2017-08-04 NOTE — PROGRESS NOTES
Subjective   Alex Tierney is a 42 y.o. male.     History of Present Illness     Spinal Cord Injury  Level T6 complicated by recurrent decubitus ulcers and osteomyelitis requiring a left BKA, permanent colostomy and a chronic indwelling bladder catheter. Underwent a bilateral hip osteotomy since last here with a significant improvement in his ROM and level of function about the house. He has felt quite well since his return home. He was discharged on metoprolol for hypertension and tachycardia while in hospital but discontinued this on his own due to increased fatigue with an improvement. He has home health support and continues to be followed by Dr Isaacs and will apparently undergo an orthopedic reassessment in the near future.     Depression  At present his mood is fair. He admits to intermittent nervousness and worrying. He denies any loss of interest in activities, difficulty concentrating, or any problem with his appetite or sleep.  He has been taking venlafaxine er 75 qd as well as alprazolam 0.5 and amitriptyline 25 daily at bedtime.  He denies any suicidal ideation.    Allergic Rhinitis  Patient has a history of allergic rhinitis. Patient's symptoms include sneezing, clear rhinorrhea, nasal congestion, periorbital pressure and postnasal drip. These symptoms are perennial with seasonal exacerbation. The patient has been suffering from these symptoms for a number of years . The patient has tried prescription antihistamine - cetirizine and prescription nasal corticosteroid - fluticasone with good relief of symptoms.     The following portions of the patient's history were reviewed and updated as appropriate: allergies, current medications, past medical history, past social history, past surgical history and problem list.    Review of Systems   Constitutional: Positive for fatigue. Negative for appetite change, chills, fever and unexpected weight change.   HENT: Positive for congestion and rhinorrhea. Negative  for ear pain, sneezing, sore throat and voice change.    Eyes: Negative for visual disturbance.   Respiratory: Negative for cough, shortness of breath and wheezing.    Cardiovascular: Negative for chest pain, palpitations and leg swelling.   Gastrointestinal: Negative for abdominal pain, blood in stool, constipation, diarrhea, nausea and vomiting.   Endocrine: Negative for polydipsia and polyuria.   Genitourinary: Negative for difficulty urinating, dysuria, frequency, hematuria and urgency.   Musculoskeletal: Positive for arthralgias and back pain. Negative for joint swelling, myalgias and neck pain.   Skin: Positive for wound (decubitus ulcers). Negative for color change.   Neurological: Positive for weakness and numbness. Negative for tremors and headaches.   Psychiatric/Behavioral: Negative for dysphoric mood, sleep disturbance and suicidal ideas. The patient is nervous/anxious.      Objective   Physical Exam   Constitutional: He is oriented to person, place, and time. He appears well-developed and well-nourished. He is cooperative. He does not have a sickly appearance. No distress.   Sitting in a wheelchair.  Status post left BKA. Bright and in fair spirits. No apparent distress at rest. No pallor, jaundice, diaphoresis, or cyanosis   HENT:   Head: Atraumatic.   Right Ear: Tympanic membrane, external ear and ear canal normal.   Left Ear: Tympanic membrane, external ear and ear canal normal.   Mouth/Throat: Oropharynx is clear and moist.   Eyes: EOM are normal. Pupils are equal, round, and reactive to light. No scleral icterus.   Neck: No JVD present. Carotid bruit is not present. No tracheal deviation present. No thyromegaly present.   Cardiovascular: Normal rate, regular rhythm, S1 normal, S2 normal, normal heart sounds and intact distal pulses.  Exam reveals no gallop.    No murmur heard.  Pulmonary/Chest: Breath sounds normal. He has no wheezes. He has no rales.   Abdominal: Soft. Normal aorta and bowel  sounds are normal. He exhibits no abdominal bruit and no mass. There is no hepatosplenomegaly. There is no tenderness. No hernia.   Musculoskeletal: He exhibits no tenderness or deformity.   Lymphadenopathy:        Head (right side): No submandibular adenopathy present.        Head (left side): No submandibular adenopathy present.     He has no cervical adenopathy.   Neurological: He is alert and oriented to person, place, and time. No cranial nerve deficit.   Spastic paraplegia   Skin: Skin is warm and dry. No rash noted. He is not diaphoretic. No pallor. Nails show no clubbing.   Psychiatric: He has a normal mood and affect. His speech is normal and behavior is normal. Thought content normal.     Assessment/Plan   Problems Addressed this Visit        Respiratory    Chronic allergic rhinitis   Continue current treatment.       Nervous and Auditory    T6 spinal cord injury  Complicated by recurrent decubitus ulcers and osteomyelitis requiring a left BKA, permanent colostomy, and chronic indwelling bladder catheter. Underwent recent osteotomies of both hips to increase his ROM.   Supportive therapy  Reminded to follow up with orthopedic surgery    Relevant Medications    gabapentin (NEURONTIN) 800 MG tablet       Musculoskeletal and Integument    Chronic osteomyelitis of multiple sites       Other    Recurrent major depressive disorder, in partial remission  Significant situational component. Supportive therapy.   Will titrate venlafaxine as alprazolam taper is continued    Relevant Medications    ALPRAZolam (XANAX) 0.5 MG tablet    nicotine polacrilex (NICORETTE) 4 MG gum    venlafaxine XR (EFFEXOR-XR) 150 MG 24 hr capsule    Smoker  Approximate 10 minute discussion regarding smoking cessation and the options with respect to this. Agreed on a trial of nicotine gum as he had quit with this for over 6 months in the past    Relevant Medications    nicotine polacrilex (NICORETTE) 4 MG gum    History of left lower  extremity amputation    Healthcare maintenance  Reminded to get a flu shot when available

## 2017-08-05 VITALS
SYSTOLIC BLOOD PRESSURE: 135 MMHG | OXYGEN SATURATION: 99 % | HEIGHT: 71 IN | TEMPERATURE: 98.5 F | RESPIRATION RATE: 12 BRPM | DIASTOLIC BLOOD PRESSURE: 80 MMHG | HEART RATE: 90 BPM

## 2017-08-05 PROBLEM — R53.81 DEBILITY: Status: RESOLVED | Noted: 2017-06-01 | Resolved: 2017-08-05

## 2017-08-05 PROBLEM — Z00.00 HEALTHCARE MAINTENANCE: Status: ACTIVE | Noted: 2017-08-05

## 2017-08-05 RX ORDER — VENLAFAXINE HYDROCHLORIDE 150 MG/1
150 CAPSULE, EXTENDED RELEASE ORAL DAILY
Qty: 30 CAPSULE | Refills: 5 | Status: SHIPPED | OUTPATIENT
Start: 2017-08-05 | End: 2017-11-06 | Stop reason: SDUPTHER

## 2017-09-15 DIAGNOSIS — F32.89 OTHER DEPRESSION: ICD-10-CM

## 2017-09-15 RX ORDER — ALPRAZOLAM 0.5 MG/1
0.25 TABLET ORAL NIGHTLY
Qty: 15 TABLET | Refills: 0 | Status: SHIPPED | OUTPATIENT
Start: 2017-09-15 | End: 2017-11-06

## 2017-11-06 ENCOUNTER — OFFICE VISIT (OUTPATIENT)
Dept: FAMILY MEDICINE CLINIC | Facility: CLINIC | Age: 42
End: 2017-11-06

## 2017-11-06 VITALS
SYSTOLIC BLOOD PRESSURE: 120 MMHG | OXYGEN SATURATION: 98 % | HEART RATE: 95 BPM | RESPIRATION RATE: 12 BRPM | DIASTOLIC BLOOD PRESSURE: 60 MMHG | HEIGHT: 71 IN | TEMPERATURE: 98.6 F

## 2017-11-06 DIAGNOSIS — F33.41 RECURRENT MAJOR DEPRESSIVE DISORDER, IN PARTIAL REMISSION (HCC): ICD-10-CM

## 2017-11-06 DIAGNOSIS — Z89.612 HISTORY OF LEFT LOWER EXTREMITY AMPUTATION (HCC): ICD-10-CM

## 2017-11-06 DIAGNOSIS — J30.2 CHRONIC SEASONAL ALLERGIC RHINITIS, UNSPECIFIED TRIGGER: Primary | ICD-10-CM

## 2017-11-06 DIAGNOSIS — F32.89 OTHER DEPRESSION: ICD-10-CM

## 2017-11-06 DIAGNOSIS — S24.102S T6 SPINAL CORD INJURY, SEQUELA (HCC): ICD-10-CM

## 2017-11-06 DIAGNOSIS — F17.200 SMOKER: ICD-10-CM

## 2017-11-06 DIAGNOSIS — Z23 ENCOUNTER FOR IMMUNIZATION: ICD-10-CM

## 2017-11-06 DIAGNOSIS — L89.324 DECUBITUS ULCER OF LEFT BUTTOCK, STAGE 4 (HCC): ICD-10-CM

## 2017-11-06 DIAGNOSIS — M86.69: ICD-10-CM

## 2017-11-06 DIAGNOSIS — Z00.00 HEALTHCARE MAINTENANCE: ICD-10-CM

## 2017-11-06 PROCEDURE — 99214 OFFICE O/P EST MOD 30 MIN: CPT | Performed by: GENERAL PRACTICE

## 2017-11-06 PROCEDURE — 90471 IMMUNIZATION ADMIN: CPT | Performed by: GENERAL PRACTICE

## 2017-11-06 PROCEDURE — 90686 IIV4 VACC NO PRSV 0.5 ML IM: CPT | Performed by: GENERAL PRACTICE

## 2017-11-06 RX ORDER — FLUTICASONE PROPIONATE 50 MCG
2 SPRAY, SUSPENSION (ML) NASAL DAILY PRN
Qty: 16 G | Refills: 5 | Status: SHIPPED | OUTPATIENT
Start: 2017-11-06 | End: 2018-03-26 | Stop reason: SDUPTHER

## 2017-11-06 RX ORDER — FOLIC ACID 1 MG/1
1 TABLET ORAL DAILY
Qty: 30 TABLET | Refills: 5 | Status: SHIPPED | OUTPATIENT
Start: 2017-11-06 | End: 2018-11-20 | Stop reason: SDUPTHER

## 2017-11-06 RX ORDER — VENLAFAXINE HYDROCHLORIDE 150 MG/1
150 CAPSULE, EXTENDED RELEASE ORAL DAILY
Qty: 30 CAPSULE | Refills: 5 | Status: SHIPPED | OUTPATIENT
Start: 2017-11-06 | End: 2018-01-22 | Stop reason: SDUPTHER

## 2017-11-06 RX ORDER — BACLOFEN 10 MG/1
20 TABLET ORAL 3 TIMES DAILY
Qty: 180 TABLET | Refills: 5 | Status: SHIPPED | OUTPATIENT
Start: 2017-11-06 | End: 2018-11-18 | Stop reason: SDUPTHER

## 2017-11-06 RX ORDER — GABAPENTIN 800 MG/1
800 TABLET ORAL 3 TIMES DAILY
Qty: 90 TABLET | Refills: 2 | Status: ON HOLD | OUTPATIENT
Start: 2017-11-06 | End: 2018-02-22 | Stop reason: SDUPTHER

## 2017-11-06 RX ORDER — SENNA PLUS 8.6 MG/1
1 TABLET ORAL 2 TIMES DAILY
Qty: 60 TABLET | Refills: 5 | Status: SHIPPED | OUTPATIENT
Start: 2017-11-06 | End: 2018-08-06

## 2017-11-06 RX ORDER — ASPIRIN 81 MG/1
81 TABLET ORAL DAILY
Qty: 30 TABLET | Refills: 5 | Status: SHIPPED | OUTPATIENT
Start: 2017-11-06 | End: 2022-10-10

## 2017-11-06 RX ORDER — MULTIVITAMIN
1 TABLET ORAL DAILY
Qty: 30 TABLET | Refills: 5 | Status: SHIPPED | OUTPATIENT
Start: 2017-11-06 | End: 2022-09-12

## 2017-11-06 RX ORDER — CETIRIZINE HYDROCHLORIDE 10 MG/1
10 TABLET ORAL NIGHTLY
Qty: 30 TABLET | Refills: 5 | Status: SHIPPED | OUTPATIENT
Start: 2017-11-06 | End: 2017-12-14 | Stop reason: SDUPTHER

## 2017-11-06 RX ORDER — AMOXICILLIN AND CLAVULANATE POTASSIUM 500; 125 MG/1; MG/1
1 TABLET, FILM COATED ORAL 3 TIMES DAILY
Qty: 30 TABLET | Refills: 0 | Status: SHIPPED | OUTPATIENT
Start: 2017-11-06 | End: 2017-12-23

## 2017-11-06 RX ORDER — AMITRIPTYLINE HYDROCHLORIDE 25 MG/1
25 TABLET, FILM COATED ORAL NIGHTLY
Qty: 30 TABLET | Refills: 5 | Status: SHIPPED | OUTPATIENT
Start: 2017-11-06 | End: 2018-03-26 | Stop reason: SDUPTHER

## 2017-11-06 NOTE — PROGRESS NOTES
Subjective   lAex Tierney is a 42 y.o. male.     History of Present Illness     Spinal Cord Injury  Level T6 complicated by recurrent decubitus ulcers and osteomyelitis requiring a left BKA, permanent colostomy and a chronic indwelling bladder catheter. Underwent a bilateral hip osteotomy with a significant improvement in his ROM and level of function about the house.  Within the last several months he has developed another ulcer over the left buttock.  His father has been doing daily dressing changes and apparently the ulcer has become smaller in diameter but deeper.  It has drained on and off throughout this time and he believes he ran a fever for several days last week.  He no longer has home health and feels that he and his father are managing fine    Depression  At present his mood is fair. He admits to persistent nervousness and worrying along with occasional moodiness. He denies any loss of interest in activities, difficulty concentrating, or any problem with his appetite or sleep.  He has been taking venlafaxine er 150 qd and amitriptyline 25 daily at bedtime.  He denies any suicidal ideation.    Allergic Rhinitis  Patient has a history of allergic rhinitis. Patient's symptoms include sneezing, clear rhinorrhea, nasal congestion, periorbital pressure and postnasal drip. These symptoms are perennial with seasonal exacerbation. The patient has been suffering from these symptoms for a number of years . The patient has tried prescription antihistamine - cetirizine and prescription nasal corticosteroid - fluticasone with good relief of symptoms.     The following portions of the patient's history were reviewed and updated as appropriate: allergies, current medications, past medical history, past social history, past surgical history and problem list.    Review of Systems   Constitutional: Positive for fatigue. Negative for appetite change, chills, fever and unexpected weight change.   HENT: Negative for  congestion, ear pain, rhinorrhea, sneezing, sore throat and voice change.    Eyes: Negative for visual disturbance.   Respiratory: Negative for cough, shortness of breath and wheezing.    Cardiovascular: Negative for chest pain, palpitations and leg swelling.   Gastrointestinal: Negative for abdominal pain, blood in stool, constipation, diarrhea, nausea and vomiting.   Endocrine: Negative for polydipsia and polyuria.   Genitourinary: Negative for difficulty urinating, dysuria, frequency, hematuria and urgency.   Musculoskeletal: Positive for arthralgias and back pain. Negative for joint swelling, myalgias and neck pain.   Skin: Positive for wound (decubitus ulcer left buttock). Negative for color change.   Neurological: Positive for weakness and numbness. Negative for tremors and headaches.   Psychiatric/Behavioral: Positive for dysphoric mood. Negative for sleep disturbance and suicidal ideas. The patient is nervous/anxious.      Objective   Physical Exam   Constitutional: He is oriented to person, place, and time. He appears well-developed and well-nourished. He is cooperative. He does not have a sickly appearance. No distress.   Sitting in a wheelchair.  Status post left BKA. Bright and in fair spirits. No apparent distress at rest. No pallor, jaundice, diaphoresis, or cyanosis   HENT:   Head: Atraumatic.   Right Ear: Tympanic membrane, external ear and ear canal normal.   Left Ear: Tympanic membrane, external ear and ear canal normal.   Mouth/Throat: Oropharynx is clear and moist.   Eyes: EOM are normal. Pupils are equal, round, and reactive to light. No scleral icterus.   Neck: No JVD present. Carotid bruit is not present. No tracheal deviation present. No thyromegaly present.   Cardiovascular: Normal rate, regular rhythm, S1 normal, S2 normal, normal heart sounds and intact distal pulses.  Exam reveals no gallop.    No murmur heard.  Pulmonary/Chest: Breath sounds normal. He has no wheezes. He has no rales.    Abdominal: Soft. Normal aorta and bowel sounds are normal. He exhibits no abdominal bruit and no mass. There is no hepatosplenomegaly. There is no tenderness. No hernia.   Musculoskeletal: He exhibits no tenderness or deformity.   Lymphadenopathy:        Head (right side): No submandibular adenopathy present.        Head (left side): No submandibular adenopathy present.     He has no cervical adenopathy.   Neurological: He is alert and oriented to person, place, and time. No cranial nerve deficit.   Spastic paraplegia   Skin: Skin is warm and dry. No rash noted. He is not diaphoretic. No pallor. Nails show no clubbing.   Packed foul-smelling 4 cm in diameter and 4 cm deep decubitus ulcer left mid medial buttock.  Appears to extend into the muscle.  No bone visible.  No significant drainage.  Surrounding skin unremarkable    Psychiatric: He has a normal mood and affect. His speech is normal and behavior is normal. Thought content normal.     Assessment/Plan   Problems Addressed this Visit        Respiratory    Chronic seasonal allergic rhinitis  Continue current medication    Relevant Medications    cetirizine (zyrTEC) 10 MG tablet    fluticasone (FLONASE) 50 MCG/ACT nasal spray       Nervous and Auditory    T6 spinal cord injury  Complicated by recurrent decubitus ulcers and osteomyelitis requiring a left BKA, permanent colostomy, and chronic indwelling bladder catheter. Underwent recent osteotomies of both hips to increase his ROM.   Supportive therapy    Relevant Medications    amitriptyline (ELAVIL) 25 MG tablet    gabapentin (NEURONTIN) 800 MG tablet    senna (SENNA-TIME) 8.6 MG tablet    folic acid (FOLVITE) 1 MG tablet    baclofen (LIORESAL) 10 MG tablet    multivitamin (DAILY BRIAN) tablet tablet       Musculoskeletal and Integument    Chronic osteomyelitis of multiple sites    Relevant Medications    amoxicillin-clavulanate (AUGMENTIN) 500-125 MG per tablet    Decubitus ulcer of left buttock, stage  4  Referral to surgery  Will start on empiric antibiotics in the meantime  Encouraged to report if any worse or if any new symptoms or concerns.    Relevant Medications    amoxicillin-clavulanate (AUGMENTIN) 500-125 MG per tablet    Other Relevant Orders    Ambulatory Referral to General Surgery       Other    Recurrent major depressive disorder, in partial remission  Significant situational component. Supportive therapy.   Referral to psychiatry  We'll titrate amitriptyline somewhat in the meantime    Relevant Medications    venlafaxine XR (EFFEXOR-XR) 150 MG 24 hr capsule    amitriptyline (ELAVIL) 25 MG tablet    Other Relevant Orders    Ambulatory Referral to Psychiatry    Smoker    Relevant Medications    aspirin 81 MG EC tablet    History of left lower extremity amputation    Healthcare maintenance  Recommended a flu shot    Relevant Orders    Flu Vaccine Quad PF 3YR+ (Completed)    Encounter for immunization    Relevant Orders    Flu Vaccine Quad PF 3YR+ (Completed)

## 2017-12-07 ENCOUNTER — TELEPHONE (OUTPATIENT)
Dept: FAMILY MEDICINE CLINIC | Facility: CLINIC | Age: 42
End: 2017-12-07

## 2017-12-14 ENCOUNTER — OFFICE VISIT (OUTPATIENT)
Dept: FAMILY MEDICINE CLINIC | Facility: CLINIC | Age: 42
End: 2017-12-14

## 2017-12-14 VITALS — HEIGHT: 71 IN | HEART RATE: 120 BPM | TEMPERATURE: 98.1 F | OXYGEN SATURATION: 97 %

## 2017-12-14 DIAGNOSIS — F33.41 RECURRENT MAJOR DEPRESSIVE DISORDER, IN PARTIAL REMISSION (HCC): ICD-10-CM

## 2017-12-14 DIAGNOSIS — J30.2 CHRONIC SEASONAL ALLERGIC RHINITIS, UNSPECIFIED TRIGGER: ICD-10-CM

## 2017-12-14 DIAGNOSIS — L89.323 DECUBITUS ULCER OF LEFT BUTTOCK, STAGE 3 (HCC): Primary | ICD-10-CM

## 2017-12-14 DIAGNOSIS — S24.102S T6 SPINAL CORD INJURY, SEQUELA (HCC): ICD-10-CM

## 2017-12-14 PROBLEM — Z00.00 HEALTHCARE MAINTENANCE: Status: RESOLVED | Noted: 2017-08-05 | Resolved: 2017-12-14

## 2017-12-14 PROBLEM — Z23 ENCOUNTER FOR IMMUNIZATION: Status: RESOLVED | Noted: 2017-11-06 | Resolved: 2017-12-14

## 2017-12-14 PROBLEM — L89.322 DECUBITUS ULCER OF LEFT BUTTOCK, STAGE 2 (HCC): Status: ACTIVE | Noted: 2017-11-06

## 2017-12-14 PROCEDURE — 99214 OFFICE O/P EST MOD 30 MIN: CPT | Performed by: PHYSICIAN ASSISTANT

## 2017-12-14 RX ORDER — CETIRIZINE HYDROCHLORIDE 10 MG/1
10 TABLET ORAL NIGHTLY
Qty: 30 TABLET | Refills: 5 | Status: SHIPPED | OUTPATIENT
Start: 2017-12-14 | End: 2018-03-26 | Stop reason: SDUPTHER
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
## 2018-01-22 ENCOUNTER — OFFICE VISIT (OUTPATIENT)
Dept: PSYCHIATRY | Facility: CLINIC | Age: 43
End: 2018-01-22

## 2018-01-22 VITALS — SYSTOLIC BLOOD PRESSURE: 134 MMHG | HEART RATE: 115 BPM | DIASTOLIC BLOOD PRESSURE: 83 MMHG | HEIGHT: 72 IN

## 2018-01-22 DIAGNOSIS — F33.1 MAJOR DEPRESSIVE DISORDER, RECURRENT EPISODE, MODERATE WITH MIXED FEATURES (HCC): Primary | ICD-10-CM

## 2018-01-22 DIAGNOSIS — F19.90 SUBSTANCE USE DISORDER: ICD-10-CM

## 2018-01-22 DIAGNOSIS — Z79.899 MEDICATION MANAGEMENT: ICD-10-CM

## 2018-01-22 DIAGNOSIS — F15.20: ICD-10-CM

## 2018-01-22 LAB
AMPHETAMINE CUT-OFF: 1000
BENZODIAZIPINE CUT-OFF: 300
BUPRENORPHINE CUT-OFF: 100
COCAINE CUT-OFF: 300
EXTERNAL AMPHETAMINE SCREEN URINE: NEGATIVE
EXTERNAL BENZODIAZEPINE SCREEN URINE: NEGATIVE
EXTERNAL BUPRENORPHINE SCREEN URINE: NEGATIVE
EXTERNAL COCAINE SCREEN URINE: NEGATIVE
EXTERNAL MDMA: NEGATIVE
EXTERNAL METHADONE SCREEN URINE: NEGATIVE
EXTERNAL METHAMPHETAMINE SCREEN URINE: NEGATIVE
EXTERNAL OPIATES SCREEN URINE: NEGATIVE
EXTERNAL OXYCODONE SCREEN URINE: NEGATIVE
EXTERNAL THC SCREEN URINE: NEGATIVE
MDMA CUT-OFF: 500
METHADONE CUT-OFF: 300
METHAMPHETAMINE CUT-OFF: 1000
OPIATES CUT-OFF: 300
OXYCODONE CUT-OFF: 100
THC CUT-OFF: 50

## 2018-01-22 PROCEDURE — 90792 PSYCH DIAG EVAL W/MED SRVCS: CPT | Performed by: NURSE PRACTITIONER

## 2018-01-22 RX ORDER — VENLAFAXINE HYDROCHLORIDE 150 MG/1
150 CAPSULE, EXTENDED RELEASE ORAL DAILY
Qty: 30 CAPSULE | Refills: 2 | Status: SHIPPED | OUTPATIENT
Start: 2018-01-22 | End: 2018-03-26 | Stop reason: SDUPTHER

## 2018-01-22 RX ORDER — NALTREXONE HYDROCHLORIDE 50 MG/1
50 TABLET, FILM COATED ORAL DAILY
Qty: 30 TABLET | Refills: 0 | Status: SHIPPED | OUTPATIENT
Start: 2018-01-22 | End: 2018-08-06

## 2018-01-22 RX ORDER — TRAZODONE HYDROCHLORIDE 50 MG/1
50-100 TABLET ORAL NIGHTLY
Qty: 60 TABLET | Refills: 0 | Status: SHIPPED | OUTPATIENT
Start: 2018-01-22 | End: 2018-03-26
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
## 2018-02-16 ENCOUNTER — HOSPITAL ENCOUNTER (OUTPATIENT)
Facility: HOSPITAL | Age: 43
Discharge: HOME HEALTH | End: 2018-03-19
Attending: INTERNAL MEDICINE | Admitting: INTERNAL MEDICINE

## 2018-02-16 ENCOUNTER — APPOINTMENT (OUTPATIENT)
Dept: GENERAL RADIOLOGY | Facility: HOSPITAL | Age: 43
End: 2018-02-16

## 2018-02-16 LAB
ALBUMIN SERPL-MCNC: 3.6 G/DL (ref 3.5–5)
ALBUMIN/GLOB SERPL: 1.2 G/DL (ref 1.5–2.5)
ALP SERPL-CCNC: 106 U/L (ref 40–129)
ALT SERPL W P-5'-P-CCNC: 18 U/L (ref 10–44)
ANION GAP SERPL CALCULATED.3IONS-SCNC: 5.1 MMOL/L (ref 3.6–11.2)
AST SERPL-CCNC: 19 U/L (ref 10–34)
BASOPHILS # BLD AUTO: 0.03 10*3/MM3 (ref 0–0.3)
BASOPHILS NFR BLD AUTO: 0.4 % (ref 0–2)
BILIRUB SERPL-MCNC: 0.2 MG/DL (ref 0.2–1.8)
BUN BLD-MCNC: 20 MG/DL (ref 7–21)
BUN/CREAT SERPL: 36.4 (ref 7–25)
CALCIUM SPEC-SCNC: 8.4 MG/DL (ref 7.7–10)
CHLORIDE SERPL-SCNC: 104 MMOL/L (ref 99–112)
CO2 SERPL-SCNC: 28.9 MMOL/L (ref 24.3–31.9)
CREAT BLD-MCNC: 0.55 MG/DL (ref 0.43–1.29)
CRP SERPL-MCNC: 2.69 MG/DL (ref 0–0.99)
DEPRECATED RDW RBC AUTO: 62.9 FL (ref 37–54)
EOSINOPHIL # BLD AUTO: 0.26 10*3/MM3 (ref 0–0.7)
EOSINOPHIL NFR BLD AUTO: 3.3 % (ref 0–5)
ERYTHROCYTE [DISTWIDTH] IN BLOOD BY AUTOMATED COUNT: 20.2 % (ref 11.5–14.5)
GFR SERPL CREATININE-BSD FRML MDRD: >150 ML/MIN/1.73
GLOBULIN UR ELPH-MCNC: 3.1 GM/DL
GLUCOSE BLD-MCNC: 94 MG/DL (ref 70–110)
HCT VFR BLD AUTO: 25.8 % (ref 42–52)
HGB BLD-MCNC: 7.9 G/DL (ref 14–18)
IMM GRANULOCYTES # BLD: 0.1 10*3/MM3 (ref 0–0.03)
IMM GRANULOCYTES NFR BLD: 1.3 % (ref 0–0.5)
INR PPP: 1.08 (ref 0.9–1.1)
LYMPHOCYTES # BLD AUTO: 1.45 10*3/MM3 (ref 1–3)
LYMPHOCYTES NFR BLD AUTO: 18.3 % (ref 21–51)
MCH RBC QN AUTO: 27.5 PG (ref 27–33)
MCHC RBC AUTO-ENTMCNC: 30.6 G/DL (ref 33–37)
MCV RBC AUTO: 89.9 FL (ref 80–94)
MONOCYTES # BLD AUTO: 0.79 10*3/MM3 (ref 0.1–0.9)
MONOCYTES NFR BLD AUTO: 9.9 % (ref 0–10)
NEUTROPHILS # BLD AUTO: 5.31 10*3/MM3 (ref 1.4–6.5)
NEUTROPHILS NFR BLD AUTO: 66.8 % (ref 30–70)
OSMOLALITY SERPL CALC.SUM OF ELEC: 278 MOSM/KG (ref 273–305)
PLATELET # BLD AUTO: 327 10*3/MM3 (ref 130–400)
PMV BLD AUTO: 9.6 FL (ref 6–10)
POTASSIUM BLD-SCNC: 3.9 MMOL/L (ref 3.5–5.3)
PROT SERPL-MCNC: 6.7 G/DL (ref 6–8)
PROTHROMBIN TIME: 14.2 SECONDS (ref 11–15.4)
RBC # BLD AUTO: 2.87 10*6/MM3 (ref 4.7–6.1)
SODIUM BLD-SCNC: 138 MMOL/L (ref 135–153)
WBC NRBC COR # BLD: 7.94 10*3/MM3 (ref 4.5–12.5)

## 2018-02-16 PROCEDURE — 85025 COMPLETE CBC W/AUTO DIFF WBC: CPT | Performed by: INTERNAL MEDICINE

## 2018-02-16 PROCEDURE — 93005 ELECTROCARDIOGRAM TRACING: CPT | Performed by: INTERNAL MEDICINE

## 2018-02-16 PROCEDURE — 80053 COMPREHEN METABOLIC PANEL: CPT | Performed by: INTERNAL MEDICINE

## 2018-02-16 PROCEDURE — 71045 X-RAY EXAM CHEST 1 VIEW: CPT

## 2018-02-16 PROCEDURE — 71045 X-RAY EXAM CHEST 1 VIEW: CPT | Performed by: RADIOLOGY

## 2018-02-16 PROCEDURE — 85610 PROTHROMBIN TIME: CPT | Performed by: INTERNAL MEDICINE

## 2018-02-16 PROCEDURE — 87186 SC STD MICRODIL/AGAR DIL: CPT | Performed by: INTERNAL MEDICINE

## 2018-02-16 PROCEDURE — 87070 CULTURE OTHR SPECIMN AEROBIC: CPT | Performed by: INTERNAL MEDICINE

## 2018-02-16 PROCEDURE — 87205 SMEAR GRAM STAIN: CPT | Performed by: INTERNAL MEDICINE

## 2018-02-16 PROCEDURE — 93010 ELECTROCARDIOGRAM REPORT: CPT | Performed by: INTERNAL MEDICINE

## 2018-02-16 PROCEDURE — 86140 C-REACTIVE PROTEIN: CPT | Performed by: INTERNAL MEDICINE

## 2018-02-16 PROCEDURE — 87077 CULTURE AEROBIC IDENTIFY: CPT | Performed by: INTERNAL MEDICINE

## 2018-02-16 PROCEDURE — 84134 ASSAY OF PREALBUMIN: CPT | Performed by: INTERNAL MEDICINE

## 2018-02-17 LAB
CHOLEST SERPL-MCNC: 140 MG/DL (ref 0–200)
HDLC SERPL-MCNC: 33 MG/DL (ref 60–100)
LDLC SERPL CALC-MCNC: 71 MG/DL (ref 0–100)
LDLC/HDLC SERPL: 2.16 {RATIO}
TRIGL SERPL-MCNC: 179 MG/DL (ref 0–150)
VLDLC SERPL-MCNC: 35.8 MG/DL

## 2018-02-17 PROCEDURE — 80061 LIPID PANEL: CPT | Performed by: INTERNAL MEDICINE

## 2018-02-18 LAB — PREALB SERPL-MCNC: 26 MG/DL (ref 12–34)

## 2018-02-19 LAB
BACTERIA SPEC AEROBE CULT: ABNORMAL
BACTERIA SPEC AEROBE CULT: ABNORMAL
GRAM STN SPEC: ABNORMAL
GRAM STN SPEC: ABNORMAL

## 2018-02-20 LAB
BACTERIA SPEC AEROBE CULT: ABNORMAL
GRAM STN SPEC: ABNORMAL
GRAM STN SPEC: ABNORMAL

## 2018-02-22 DIAGNOSIS — S24.102S T6 SPINAL CORD INJURY, SEQUELA (HCC): ICD-10-CM

## 2018-02-22 RX ORDER — GABAPENTIN 800 MG/1
TABLET ORAL
Qty: 90 TABLET | Refills: 0 | Status: SHIPPED | OUTPATIENT
Start: 2018-02-22 | End: 2018-03-26 | Stop reason: SDUPTHER

## 2018-02-23 LAB
ALBUMIN SERPL-MCNC: 3.6 G/DL (ref 3.5–5)
ALBUMIN/GLOB SERPL: 1.1 G/DL (ref 1.5–2.5)
ALP SERPL-CCNC: 132 U/L (ref 40–129)
ALT SERPL W P-5'-P-CCNC: 22 U/L (ref 10–44)
ANION GAP SERPL CALCULATED.3IONS-SCNC: 5.9 MMOL/L (ref 3.6–11.2)
AST SERPL-CCNC: 16 U/L (ref 10–34)
BASOPHILS # BLD AUTO: 0.05 10*3/MM3 (ref 0–0.3)
BASOPHILS NFR BLD AUTO: 0.7 % (ref 0–2)
BILIRUB SERPL-MCNC: 0.2 MG/DL (ref 0.2–1.8)
BUN BLD-MCNC: 15 MG/DL (ref 7–21)
BUN/CREAT SERPL: 24.6 (ref 7–25)
CALCIUM SPEC-SCNC: 8.6 MG/DL (ref 7.7–10)
CHLORIDE SERPL-SCNC: 104 MMOL/L (ref 99–112)
CO2 SERPL-SCNC: 28.1 MMOL/L (ref 24.3–31.9)
CREAT BLD-MCNC: 0.61 MG/DL (ref 0.43–1.29)
CRP SERPL-MCNC: 3.19 MG/DL (ref 0–0.99)
DEPRECATED RDW RBC AUTO: 61.1 FL (ref 37–54)
EOSINOPHIL # BLD AUTO: 0.52 10*3/MM3 (ref 0–0.7)
EOSINOPHIL NFR BLD AUTO: 6.9 % (ref 0–5)
ERYTHROCYTE [DISTWIDTH] IN BLOOD BY AUTOMATED COUNT: 20.1 % (ref 11.5–14.5)
GFR SERPL CREATININE-BSD FRML MDRD: 145 ML/MIN/1.73
GLOBULIN UR ELPH-MCNC: 3.2 GM/DL
GLUCOSE BLD-MCNC: 98 MG/DL (ref 70–110)
HCT VFR BLD AUTO: 27.1 % (ref 42–52)
HGB BLD-MCNC: 7.9 G/DL (ref 14–18)
IMM GRANULOCYTES # BLD: 0.08 10*3/MM3 (ref 0–0.03)
IMM GRANULOCYTES NFR BLD: 1.1 % (ref 0–0.5)
LYMPHOCYTES # BLD AUTO: 1.78 10*3/MM3 (ref 1–3)
LYMPHOCYTES NFR BLD AUTO: 23.5 % (ref 21–51)
MCH RBC QN AUTO: 24.8 PG (ref 27–33)
MCHC RBC AUTO-ENTMCNC: 29.2 G/DL (ref 33–37)
MCV RBC AUTO: 85.2 FL (ref 80–94)
MONOCYTES # BLD AUTO: 1.12 10*3/MM3 (ref 0.1–0.9)
MONOCYTES NFR BLD AUTO: 14.8 % (ref 0–10)
NEUTROPHILS # BLD AUTO: 4.02 10*3/MM3 (ref 1.4–6.5)
NEUTROPHILS NFR BLD AUTO: 53 % (ref 30–70)
OSMOLALITY SERPL CALC.SUM OF ELEC: 276.5 MOSM/KG (ref 273–305)
PLATELET # BLD AUTO: 445 10*3/MM3 (ref 130–400)
PMV BLD AUTO: 9.4 FL (ref 6–10)
POTASSIUM BLD-SCNC: 3.8 MMOL/L (ref 3.5–5.3)
PROT SERPL-MCNC: 6.8 G/DL (ref 6–8)
RBC # BLD AUTO: 3.18 10*6/MM3 (ref 4.7–6.1)
SODIUM BLD-SCNC: 138 MMOL/L (ref 135–153)
WBC NRBC COR # BLD: 7.57 10*3/MM3 (ref 4.5–12.5)

## 2018-02-23 PROCEDURE — 86140 C-REACTIVE PROTEIN: CPT | Performed by: INTERNAL MEDICINE

## 2018-02-23 PROCEDURE — 85025 COMPLETE CBC W/AUTO DIFF WBC: CPT | Performed by: INTERNAL MEDICINE

## 2018-02-23 PROCEDURE — 80053 COMPREHEN METABOLIC PANEL: CPT | Performed by: INTERNAL MEDICINE

## 2018-02-26 LAB — CRP SERPL-MCNC: 1.37 MG/DL (ref 0–0.99)

## 2018-02-26 PROCEDURE — 86140 C-REACTIVE PROTEIN: CPT | Performed by: PHYSICIAN ASSISTANT

## 2018-03-01 LAB
HCT VFR BLD AUTO: 28.7 % (ref 42–52)
HGB BLD-MCNC: 8.2 G/DL (ref 14–18)

## 2018-03-01 PROCEDURE — 85018 HEMOGLOBIN: CPT | Performed by: INTERNAL MEDICINE

## 2018-03-01 PROCEDURE — 85014 HEMATOCRIT: CPT | Performed by: INTERNAL MEDICINE

## 2018-03-02 LAB
ALBUMIN SERPL-MCNC: 3.6 G/DL (ref 3.5–5)
ALBUMIN/GLOB SERPL: 1.1 G/DL (ref 1.5–2.5)
ALP SERPL-CCNC: 147 U/L (ref 40–129)
ALT SERPL W P-5'-P-CCNC: 20 U/L (ref 10–44)
ANION GAP SERPL CALCULATED.3IONS-SCNC: 2.8 MMOL/L (ref 3.6–11.2)
AST SERPL-CCNC: 18 U/L (ref 10–34)
BASOPHILS # BLD AUTO: 0.03 10*3/MM3 (ref 0–0.3)
BASOPHILS NFR BLD AUTO: 0.3 % (ref 0–2)
BILIRUB SERPL-MCNC: 0.2 MG/DL (ref 0.2–1.8)
BUN BLD-MCNC: 15 MG/DL (ref 7–21)
BUN/CREAT SERPL: 30.6 (ref 7–25)
CALCIUM SPEC-SCNC: 8.9 MG/DL (ref 7.7–10)
CHLORIDE SERPL-SCNC: 107 MMOL/L (ref 99–112)
CO2 SERPL-SCNC: 27.2 MMOL/L (ref 24.3–31.9)
CREAT BLD-MCNC: 0.49 MG/DL (ref 0.43–1.29)
CRP SERPL-MCNC: 2.54 MG/DL (ref 0–0.99)
DEPRECATED RDW RBC AUTO: 56.8 FL (ref 37–54)
EOSINOPHIL # BLD AUTO: 0.82 10*3/MM3 (ref 0–0.7)
EOSINOPHIL NFR BLD AUTO: 8.6 % (ref 0–5)
ERYTHROCYTE [DISTWIDTH] IN BLOOD BY AUTOMATED COUNT: 20.3 % (ref 11.5–14.5)
GFR SERPL CREATININE-BSD FRML MDRD: >150 ML/MIN/1.73
GLOBULIN UR ELPH-MCNC: 3.2 GM/DL
GLUCOSE BLD-MCNC: 110 MG/DL (ref 70–110)
HCT VFR BLD AUTO: 27.5 % (ref 42–52)
HGB BLD-MCNC: 8 G/DL (ref 14–18)
IMM GRANULOCYTES # BLD: 0.09 10*3/MM3 (ref 0–0.03)
IMM GRANULOCYTES NFR BLD: 0.9 % (ref 0–0.5)
LYMPHOCYTES # BLD AUTO: 1.81 10*3/MM3 (ref 1–3)
LYMPHOCYTES NFR BLD AUTO: 19 % (ref 21–51)
MCH RBC QN AUTO: 23.5 PG (ref 27–33)
MCHC RBC AUTO-ENTMCNC: 29.1 G/DL (ref 33–37)
MCV RBC AUTO: 80.6 FL (ref 80–94)
MONOCYTES # BLD AUTO: 0.96 10*3/MM3 (ref 0.1–0.9)
MONOCYTES NFR BLD AUTO: 10.1 % (ref 0–10)
NEUTROPHILS # BLD AUTO: 5.83 10*3/MM3 (ref 1.4–6.5)
NEUTROPHILS NFR BLD AUTO: 61.1 % (ref 30–70)
OSMOLALITY SERPL CALC.SUM OF ELEC: 275.3 MOSM/KG (ref 273–305)
PLATELET # BLD AUTO: 307 10*3/MM3 (ref 130–400)
PMV BLD AUTO: 9.9 FL (ref 6–10)
POTASSIUM BLD-SCNC: 3.9 MMOL/L (ref 3.5–5.3)
PROT SERPL-MCNC: 6.8 G/DL (ref 6–8)
RBC # BLD AUTO: 3.41 10*6/MM3 (ref 4.7–6.1)
SODIUM BLD-SCNC: 137 MMOL/L (ref 135–153)
WBC NRBC COR # BLD: 9.54 10*3/MM3 (ref 4.5–12.5)

## 2018-03-02 PROCEDURE — 86140 C-REACTIVE PROTEIN: CPT | Performed by: INTERNAL MEDICINE

## 2018-03-02 PROCEDURE — 80053 COMPREHEN METABOLIC PANEL: CPT | Performed by: INTERNAL MEDICINE

## 2018-03-02 PROCEDURE — 85025 COMPLETE CBC W/AUTO DIFF WBC: CPT | Performed by: INTERNAL MEDICINE

## 2018-03-03 PROBLEM — M86.9 OSTEOMYELITIS: Status: ACTIVE | Noted: 2018-03-03

## 2018-03-03 LAB
ALBUMIN SERPL-MCNC: 3.6 G/DL (ref 3.5–5)
ALBUMIN/GLOB SERPL: 1.1 G/DL (ref 1.5–2.5)
ALP SERPL-CCNC: 155 U/L (ref 40–129)
ALT SERPL W P-5'-P-CCNC: 16 U/L (ref 10–44)
ANION GAP SERPL CALCULATED.3IONS-SCNC: 5.5 MMOL/L (ref 3.6–11.2)
AST SERPL-CCNC: 13 U/L (ref 10–34)
BILIRUB SERPL-MCNC: 0.2 MG/DL (ref 0.2–1.8)
BUN BLD-MCNC: 14 MG/DL (ref 7–21)
BUN/CREAT SERPL: 23.3 (ref 7–25)
CALCIUM SPEC-SCNC: 8.7 MG/DL (ref 7.7–10)
CHLORIDE SERPL-SCNC: 109 MMOL/L (ref 99–112)
CO2 SERPL-SCNC: 25.5 MMOL/L (ref 24.3–31.9)
CREAT BLD-MCNC: 0.6 MG/DL (ref 0.43–1.29)
CRP SERPL-MCNC: 2.68 MG/DL (ref 0–0.99)
DEPRECATED RDW RBC AUTO: 57.7 FL (ref 37–54)
ERYTHROCYTE [DISTWIDTH] IN BLOOD BY AUTOMATED COUNT: 20.2 % (ref 11.5–14.5)
GFR SERPL CREATININE-BSD FRML MDRD: 148 ML/MIN/1.73
GLOBULIN UR ELPH-MCNC: 3.2 GM/DL
GLUCOSE BLD-MCNC: 104 MG/DL (ref 70–110)
HCT VFR BLD AUTO: 28.2 % (ref 42–52)
HGB BLD-MCNC: 8 G/DL (ref 14–18)
MCH RBC QN AUTO: 23 PG (ref 27–33)
MCHC RBC AUTO-ENTMCNC: 28.4 G/DL (ref 33–37)
MCV RBC AUTO: 81 FL (ref 80–94)
OSMOLALITY SERPL CALC.SUM OF ELEC: 280.2 MOSM/KG (ref 273–305)
PLATELET # BLD AUTO: 313 10*3/MM3 (ref 130–400)
PMV BLD AUTO: 10.2 FL (ref 6–10)
POTASSIUM BLD-SCNC: 3.7 MMOL/L (ref 3.5–5.3)
PROT SERPL-MCNC: 6.8 G/DL (ref 6–8)
RBC # BLD AUTO: 3.48 10*6/MM3 (ref 4.7–6.1)
SODIUM BLD-SCNC: 140 MMOL/L (ref 135–153)
WBC NRBC COR # BLD: 6.7 10*3/MM3 (ref 4.5–12.5)

## 2018-03-03 PROCEDURE — 86140 C-REACTIVE PROTEIN: CPT | Performed by: INTERNAL MEDICINE

## 2018-03-03 PROCEDURE — 80053 COMPREHEN METABOLIC PANEL: CPT | Performed by: INTERNAL MEDICINE

## 2018-03-03 PROCEDURE — 85027 COMPLETE CBC AUTOMATED: CPT | Performed by: INTERNAL MEDICINE

## 2018-03-06 ENCOUNTER — APPOINTMENT (OUTPATIENT)
Dept: GENERAL RADIOLOGY | Facility: HOSPITAL | Age: 43
End: 2018-03-06

## 2018-03-06 LAB
CRP SERPL-MCNC: 9.66 MG/DL (ref 0–0.99)
DEPRECATED RDW RBC AUTO: 57.2 FL (ref 37–54)
ERYTHROCYTE [DISTWIDTH] IN BLOOD BY AUTOMATED COUNT: 21.1 % (ref 11.5–14.5)
HCT VFR BLD AUTO: 28.8 % (ref 42–52)
HGB BLD-MCNC: 8.2 G/DL (ref 14–18)
MCH RBC QN AUTO: 22.3 PG (ref 27–33)
MCHC RBC AUTO-ENTMCNC: 28.5 G/DL (ref 33–37)
MCV RBC AUTO: 78.3 FL (ref 80–94)
PLATELET # BLD AUTO: 313 10*3/MM3 (ref 130–400)
PMV BLD AUTO: 10 FL (ref 6–10)
RBC # BLD AUTO: 3.68 10*6/MM3 (ref 4.7–6.1)
WBC NRBC COR # BLD: 8.67 10*3/MM3 (ref 4.5–12.5)

## 2018-03-06 PROCEDURE — 86140 C-REACTIVE PROTEIN: CPT | Performed by: INTERNAL MEDICINE

## 2018-03-06 PROCEDURE — 71045 X-RAY EXAM CHEST 1 VIEW: CPT

## 2018-03-06 PROCEDURE — 87040 BLOOD CULTURE FOR BACTERIA: CPT | Performed by: INTERNAL MEDICINE

## 2018-03-06 PROCEDURE — 85027 COMPLETE CBC AUTOMATED: CPT | Performed by: INTERNAL MEDICINE

## 2018-03-06 PROCEDURE — 71045 X-RAY EXAM CHEST 1 VIEW: CPT | Performed by: RADIOLOGY

## 2018-03-07 LAB
BACTERIA UR QL AUTO: ABNORMAL /HPF
BILIRUB UR QL STRIP: NEGATIVE
CK SERPL-CCNC: 65 U/L (ref 24–204)
CLARITY UR: CLEAR
COLOR UR: YELLOW
GLUCOSE UR STRIP-MCNC: NEGATIVE MG/DL
HGB UR QL STRIP.AUTO: NEGATIVE
HYALINE CASTS UR QL AUTO: ABNORMAL /LPF
KETONES UR QL STRIP: NEGATIVE
LEUKOCYTE ESTERASE UR QL STRIP.AUTO: ABNORMAL
NITRITE UR QL STRIP: NEGATIVE
PH UR STRIP.AUTO: 5.5 [PH] (ref 5–8)
PROT UR QL STRIP: NEGATIVE
RBC # UR: ABNORMAL /HPF
REF LAB TEST METHOD: ABNORMAL
SP GR UR STRIP: 1.02 (ref 1–1.03)
SQUAMOUS #/AREA URNS HPF: ABNORMAL /HPF
UROBILINOGEN UR QL STRIP: ABNORMAL
WBC UR QL AUTO: ABNORMAL /HPF

## 2018-03-07 PROCEDURE — 82550 ASSAY OF CK (CPK): CPT | Performed by: NURSE PRACTITIONER

## 2018-03-07 PROCEDURE — 81001 URINALYSIS AUTO W/SCOPE: CPT | Performed by: INTERNAL MEDICINE

## 2018-03-07 PROCEDURE — 87086 URINE CULTURE/COLONY COUNT: CPT | Performed by: INTERNAL MEDICINE

## 2018-03-08 LAB
ALBUMIN SERPL-MCNC: 4 G/DL (ref 3.5–5)
ALBUMIN/GLOB SERPL: 1.2 G/DL (ref 1.5–2.5)
ALP SERPL-CCNC: 133 U/L (ref 40–129)
ALT SERPL W P-5'-P-CCNC: 17 U/L (ref 10–44)
ANION GAP SERPL CALCULATED.3IONS-SCNC: 7.8 MMOL/L (ref 3.6–11.2)
AST SERPL-CCNC: 15 U/L (ref 10–34)
BILIRUB SERPL-MCNC: 0.3 MG/DL (ref 0.2–1.8)
BUN BLD-MCNC: 12 MG/DL (ref 7–21)
BUN/CREAT SERPL: 21.8 (ref 7–25)
CALCIUM SPEC-SCNC: 9.1 MG/DL (ref 7.7–10)
CHLORIDE SERPL-SCNC: 105 MMOL/L (ref 99–112)
CK SERPL-CCNC: 83 U/L (ref 24–204)
CO2 SERPL-SCNC: 23.2 MMOL/L (ref 24.3–31.9)
CREAT BLD-MCNC: 0.55 MG/DL (ref 0.43–1.29)
CRP SERPL-MCNC: 10.69 MG/DL (ref 0–0.99)
GFR SERPL CREATININE-BSD FRML MDRD: >150 ML/MIN/1.73
GLOBULIN UR ELPH-MCNC: 3.4 GM/DL
GLUCOSE BLD-MCNC: 98 MG/DL (ref 70–110)
OSMOLALITY SERPL CALC.SUM OF ELEC: 271.7 MOSM/KG (ref 273–305)
POTASSIUM BLD-SCNC: 4 MMOL/L (ref 3.5–5.3)
PROT SERPL-MCNC: 7.4 G/DL (ref 6–8)
SODIUM BLD-SCNC: 136 MMOL/L (ref 135–153)

## 2018-03-08 PROCEDURE — 82550 ASSAY OF CK (CPK): CPT | Performed by: NURSE PRACTITIONER

## 2018-03-08 PROCEDURE — 86140 C-REACTIVE PROTEIN: CPT | Performed by: NURSE PRACTITIONER

## 2018-03-08 PROCEDURE — 80053 COMPREHEN METABOLIC PANEL: CPT | Performed by: INTERNAL MEDICINE

## 2018-03-09 LAB
ALBUMIN SERPL-MCNC: 3.6 G/DL (ref 3.5–5)
ALBUMIN/GLOB SERPL: 1.1 G/DL (ref 1.5–2.5)
ALP SERPL-CCNC: 125 U/L (ref 40–129)
ALT SERPL W P-5'-P-CCNC: 16 U/L (ref 10–44)
ANION GAP SERPL CALCULATED.3IONS-SCNC: 4.3 MMOL/L (ref 3.6–11.2)
ANISOCYTOSIS BLD QL: NORMAL
AST SERPL-CCNC: 13 U/L (ref 10–34)
BACTERIA SPEC AEROBE CULT: NO GROWTH
BASOPHILS # BLD AUTO: 0.04 10*3/MM3 (ref 0–0.3)
BASOPHILS NFR BLD AUTO: 0.7 % (ref 0–2)
BILIRUB SERPL-MCNC: 0.2 MG/DL (ref 0.2–1.8)
BUN BLD-MCNC: 12 MG/DL (ref 7–21)
BUN/CREAT SERPL: 25 (ref 7–25)
CALCIUM SPEC-SCNC: 8.8 MG/DL (ref 7.7–10)
CHLORIDE SERPL-SCNC: 105 MMOL/L (ref 99–112)
CO2 SERPL-SCNC: 27.7 MMOL/L (ref 24.3–31.9)
CREAT BLD-MCNC: 0.48 MG/DL (ref 0.43–1.29)
DEPRECATED RDW RBC AUTO: 56.6 FL (ref 37–54)
ELLIPTOCYTES BLD QL SMEAR: NORMAL
EOSINOPHIL # BLD AUTO: 0.47 10*3/MM3 (ref 0–0.7)
EOSINOPHIL NFR BLD AUTO: 8.6 % (ref 0–5)
ERYTHROCYTE [DISTWIDTH] IN BLOOD BY AUTOMATED COUNT: 21.1 % (ref 11.5–14.5)
GFR SERPL CREATININE-BSD FRML MDRD: >150 ML/MIN/1.73
GLOBULIN UR ELPH-MCNC: 3.3 GM/DL
GLUCOSE BLD-MCNC: 92 MG/DL (ref 70–110)
HCT VFR BLD AUTO: 26.5 % (ref 42–52)
HGB BLD-MCNC: 7.6 G/DL (ref 14–18)
HYPOCHROMIA BLD QL: NORMAL
IMM GRANULOCYTES # BLD: 0.04 10*3/MM3 (ref 0–0.03)
IMM GRANULOCYTES NFR BLD: 0.7 % (ref 0–0.5)
LYMPHOCYTES # BLD AUTO: 1.32 10*3/MM3 (ref 1–3)
LYMPHOCYTES NFR BLD AUTO: 24.3 % (ref 21–51)
MCH RBC QN AUTO: 22.3 PG (ref 27–33)
MCHC RBC AUTO-ENTMCNC: 28.7 G/DL (ref 33–37)
MCV RBC AUTO: 77.7 FL (ref 80–94)
MICROCYTES BLD QL: NORMAL
MONOCYTES # BLD AUTO: 0.98 10*3/MM3 (ref 0.1–0.9)
MONOCYTES NFR BLD AUTO: 18 % (ref 0–10)
NEUTROPHILS # BLD AUTO: 2.59 10*3/MM3 (ref 1.4–6.5)
NEUTROPHILS NFR BLD AUTO: 47.7 % (ref 30–70)
OSMOLALITY SERPL CALC.SUM OF ELEC: 273.2 MOSM/KG (ref 273–305)
PLAT MORPH BLD: NORMAL
PLATELET # BLD AUTO: 315 10*3/MM3 (ref 130–400)
PMV BLD AUTO: 10 FL (ref 6–10)
POTASSIUM BLD-SCNC: 3.9 MMOL/L (ref 3.5–5.3)
PROT SERPL-MCNC: 6.9 G/DL (ref 6–8)
RBC # BLD AUTO: 3.41 10*6/MM3 (ref 4.7–6.1)
SODIUM BLD-SCNC: 137 MMOL/L (ref 135–153)
WBC NRBC COR # BLD: 5.44 10*3/MM3 (ref 4.5–12.5)

## 2018-03-09 PROCEDURE — 85007 BL SMEAR W/DIFF WBC COUNT: CPT | Performed by: INTERNAL MEDICINE

## 2018-03-09 PROCEDURE — 85025 COMPLETE CBC W/AUTO DIFF WBC: CPT | Performed by: INTERNAL MEDICINE

## 2018-03-09 PROCEDURE — 80053 COMPREHEN METABOLIC PANEL: CPT | Performed by: INTERNAL MEDICINE

## 2018-03-10 LAB — CRP SERPL-MCNC: 5.57 MG/DL (ref 0–0.99)

## 2018-03-10 PROCEDURE — 86140 C-REACTIVE PROTEIN: CPT | Performed by: PHYSICIAN ASSISTANT

## 2018-03-11 LAB
BACTERIA SPEC AEROBE CULT: NORMAL
BACTERIA SPEC AEROBE CULT: NORMAL

## 2018-03-14 LAB
ALBUMIN SERPL-MCNC: 3.4 G/DL (ref 3.5–5)
ALBUMIN/GLOB SERPL: 1 G/DL (ref 1.5–2.5)
ALP SERPL-CCNC: 139 U/L (ref 40–129)
ALT SERPL W P-5'-P-CCNC: 25 U/L (ref 10–44)
ANION GAP SERPL CALCULATED.3IONS-SCNC: 4.5 MMOL/L (ref 3.6–11.2)
AST SERPL-CCNC: 22 U/L (ref 10–34)
BILIRUB SERPL-MCNC: 0.3 MG/DL (ref 0.2–1.8)
BUN BLD-MCNC: 14 MG/DL (ref 7–21)
BUN/CREAT SERPL: 32.6 (ref 7–25)
CALCIUM SPEC-SCNC: 8.7 MG/DL (ref 7.7–10)
CHLORIDE SERPL-SCNC: 109 MMOL/L (ref 99–112)
CO2 SERPL-SCNC: 25.5 MMOL/L (ref 24.3–31.9)
CREAT BLD-MCNC: 0.43 MG/DL (ref 0.43–1.29)
CRP SERPL-MCNC: 2.98 MG/DL (ref 0–0.99)
DEPRECATED RDW RBC AUTO: 55.1 FL (ref 37–54)
ERYTHROCYTE [DISTWIDTH] IN BLOOD BY AUTOMATED COUNT: 21 % (ref 11.5–14.5)
GFR SERPL CREATININE-BSD FRML MDRD: >150 ML/MIN/1.73
GLOBULIN UR ELPH-MCNC: 3.5 GM/DL
GLUCOSE BLD-MCNC: 105 MG/DL (ref 70–110)
HCT VFR BLD AUTO: 29.6 % (ref 42–52)
HGB BLD-MCNC: 8.4 G/DL (ref 14–18)
MCH RBC QN AUTO: 21.4 PG (ref 27–33)
MCHC RBC AUTO-ENTMCNC: 28.4 G/DL (ref 33–37)
MCV RBC AUTO: 75.5 FL (ref 80–94)
OSMOLALITY SERPL CALC.SUM OF ELEC: 278.4 MOSM/KG (ref 273–305)
PLATELET # BLD AUTO: 434 10*3/MM3 (ref 130–400)
PMV BLD AUTO: 9.9 FL (ref 6–10)
POTASSIUM BLD-SCNC: 4.2 MMOL/L (ref 3.5–5.3)
PROT SERPL-MCNC: 6.9 G/DL (ref 6–8)
RBC # BLD AUTO: 3.92 10*6/MM3 (ref 4.7–6.1)
SODIUM BLD-SCNC: 139 MMOL/L (ref 135–153)
WBC NRBC COR # BLD: 6.82 10*3/MM3 (ref 4.5–12.5)

## 2018-03-14 PROCEDURE — 86140 C-REACTIVE PROTEIN: CPT | Performed by: INTERNAL MEDICINE

## 2018-03-14 PROCEDURE — 85027 COMPLETE CBC AUTOMATED: CPT | Performed by: INTERNAL MEDICINE

## 2018-03-14 PROCEDURE — 80053 COMPREHEN METABOLIC PANEL: CPT | Performed by: INTERNAL MEDICINE

## 2018-03-15 LAB
CK SERPL-CCNC: 76 U/L (ref 24–204)
CRP SERPL-MCNC: 2.41 MG/DL (ref 0–0.99)

## 2018-03-15 PROCEDURE — 82550 ASSAY OF CK (CPK): CPT | Performed by: NURSE PRACTITIONER

## 2018-03-15 PROCEDURE — 86140 C-REACTIVE PROTEIN: CPT | Performed by: NURSE PRACTITIONER

## 2018-03-16 LAB
ALBUMIN SERPL-MCNC: 3.7 G/DL (ref 3.5–5)
ALBUMIN/GLOB SERPL: 1.1 G/DL (ref 1.5–2.5)
ALP SERPL-CCNC: 146 U/L (ref 40–129)
ALT SERPL W P-5'-P-CCNC: 23 U/L (ref 10–44)
ANION GAP SERPL CALCULATED.3IONS-SCNC: 5.6 MMOL/L (ref 3.6–11.2)
ANISOCYTOSIS BLD QL: NORMAL
AST SERPL-CCNC: 20 U/L (ref 10–34)
BASOPHILS # BLD AUTO: 0.01 10*3/MM3 (ref 0–0.3)
BASOPHILS NFR BLD AUTO: 0.1 % (ref 0–2)
BILIRUB SERPL-MCNC: 0.3 MG/DL (ref 0.2–1.8)
BUN BLD-MCNC: 14 MG/DL (ref 7–21)
BUN/CREAT SERPL: 31.8 (ref 7–25)
CALCIUM SPEC-SCNC: 9.3 MG/DL (ref 7.7–10)
CHLORIDE SERPL-SCNC: 108 MMOL/L (ref 99–112)
CO2 SERPL-SCNC: 26.4 MMOL/L (ref 24.3–31.9)
CREAT BLD-MCNC: 0.44 MG/DL (ref 0.43–1.29)
DEPRECATED RDW RBC AUTO: 57.7 FL (ref 37–54)
EOSINOPHIL # BLD AUTO: 0.44 10*3/MM3 (ref 0–0.7)
EOSINOPHIL NFR BLD AUTO: 6.5 % (ref 0–5)
ERYTHROCYTE [DISTWIDTH] IN BLOOD BY AUTOMATED COUNT: 21.4 % (ref 11.5–14.5)
GFR SERPL CREATININE-BSD FRML MDRD: >150 ML/MIN/1.73
GLOBULIN UR ELPH-MCNC: 3.4 GM/DL
GLUCOSE BLD-MCNC: 81 MG/DL (ref 70–110)
HCT VFR BLD AUTO: 28.9 % (ref 42–52)
HGB BLD-MCNC: 8.1 G/DL (ref 14–18)
HYPOCHROMIA BLD QL: NORMAL
IMM GRANULOCYTES # BLD: 0.08 10*3/MM3 (ref 0–0.03)
IMM GRANULOCYTES NFR BLD: 1.2 % (ref 0–0.5)
LYMPHOCYTES # BLD AUTO: 2 10*3/MM3 (ref 1–3)
LYMPHOCYTES NFR BLD AUTO: 29.6 % (ref 21–51)
MCH RBC QN AUTO: 20.6 PG (ref 27–33)
MCHC RBC AUTO-ENTMCNC: 28 G/DL (ref 33–37)
MCV RBC AUTO: 73.5 FL (ref 80–94)
MICROCYTES BLD QL: NORMAL
MONOCYTES # BLD AUTO: 0.68 10*3/MM3 (ref 0.1–0.9)
MONOCYTES NFR BLD AUTO: 10.1 % (ref 0–10)
NEUTROPHILS # BLD AUTO: 3.55 10*3/MM3 (ref 1.4–6.5)
NEUTROPHILS NFR BLD AUTO: 52.5 % (ref 30–70)
NRBC BLD MANUAL-RTO: 0 /100 WBC (ref 0–0)
OSMOLALITY SERPL CALC.SUM OF ELEC: 278.9 MOSM/KG (ref 273–305)
PLATELET # BLD AUTO: 423 10*3/MM3 (ref 130–400)
PMV BLD AUTO: 9.5 FL (ref 6–10)
POTASSIUM BLD-SCNC: 4 MMOL/L (ref 3.5–5.3)
PROT SERPL-MCNC: 7.1 G/DL (ref 6–8)
RBC # BLD AUTO: 3.93 10*6/MM3 (ref 4.7–6.1)
SMALL PLATELETS BLD QL SMEAR: NORMAL
SODIUM BLD-SCNC: 140 MMOL/L (ref 135–153)
WBC NRBC COR # BLD: 6.76 10*3/MM3 (ref 4.5–12.5)

## 2018-03-16 PROCEDURE — 80053 COMPREHEN METABOLIC PANEL: CPT | Performed by: INTERNAL MEDICINE

## 2018-03-16 PROCEDURE — 85025 COMPLETE CBC W/AUTO DIFF WBC: CPT | Performed by: INTERNAL MEDICINE

## 2018-03-16 PROCEDURE — 85007 BL SMEAR W/DIFF WBC COUNT: CPT | Performed by: INTERNAL MEDICINE

## 2018-03-26 ENCOUNTER — OFFICE VISIT (OUTPATIENT)
Dept: FAMILY MEDICINE CLINIC | Facility: CLINIC | Age: 43
End: 2018-03-26

## 2018-03-26 VITALS
HEIGHT: 72 IN | OXYGEN SATURATION: 99 % | DIASTOLIC BLOOD PRESSURE: 60 MMHG | RESPIRATION RATE: 12 BRPM | TEMPERATURE: 98.6 F | SYSTOLIC BLOOD PRESSURE: 110 MMHG | HEART RATE: 104 BPM

## 2018-03-26 DIAGNOSIS — M86.69: ICD-10-CM

## 2018-03-26 DIAGNOSIS — F33.41 RECURRENT MAJOR DEPRESSIVE DISORDER, IN PARTIAL REMISSION (HCC): ICD-10-CM

## 2018-03-26 DIAGNOSIS — M86.39 CHRONIC MULTIFOCAL OSTEOMYELITIS OF MULTIPLE SITES (HCC): ICD-10-CM

## 2018-03-26 DIAGNOSIS — Z89.612 HISTORY OF LEFT LOWER EXTREMITY AMPUTATION (HCC): ICD-10-CM

## 2018-03-26 DIAGNOSIS — S24.102S T6 SPINAL CORD INJURY, SEQUELA (HCC): ICD-10-CM

## 2018-03-26 DIAGNOSIS — F17.200 SMOKER: ICD-10-CM

## 2018-03-26 DIAGNOSIS — F33.1 MAJOR DEPRESSIVE DISORDER, RECURRENT EPISODE, MODERATE WITH MIXED FEATURES (HCC): ICD-10-CM

## 2018-03-26 DIAGNOSIS — J30.2 CHRONIC SEASONAL ALLERGIC RHINITIS, UNSPECIFIED TRIGGER: Primary | ICD-10-CM

## 2018-03-26 DIAGNOSIS — D50.9 MICROCYTIC ANEMIA: ICD-10-CM

## 2018-03-26 LAB
ALBUMIN SERPL-MCNC: 4.2 G/DL (ref 3.5–5)
ALBUMIN/GLOB SERPL: 1.1 G/DL (ref 1.5–2.5)
ALP SERPL-CCNC: 164 U/L (ref 40–129)
ALT SERPL W P-5'-P-CCNC: 17 U/L (ref 10–44)
ANION GAP SERPL CALCULATED.3IONS-SCNC: 4.8 MMOL/L (ref 3.6–11.2)
ANISOCYTOSIS BLD QL: NORMAL
AST SERPL-CCNC: 20 U/L (ref 10–34)
BASOPHILS # BLD AUTO: 0.03 10*3/MM3 (ref 0–0.3)
BASOPHILS NFR BLD AUTO: 0.3 % (ref 0–2)
BILIRUB SERPL-MCNC: 0.2 MG/DL (ref 0.2–1.8)
BUN BLD-MCNC: 10 MG/DL (ref 7–21)
BUN/CREAT SERPL: 18.9 (ref 7–25)
CALCIUM SPEC-SCNC: 9.3 MG/DL (ref 7.7–10)
CHLORIDE SERPL-SCNC: 104 MMOL/L (ref 99–112)
CO2 SERPL-SCNC: 28.2 MMOL/L (ref 24.3–31.9)
CREAT BLD-MCNC: 0.53 MG/DL (ref 0.43–1.29)
DEPRECATED RDW RBC AUTO: 54.8 FL (ref 37–54)
EOSINOPHIL # BLD AUTO: 0.53 10*3/MM3 (ref 0–0.7)
EOSINOPHIL NFR BLD AUTO: 5.4 % (ref 0–5)
ERYTHROCYTE [DISTWIDTH] IN BLOOD BY AUTOMATED COUNT: 22.4 % (ref 11.5–14.5)
FERRITIN SERPL-MCNC: 17 NG/ML (ref 21.9–321.7)
GFR SERPL CREATININE-BSD FRML MDRD: >150 ML/MIN/1.73
GLOBULIN UR ELPH-MCNC: 4 GM/DL
GLUCOSE BLD-MCNC: 81 MG/DL (ref 70–110)
HCT VFR BLD AUTO: 33.4 % (ref 42–52)
HGB BLD-MCNC: 9.7 G/DL (ref 14–18)
HYPOCHROMIA BLD QL: NORMAL
IMM GRANULOCYTES # BLD: 0.06 10*3/MM3 (ref 0–0.03)
IMM GRANULOCYTES NFR BLD: 0.6 % (ref 0–0.5)
IRON 24H UR-MRATE: 11 MCG/DL (ref 53–167)
LYMPHOCYTES # BLD AUTO: 1.8 10*3/MM3 (ref 1–3)
LYMPHOCYTES NFR BLD AUTO: 18.3 % (ref 21–51)
MCH RBC QN AUTO: 20.2 PG (ref 27–33)
MCHC RBC AUTO-ENTMCNC: 29 G/DL (ref 33–37)
MCV RBC AUTO: 69.4 FL (ref 80–94)
MICROCYTES BLD QL: NORMAL
MONOCYTES # BLD AUTO: 0.67 10*3/MM3 (ref 0.1–0.9)
MONOCYTES NFR BLD AUTO: 6.8 % (ref 0–10)
NEUTROPHILS # BLD AUTO: 6.74 10*3/MM3 (ref 1.4–6.5)
NEUTROPHILS NFR BLD AUTO: 68.6 % (ref 30–70)
OSMOLALITY SERPL CALC.SUM OF ELEC: 271.9 MOSM/KG (ref 273–305)
PLATELET # BLD AUTO: 526 10*3/MM3 (ref 130–400)
PMV BLD AUTO: 10.2 FL (ref 6–10)
POIKILOCYTOSIS BLD QL SMEAR: NORMAL
POTASSIUM BLD-SCNC: 3.9 MMOL/L (ref 3.5–5.3)
PROT SERPL-MCNC: 8.2 G/DL (ref 6–8)
RBC # BLD AUTO: 4.81 10*6/MM3 (ref 4.7–6.1)
SMALL PLATELETS BLD QL SMEAR: NORMAL
SODIUM BLD-SCNC: 137 MMOL/L (ref 135–153)
WBC NRBC COR # BLD: 9.83 10*3/MM3 (ref 4.5–12.5)

## 2018-03-26 PROCEDURE — 99214 OFFICE O/P EST MOD 30 MIN: CPT | Performed by: GENERAL PRACTICE

## 2018-03-26 PROCEDURE — 85025 COMPLETE CBC W/AUTO DIFF WBC: CPT | Performed by: GENERAL PRACTICE

## 2018-03-26 PROCEDURE — 85007 BL SMEAR W/DIFF WBC COUNT: CPT | Performed by: GENERAL PRACTICE

## 2018-03-26 PROCEDURE — 83540 ASSAY OF IRON: CPT | Performed by: GENERAL PRACTICE

## 2018-03-26 PROCEDURE — 36415 COLL VENOUS BLD VENIPUNCTURE: CPT | Performed by: GENERAL PRACTICE

## 2018-03-26 PROCEDURE — 82728 ASSAY OF FERRITIN: CPT | Performed by: GENERAL PRACTICE

## 2018-03-26 PROCEDURE — 84466 ASSAY OF TRANSFERRIN: CPT | Performed by: GENERAL PRACTICE

## 2018-03-26 PROCEDURE — 80053 COMPREHEN METABOLIC PANEL: CPT | Performed by: GENERAL PRACTICE

## 2018-03-26 RX ORDER — CETIRIZINE HYDROCHLORIDE 10 MG/1
10 TABLET ORAL NIGHTLY
Qty: 30 TABLET | Refills: 5 | Status: SHIPPED | OUTPATIENT
Start: 2018-03-26 | End: 2018-11-20 | Stop reason: SDUPTHER

## 2018-03-26 RX ORDER — FLUTICASONE PROPIONATE 50 MCG
2 SPRAY, SUSPENSION (ML) NASAL DAILY PRN
Qty: 16 G | Refills: 5 | Status: SHIPPED | OUTPATIENT
Start: 2018-03-26 | End: 2018-11-20 | Stop reason: SDUPTHER

## 2018-03-26 RX ORDER — GABAPENTIN 800 MG/1
800 TABLET ORAL 3 TIMES DAILY
Qty: 90 TABLET | Refills: 2 | Status: SHIPPED | OUTPATIENT
Start: 2018-03-26 | End: 2018-08-06 | Stop reason: SDUPTHER

## 2018-03-26 RX ORDER — AMITRIPTYLINE HYDROCHLORIDE 50 MG/1
50 TABLET, FILM COATED ORAL NIGHTLY
Qty: 30 TABLET | Refills: 5 | Status: SHIPPED | OUTPATIENT
Start: 2018-03-26 | End: 2018-11-20 | Stop reason: SDUPTHER

## 2018-03-26 RX ORDER — FAMOTIDINE 20 MG/1
TABLET, FILM COATED ORAL
COMMUNITY
Start: 2018-03-19 | End: 2018-11-20 | Stop reason: SDUPTHER

## 2018-03-26 RX ORDER — HYDROCODONE BITARTRATE AND ACETAMINOPHEN 7.5; 325 MG/1; MG/1
TABLET ORAL
COMMUNITY
Start: 2018-03-20 | End: 2018-08-06

## 2018-03-26 RX ORDER — FERROUS SULFATE 325(65) MG
TABLET ORAL
Qty: 60 TABLET | Refills: 5 | Status: SHIPPED | OUTPATIENT
Start: 2018-03-26 | End: 2018-08-06

## 2018-03-26 RX ORDER — VENLAFAXINE HYDROCHLORIDE 150 MG/1
150 CAPSULE, EXTENDED RELEASE ORAL DAILY
Qty: 30 CAPSULE | Refills: 2 | Status: SHIPPED | OUTPATIENT
Start: 2018-03-26 | End: 2018-08-06 | Stop reason: SDUPTHER

## 2018-03-26 NOTE — PROGRESS NOTES
Subjective   Alex Tierney is a 42 y.o. male.     History of Present Illness     Osteomyelitis  Admitted to [facility] on 2/1/18 with sepsis due to osteomyelitis of the right femoral head with a surrounding abscess.  This required a right hip arthrotomy with lavage and debridement of the surrounding fascia, muscle, and bone followed by resection of the proximal femur and ultimately drainage of a postoperative hematoma.  Required prolonged IV antibiotics as well as transfusion of 4 units of packed red blood cells.  Ultimately discharged to [facility] and returned home one week ago.  His appetite has remained poor but he denies any nausea, vomiting or diarrhea.  There is no history of any chest pain, cough or shortness of breath and he denies any rash, bleeding, fever or chills.  He has home health.  He will be undergoing a reassessment with Dr. Lopez this afternoon.  Closure of at least several decubitus ulcers is apparently being considered.    Spinal Cord Injury  Level T6 complicated by recurrent decubitus ulcers and osteomyelitis requiring a previous left BKA, permanent colostomy and a chronic indwelling bladder catheter. Underwent a previous bilateral hip osteotomy with a significant improvement in his ROM and level of function about the house.      Depression  At present his mood is fair. He admits to persistent nervousness and worrying along with occasional moodiness. He denies any loss of interest in activities, difficulty concentrating, or any problem with his appetite or sleep.  He has been taking venlafaxine er 150 qd and amitriptyline 25 daily at bedtime.  He denies any suicidal ideation.    Allergic Rhinitis  Patient has a history of allergic rhinitis. Patient's symptoms include sneezing, clear rhinorrhea, nasal congestion, periorbital pressure and postnasal drip. These symptoms are perennial with seasonal exacerbation. The patient has been suffering from these symptoms for a number of  years . The patient has tried prescription antihistamine - cetirizine and prescription nasal corticosteroid - fluticasone with good relief of symptoms.     The following portions of the patient's history were reviewed and updated as appropriate: allergies, current medications, past medical history, past social history, past surgical history and problem list.    Review of Systems   Constitutional: Positive for fatigue. Negative for appetite change, chills, fever and unexpected weight change.   HENT: Negative for congestion, ear pain, rhinorrhea, sneezing, sore throat and voice change.    Eyes: Negative for visual disturbance.   Respiratory: Negative for cough, shortness of breath and wheezing.    Cardiovascular: Negative for chest pain, palpitations and leg swelling.   Gastrointestinal: Negative for abdominal pain, blood in stool, constipation, diarrhea, nausea and vomiting.   Endocrine: Negative for polydipsia and polyuria.   Genitourinary: Negative for difficulty urinating, dysuria, frequency, hematuria and urgency.   Musculoskeletal: Positive for arthralgias and back pain. Negative for joint swelling, myalgias and neck pain.   Skin: Positive for wound (decubitus ulcers). Negative for color change.   Neurological: Positive for weakness and numbness. Negative for tremors and headaches.   Psychiatric/Behavioral: Positive for dysphoric mood. Negative for sleep disturbance and suicidal ideas. The patient is nervous/anxious.      Objective   Physical Exam   Constitutional: He is oriented to person, place, and time. He appears well-developed and well-nourished. He is cooperative. He does not have a sickly appearance. No distress.   Sitting in a wheelchair.  Status post left BKA. Bright and in fair spirits. No apparent distress at rest. No pallor, jaundice, diaphoresis, or cyanosis   HENT:   Head: Atraumatic.   Right Ear: Tympanic membrane, external ear and ear canal normal.   Left Ear: Tympanic membrane, external ear and  ear canal normal.   Mouth/Throat: Oropharynx is clear and moist.   Eyes: EOM are normal. Pupils are equal, round, and reactive to light. No scleral icterus.   Neck: No JVD present. Carotid bruit is not present. No tracheal deviation present. No thyromegaly present.   Cardiovascular: Normal rate, regular rhythm, S1 normal, S2 normal, normal heart sounds and intact distal pulses.  Exam reveals no gallop.    No murmur heard.  Pulmonary/Chest: Breath sounds normal. He has no wheezes. He has no rales.   Abdominal: Soft. Normal aorta and bowel sounds are normal. He exhibits no abdominal bruit and no mass. There is no hepatosplenomegaly. There is no tenderness. No hernia.   Musculoskeletal: He exhibits no tenderness or deformity.   Lymphadenopathy:        Head (right side): No submandibular adenopathy present.        Head (left side): No submandibular adenopathy present.     He has no cervical adenopathy.   Neurological: He is alert and oriented to person, place, and time. No cranial nerve deficit.   Spastic paraplegia   Skin: Skin is warm and dry. No rash noted. He is not diaphoretic. No pallor. Nails show no clubbing.   Psychiatric: He has a normal mood and affect. His speech is normal and behavior is normal. Thought content normal.     Assessment/Plan   Problems Addressed this Visit        Respiratory    Chronic seasonal allergic rhinitis   Reminded regarding allergen avoidance.  Continue current medication    Relevant Medications    fluticasone (FLONASE) 50 MCG/ACT nasal spray    cetirizine (zyrTEC) 10 MG tablet       Nervous and Auditory    T6 spinal cord injury  Continue current medication  Follow up with orthopedic surgery    Relevant Medications    gabapentin (NEURONTIN) 800 MG tablet    amitriptyline (ELAVIL) 50 MG tablet       Musculoskeletal and Integument    Chronic multifocal osteomyelitis of multiple sites    As above.        Hematopoietic and Hemostatic    Microcytic anemia  Updated labs drawn.    Relevant  Orders    CBC & Differential (Completed)    Comprehensive Metabolic Panel (Completed)    Iron (Completed)    Transferrin    Ferritin (Completed)    CBC Auto Differential (Completed)    Scan Slide (Completed)    Osmolality, Calculated (Completed)       Other    Recurrent major depressive disorder, in partial remission  Significant situational component.   Supportive therapy.   Will titrate amitriptyline somewhat     Relevant Medications    venlafaxine XR (EFFEXOR-XR) 150 MG 24 hr capsule    amitriptyline (ELAVIL) 50 MG tablet    Smoker    History of left lower extremity amputation

## 2018-03-27 LAB — TRANSFERRIN SERPL-MCNC: 316 MG/DL (ref 200–370)

## 2018-03-27 NOTE — PROGRESS NOTES
Left message for pt about the following per Dr. Rose. VH    -- Patient needs to start on ferrous sulfate - jeffrey emailed a script to his pharm

## 2018-06-07 ENCOUNTER — APPOINTMENT (OUTPATIENT)
Dept: GENERAL RADIOLOGY | Facility: HOSPITAL | Age: 43
End: 2018-06-07

## 2018-06-07 ENCOUNTER — HOSPITAL ENCOUNTER (OUTPATIENT)
Facility: HOSPITAL | Age: 43
Discharge: HOME HEALTH | End: 2018-07-25
Attending: INTERNAL MEDICINE | Admitting: INTERNAL MEDICINE

## 2018-06-07 LAB
ALBUMIN SERPL-MCNC: 3.9 G/DL (ref 3.5–5)
ALBUMIN/GLOB SERPL: 1.1 G/DL (ref 1.5–2.5)
ALP SERPL-CCNC: 156 U/L (ref 40–129)
ALT SERPL W P-5'-P-CCNC: 27 U/L (ref 10–44)
ANION GAP SERPL CALCULATED.3IONS-SCNC: 6.5 MMOL/L (ref 3.6–11.2)
ANISOCYTOSIS BLD QL: NORMAL
AST SERPL-CCNC: 20 U/L (ref 10–34)
BACTERIA UR QL AUTO: ABNORMAL /HPF
BASOPHILS # BLD AUTO: 0.03 10*3/MM3 (ref 0–0.3)
BASOPHILS NFR BLD AUTO: 0.2 % (ref 0–2)
BILIRUB SERPL-MCNC: 0.2 MG/DL (ref 0.2–1.8)
BILIRUB UR QL STRIP: NEGATIVE
BUN BLD-MCNC: 11 MG/DL (ref 7–21)
BUN/CREAT SERPL: 18.6 (ref 7–25)
CALCIUM SPEC-SCNC: 9.1 MG/DL (ref 7.7–10)
CHLORIDE SERPL-SCNC: 104 MMOL/L (ref 99–112)
CLARITY UR: ABNORMAL
CO2 SERPL-SCNC: 23.5 MMOL/L (ref 24.3–31.9)
COLOR UR: YELLOW
CREAT BLD-MCNC: 0.59 MG/DL (ref 0.43–1.29)
DEPRECATED RDW RBC AUTO: 66.9 FL (ref 37–54)
EOSINOPHIL # BLD AUTO: 0.44 10*3/MM3 (ref 0–0.7)
EOSINOPHIL NFR BLD AUTO: 3.6 % (ref 0–5)
ERYTHROCYTE [DISTWIDTH] IN BLOOD BY AUTOMATED COUNT: 25 % (ref 11.5–14.5)
GFR SERPL CREATININE-BSD FRML MDRD: 150 ML/MIN/1.73
GLOBULIN UR ELPH-MCNC: 3.7 GM/DL
GLUCOSE BLD-MCNC: 100 MG/DL (ref 70–110)
GLUCOSE UR STRIP-MCNC: NEGATIVE MG/DL
HCT VFR BLD AUTO: 38.7 % (ref 42–52)
HGB BLD-MCNC: 11.7 G/DL (ref 14–18)
HGB UR QL STRIP.AUTO: ABNORMAL
HYALINE CASTS UR QL AUTO: ABNORMAL /LPF
HYPOCHROMIA BLD QL: NORMAL
IMM GRANULOCYTES # BLD: 0.19 10*3/MM3 (ref 0–0.03)
IMM GRANULOCYTES NFR BLD: 1.5 % (ref 0–0.5)
KETONES UR QL STRIP: ABNORMAL
LEUKOCYTE ESTERASE UR QL STRIP.AUTO: ABNORMAL
LYMPHOCYTES # BLD AUTO: 1.32 10*3/MM3 (ref 1–3)
LYMPHOCYTES NFR BLD AUTO: 10.7 % (ref 21–51)
MCH RBC QN AUTO: 22.5 PG (ref 27–33)
MCHC RBC AUTO-ENTMCNC: 30.2 G/DL (ref 33–37)
MCV RBC AUTO: 74.3 FL (ref 80–94)
MICROCYTES BLD QL: NORMAL
MONOCYTES # BLD AUTO: 0.9 10*3/MM3 (ref 0.1–0.9)
MONOCYTES NFR BLD AUTO: 7.3 % (ref 0–10)
NEUTROPHILS # BLD AUTO: 9.46 10*3/MM3 (ref 1.4–6.5)
NEUTROPHILS NFR BLD AUTO: 76.7 % (ref 30–70)
NITRITE UR QL STRIP: NEGATIVE
OSMOLALITY SERPL CALC.SUM OF ELEC: 267.7 MOSM/KG (ref 273–305)
PH UR STRIP.AUTO: <=5 [PH] (ref 5–8)
PLAT MORPH BLD: NORMAL
PLATELET # BLD AUTO: 414 10*3/MM3 (ref 130–400)
PMV BLD AUTO: 9.6 FL (ref 6–10)
POTASSIUM BLD-SCNC: 4.1 MMOL/L (ref 3.5–5.3)
PROT SERPL-MCNC: 7.6 G/DL (ref 6–8)
PROT UR QL STRIP: ABNORMAL
RBC # BLD AUTO: 5.21 10*6/MM3 (ref 4.7–6.1)
RBC # UR: ABNORMAL /HPF
REF LAB TEST METHOD: ABNORMAL
SODIUM BLD-SCNC: 134 MMOL/L (ref 135–153)
SP GR UR STRIP: 1.03 (ref 1–1.03)
SQUAMOUS #/AREA URNS HPF: ABNORMAL /HPF
UROBILINOGEN UR QL STRIP: ABNORMAL
WBC NRBC COR # BLD: 12.34 10*3/MM3 (ref 4.5–12.5)
WBC UR QL AUTO: ABNORMAL /HPF
YEAST URNS QL MICRO: ABNORMAL /HPF

## 2018-06-07 PROCEDURE — 81001 URINALYSIS AUTO W/SCOPE: CPT | Performed by: INTERNAL MEDICINE

## 2018-06-07 PROCEDURE — 85007 BL SMEAR W/DIFF WBC COUNT: CPT | Performed by: INTERNAL MEDICINE

## 2018-06-07 PROCEDURE — 80053 COMPREHEN METABOLIC PANEL: CPT | Performed by: INTERNAL MEDICINE

## 2018-06-07 PROCEDURE — 87077 CULTURE AEROBIC IDENTIFY: CPT | Performed by: INTERNAL MEDICINE

## 2018-06-07 PROCEDURE — 87040 BLOOD CULTURE FOR BACTERIA: CPT | Performed by: INTERNAL MEDICINE

## 2018-06-07 PROCEDURE — 87186 SC STD MICRODIL/AGAR DIL: CPT | Performed by: INTERNAL MEDICINE

## 2018-06-07 PROCEDURE — 87205 SMEAR GRAM STAIN: CPT | Performed by: INTERNAL MEDICINE

## 2018-06-07 PROCEDURE — 85025 COMPLETE CBC W/AUTO DIFF WBC: CPT | Performed by: INTERNAL MEDICINE

## 2018-06-07 PROCEDURE — 87070 CULTURE OTHR SPECIMN AEROBIC: CPT | Performed by: INTERNAL MEDICINE

## 2018-06-07 PROCEDURE — 84134 ASSAY OF PREALBUMIN: CPT | Performed by: INTERNAL MEDICINE

## 2018-06-07 PROCEDURE — 93005 ELECTROCARDIOGRAM TRACING: CPT | Performed by: INTERNAL MEDICINE

## 2018-06-07 PROCEDURE — 93010 ELECTROCARDIOGRAM REPORT: CPT | Performed by: INTERNAL MEDICINE

## 2018-06-07 PROCEDURE — 71045 X-RAY EXAM CHEST 1 VIEW: CPT | Performed by: RADIOLOGY

## 2018-06-07 PROCEDURE — 71045 X-RAY EXAM CHEST 1 VIEW: CPT

## 2018-06-08 PROBLEM — M86.9: Status: ACTIVE | Noted: 2018-06-08

## 2018-06-08 PROCEDURE — 97167 OT EVAL HIGH COMPLEX 60 MIN: CPT

## 2018-06-08 PROCEDURE — 87205 SMEAR GRAM STAIN: CPT | Performed by: INTERNAL MEDICINE

## 2018-06-08 PROCEDURE — 87186 SC STD MICRODIL/AGAR DIL: CPT | Performed by: INTERNAL MEDICINE

## 2018-06-08 PROCEDURE — 87070 CULTURE OTHR SPECIMN AEROBIC: CPT | Performed by: INTERNAL MEDICINE

## 2018-06-08 PROCEDURE — 87077 CULTURE AEROBIC IDENTIFY: CPT | Performed by: INTERNAL MEDICINE

## 2018-06-08 PROCEDURE — 87185 SC STD ENZYME DETCJ PER NZM: CPT

## 2018-06-09 LAB
PREALB SERPL-MCNC: 23 MG/DL (ref 12–34)
VANCOMYCIN TROUGH SERPL-MCNC: 15.2 MCG/ML (ref 5–15)

## 2018-06-09 PROCEDURE — 80202 ASSAY OF VANCOMYCIN: CPT | Performed by: INTERNAL MEDICINE

## 2018-06-10 LAB
ANISOCYTOSIS BLD QL: NORMAL
BASOPHILS # BLD AUTO: 0.05 10*3/MM3 (ref 0–0.3)
BASOPHILS NFR BLD AUTO: 0.5 % (ref 0–2)
CRP SERPL-MCNC: 7.62 MG/DL (ref 0–0.99)
DEPRECATED RDW RBC AUTO: 66.1 FL (ref 37–54)
ELLIPTOCYTES BLD QL SMEAR: NORMAL
EOSINOPHIL # BLD AUTO: 0.56 10*3/MM3 (ref 0–0.7)
EOSINOPHIL NFR BLD AUTO: 6 % (ref 0–5)
ERYTHROCYTE [DISTWIDTH] IN BLOOD BY AUTOMATED COUNT: 24.8 % (ref 11.5–14.5)
HCT VFR BLD AUTO: 32.6 % (ref 42–52)
HGB BLD-MCNC: 9.7 G/DL (ref 14–18)
HYPOCHROMIA BLD QL: NORMAL
IMM GRANULOCYTES # BLD: 0.2 10*3/MM3 (ref 0–0.03)
IMM GRANULOCYTES NFR BLD: 2.1 % (ref 0–0.5)
LYMPHOCYTES # BLD AUTO: 2.19 10*3/MM3 (ref 1–3)
LYMPHOCYTES NFR BLD AUTO: 23.3 % (ref 21–51)
MCH RBC QN AUTO: 22.3 PG (ref 27–33)
MCHC RBC AUTO-ENTMCNC: 29.8 G/DL (ref 33–37)
MCV RBC AUTO: 74.9 FL (ref 80–94)
MICROCYTES BLD QL: NORMAL
MONOCYTES # BLD AUTO: 0.94 10*3/MM3 (ref 0.1–0.9)
MONOCYTES NFR BLD AUTO: 10 % (ref 0–10)
NEUTROPHILS # BLD AUTO: 5.47 10*3/MM3 (ref 1.4–6.5)
NEUTROPHILS NFR BLD AUTO: 58.1 % (ref 30–70)
PLAT MORPH BLD: NORMAL
PLATELET # BLD AUTO: 381 10*3/MM3 (ref 130–400)
PMV BLD AUTO: 9.3 FL (ref 6–10)
RBC # BLD AUTO: 4.35 10*6/MM3 (ref 4.7–6.1)
WBC NRBC COR # BLD: 9.41 10*3/MM3 (ref 4.5–12.5)

## 2018-06-10 PROCEDURE — 85025 COMPLETE CBC W/AUTO DIFF WBC: CPT | Performed by: NURSE PRACTITIONER

## 2018-06-10 PROCEDURE — 85007 BL SMEAR W/DIFF WBC COUNT: CPT | Performed by: NURSE PRACTITIONER

## 2018-06-10 PROCEDURE — 86140 C-REACTIVE PROTEIN: CPT | Performed by: NURSE PRACTITIONER

## 2018-06-11 LAB
BACTERIA SPEC AEROBE CULT: ABNORMAL
BACTERIA SPEC AEROBE CULT: ABNORMAL
GRAM STN SPEC: ABNORMAL

## 2018-06-12 LAB
BACTERIA SPEC AEROBE CULT: ABNORMAL
BACTERIA SPEC AEROBE CULT: NORMAL
BACTERIA SPEC AEROBE CULT: NORMAL
GRAM STN SPEC: ABNORMAL
GRAM STN SPEC: ABNORMAL
VANCOMYCIN TROUGH SERPL-MCNC: 15.5 MCG/ML (ref 5–15)

## 2018-06-12 PROCEDURE — 80202 ASSAY OF VANCOMYCIN: CPT | Performed by: INTERNAL MEDICINE

## 2018-06-13 LAB
BACTERIA SPEC AEROBE CULT: ABNORMAL
GRAM STN SPEC: ABNORMAL
GRAM STN SPEC: ABNORMAL

## 2018-06-14 LAB — CRP SERPL-MCNC: 4.93 MG/DL (ref 0–0.99)

## 2018-06-14 PROCEDURE — 86140 C-REACTIVE PROTEIN: CPT | Performed by: INTERNAL MEDICINE

## 2018-06-21 LAB
ANISOCYTOSIS BLD QL: NORMAL
BASOPHILS # BLD AUTO: 0.04 10*3/MM3 (ref 0–0.3)
BASOPHILS NFR BLD AUTO: 0.3 % (ref 0–2)
CRP SERPL-MCNC: 5.73 MG/DL (ref 0–0.99)
DEPRECATED RDW RBC AUTO: 60.5 FL (ref 37–54)
EOSINOPHIL # BLD AUTO: 0.47 10*3/MM3 (ref 0–0.7)
EOSINOPHIL NFR BLD AUTO: 4 % (ref 0–5)
ERYTHROCYTE [DISTWIDTH] IN BLOOD BY AUTOMATED COUNT: 23.4 % (ref 11.5–14.5)
HCT VFR BLD AUTO: 38.4 % (ref 42–52)
HGB BLD-MCNC: 11.7 G/DL (ref 14–18)
HYPOCHROMIA BLD QL: NORMAL
IMM GRANULOCYTES # BLD: 0.1 10*3/MM3 (ref 0–0.03)
IMM GRANULOCYTES NFR BLD: 0.9 % (ref 0–0.5)
LYMPHOCYTES # BLD AUTO: 1.99 10*3/MM3 (ref 1–3)
LYMPHOCYTES NFR BLD AUTO: 17.1 % (ref 21–51)
MCH RBC QN AUTO: 22.4 PG (ref 27–33)
MCHC RBC AUTO-ENTMCNC: 30.5 G/DL (ref 33–37)
MCV RBC AUTO: 73.6 FL (ref 80–94)
MICROCYTES BLD QL: NORMAL
MONOCYTES # BLD AUTO: 1.27 10*3/MM3 (ref 0.1–0.9)
MONOCYTES NFR BLD AUTO: 10.9 % (ref 0–10)
NEUTROPHILS # BLD AUTO: 7.79 10*3/MM3 (ref 1.4–6.5)
NEUTROPHILS NFR BLD AUTO: 66.8 % (ref 30–70)
PLAT MORPH BLD: NORMAL
PLATELET # BLD AUTO: 404 10*3/MM3 (ref 130–400)
PMV BLD AUTO: 10.2 FL (ref 6–10)
RBC # BLD AUTO: 5.22 10*6/MM3 (ref 4.7–6.1)
WBC NRBC COR # BLD: 11.66 10*3/MM3 (ref 4.5–12.5)

## 2018-06-21 PROCEDURE — 85025 COMPLETE CBC W/AUTO DIFF WBC: CPT | Performed by: NURSE PRACTITIONER

## 2018-06-21 PROCEDURE — 85007 BL SMEAR W/DIFF WBC COUNT: CPT | Performed by: NURSE PRACTITIONER

## 2018-06-21 PROCEDURE — 86140 C-REACTIVE PROTEIN: CPT | Performed by: NURSE PRACTITIONER

## 2018-06-23 LAB — CRP SERPL-MCNC: 4.57 MG/DL (ref 0–0.99)

## 2018-06-23 PROCEDURE — 86140 C-REACTIVE PROTEIN: CPT | Performed by: NURSE PRACTITIONER

## 2018-06-27 PROCEDURE — 87077 CULTURE AEROBIC IDENTIFY: CPT | Performed by: INTERNAL MEDICINE

## 2018-06-27 PROCEDURE — 87205 SMEAR GRAM STAIN: CPT | Performed by: INTERNAL MEDICINE

## 2018-06-27 PROCEDURE — 87070 CULTURE OTHR SPECIMN AEROBIC: CPT | Performed by: INTERNAL MEDICINE

## 2018-06-27 PROCEDURE — 87186 SC STD MICRODIL/AGAR DIL: CPT | Performed by: INTERNAL MEDICINE

## 2018-06-28 LAB — CRP SERPL-MCNC: 3.69 MG/DL (ref 0–0.99)

## 2018-06-28 PROCEDURE — 86140 C-REACTIVE PROTEIN: CPT | Performed by: INTERNAL MEDICINE

## 2018-06-29 LAB — CRP SERPL-MCNC: 3.22 MG/DL (ref 0–0.99)

## 2018-06-29 PROCEDURE — 86140 C-REACTIVE PROTEIN: CPT | Performed by: INTERNAL MEDICINE

## 2018-06-30 LAB
ANISOCYTOSIS BLD QL: NORMAL
BASOPHILS # BLD AUTO: 0.02 10*3/MM3 (ref 0–0.3)
BASOPHILS NFR BLD AUTO: 0.2 % (ref 0–2)
CRP SERPL-MCNC: 2.49 MG/DL (ref 0–0.99)
DEPRECATED RDW RBC AUTO: 59.1 FL (ref 37–54)
EOSINOPHIL # BLD AUTO: 0.52 10*3/MM3 (ref 0–0.7)
EOSINOPHIL NFR BLD AUTO: 5.9 % (ref 0–5)
ERYTHROCYTE [DISTWIDTH] IN BLOOD BY AUTOMATED COUNT: 21.8 % (ref 11.5–14.5)
HCT VFR BLD AUTO: 39.1 % (ref 42–52)
HGB BLD-MCNC: 11.8 G/DL (ref 14–18)
HYPOCHROMIA BLD QL: NORMAL
IMM GRANULOCYTES # BLD: 0.1 10*3/MM3 (ref 0–0.03)
IMM GRANULOCYTES NFR BLD: 1.1 % (ref 0–0.5)
LYMPHOCYTES # BLD AUTO: 1.58 10*3/MM3 (ref 1–3)
LYMPHOCYTES NFR BLD AUTO: 18.1 % (ref 21–51)
MCH RBC QN AUTO: 22.3 PG (ref 27–33)
MCHC RBC AUTO-ENTMCNC: 30.2 G/DL (ref 33–37)
MCV RBC AUTO: 74.1 FL (ref 80–94)
MICROCYTES BLD QL: NORMAL
MONOCYTES # BLD AUTO: 1.07 10*3/MM3 (ref 0.1–0.9)
MONOCYTES NFR BLD AUTO: 12.2 % (ref 0–10)
NEUTROPHILS # BLD AUTO: 5.46 10*3/MM3 (ref 1.4–6.5)
NEUTROPHILS NFR BLD AUTO: 62.5 % (ref 30–70)
PLAT MORPH BLD: NORMAL
PLATELET # BLD AUTO: 283 10*3/MM3 (ref 130–400)
PMV BLD AUTO: 10.1 FL (ref 6–10)
RBC # BLD AUTO: 5.28 10*6/MM3 (ref 4.7–6.1)
WBC NRBC COR # BLD: 8.75 10*3/MM3 (ref 4.5–12.5)

## 2018-06-30 PROCEDURE — 86140 C-REACTIVE PROTEIN: CPT | Performed by: INTERNAL MEDICINE

## 2018-06-30 PROCEDURE — 85007 BL SMEAR W/DIFF WBC COUNT: CPT | Performed by: NURSE PRACTITIONER

## 2018-06-30 PROCEDURE — 85025 COMPLETE CBC W/AUTO DIFF WBC: CPT | Performed by: NURSE PRACTITIONER

## 2018-07-01 LAB — CRP SERPL-MCNC: 2.74 MG/DL (ref 0–0.99)

## 2018-07-01 PROCEDURE — 86140 C-REACTIVE PROTEIN: CPT | Performed by: INTERNAL MEDICINE

## 2018-07-02 LAB
BACTERIA SPEC AEROBE CULT: ABNORMAL
CRP SERPL-MCNC: 3 MG/DL (ref 0–0.99)
GRAM STN SPEC: ABNORMAL

## 2018-07-02 PROCEDURE — 86140 C-REACTIVE PROTEIN: CPT | Performed by: INTERNAL MEDICINE

## 2018-07-03 ENCOUNTER — APPOINTMENT (OUTPATIENT)
Dept: ULTRASOUND IMAGING | Facility: HOSPITAL | Age: 43
End: 2018-07-03

## 2018-07-03 LAB
AMYLASE SERPL-CCNC: 37 U/L (ref 28–100)
LIPASE SERPL-CCNC: 25 U/L (ref 13–60)

## 2018-07-03 PROCEDURE — 76705 ECHO EXAM OF ABDOMEN: CPT

## 2018-07-03 PROCEDURE — 82150 ASSAY OF AMYLASE: CPT | Performed by: PHYSICIAN ASSISTANT

## 2018-07-03 PROCEDURE — 83690 ASSAY OF LIPASE: CPT | Performed by: PHYSICIAN ASSISTANT

## 2018-07-03 PROCEDURE — 76705 ECHO EXAM OF ABDOMEN: CPT | Performed by: RADIOLOGY

## 2018-07-04 LAB — CRP SERPL-MCNC: 9.23 MG/DL (ref 0–0.99)

## 2018-07-04 PROCEDURE — 86140 C-REACTIVE PROTEIN: CPT | Performed by: PHYSICIAN ASSISTANT

## 2018-07-05 LAB
CRP SERPL-MCNC: 7.78 MG/DL (ref 0–0.99)
DEPRECATED RDW RBC AUTO: 58 FL (ref 37–54)
ERYTHROCYTE [DISTWIDTH] IN BLOOD BY AUTOMATED COUNT: 22.4 % (ref 11.5–14.5)
HCT VFR BLD AUTO: 38.2 % (ref 42–52)
HGB BLD-MCNC: 11.7 G/DL (ref 14–18)
MCH RBC QN AUTO: 22.8 PG (ref 27–33)
MCHC RBC AUTO-ENTMCNC: 30.6 G/DL (ref 33–37)
MCV RBC AUTO: 74.3 FL (ref 80–94)
PLATELET # BLD AUTO: 282 10*3/MM3 (ref 130–400)
PMV BLD AUTO: 10 FL (ref 6–10)
RBC # BLD AUTO: 5.14 10*6/MM3 (ref 4.7–6.1)
WBC NRBC COR # BLD: 3.7 10*3/MM3 (ref 4.5–12.5)

## 2018-07-05 PROCEDURE — 86140 C-REACTIVE PROTEIN: CPT | Performed by: INTERNAL MEDICINE

## 2018-07-05 PROCEDURE — 85027 COMPLETE CBC AUTOMATED: CPT | Performed by: INTERNAL MEDICINE

## 2018-07-07 LAB
CRP SERPL-MCNC: 5.48 MG/DL (ref 0–0.99)
DEPRECATED RDW RBC AUTO: 57.8 FL (ref 37–54)
ERYTHROCYTE [DISTWIDTH] IN BLOOD BY AUTOMATED COUNT: 22.4 % (ref 11.5–14.5)
HCT VFR BLD AUTO: 38 % (ref 42–52)
HGB BLD-MCNC: 11.5 G/DL (ref 14–18)
MCH RBC QN AUTO: 22.4 PG (ref 27–33)
MCHC RBC AUTO-ENTMCNC: 30.3 G/DL (ref 33–37)
MCV RBC AUTO: 74.1 FL (ref 80–94)
PLATELET # BLD AUTO: 297 10*3/MM3 (ref 130–400)
PMV BLD AUTO: 9.9 FL (ref 6–10)
RBC # BLD AUTO: 5.13 10*6/MM3 (ref 4.7–6.1)
WBC NRBC COR # BLD: 2.88 10*3/MM3 (ref 4.5–12.5)

## 2018-07-07 PROCEDURE — 85027 COMPLETE CBC AUTOMATED: CPT | Performed by: INTERNAL MEDICINE

## 2018-07-07 PROCEDURE — 86140 C-REACTIVE PROTEIN: CPT | Performed by: INTERNAL MEDICINE

## 2018-07-11 LAB
ALBUMIN SERPL-MCNC: 3.7 G/DL (ref 3.5–5)
ALBUMIN/GLOB SERPL: 0.9 G/DL (ref 1.5–2.5)
ALP SERPL-CCNC: 213 U/L (ref 40–129)
ALT SERPL W P-5'-P-CCNC: 31 U/L (ref 10–44)
ANION GAP SERPL CALCULATED.3IONS-SCNC: 8.6 MMOL/L (ref 3.6–11.2)
AST SERPL-CCNC: 20 U/L (ref 10–34)
BILIRUB SERPL-MCNC: 0.3 MG/DL (ref 0.2–1.8)
BUN BLD-MCNC: 25 MG/DL (ref 7–21)
BUN/CREAT SERPL: 40.3 (ref 7–25)
CALCIUM SPEC-SCNC: 8.9 MG/DL (ref 7.7–10)
CHLORIDE SERPL-SCNC: 106 MMOL/L (ref 99–112)
CO2 SERPL-SCNC: 25.4 MMOL/L (ref 24.3–31.9)
CREAT BLD-MCNC: 0.62 MG/DL (ref 0.43–1.29)
CRP SERPL-MCNC: 7.79 MG/DL (ref 0–0.99)
DEPRECATED RDW RBC AUTO: 57.8 FL (ref 37–54)
ERYTHROCYTE [DISTWIDTH] IN BLOOD BY AUTOMATED COUNT: 22.4 % (ref 11.5–14.5)
GFR SERPL CREATININE-BSD FRML MDRD: 142 ML/MIN/1.73
GLOBULIN UR ELPH-MCNC: 3.9 GM/DL
GLUCOSE BLD-MCNC: 75 MG/DL (ref 70–110)
HCT VFR BLD AUTO: 40 % (ref 42–52)
HGB BLD-MCNC: 12 G/DL (ref 14–18)
MCH RBC QN AUTO: 22.3 PG (ref 27–33)
MCHC RBC AUTO-ENTMCNC: 30 G/DL (ref 33–37)
MCV RBC AUTO: 74.2 FL (ref 80–94)
OSMOLALITY SERPL CALC.SUM OF ELEC: 282.5 MOSM/KG (ref 273–305)
PLATELET # BLD AUTO: 394 10*3/MM3 (ref 130–400)
PMV BLD AUTO: 9.6 FL (ref 6–10)
POTASSIUM BLD-SCNC: 3.7 MMOL/L (ref 3.5–5.3)
PROT SERPL-MCNC: 7.6 G/DL (ref 6–8)
RBC # BLD AUTO: 5.39 10*6/MM3 (ref 4.7–6.1)
SODIUM BLD-SCNC: 140 MMOL/L (ref 135–153)
WBC NRBC COR # BLD: 5.93 10*3/MM3 (ref 4.5–12.5)

## 2018-07-11 PROCEDURE — 80053 COMPREHEN METABOLIC PANEL: CPT | Performed by: INTERNAL MEDICINE

## 2018-07-11 PROCEDURE — 85027 COMPLETE CBC AUTOMATED: CPT | Performed by: INTERNAL MEDICINE

## 2018-07-11 PROCEDURE — 86140 C-REACTIVE PROTEIN: CPT | Performed by: INTERNAL MEDICINE

## 2018-07-13 LAB — CRP SERPL-MCNC: 7.52 MG/DL (ref 0–0.99)

## 2018-07-13 PROCEDURE — 86140 C-REACTIVE PROTEIN: CPT | Performed by: PHYSICIAN ASSISTANT

## 2018-07-16 LAB
ANION GAP SERPL CALCULATED.3IONS-SCNC: 2.6 MMOL/L (ref 3.6–11.2)
APTT PPP: 56.1 SECONDS (ref 23.8–36.1)
BUN BLD-MCNC: 18 MG/DL (ref 7–21)
BUN/CREAT SERPL: 37.5 (ref 7–25)
CALCIUM SPEC-SCNC: 8.6 MG/DL (ref 7.7–10)
CHLORIDE SERPL-SCNC: 108 MMOL/L (ref 99–112)
CO2 SERPL-SCNC: 27.4 MMOL/L (ref 24.3–31.9)
CREAT BLD-MCNC: 0.48 MG/DL (ref 0.43–1.29)
DEPRECATED RDW RBC AUTO: 56.8 FL (ref 37–54)
ERYTHROCYTE [DISTWIDTH] IN BLOOD BY AUTOMATED COUNT: 21.7 % (ref 11.5–14.5)
GFR SERPL CREATININE-BSD FRML MDRD: >150 ML/MIN/1.73
GLUCOSE BLD-MCNC: 84 MG/DL (ref 70–110)
HCT VFR BLD AUTO: 40.8 % (ref 42–52)
HGB BLD-MCNC: 12 G/DL (ref 14–18)
INR PPP: 1.12 (ref 0.9–1.1)
MCH RBC QN AUTO: 22 PG (ref 27–33)
MCHC RBC AUTO-ENTMCNC: 29.4 G/DL (ref 33–37)
MCV RBC AUTO: 74.9 FL (ref 80–94)
OSMOLALITY SERPL CALC.SUM OF ELEC: 276.8 MOSM/KG (ref 273–305)
PLATELET # BLD AUTO: 371 10*3/MM3 (ref 130–400)
PMV BLD AUTO: 9.4 FL (ref 6–10)
POTASSIUM BLD-SCNC: 4.7 MMOL/L (ref 3.5–5.3)
PROTHROMBIN TIME: 14.6 SECONDS (ref 11–15.4)
RBC # BLD AUTO: 5.45 10*6/MM3 (ref 4.7–6.1)
SODIUM BLD-SCNC: 138 MMOL/L (ref 135–153)
WBC NRBC COR # BLD: 8.47 10*3/MM3 (ref 4.5–12.5)

## 2018-07-16 PROCEDURE — 85610 PROTHROMBIN TIME: CPT | Performed by: INTERNAL MEDICINE

## 2018-07-16 PROCEDURE — 85027 COMPLETE CBC AUTOMATED: CPT | Performed by: INTERNAL MEDICINE

## 2018-07-16 PROCEDURE — 85730 THROMBOPLASTIN TIME PARTIAL: CPT | Performed by: INTERNAL MEDICINE

## 2018-07-16 PROCEDURE — 80048 BASIC METABOLIC PNL TOTAL CA: CPT | Performed by: INTERNAL MEDICINE

## 2018-07-19 LAB
ANISOCYTOSIS BLD QL: ABNORMAL
BASOPHILS # BLD MANUAL: 0.18 10*3/MM3 (ref 0–0.3)
BASOPHILS NFR BLD AUTO: 2 % (ref 0–2)
CRP SERPL-MCNC: 5.47 MG/DL (ref 0–0.99)
DEPRECATED RDW RBC AUTO: 55.4 FL (ref 37–54)
EOSINOPHIL # BLD MANUAL: 0.35 10*3/MM3 (ref 0–0.7)
EOSINOPHIL NFR BLD MANUAL: 4 % (ref 0–5)
ERYTHROCYTE [DISTWIDTH] IN BLOOD BY AUTOMATED COUNT: 21.7 % (ref 11.5–14.5)
HCT VFR BLD AUTO: 43.5 % (ref 42–52)
HGB BLD-MCNC: 12.8 G/DL (ref 14–18)
HYPOCHROMIA BLD QL: ABNORMAL
LYMPHOCYTES # BLD MANUAL: 2.37 10*3/MM3 (ref 1–3)
LYMPHOCYTES NFR BLD MANUAL: 27 % (ref 21–51)
LYMPHOCYTES NFR BLD MANUAL: 7 % (ref 0–10)
MCH RBC QN AUTO: 22 PG (ref 27–33)
MCHC RBC AUTO-ENTMCNC: 29.4 G/DL (ref 33–37)
MCV RBC AUTO: 74.6 FL (ref 80–94)
METAMYELOCYTES NFR BLD MANUAL: 1 % (ref 0–0)
MICROCYTES BLD QL: ABNORMAL
MONOCYTES # BLD AUTO: 0.62 10*3/MM3 (ref 0.1–0.9)
NEUTROPHILS # BLD AUTO: 5.19 10*3/MM3 (ref 1.4–6.5)
NEUTROPHILS NFR BLD MANUAL: 59 % (ref 30–70)
PLAT MORPH BLD: NORMAL
PLATELET # BLD AUTO: 361 10*3/MM3 (ref 130–400)
PMV BLD AUTO: 9.8 FL (ref 6–10)
RBC # BLD AUTO: 5.83 10*6/MM3 (ref 4.7–6.1)
SCAN SLIDE: NORMAL
WBC NRBC COR # BLD: 8.79 10*3/MM3 (ref 4.5–12.5)

## 2018-07-19 PROCEDURE — 85007 BL SMEAR W/DIFF WBC COUNT: CPT | Performed by: NURSE PRACTITIONER

## 2018-07-19 PROCEDURE — 85025 COMPLETE CBC W/AUTO DIFF WBC: CPT | Performed by: NURSE PRACTITIONER

## 2018-07-19 PROCEDURE — 86140 C-REACTIVE PROTEIN: CPT | Performed by: NURSE PRACTITIONER

## 2018-07-24 LAB
ANISOCYTOSIS BLD QL: NORMAL
BASOPHILS # BLD AUTO: 0.03 10*3/MM3 (ref 0–0.3)
BASOPHILS NFR BLD AUTO: 0.2 % (ref 0–2)
CRP SERPL-MCNC: 4.57 MG/DL (ref 0–0.99)
DEPRECATED RDW RBC AUTO: 54.1 FL (ref 37–54)
EOSINOPHIL # BLD AUTO: 0.22 10*3/MM3 (ref 0–0.7)
EOSINOPHIL NFR BLD AUTO: 1.7 % (ref 0–5)
ERYTHROCYTE [DISTWIDTH] IN BLOOD BY AUTOMATED COUNT: 21.5 % (ref 11.5–14.5)
HCT VFR BLD AUTO: 41 % (ref 42–52)
HGB BLD-MCNC: 12.7 G/DL (ref 14–18)
HYPOCHROMIA BLD QL: NORMAL
IMM GRANULOCYTES # BLD: 0.21 10*3/MM3 (ref 0–0.03)
IMM GRANULOCYTES NFR BLD: 1.6 % (ref 0–0.5)
LYMPHOCYTES # BLD AUTO: 1.47 10*3/MM3 (ref 1–3)
LYMPHOCYTES NFR BLD AUTO: 11.2 % (ref 21–51)
MCH RBC QN AUTO: 22.5 PG (ref 27–33)
MCHC RBC AUTO-ENTMCNC: 31 G/DL (ref 33–37)
MCV RBC AUTO: 72.7 FL (ref 80–94)
MICROCYTES BLD QL: NORMAL
MONOCYTES # BLD AUTO: 1.25 10*3/MM3 (ref 0.1–0.9)
MONOCYTES NFR BLD AUTO: 9.5 % (ref 0–10)
NEUTROPHILS # BLD AUTO: 9.95 10*3/MM3 (ref 1.4–6.5)
NEUTROPHILS NFR BLD AUTO: 75.8 % (ref 30–70)
PLAT MORPH BLD: NORMAL
PLATELET # BLD AUTO: 341 10*3/MM3 (ref 130–400)
PMV BLD AUTO: 9.8 FL (ref 6–10)
RBC # BLD AUTO: 5.64 10*6/MM3 (ref 4.7–6.1)
WBC NRBC COR # BLD: 13.13 10*3/MM3 (ref 4.5–12.5)

## 2018-07-24 PROCEDURE — 93005 ELECTROCARDIOGRAM TRACING: CPT | Performed by: INTERNAL MEDICINE

## 2018-07-24 PROCEDURE — 85007 BL SMEAR W/DIFF WBC COUNT: CPT | Performed by: NURSE PRACTITIONER

## 2018-07-24 PROCEDURE — 93010 ELECTROCARDIOGRAM REPORT: CPT | Performed by: INTERNAL MEDICINE

## 2018-07-24 PROCEDURE — 85025 COMPLETE CBC W/AUTO DIFF WBC: CPT | Performed by: NURSE PRACTITIONER

## 2018-07-24 PROCEDURE — 86140 C-REACTIVE PROTEIN: CPT | Performed by: NURSE PRACTITIONER

## 2018-07-25 LAB — CRP SERPL-MCNC: 5.15 MG/DL (ref 0–0.99)

## 2018-07-25 PROCEDURE — 86140 C-REACTIVE PROTEIN: CPT | Performed by: INTERNAL MEDICINE

## 2018-07-26 ENCOUNTER — TRANSITIONAL CARE MANAGEMENT TELEPHONE ENCOUNTER (OUTPATIENT)
Dept: FAMILY MEDICINE CLINIC | Facility: CLINIC | Age: 43
End: 2018-07-26

## 2018-08-04 NOTE — PROGRESS NOTES
Inpatient Rehabilitation Functional Measures Assessment    Functional Measures  LYNETTE Eating:  LYNETTE Grooming:  LYNETTE Bathing:  LYNETTE Upper Body Dressing:  LYNETTE Lower Body Dressing:  LYNETTE Toileting:    LYNETTE Bladder Management  Level of Assistance:  Frequency/Number of Accidents this Shift:    LYNETTE Bowel Management  Level of Assistance:  Frequency/Number of Accidents this Shift:    LYNETTE Bed/Chair/Wheelchair Transfer:  Bed/chair/wheelchair Transfer Score = 4.  Patient performs 75% or more of effort and minimal assistance (little/incidental  help/lifting of one limb/steadying) for transferring to and from the  bed/chair/wheelchair, requiring: Contact guard. Patient requires the following  assistive device(s): Sliding board.  LYNETTE Toilet Transfer:  LYNETTE Tub/Shower Transfer:    Previously Documented Mode of Locomotion at Discharge:  [facility] Expected Mode of Locomotion at Discharge:  [facility] Walk/Wheelchair:  [facility] Stairs:    [facility] Comprehension:  [facility] Expression:  [facility] Social Interaction:  LYNETTE Problem Solving:  LYNETTE Memory:    Therapy Mode Minutes  Occupational Therapy: Individual: 90 minutes.  Physical Therapy:  Speech Language Pathology:    Discharge Functional Goals:    Signed by: Lilia Silva, Occupational Therapist     Improved

## 2018-08-06 ENCOUNTER — OFFICE VISIT (OUTPATIENT)
Dept: FAMILY MEDICINE CLINIC | Facility: CLINIC | Age: 43
End: 2018-08-06

## 2018-08-06 VITALS
DIASTOLIC BLOOD PRESSURE: 60 MMHG | RESPIRATION RATE: 12 BRPM | SYSTOLIC BLOOD PRESSURE: 105 MMHG | TEMPERATURE: 98.5 F | HEART RATE: 104 BPM | OXYGEN SATURATION: 97 % | HEIGHT: 72 IN

## 2018-08-06 DIAGNOSIS — J30.2 CHRONIC SEASONAL ALLERGIC RHINITIS, UNSPECIFIED TRIGGER: Primary | ICD-10-CM

## 2018-08-06 DIAGNOSIS — F33.1 MAJOR DEPRESSIVE DISORDER, RECURRENT EPISODE, MODERATE WITH MIXED FEATURES (HCC): ICD-10-CM

## 2018-08-06 DIAGNOSIS — N31.2 ATONIC BLADDER: ICD-10-CM

## 2018-08-06 DIAGNOSIS — F33.41 RECURRENT MAJOR DEPRESSIVE DISORDER, IN PARTIAL REMISSION (HCC): ICD-10-CM

## 2018-08-06 DIAGNOSIS — S24.102S T6 SPINAL CORD INJURY, SEQUELA (HCC): ICD-10-CM

## 2018-08-06 DIAGNOSIS — Z00.00 HEALTHCARE MAINTENANCE: ICD-10-CM

## 2018-08-06 PROCEDURE — 99214 OFFICE O/P EST MOD 30 MIN: CPT | Performed by: GENERAL PRACTICE

## 2018-08-06 RX ORDER — GABAPENTIN 800 MG/1
800 TABLET ORAL 3 TIMES DAILY
Qty: 90 TABLET | Refills: 2 | Status: SHIPPED | OUTPATIENT
Start: 2018-08-06 | End: 2018-11-10 | Stop reason: SDUPTHER

## 2018-08-06 RX ORDER — VENLAFAXINE HYDROCHLORIDE 150 MG/1
150 CAPSULE, EXTENDED RELEASE ORAL DAILY
Qty: 30 CAPSULE | Refills: 2 | Status: SHIPPED | OUTPATIENT
Start: 2018-08-06 | End: 2018-11-20 | Stop reason: SDUPTHER

## 2018-08-06 RX ORDER — MULTIVIT WITH MINERALS/LUTEIN
1000 TABLET ORAL DAILY
Qty: 30 TABLET | Refills: 5
Start: 2018-08-06 | End: 2020-02-14

## 2018-08-06 RX ORDER — VENLAFAXINE HYDROCHLORIDE 75 MG/1
CAPSULE, EXTENDED RELEASE ORAL
Qty: 30 CAPSULE | Refills: 5 | Status: SHIPPED | OUTPATIENT
Start: 2018-08-06 | End: 2018-11-20 | Stop reason: SDUPTHER

## 2018-08-06 RX ORDER — OXYBUTYNIN CHLORIDE 10 MG/1
10 TABLET, EXTENDED RELEASE ORAL DAILY
Qty: 30 TABLET | Refills: 5 | Status: SHIPPED | OUTPATIENT
Start: 2018-08-06 | End: 2018-11-20 | Stop reason: SDUPTHER

## 2018-08-06 NOTE — PROGRESS NOTES
Subjective   Alex Tierney is a 43 y.o. male.     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 Surgery included lavage of the right hip with a rotational flap and debridement of the ischium bilaterally.  He continues on levofloxacin and trimethoprim/sulfamethoxazole and is followed by home health.  He will undergo a reassessment with Dr. Lopez on 8/14/18 at which time readmission and further surgery is apparently planned.    Spinal Cord Injury  Level T6 complicated by recurrent decubitus ulcers and osteomyelitis requiring a previous left BKA, permanent colostomy and a chronic indwelling bladder catheter. Underwent a previous bilateral hip osteotomy with a significant improvement in his ROM and level of function about the house.  He feels gabapentin continues to help with his back and lower extremity discomfort.  He is hoping to return to intermittent self catheterizations prior to his readmission to hospital.  He remains on oxybutynin but on immediate release at a relatively low dose of 5 daily.    Depression  At present his mood is much better. He feels much more optimistic about his future.  He is considering a move to [place] where more resources would be available. He denies any loss of interest in activities, difficulty concentrating, or any problem with his appetite or sleep.  He is currently on venlafaxine er 225 qd and amitriptyline 25 daily at bedtime.  He denies any suicidal ideation.    Allergic Rhinitis  Patient has a history of allergic rhinitis. Patient's symptoms include sneezing, clear rhinorrhea, nasal congestion, periorbital pressure and postnasal drip. These symptoms are perennial with seasonal exacerbation. The patient has been suffering from these symptoms for a number of years . The patient has tried  prescription antihistamine - cetirizine and prescription nasal corticosteroid - fluticasone with good relief of symptoms.     The following portions of the patient's history were reviewed and updated as appropriate: allergies, current medications, past medical history, past social history, past surgical history and problem list.    Review of Systems   Constitutional: Positive for fatigue. Negative for appetite change, chills, fever and unexpected weight change.   HENT: Negative for congestion, ear pain, rhinorrhea, sneezing, sore throat and voice change.    Eyes: Negative for visual disturbance.   Respiratory: Negative for cough, shortness of breath and wheezing.    Cardiovascular: Negative for chest pain, palpitations and leg swelling.   Gastrointestinal: Negative for abdominal pain, blood in stool, constipation, diarrhea, nausea and vomiting.   Endocrine: Negative for polydipsia and polyuria.   Genitourinary: Negative for difficulty urinating, dysuria, frequency, hematuria and urgency.   Musculoskeletal: Positive for arthralgias and back pain. Negative for joint swelling, myalgias and neck pain.   Skin: Positive for wound (decubitus ulcers). Negative for color change.   Neurological: Positive for weakness and numbness. Negative for tremors and headaches.   Psychiatric/Behavioral: Negative for dysphoric mood, sleep disturbance and suicidal ideas. The patient is nervous/anxious.      Objective   Physical Exam   Constitutional: He is oriented to person, place, and time. He appears well-developed and well-nourished. He is cooperative. He does not have a sickly appearance. No distress.   Sitting in a wheelchair.  Status post left BKA. Bright and in fair spirits. No apparent distress at rest. No pallor, jaundice, diaphoresis, or cyanosis   HENT:   Head: Atraumatic.   Right Ear: Tympanic membrane, external ear and ear canal normal.   Left Ear: Tympanic membrane, external ear and ear canal normal.   Mouth/Throat:  Oropharynx is clear and moist.   Eyes: Pupils are equal, round, and reactive to light. EOM are normal. No scleral icterus.   Neck: No JVD present. Carotid bruit is not present. No tracheal deviation present. No thyromegaly present.   Cardiovascular: Normal rate, regular rhythm, S1 normal, S2 normal, normal heart sounds and intact distal pulses.  Exam reveals no gallop.    No murmur heard.  Pulmonary/Chest: Breath sounds normal. He has no wheezes. He has no rales.   Abdominal: Soft. Normal aorta and bowel sounds are normal. He exhibits no abdominal bruit and no mass. There is no hepatosplenomegaly. There is no tenderness. No hernia.   Musculoskeletal: He exhibits no tenderness or deformity.   Lymphadenopathy:        Head (right side): No submandibular adenopathy present.        Head (left side): No submandibular adenopathy present.     He has no cervical adenopathy.   Neurological: He is alert and oriented to person, place, and time. No cranial nerve deficit.   Spastic paraplegia   Skin: Skin is warm and dry. No rash noted. He is not diaphoretic. No pallor. Nails show no clubbing.   Psychiatric: He has a normal mood and affect. His speech is normal and behavior is normal. Thought content normal.     Assessment/Plan   Problems Addressed this Visit        Respiratory    Chronic seasonal allergic rhinitis  Reminded regarding allergen avoidance.  Continue current medication       Nervous and Auditory    T6 spinal cord injury (CMS/HCC)  Continue current medication  Follow up with orthopedic surgery    Relevant Medications    gabapentin (NEURONTIN) 800 MG tablet       Musculoskeletal and Integument    Atonic bladder  Associated with above  Oxybutynin will be replaced with the extended release formulation at a dose of 10 mg daily     Relevant Medications    oxybutynin XL (DITROPAN-XL) 10 MG 24 hr tablet       Other    Recurrent major depressive disorder, in partial remission (CMS/HCC)  Improved   Significant situational  component.   Supportive therapy.   Continue current medication.  Encouraged to report if any worse or if any new symptoms or concerns.    Relevant Medications    venlafaxine XR (EFFEXOR-XR) 75 MG 24 hr capsule    venlafaxine XR (EFFEXOR-XR) 150 MG 24 hr capsule    Healthcare maintenance  Reminded to get a flu shot when available.

## 2018-11-10 DIAGNOSIS — S24.102S T6 SPINAL CORD INJURY, SEQUELA (HCC): ICD-10-CM

## 2018-11-12 RX ORDER — GABAPENTIN 800 MG/1
TABLET ORAL
Qty: 90 TABLET | Refills: 0 | Status: SHIPPED | OUTPATIENT
Start: 2018-11-12 | End: 2018-11-20 | Stop reason: SDUPTHER

## 2018-11-18 DIAGNOSIS — S24.102S T6 SPINAL CORD INJURY, SEQUELA (HCC): ICD-10-CM

## 2018-11-19 RX ORDER — BACLOFEN 10 MG/1
TABLET ORAL
Qty: 180 TABLET | Refills: 4 | Status: SHIPPED | OUTPATIENT
Start: 2018-11-19 | End: 2018-11-20 | Stop reason: SDUPTHER

## 2018-11-20 ENCOUNTER — OFFICE VISIT (OUTPATIENT)
Dept: FAMILY MEDICINE CLINIC | Facility: CLINIC | Age: 43
End: 2018-11-20

## 2018-11-20 DIAGNOSIS — Z00.00 HEALTHCARE MAINTENANCE: ICD-10-CM

## 2018-11-20 DIAGNOSIS — F17.200 SMOKER: ICD-10-CM

## 2018-11-20 DIAGNOSIS — Z23 ENCOUNTER FOR IMMUNIZATION: ICD-10-CM

## 2018-11-20 DIAGNOSIS — K21.9 GASTROESOPHAGEAL REFLUX DISEASE WITHOUT ESOPHAGITIS: ICD-10-CM

## 2018-11-20 DIAGNOSIS — Z89.612 HISTORY OF LEFT LOWER EXTREMITY AMPUTATION (HCC): ICD-10-CM

## 2018-11-20 DIAGNOSIS — S24.102S T6 SPINAL CORD INJURY, SEQUELA (HCC): ICD-10-CM

## 2018-11-20 DIAGNOSIS — N31.2 ATONIC BLADDER: ICD-10-CM

## 2018-11-20 DIAGNOSIS — M86.39 CHRONIC MULTIFOCAL OSTEOMYELITIS OF MULTIPLE SITES (HCC): ICD-10-CM

## 2018-11-20 DIAGNOSIS — F33.1 MAJOR DEPRESSIVE DISORDER, RECURRENT EPISODE, MODERATE WITH MIXED FEATURES (HCC): ICD-10-CM

## 2018-11-20 DIAGNOSIS — J30.9 CHRONIC ALLERGIC RHINITIS: Primary | ICD-10-CM

## 2018-11-20 DIAGNOSIS — J30.2 CHRONIC SEASONAL ALLERGIC RHINITIS: ICD-10-CM

## 2018-11-20 DIAGNOSIS — F33.41 RECURRENT MAJOR DEPRESSIVE DISORDER, IN PARTIAL REMISSION (HCC): ICD-10-CM

## 2018-11-20 PROCEDURE — 90686 IIV4 VACC NO PRSV 0.5 ML IM: CPT | Performed by: GENERAL PRACTICE

## 2018-11-20 PROCEDURE — 99214 OFFICE O/P EST MOD 30 MIN: CPT | Performed by: GENERAL PRACTICE

## 2018-11-20 PROCEDURE — G0008 ADMIN INFLUENZA VIRUS VAC: HCPCS | Performed by: GENERAL PRACTICE

## 2018-11-20 RX ORDER — FOLIC ACID 1 MG/1
1 TABLET ORAL DAILY
Qty: 30 TABLET | Refills: 5 | Status: SHIPPED | OUTPATIENT
Start: 2018-11-20 | End: 2022-09-12

## 2018-11-20 RX ORDER — VENLAFAXINE HYDROCHLORIDE 150 MG/1
150 CAPSULE, EXTENDED RELEASE ORAL DAILY
Qty: 30 CAPSULE | Refills: 2 | Status: SHIPPED | OUTPATIENT
Start: 2018-11-20 | End: 2019-04-01 | Stop reason: SDUPTHER

## 2018-11-20 RX ORDER — VENLAFAXINE HYDROCHLORIDE 75 MG/1
CAPSULE, EXTENDED RELEASE ORAL
Qty: 30 CAPSULE | Refills: 5 | Status: SHIPPED | OUTPATIENT
Start: 2018-11-20 | End: 2019-09-20 | Stop reason: SDUPTHER

## 2018-11-20 RX ORDER — FLUTICASONE PROPIONATE 50 MCG
2 SPRAY, SUSPENSION (ML) NASAL DAILY PRN
Qty: 16 G | Refills: 5 | Status: SHIPPED | OUTPATIENT
Start: 2018-11-20 | End: 2020-06-15 | Stop reason: SDUPTHER

## 2018-11-20 RX ORDER — BACLOFEN 10 MG/1
20 TABLET ORAL 3 TIMES DAILY
Qty: 180 TABLET | Refills: 4 | Status: SHIPPED | OUTPATIENT
Start: 2018-11-20 | End: 2019-12-18

## 2018-11-20 RX ORDER — AMITRIPTYLINE HYDROCHLORIDE 25 MG/1
TABLET, FILM COATED ORAL
Qty: 60 TABLET | Refills: 5 | Status: ON HOLD | OUTPATIENT
Start: 2018-11-20 | End: 2019-07-10 | Stop reason: SDUPTHER

## 2018-11-20 RX ORDER — GABAPENTIN 800 MG/1
800 TABLET ORAL 3 TIMES DAILY
Qty: 90 TABLET | Refills: 2 | Status: SHIPPED | OUTPATIENT
Start: 2018-11-20 | End: 2019-05-13 | Stop reason: SDUPTHER

## 2018-11-20 RX ORDER — OXYBUTYNIN CHLORIDE 10 MG/1
10 TABLET, EXTENDED RELEASE ORAL DAILY
Qty: 30 TABLET | Refills: 5 | Status: SHIPPED | OUTPATIENT
Start: 2018-11-20 | End: 2019-12-16 | Stop reason: SDUPTHER

## 2018-11-20 RX ORDER — FAMOTIDINE 20 MG/1
20 TABLET, FILM COATED ORAL 2 TIMES DAILY
Qty: 60 TABLET | Refills: 5 | Status: SHIPPED | OUTPATIENT
Start: 2018-11-20 | End: 2020-06-15 | Stop reason: SDUPTHER

## 2018-11-20 RX ORDER — CETIRIZINE HYDROCHLORIDE 10 MG/1
10 TABLET ORAL NIGHTLY
Qty: 30 TABLET | Refills: 5 | Status: SHIPPED | OUTPATIENT
Start: 2018-11-20 | End: 2020-06-15 | Stop reason: SDUPTHER

## 2018-11-20 NOTE — PROGRESS NOTES
Subjective   Alex Tierney is a 43 y.o. male.     History of Present Illness     Hospital Follow-up  Underwent debridement of a grade 4 right ischial decubitus ulcer with rotation of a muscle flap by Dr Isaacs last month. His course was apparently uncomplicated and he has been home for several weeks. Further surgery is apparently planned however he would like a second opinion if possible    Spinal Cord Injury  Level T6 complicated by recurrent decubitus ulcers and osteomyelitis requiring a previous left BKA, permanent colostomy and a chronic indwelling bladder catheter. Underwent a previous bilateral hip osteotomy with a significant improvement in his ROM and level of function about the house.  He feels gabapentin continues to help with his back and lower extremity discomfort.  He is performing intermittent self catheterizations once more.  He remains on oxybutynin    Depression  His mood has been fair. He is still considering a move to [place] where more resources would be available. He denies any loss of interest in activities, difficulty concentrating, or any problem with his appetite or sleep.  He is currently on venlafaxine er 225 qd. He apparently ran out of amitriptyline some time ago.  He denies any suicidal ideation.    Allergic Rhinitis  Patient has a history of allergic rhinitis. Patient's symptoms include sneezing, clear rhinorrhea, nasal congestion, periorbital pressure and postnasal drip. These symptoms are perennial with seasonal exacerbation. The patient has been suffering from these symptoms for a number of years . The patient has tried prescription antihistamine - cetirizine and prescription nasal corticosteroid - fluticasone with good relief of symptoms.     The following portions of the patient's history were reviewed and updated as appropriate: allergies, current medications, past medical history, past social history and problem list.    Review of Systems   Constitutional: Positive for  fatigue. Negative for appetite change, chills, fever and unexpected weight change.   HENT: Negative for congestion, ear pain, rhinorrhea, sneezing, sore throat and voice change.    Eyes: Negative for visual disturbance.   Respiratory: Negative for cough, shortness of breath and wheezing.    Cardiovascular: Negative for chest pain, palpitations and leg swelling.   Gastrointestinal: Negative for abdominal pain, blood in stool, constipation, diarrhea, nausea and vomiting.   Endocrine: Negative for polydipsia and polyuria.   Genitourinary: Negative for difficulty urinating, dysuria, frequency, hematuria and urgency.   Musculoskeletal: Positive for arthralgias and back pain. Negative for joint swelling, myalgias and neck pain.   Skin: Positive for wound (decubitus ulcers). Negative for color change.   Neurological: Positive for weakness and numbness. Negative for tremors and headaches.   Psychiatric/Behavioral: Positive for dysphoric mood (intermittent - mild). Negative for sleep disturbance and suicidal ideas. The patient is nervous/anxious.      Objective   Physical Exam   Constitutional: He is oriented to person, place, and time. He appears well-developed and well-nourished. He is cooperative. He does not have a sickly appearance. No distress.   Sitting in a wheelchair.  Status post left BKA. Bright and in fair spirits. No apparent distress at rest. No pallor, jaundice, diaphoresis, or cyanosis   HENT:   Head: Atraumatic.   Right Ear: Tympanic membrane, external ear and ear canal normal.   Left Ear: Tympanic membrane, external ear and ear canal normal.   Mouth/Throat: Oropharynx is clear and moist.   Eyes: EOM are normal. Pupils are equal, round, and reactive to light. No scleral icterus.   Neck: No JVD present. Carotid bruit is not present. No tracheal deviation present. No thyromegaly present.   Cardiovascular: Normal rate, regular rhythm, S1 normal, S2 normal, normal heart sounds and intact distal pulses. Exam  reveals no gallop.   No murmur heard.  Pulmonary/Chest: Breath sounds normal. He has no wheezes. He has no rales.   Abdominal: Soft. Normal aorta and bowel sounds are normal. He exhibits no abdominal bruit and no mass. There is no hepatosplenomegaly. There is no tenderness. No hernia.   Musculoskeletal: He exhibits no tenderness or deformity.   Lymphadenopathy:        Head (right side): No submandibular adenopathy present.        Head (left side): No submandibular adenopathy present.     He has no cervical adenopathy.   Neurological: He is alert and oriented to person, place, and time. No cranial nerve deficit.   Spastic paraplegia   Skin: Skin is warm and dry. No rash noted. He is not diaphoretic. No pallor. Nails show no clubbing.   Psychiatric: He has a normal mood and affect. His speech is normal and behavior is normal. Thought content normal.     Assessment/Plan   Problems Addressed this Visit        Respiratory    Chronic seasonal allergic rhinitis  Continue current medication    Relevant Medications    fluticasone (FLONASE) 50 MCG/ACT nasal spray    cetirizine (zyrTEC) 10 MG tablet       Digestive    Gastroesophageal reflux disease without esophagitis   Symptoms are currently well controlled.  Reminded regarding lifestyle modification.  Continue current medication.    Relevant Medications    famotidine (PEPCID) 20 MG tablet       Nervous and Auditory    T6 spinal cord injury (CMS/HCC)  Complicated by recurrent decubitus ulcers and osteomyelitis requiring a left BKA, permanent colostomy, and chronic indwelling [place]on catheter.    Follow up with orthopedic surgery. Will try to arrange a second opinion at     Relevant Medications    amitriptyline (ELAVIL) 25 MG tablet    gabapentin (NEURONTIN) 800 MG tablet    folic acid (FOLVITE) 1 MG tablet    baclofen (LIORESAL) 10 MG tablet       Musculoskeletal and Integument    Chronic multifocal osteomyelitis of multiple sites (CMS/HCC)  As above.    Atonic bladder  With  intermittent self catheterizations.   Continue current medication    Relevant Medications    oxybutynin XL (DITROPAN-XL) 10 MG 24 hr tablet       Other    Recurrent major depressive disorder, in partial remission (CMS/HCC)  Significant situational component.   Supportive therapy.   Will resume amitriptyline  Encouraged to report if any worse or if any new symptoms or concerns.    Relevant Medications    amitriptyline (ELAVIL) 25 MG tablet    venlafaxine XR (EFFEXOR-XR) 150 MG 24 hr capsule    venlafaxine XR (EFFEXOR-XR) 75 MG 24 hr capsule    Smoker    History of left lower extremity amputation (CMS/HCC)    Healthcare maintenance  Recommended a flu shot    Relevant Orders    Fluarix/Fluzone/Afluria Quad>6 Months (Completed)    Encounter for immunization    Relevant Orders    Fluarix/Fluzone/Afluria Quad>6 Months (Completed)

## 2018-11-22 VITALS
OXYGEN SATURATION: 99 % | RESPIRATION RATE: 12 BRPM | DIASTOLIC BLOOD PRESSURE: 55 MMHG | TEMPERATURE: 98.5 F | HEART RATE: 82 BPM | BODY MASS INDEX: 33.23 KG/M2 | SYSTOLIC BLOOD PRESSURE: 110 MMHG | HEIGHT: 72 IN

## 2018-11-22 PROBLEM — M86.9: Status: RESOLVED | Noted: 2018-06-08 | Resolved: 2018-11-22

## 2018-11-22 PROBLEM — L89.323 DECUBITUS ULCER OF LEFT BUTTOCK, STAGE 3: Status: RESOLVED | Noted: 2017-11-06 | Resolved: 2018-11-22

## 2018-12-18 ENCOUNTER — TELEPHONE (OUTPATIENT)
Dept: FAMILY MEDICINE CLINIC | Facility: CLINIC | Age: 43
End: 2018-12-18

## 2018-12-18 NOTE — TELEPHONE ENCOUNTER
Scheduled for [facility] 01/07/2019 @ 11:00.      ----- Message from Delgado Rose MD sent at 11/20/2018  4:21 PM EST -----  Patient has recurrent decubitus ulcers with repeated procedures at [facility]   He would like a second opinion at  - ?plastic surgery department  ?orthopedic surgery

## 2018-12-25 RX ORDER — FERROUS SULFATE 325(65) MG
325 TABLET ORAL 2 TIMES WEEKLY
Qty: 60 TABLET | Refills: 2 | Status: SHIPPED | OUTPATIENT
Start: 2018-12-27 | End: 2020-02-14

## 2019-01-08 ENCOUNTER — TRANSCRIBE ORDERS (OUTPATIENT)
Dept: PHYSICAL THERAPY | Facility: HOSPITAL | Age: 44
End: 2019-01-08

## 2019-01-08 ENCOUNTER — HOSPITAL ENCOUNTER (OUTPATIENT)
Dept: PHYSICAL THERAPY | Facility: HOSPITAL | Age: 44
Setting detail: THERAPIES SERIES
Discharge: HOME | End: 2019-01-08

## 2019-01-08 DIAGNOSIS — G82.20 PARAPLEGIA (HCC): Primary | ICD-10-CM

## 2019-01-08 PROCEDURE — 97163 PT EVAL HIGH COMPLEX 45 MIN: CPT
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
## 2019-04-01 DIAGNOSIS — F33.1 MAJOR DEPRESSIVE DISORDER, RECURRENT EPISODE, MODERATE WITH MIXED FEATURES (HCC): ICD-10-CM

## 2019-04-01 RX ORDER — VENLAFAXINE HYDROCHLORIDE 150 MG/1
CAPSULE, EXTENDED RELEASE ORAL
Qty: 90 CAPSULE | Refills: 1 | Status: SHIPPED | OUTPATIENT
Start: 2019-04-01 | End: 2019-12-16 | Stop reason: SDUPTHER

## 2019-04-22 RX ORDER — FLUCONAZOLE 150 MG/1
150 TABLET ORAL DAILY
Qty: 3 TABLET | Refills: 0 | Status: SHIPPED | OUTPATIENT
Start: 2019-04-22 | End: 2019-04-25

## 2019-04-23 ENCOUNTER — TELEPHONE (OUTPATIENT)
Dept: FAMILY MEDICINE CLINIC | Facility: CLINIC | Age: 44
End: 2019-04-23

## 2019-04-23 NOTE — TELEPHONE ENCOUNTER
----- Message from ROBINA Girard sent at 4/22/2019  5:15 PM EDT -----  Sent 3 days   ----- Message -----  From: Candace Cross MA  Sent: 4/22/2019   4:33 PM  To: ROBINA Girard    Patient called in and stated that he has a bad yeast infection after being in the hospital and getting a ton of antibiotics. Could you send him in a couple days worth of diflucan?

## 2019-05-13 DIAGNOSIS — S24.102S T6 SPINAL CORD INJURY, SEQUELA (HCC): ICD-10-CM

## 2019-05-13 RX ORDER — GABAPENTIN 800 MG/1
TABLET ORAL
Qty: 30 TABLET | Refills: 0 | Status: SHIPPED | OUTPATIENT
Start: 2019-05-13 | End: 2019-06-11 | Stop reason: SDUPTHER

## 2019-06-11 DIAGNOSIS — S24.102S T6 SPINAL CORD INJURY, SEQUELA (HCC): ICD-10-CM

## 2019-06-12 RX ORDER — GABAPENTIN 800 MG/1
800 TABLET ORAL 3 TIMES DAILY
Qty: 90 TABLET | Refills: 1 | Status: SHIPPED | OUTPATIENT
Start: 2019-06-12 | End: 2019-08-19 | Stop reason: SDUPTHER

## 2019-06-18 ENCOUNTER — HOSPITAL ENCOUNTER (INPATIENT)
Facility: HOSPITAL | Age: 44
LOS: 1 days | Discharge: ANOTHER HOSPITAL | End: 2019-06-19
Attending: EMERGENCY MEDICINE | Admitting: HOSPITALIST

## 2019-06-18 ENCOUNTER — APPOINTMENT (OUTPATIENT)
Dept: GENERAL RADIOLOGY | Facility: HOSPITAL | Age: 44
End: 2019-06-18

## 2019-06-18 ENCOUNTER — APPOINTMENT (OUTPATIENT)
Dept: CT IMAGING | Facility: HOSPITAL | Age: 44
End: 2019-06-18

## 2019-06-18 DIAGNOSIS — I48.92 ATRIAL FLUTTER WITH RAPID VENTRICULAR RESPONSE (HCC): ICD-10-CM

## 2019-06-18 DIAGNOSIS — A41.9 SEVERE SEPSIS (HCC): Primary | ICD-10-CM

## 2019-06-18 DIAGNOSIS — R65.20 SEVERE SEPSIS (HCC): Primary | ICD-10-CM

## 2019-06-18 DIAGNOSIS — N39.0 COMPLICATED UTI (URINARY TRACT INFECTION): ICD-10-CM

## 2019-06-18 DIAGNOSIS — N17.9 ACUTE RENAL FAILURE, UNSPECIFIED ACUTE RENAL FAILURE TYPE (HCC): ICD-10-CM

## 2019-06-18 PROBLEM — R65.21 SEPTIC SHOCK: Status: ACTIVE | Noted: 2019-06-18

## 2019-06-18 LAB
6-ACETYL MORPHINE: NEGATIVE
A-A DO2: 108.1 MMHG (ref 0–300)
ALBUMIN SERPL-MCNC: 3.39 G/DL (ref 3.5–5.2)
ALBUMIN/GLOB SERPL: 0.9 G/DL
ALP SERPL-CCNC: 196 U/L (ref 39–117)
ALT SERPL W P-5'-P-CCNC: 25 U/L (ref 1–41)
AMPHET+METHAMPHET UR QL: POSITIVE
ANION GAP SERPL CALCULATED.3IONS-SCNC: 28.1 MMOL/L
APAP SERPL-MCNC: <5 MCG/ML (ref 10–30)
ARTERIAL PATENCY WRIST A: POSITIVE
AST SERPL-CCNC: 49 U/L (ref 1–40)
ATMOSPHERIC PRESS: 726 MMHG
BACTERIA UR QL AUTO: ABNORMAL /HPF
BARBITURATES UR QL SCN: NEGATIVE
BASE EXCESS BLDA CALC-SCNC: -16.7 MMOL/L
BASOPHILS # BLD AUTO: 0.01 10*3/MM3 (ref 0–0.2)
BASOPHILS NFR BLD AUTO: 0.2 % (ref 0–1.5)
BDY SITE: ABNORMAL
BENZODIAZ UR QL SCN: NEGATIVE
BILIRUB SERPL-MCNC: 0.9 MG/DL (ref 0.2–1.2)
BILIRUB UR QL STRIP: NEGATIVE
BODY TEMPERATURE: 98.6 C
BUN BLD-MCNC: 69 MG/DL (ref 6–20)
BUN/CREAT SERPL: 12.1 (ref 7–25)
BUPRENORPHINE SERPL-MCNC: NEGATIVE NG/ML
CALCIUM SPEC-SCNC: 8.9 MG/DL (ref 8.6–10.5)
CANNABINOIDS SERPL QL: NEGATIVE
CHLORIDE SERPL-SCNC: 87 MMOL/L (ref 98–107)
CLARITY UR: ABNORMAL
CO2 SERPL-SCNC: 13.9 MMOL/L (ref 22–29)
COCAINE UR QL: NEGATIVE
COHGB MFR BLD: 0.9 % (ref 0–5)
COLOR UR: ABNORMAL
CREAT BLD-MCNC: 5.69 MG/DL (ref 0.76–1.27)
CRP SERPL-MCNC: 30.24 MG/DL (ref 0–0.5)
D-LACTATE SERPL-SCNC: 5.7 MMOL/L (ref 0.5–2)
D-LACTATE SERPL-SCNC: 7.2 MMOL/L (ref 0.5–2)
DEPRECATED RDW RBC AUTO: 51.4 FL (ref 37–54)
EOSINOPHIL # BLD AUTO: 0 10*3/MM3 (ref 0–0.4)
EOSINOPHIL NFR BLD AUTO: 0 % (ref 0.3–6.2)
ERYTHROCYTE [DISTWIDTH] IN BLOOD BY AUTOMATED COUNT: 18.1 % (ref 12.3–15.4)
ETHANOL BLD-MCNC: <10 MG/DL (ref 0–10)
ETHANOL UR QL: <0.01 %
GFR SERPL CREATININE-BSD FRML MDRD: 11 ML/MIN/1.73
GFR SERPL CREATININE-BSD FRML MDRD: ABNORMAL ML/MIN/1.73
GLOBULIN UR ELPH-MCNC: 3.9 GM/DL
GLUCOSE BLD-MCNC: 106 MG/DL (ref 65–99)
GLUCOSE UR STRIP-MCNC: NEGATIVE MG/DL
HCO3 BLDA-SCNC: 7.5 MMOL/L (ref 22–26)
HCT VFR BLD AUTO: 43 % (ref 37.5–51)
HCT VFR BLD CALC: 34 % (ref 42–52)
HGB BLD-MCNC: 14.5 G/DL (ref 13–17.7)
HGB BLDA-MCNC: 11.4 G/DL (ref 12–16)
HGB UR QL STRIP.AUTO: ABNORMAL
HOLD SPECIMEN: NORMAL
HOROWITZ INDEX BLD+IHG-RTO: 28 %
HYALINE CASTS UR QL AUTO: ABNORMAL /LPF
IMM GRANULOCYTES # BLD AUTO: 0.03 10*3/MM3 (ref 0–0.05)
IMM GRANULOCYTES NFR BLD AUTO: 0.6 % (ref 0–0.5)
KETONES UR QL STRIP: ABNORMAL
LEUKOCYTE ESTERASE UR QL STRIP.AUTO: ABNORMAL
LYMPHOCYTES # BLD AUTO: 0.29 10*3/MM3 (ref 0.7–3.1)
LYMPHOCYTES NFR BLD AUTO: 5.3 % (ref 19.6–45.3)
MAGNESIUM SERPL-MCNC: 1.7 MG/DL (ref 1.6–2.6)
MCH RBC QN AUTO: 26.5 PG (ref 26.6–33)
MCHC RBC AUTO-ENTMCNC: 33.7 G/DL (ref 31.5–35.7)
MCV RBC AUTO: 78.6 FL (ref 79–97)
METHADONE UR QL SCN: NEGATIVE
METHGB BLD QL: 0.4 % (ref 0–3)
MODALITY: ABNORMAL
MONOCYTES # BLD AUTO: 0.1 10*3/MM3 (ref 0.1–0.9)
MONOCYTES NFR BLD AUTO: 1.8 % (ref 5–12)
NEUTROPHILS # BLD AUTO: 5 10*3/MM3 (ref 1.7–7)
NEUTROPHILS NFR BLD AUTO: 92.1 % (ref 42.7–76)
NITRITE UR QL STRIP: POSITIVE
OPIATES UR QL: NEGATIVE
OXYCODONE UR QL SCN: NEGATIVE
OXYHGB MFR BLDV: 86.1 % (ref 85–100)
PCO2 BLDA: 15.8 MM HG (ref 35–45)
PCP UR QL SCN: NEGATIVE
PH BLDA: 7.29 PH UNITS (ref 7.35–7.45)
PH UR STRIP.AUTO: 8 [PH] (ref 5–8)
PLATELET # BLD AUTO: 152 10*3/MM3 (ref 140–450)
PMV BLD AUTO: ABNORMAL FL (ref 6–12)
PO2 BLDA: 63.4 MM HG (ref 80–100)
POTASSIUM BLD-SCNC: 4 MMOL/L (ref 3.5–5.2)
PROT SERPL-MCNC: 7.3 G/DL (ref 6–8.5)
PROT UR QL STRIP: ABNORMAL
RBC # BLD AUTO: 5.47 10*6/MM3 (ref 4.14–5.8)
RBC # UR: ABNORMAL /HPF
REF LAB TEST METHOD: ABNORMAL
SALICYLATES SERPL-MCNC: <0.3 MG/DL
SAO2 % BLDCOA: 87.2 % (ref 90–100)
SODIUM BLD-SCNC: 129 MMOL/L (ref 136–145)
SP GR UR STRIP: 1.01 (ref 1–1.03)
SQUAMOUS #/AREA URNS HPF: ABNORMAL /HPF
TROPONIN T SERPL-MCNC: 0.05 NG/ML (ref 0–0.03)
TSH SERPL DL<=0.05 MIU/L-ACNC: 1.4 MIU/ML (ref 0.27–4.2)
UROBILINOGEN UR QL STRIP: ABNORMAL
WBC NRBC COR # BLD: 5.43 10*3/MM3 (ref 3.4–10.8)
WBC UR QL AUTO: ABNORMAL /HPF
WHOLE BLOOD HOLD SPECIMEN: NORMAL
WHOLE BLOOD HOLD SPECIMEN: NORMAL

## 2019-06-18 PROCEDURE — 85025 COMPLETE CBC W/AUTO DIFF WBC: CPT | Performed by: EMERGENCY MEDICINE

## 2019-06-18 PROCEDURE — 25010000002 PIPERACILLIN-TAZOBACTAM: Performed by: FAMILY MEDICINE

## 2019-06-18 PROCEDURE — 80307 DRUG TEST PRSMV CHEM ANLYZR: CPT | Performed by: EMERGENCY MEDICINE

## 2019-06-18 PROCEDURE — 25010000002 DIGOXIN PER 500 MCG

## 2019-06-18 PROCEDURE — 70450 CT HEAD/BRAIN W/O DYE: CPT | Performed by: RADIOLOGY

## 2019-06-18 PROCEDURE — 71045 X-RAY EXAM CHEST 1 VIEW: CPT

## 2019-06-18 PROCEDURE — 93010 ELECTROCARDIOGRAM REPORT: CPT | Performed by: INTERNAL MEDICINE

## 2019-06-18 PROCEDURE — 25010000002 LINEZOLID 600 MG/300ML SOLUTION: Performed by: EMERGENCY MEDICINE

## 2019-06-18 PROCEDURE — 87150 DNA/RNA AMPLIFIED PROBE: CPT | Performed by: EMERGENCY MEDICINE

## 2019-06-18 PROCEDURE — 81001 URINALYSIS AUTO W/SCOPE: CPT | Performed by: EMERGENCY MEDICINE

## 2019-06-18 PROCEDURE — 83735 ASSAY OF MAGNESIUM: CPT | Performed by: EMERGENCY MEDICINE

## 2019-06-18 PROCEDURE — 87040 BLOOD CULTURE FOR BACTERIA: CPT | Performed by: EMERGENCY MEDICINE

## 2019-06-18 PROCEDURE — 84484 ASSAY OF TROPONIN QUANT: CPT | Performed by: EMERGENCY MEDICINE

## 2019-06-18 PROCEDURE — 36600 WITHDRAWAL OF ARTERIAL BLOOD: CPT | Performed by: EMERGENCY MEDICINE

## 2019-06-18 PROCEDURE — 83605 ASSAY OF LACTIC ACID: CPT | Performed by: EMERGENCY MEDICINE

## 2019-06-18 PROCEDURE — 96368 THER/DIAG CONCURRENT INF: CPT

## 2019-06-18 PROCEDURE — 99285 EMERGENCY DEPT VISIT HI MDM: CPT | Performed by: HOSPITALIST

## 2019-06-18 PROCEDURE — 70450 CT HEAD/BRAIN W/O DYE: CPT

## 2019-06-18 PROCEDURE — 25010000002 MIDAZOLAM PER 1 MG: Performed by: EMERGENCY MEDICINE

## 2019-06-18 PROCEDURE — 71045 X-RAY EXAM CHEST 1 VIEW: CPT | Performed by: RADIOLOGY

## 2019-06-18 PROCEDURE — 96367 TX/PROPH/DG ADDL SEQ IV INF: CPT

## 2019-06-18 PROCEDURE — 96366 THER/PROPH/DIAG IV INF ADDON: CPT

## 2019-06-18 PROCEDURE — 80053 COMPREHEN METABOLIC PANEL: CPT | Performed by: EMERGENCY MEDICINE

## 2019-06-18 PROCEDURE — 84443 ASSAY THYROID STIM HORMONE: CPT | Performed by: EMERGENCY MEDICINE

## 2019-06-18 PROCEDURE — 87186 SC STD MICRODIL/AGAR DIL: CPT | Performed by: EMERGENCY MEDICINE

## 2019-06-18 PROCEDURE — 93005 ELECTROCARDIOGRAM TRACING: CPT | Performed by: EMERGENCY MEDICINE

## 2019-06-18 PROCEDURE — 96375 TX/PRO/DX INJ NEW DRUG ADDON: CPT

## 2019-06-18 PROCEDURE — 82805 BLOOD GASES W/O2 SATURATION: CPT | Performed by: EMERGENCY MEDICINE

## 2019-06-18 PROCEDURE — 99291 CRITICAL CARE FIRST HOUR: CPT

## 2019-06-18 PROCEDURE — 83050 HGB METHEMOGLOBIN QUAN: CPT | Performed by: EMERGENCY MEDICINE

## 2019-06-18 PROCEDURE — 87086 URINE CULTURE/COLONY COUNT: CPT | Performed by: EMERGENCY MEDICINE

## 2019-06-18 PROCEDURE — 87077 CULTURE AEROBIC IDENTIFY: CPT | Performed by: EMERGENCY MEDICINE

## 2019-06-18 PROCEDURE — 05H533Z INSERTION OF INFUSION DEVICE INTO RIGHT SUBCLAVIAN VEIN, PERCUTANEOUS APPROACH: ICD-10-PCS | Performed by: EMERGENCY MEDICINE

## 2019-06-18 PROCEDURE — 86140 C-REACTIVE PROTEIN: CPT | Performed by: EMERGENCY MEDICINE

## 2019-06-18 PROCEDURE — 25010000002 PIPERACILLIN-TAZOBACTAM: Performed by: EMERGENCY MEDICINE

## 2019-06-18 PROCEDURE — 82375 ASSAY CARBOXYHB QUANT: CPT | Performed by: EMERGENCY MEDICINE

## 2019-06-18 PROCEDURE — 96365 THER/PROPH/DIAG IV INF INIT: CPT

## 2019-06-18 PROCEDURE — C1751 CATH, INF, PER/CENT/MIDLINE: HCPCS

## 2019-06-18 RX ORDER — SODIUM CHLORIDE 0.9 % (FLUSH) 0.9 %
3 SYRINGE (ML) INJECTION EVERY 12 HOURS SCHEDULED
Status: CANCELLED | OUTPATIENT
Start: 2019-06-18

## 2019-06-18 RX ORDER — SODIUM CHLORIDE 0.9 % (FLUSH) 0.9 %
3-10 SYRINGE (ML) INJECTION AS NEEDED
Status: CANCELLED | OUTPATIENT
Start: 2019-06-18

## 2019-06-18 RX ORDER — ASPIRIN 81 MG/1
324 TABLET, CHEWABLE ORAL ONCE
Status: COMPLETED | OUTPATIENT
Start: 2019-06-18 | End: 2019-06-18

## 2019-06-18 RX ORDER — DIGOXIN 0.25 MG/ML
INJECTION INTRAMUSCULAR; INTRAVENOUS
Status: COMPLETED
Start: 2019-06-18 | End: 2019-06-18

## 2019-06-18 RX ORDER — MAGNESIUM SULFATE HEPTAHYDRATE 40 MG/ML
4 INJECTION, SOLUTION INTRAVENOUS AS NEEDED
Status: CANCELLED | OUTPATIENT
Start: 2019-06-18

## 2019-06-18 RX ORDER — SODIUM CHLORIDE 9 MG/ML
125 INJECTION, SOLUTION INTRAVENOUS CONTINUOUS
Status: CANCELLED | OUTPATIENT
Start: 2019-06-18

## 2019-06-18 RX ORDER — HEPARIN SODIUM 5000 [USP'U]/ML
5000 INJECTION, SOLUTION INTRAVENOUS; SUBCUTANEOUS EVERY 12 HOURS SCHEDULED
Status: CANCELLED | OUTPATIENT
Start: 2019-06-18

## 2019-06-18 RX ORDER — MAGNESIUM SULFATE HEPTAHYDRATE 40 MG/ML
2 INJECTION, SOLUTION INTRAVENOUS AS NEEDED
Status: CANCELLED | OUTPATIENT
Start: 2019-06-18

## 2019-06-18 RX ORDER — LINEZOLID 600 MG/1
600 TABLET, FILM COATED ORAL EVERY 12 HOURS SCHEDULED
Status: CANCELLED | OUTPATIENT
Start: 2019-06-18 | End: 2019-06-25

## 2019-06-18 RX ORDER — MIDAZOLAM HYDROCHLORIDE 1 MG/ML
2 INJECTION INTRAMUSCULAR; INTRAVENOUS ONCE
Status: COMPLETED | OUTPATIENT
Start: 2019-06-18 | End: 2019-06-18

## 2019-06-18 RX ORDER — DIGOXIN 0.25 MG/ML
250 INJECTION INTRAMUSCULAR; INTRAVENOUS ONCE
Status: COMPLETED | OUTPATIENT
Start: 2019-06-18 | End: 2019-06-18

## 2019-06-18 RX ORDER — LINEZOLID 2 MG/ML
600 INJECTION, SOLUTION INTRAVENOUS ONCE
Status: COMPLETED | OUTPATIENT
Start: 2019-06-18 | End: 2019-06-18

## 2019-06-18 RX ORDER — SODIUM CHLORIDE 0.9 % (FLUSH) 0.9 %
10 SYRINGE (ML) INJECTION AS NEEDED
Status: DISCONTINUED | OUTPATIENT
Start: 2019-06-18 | End: 2019-06-19 | Stop reason: HOSPADM

## 2019-06-18 RX ORDER — SODIUM CHLORIDE 9 MG/ML
125 INJECTION, SOLUTION INTRAVENOUS CONTINUOUS
Status: DISCONTINUED | OUTPATIENT
Start: 2019-06-18 | End: 2019-06-19 | Stop reason: HOSPADM

## 2019-06-18 RX ORDER — SODIUM CHLORIDE 9 MG/ML
INJECTION, SOLUTION INTRAVENOUS
Status: COMPLETED
Start: 2019-06-18 | End: 2019-06-18

## 2019-06-18 RX ORDER — KETAMINE HYDROCHLORIDE 100 MG/ML
150 INJECTION INTRAMUSCULAR; INTRAVENOUS ONCE
Status: COMPLETED | OUTPATIENT
Start: 2019-06-18 | End: 2019-06-18

## 2019-06-18 RX ORDER — MAGNESIUM SULFATE 1 G/100ML
1 INJECTION INTRAVENOUS AS NEEDED
Status: CANCELLED | OUTPATIENT
Start: 2019-06-18

## 2019-06-18 RX ORDER — POTASSIUM CHLORIDE 7.45 MG/ML
10 INJECTION INTRAVENOUS
Status: CANCELLED | OUTPATIENT
Start: 2019-06-18

## 2019-06-18 RX ADMIN — LINEZOLID 600 MG: 600 INJECTION, SOLUTION INTRAVENOUS at 17:06

## 2019-06-18 RX ADMIN — KETAMINE HYDROCHLORIDE 150 MG: 100 INJECTION INTRAMUSCULAR; INTRAVENOUS at 17:12

## 2019-06-18 RX ADMIN — MIDAZOLAM HYDROCHLORIDE 2 MG: 1 INJECTION, SOLUTION INTRAMUSCULAR; INTRAVENOUS at 17:10

## 2019-06-18 RX ADMIN — DIGOXIN 250 MCG: 0.25 INJECTION INTRAMUSCULAR; INTRAVENOUS at 14:31

## 2019-06-18 RX ADMIN — ASPIRIN 324 MG: 81 TABLET, CHEWABLE ORAL at 16:12

## 2019-06-18 RX ADMIN — PIPERACILLIN SODIUM,TAZOBACTAM SODIUM 3.38 G: 3; .375 INJECTION, POWDER, FOR SOLUTION INTRAVENOUS at 16:11

## 2019-06-18 RX ADMIN — SODIUM CHLORIDE 125 ML/HR: 9 INJECTION, SOLUTION INTRAVENOUS at 16:11

## 2019-06-18 RX ADMIN — SODIUM CHLORIDE 1000 ML: 9 INJECTION, SOLUTION INTRAVENOUS at 16:38

## 2019-06-18 RX ADMIN — NOREPINEPHRINE BITARTRATE 0.1 MCG/KG/MIN: 1 INJECTION INTRAVENOUS at 17:25

## 2019-06-18 RX ADMIN — SODIUM CHLORIDE 2721 ML: 9 INJECTION, SOLUTION INTRAVENOUS at 14:29

## 2019-06-18 RX ADMIN — SODIUM CHLORIDE 2000 ML: 9 INJECTION, SOLUTION INTRAVENOUS at 14:21

## 2019-06-18 RX ADMIN — PIPERACILLIN SODIUM,TAZOBACTAM SODIUM 3.38 G: 3; .375 INJECTION, POWDER, FOR SOLUTION INTRAVENOUS at 23:35

## 2019-06-18 RX ADMIN — NOREPINEPHRINE BITARTRATE 0.3 MCG/KG/MIN: 1 INJECTION INTRAVENOUS at 23:37
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
## 2019-06-18 NOTE — ED NOTES
[facility] department brought papers for court date.patient aware of papers.[facility]  waiting out side door to ask pt questions       Bailey Gottlieb RN  06/18/19 4016

## 2019-06-18 NOTE — ED NOTES
Pharmacy called to inform of patient IV order of Ketamine.  Addie states she will have it brought over.  MD Notified.     Bossman Vivas RN  06/18/19 6609

## 2019-06-18 NOTE — ED NOTES
[facility] called and said they are still working on moving the current pt and when the get the room cleared and cleaned they will call us back.     Lakshmi Constantino  06/18/19 1958

## 2019-06-18 NOTE — ED NOTES
Increased levophed to 0.3mcg/kg/hr. Will continue to monitor.     Allison Chopra, LOBITO  06/18/19 1955       Allison Chopra RN  06/18/19 1956

## 2019-06-18 NOTE — ED NOTES
Pt arrived with f/c cath in place draining dark cloudy urine with light red sediment. He voices he changes catheter at home last time changed was 1 month ago. Pt has a colostomy in place with brown liquid stool. Patient reports he has multiple meth sores all over his body. Patient is paralyzed from waist down.  Patient has amputation below left knee.      Bailey Gottlieb, RN  06/18/19 4558

## 2019-06-18 NOTE — ED NOTES
Alissa called from [facility] states they are working on transferring  a pt to another facility that as soon as they get the bed clean they will call with room assignment.       Hali Tolbert  06/18/19 1811       Hali Tolbert  06/18/19 1814
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
## 2019-06-19 VITALS
HEIGHT: 72 IN | SYSTOLIC BLOOD PRESSURE: 102 MMHG | OXYGEN SATURATION: 100 % | HEART RATE: 98 BPM | TEMPERATURE: 98 F | RESPIRATION RATE: 18 BRPM | BODY MASS INDEX: 27.09 KG/M2 | DIASTOLIC BLOOD PRESSURE: 71 MMHG | WEIGHT: 200 LBS

## 2019-06-19 LAB — BACTERIA BLD CULT: ABNORMAL

## 2019-06-19 PROCEDURE — 96367 TX/PROPH/DG ADDL SEQ IV INF: CPT

## 2019-06-19 PROCEDURE — 25010000002 LORAZEPAM PER 2 MG

## 2019-06-19 PROCEDURE — 25010000002 LINEZOLID 600 MG/300ML SOLUTION: Performed by: EMERGENCY MEDICINE

## 2019-06-19 PROCEDURE — 25010000002 PIPERACILLIN-TAZOBACTAM: Performed by: FAMILY MEDICINE

## 2019-06-19 PROCEDURE — 96375 TX/PRO/DX INJ NEW DRUG ADDON: CPT

## 2019-06-19 PROCEDURE — 96366 THER/PROPH/DIAG IV INF ADDON: CPT

## 2019-06-19 RX ORDER — ASPIRIN 81 MG/1
324 TABLET, CHEWABLE ORAL ONCE
Status: COMPLETED | OUTPATIENT
Start: 2019-06-19 | End: 2019-06-19

## 2019-06-19 RX ORDER — LINEZOLID 2 MG/ML
600 INJECTION, SOLUTION INTRAVENOUS ONCE
Status: COMPLETED | OUTPATIENT
Start: 2019-06-19 | End: 2019-06-19

## 2019-06-19 RX ORDER — LORAZEPAM 2 MG/ML
INJECTION INTRAMUSCULAR
Status: COMPLETED
Start: 2019-06-19 | End: 2019-06-19

## 2019-06-19 RX ORDER — LORAZEPAM 2 MG/ML
0.5 INJECTION INTRAMUSCULAR ONCE
Status: COMPLETED | OUTPATIENT
Start: 2019-06-19 | End: 2019-06-19

## 2019-06-19 RX ORDER — ACETAMINOPHEN 500 MG
1000 TABLET ORAL ONCE
Status: COMPLETED | OUTPATIENT
Start: 2019-06-19 | End: 2019-06-19

## 2019-06-19 RX ADMIN — SODIUM CHLORIDE 125 ML/HR: 9 INJECTION, SOLUTION INTRAVENOUS at 04:29

## 2019-06-19 RX ADMIN — LINEZOLID 600 MG: 600 INJECTION, SOLUTION INTRAVENOUS at 09:12

## 2019-06-19 RX ADMIN — ACETAMINOPHEN 1000 MG: 500 TABLET ORAL at 00:45

## 2019-06-19 RX ADMIN — ASPIRIN 324 MG: 81 TABLET, CHEWABLE ORAL at 04:26

## 2019-06-19 RX ADMIN — LORAZEPAM 0.5 MG: 2 INJECTION INTRAMUSCULAR; INTRAVENOUS at 04:23

## 2019-06-19 RX ADMIN — LORAZEPAM 0.5 MG: 2 INJECTION INTRAMUSCULAR at 04:23

## 2019-06-19 RX ADMIN — PIPERACILLIN SODIUM,TAZOBACTAM SODIUM 3.38 G: 3; .375 INJECTION, POWDER, FOR SOLUTION INTRAVENOUS at 04:59

## 2019-06-19 NOTE — ED NOTES
Patient signed EMTALA form at this time for consent to transfer to [facility]     Irene Reyna RN  06/19/19 0837       Irene Reyna RN  06/19/19 0880

## 2019-06-19 NOTE — ED NOTES
[facility] accepted pt run and said that it would be around 8 am per their c mena.      Lakshmi Constantino  06/19/19 0756

## 2019-06-19 NOTE — ED NOTES
[facility] ems here to transport pt to [facility].     Irene Reyna, LOBITO  06/19/19 [id number]

## 2019-06-19 NOTE — ED NOTES
Pt's colostomy bag started leaking from around entire device and flange. Called house sup Pat and requested new supplies to replace the colostomy apparatus.     Allison Chopra RN  06/19/19 3935

## 2019-06-19 NOTE — ED NOTES
Cleaned stool off of pt and colostomy area. Applied mastisol to area surround colostomy area. Applied new colostomy flange and colostomy bag successfully.Pt new gown on pt. Will continue to monitor.     Allison Chopra, LOBITO  06/19/19 8738       Allison Chopra RN  06/19/19 3966

## 2019-06-19 NOTE — ED NOTES
Turned pt and rotated pillow under opposite to relieve pressure off of affected area of buttocks & thighs.     Allison Chopra, RN  06/19/19 1754

## 2019-06-19 NOTE — ED NOTES
Transferred pt into a new room and placed him a more comfortable hospital bed. Rolled pt to check his bottom and coccyx and discovered an old healing decubitus that was leaking purulent drainage seeping from it and severe moisture associated excoriation to the back of pt's bilateral thighs and bilateral buttocks.Cleaned pt and placed new pads underneath him and applied protective barrier clean to all affected areas.  Placed pillow underneath pt's right side to relieve pressure to the area and will continue to rotate sides every two hours. Placed pillow under pt's right lower leg to relieve pressure off the pressure injury to pt's right heel. Will continue to monitor.     Allison Chopra, RN  06/19/19 3171       Allison Chopra RN  06/19/19 3702

## 2019-06-20 LAB — BACTERIA SPEC AEROBE CULT: ABNORMAL

## 2019-06-21 LAB
BACTERIA SPEC AEROBE CULT: ABNORMAL
BACTERIA SPEC AEROBE CULT: ABNORMAL
GRAM STN SPEC: ABNORMAL
ISOLATED FROM: ABNORMAL
ISOLATED FROM: ABNORMAL

## 2019-07-01 ENCOUNTER — APPOINTMENT (OUTPATIENT)
Dept: GENERAL RADIOLOGY | Facility: HOSPITAL | Age: 44
End: 2019-07-01

## 2019-07-01 ENCOUNTER — HOSPITAL ENCOUNTER (OUTPATIENT)
Facility: HOSPITAL | Age: 44
Discharge: LEFT BEFORE DISCHARGE | End: 2019-07-31
Attending: INTERNAL MEDICINE | Admitting: INTERNAL MEDICINE

## 2019-07-01 PROBLEM — I38 ENDOCARDITIS, VALVE: Status: ACTIVE | Noted: 2019-07-01

## 2019-07-01 PROBLEM — L89.159 PRESSURE ULCER OF COCCYGEAL REGION: Status: ACTIVE | Noted: 2019-07-01

## 2019-07-01 PROBLEM — A49.8 PROTEUS MIRABILIS INFECTION: Status: ACTIVE | Noted: 2019-07-01

## 2019-07-01 LAB
ALBUMIN SERPL-MCNC: 3.5 G/DL (ref 3.5–5.2)
ALBUMIN/GLOB SERPL: 0.9 G/DL
ALP SERPL-CCNC: 130 U/L (ref 39–117)
ALT SERPL W P-5'-P-CCNC: 29 U/L (ref 1–41)
ANION GAP SERPL CALCULATED.3IONS-SCNC: 11.8 MMOL/L (ref 5–15)
AST SERPL-CCNC: 20 U/L (ref 1–40)
BASOPHILS # BLD AUTO: 0.08 10*3/MM3 (ref 0–0.2)
BASOPHILS NFR BLD AUTO: 0.7 % (ref 0–1.5)
BILIRUB SERPL-MCNC: <0.2 MG/DL (ref 0.2–1.2)
BUN BLD-MCNC: 21 MG/DL (ref 6–20)
BUN/CREAT SERPL: 39.6 (ref 7–25)
CALCIUM SPEC-SCNC: 9 MG/DL (ref 8.6–10.5)
CHLORIDE SERPL-SCNC: 104 MMOL/L (ref 98–107)
CHOLEST SERPL-MCNC: 163 MG/DL (ref 0–200)
CO2 SERPL-SCNC: 26.2 MMOL/L (ref 22–29)
CREAT BLD-MCNC: 0.53 MG/DL (ref 0.76–1.27)
CRP SERPL-MCNC: 1.83 MG/DL (ref 0–0.5)
DEPRECATED RDW RBC AUTO: 51.2 FL (ref 37–54)
EOSINOPHIL # BLD AUTO: 0.13 10*3/MM3 (ref 0–0.4)
EOSINOPHIL NFR BLD AUTO: 1.1 % (ref 0.3–6.2)
ERYTHROCYTE [DISTWIDTH] IN BLOOD BY AUTOMATED COUNT: 17.7 % (ref 12.3–15.4)
GFR SERPL CREATININE-BSD FRML MDRD: >150 ML/MIN/1.73
GLOBULIN UR ELPH-MCNC: 3.8 GM/DL
GLUCOSE BLD-MCNC: 110 MG/DL (ref 65–99)
HCT VFR BLD AUTO: 38.2 % (ref 37.5–51)
HDLC SERPL-MCNC: 39 MG/DL (ref 40–60)
HGB BLD-MCNC: 11.7 G/DL (ref 13–17.7)
IMM GRANULOCYTES # BLD AUTO: 0.32 10*3/MM3 (ref 0–0.05)
IMM GRANULOCYTES NFR BLD AUTO: 2.8 % (ref 0–0.5)
LDLC SERPL CALC-MCNC: 79 MG/DL (ref 0–100)
LDLC/HDLC SERPL: 2.03 {RATIO}
LYMPHOCYTES # BLD AUTO: 1.93 10*3/MM3 (ref 0.7–3.1)
LYMPHOCYTES NFR BLD AUTO: 16.9 % (ref 19.6–45.3)
MCH RBC QN AUTO: 25.2 PG (ref 26.6–33)
MCHC RBC AUTO-ENTMCNC: 30.6 G/DL (ref 31.5–35.7)
MCV RBC AUTO: 82.3 FL (ref 79–97)
MONOCYTES # BLD AUTO: 0.78 10*3/MM3 (ref 0.1–0.9)
MONOCYTES NFR BLD AUTO: 6.8 % (ref 5–12)
NEUTROPHILS # BLD AUTO: 8.15 10*3/MM3 (ref 1.7–7)
NEUTROPHILS NFR BLD AUTO: 71.7 % (ref 42.7–76)
PLATELET # BLD AUTO: 306 10*3/MM3 (ref 140–450)
PMV BLD AUTO: 10 FL (ref 6–12)
POTASSIUM BLD-SCNC: 4 MMOL/L (ref 3.5–5.2)
PROT SERPL-MCNC: 7.3 G/DL (ref 6–8.5)
RBC # BLD AUTO: 4.64 10*6/MM3 (ref 4.14–5.8)
SODIUM BLD-SCNC: 142 MMOL/L (ref 136–145)
TRIGL SERPL-MCNC: 224 MG/DL (ref 0–150)
VLDLC SERPL-MCNC: 44.8 MG/DL
WBC NRBC COR # BLD: 11.39 10*3/MM3 (ref 3.4–10.8)

## 2019-07-01 PROCEDURE — 80061 LIPID PANEL: CPT | Performed by: INTERNAL MEDICINE

## 2019-07-01 PROCEDURE — 84134 ASSAY OF PREALBUMIN: CPT | Performed by: INTERNAL MEDICINE

## 2019-07-01 PROCEDURE — 86140 C-REACTIVE PROTEIN: CPT | Performed by: INTERNAL MEDICINE

## 2019-07-01 PROCEDURE — 80053 COMPREHEN METABOLIC PANEL: CPT | Performed by: INTERNAL MEDICINE

## 2019-07-01 PROCEDURE — 85025 COMPLETE CBC W/AUTO DIFF WBC: CPT | Performed by: INTERNAL MEDICINE

## 2019-07-01 PROCEDURE — 71045 X-RAY EXAM CHEST 1 VIEW: CPT

## 2019-07-01 PROCEDURE — 71045 X-RAY EXAM CHEST 1 VIEW: CPT | Performed by: RADIOLOGY

## 2019-07-01 PROCEDURE — 93005 ELECTROCARDIOGRAM TRACING: CPT | Performed by: INTERNAL MEDICINE

## 2019-07-02 LAB
BACTERIA UR QL AUTO: ABNORMAL /HPF
BILIRUB UR QL STRIP: NEGATIVE
CLARITY UR: CLEAR
COLOR UR: YELLOW
GLUCOSE UR STRIP-MCNC: NEGATIVE MG/DL
HGB UR QL STRIP.AUTO: NEGATIVE
HYALINE CASTS UR QL AUTO: ABNORMAL /LPF
KETONES UR QL STRIP: NEGATIVE
LEUKOCYTE ESTERASE UR QL STRIP.AUTO: ABNORMAL
NITRITE UR QL STRIP: NEGATIVE
PH UR STRIP.AUTO: 5.5 [PH] (ref 5–8)
PREALB SERPL-MCNC: 37.5 MG/DL (ref 20–40)
PROT UR QL STRIP: NEGATIVE
RBC # UR: ABNORMAL /HPF
REF LAB TEST METHOD: ABNORMAL
SP GR UR STRIP: 1.01 (ref 1–1.03)
SQUAMOUS #/AREA URNS HPF: ABNORMAL /HPF
UROBILINOGEN UR QL STRIP: ABNORMAL
WBC UR QL AUTO: ABNORMAL /HPF

## 2019-07-02 PROCEDURE — 87205 SMEAR GRAM STAIN: CPT | Performed by: INTERNAL MEDICINE

## 2019-07-02 PROCEDURE — 87077 CULTURE AEROBIC IDENTIFY: CPT | Performed by: INTERNAL MEDICINE

## 2019-07-02 PROCEDURE — 87186 SC STD MICRODIL/AGAR DIL: CPT | Performed by: INTERNAL MEDICINE

## 2019-07-02 PROCEDURE — 97165 OT EVAL LOW COMPLEX 30 MIN: CPT

## 2019-07-02 PROCEDURE — 87070 CULTURE OTHR SPECIMN AEROBIC: CPT | Performed by: INTERNAL MEDICINE

## 2019-07-02 PROCEDURE — 81001 URINALYSIS AUTO W/SCOPE: CPT | Performed by: INTERNAL MEDICINE

## 2019-07-03 ENCOUNTER — APPOINTMENT (OUTPATIENT)
Dept: CARDIOLOGY | Facility: HOSPITAL | Age: 44
End: 2019-07-03

## 2019-07-04 LAB
ALBUMIN SERPL-MCNC: 3.53 G/DL (ref 3.5–5.2)
ALBUMIN/GLOB SERPL: 1 G/DL
ALP SERPL-CCNC: 137 U/L (ref 39–117)
ALT SERPL W P-5'-P-CCNC: 27 U/L (ref 1–41)
ANION GAP SERPL CALCULATED.3IONS-SCNC: 12.5 MMOL/L (ref 5–15)
ANISOCYTOSIS BLD QL: ABNORMAL
AST SERPL-CCNC: 16 U/L (ref 1–40)
BACTERIA SPEC AEROBE CULT: ABNORMAL
BILIRUB SERPL-MCNC: 0.2 MG/DL (ref 0.2–1.2)
BUN BLD-MCNC: 17 MG/DL (ref 6–20)
BUN/CREAT SERPL: 29.8 (ref 7–25)
CALCIUM SPEC-SCNC: 9.2 MG/DL (ref 8.6–10.5)
CHLORIDE SERPL-SCNC: 105 MMOL/L (ref 98–107)
CO2 SERPL-SCNC: 25.5 MMOL/L (ref 22–29)
CREAT BLD-MCNC: 0.57 MG/DL (ref 0.76–1.27)
CRP SERPL-MCNC: 1.91 MG/DL (ref 0–0.5)
DEPRECATED RDW RBC AUTO: 52.4 FL (ref 37–54)
ERYTHROCYTE [DISTWIDTH] IN BLOOD BY AUTOMATED COUNT: 18.7 % (ref 12.3–15.4)
GFR SERPL CREATININE-BSD FRML MDRD: >150 ML/MIN/1.73
GLOBULIN UR ELPH-MCNC: 3.5 GM/DL
GLUCOSE BLD-MCNC: 108 MG/DL (ref 65–99)
GRAM STN SPEC: ABNORMAL
HCT VFR BLD AUTO: 39.3 % (ref 37.5–51)
HGB BLD-MCNC: 11.8 G/DL (ref 13–17.7)
HYPOCHROMIA BLD QL: ABNORMAL
LYMPHOCYTES # BLD MANUAL: 2.96 10*3/MM3 (ref 0.7–3.1)
LYMPHOCYTES NFR BLD MANUAL: 27 % (ref 19.6–45.3)
LYMPHOCYTES NFR BLD MANUAL: 6 % (ref 5–12)
MCH RBC QN AUTO: 25.3 PG (ref 26.6–33)
MCHC RBC AUTO-ENTMCNC: 30 G/DL (ref 31.5–35.7)
MCV RBC AUTO: 84.3 FL (ref 79–97)
METAMYELOCYTES NFR BLD MANUAL: 1 % (ref 0–0)
MONOCYTES # BLD AUTO: 0.66 10*3/MM3 (ref 0.1–0.9)
NEUTROPHILS # BLD AUTO: 7.24 10*3/MM3 (ref 1.7–7)
NEUTROPHILS NFR BLD MANUAL: 65 % (ref 42.7–76)
NEUTS BAND NFR BLD MANUAL: 1 % (ref 0–5)
NRBC SPEC MANUAL: 2 /100 WBC (ref 0–0.2)
PLAT MORPH BLD: NORMAL
PLATELET # BLD AUTO: 336 10*3/MM3 (ref 140–450)
PMV BLD AUTO: 10 FL (ref 6–12)
POTASSIUM BLD-SCNC: 4 MMOL/L (ref 3.5–5.2)
PROT SERPL-MCNC: 7 G/DL (ref 6–8.5)
RBC # BLD AUTO: 4.66 10*6/MM3 (ref 4.14–5.8)
SCAN SLIDE: NORMAL
SODIUM BLD-SCNC: 143 MMOL/L (ref 136–145)
WBC NRBC COR # BLD: 10.97 10*3/MM3 (ref 3.4–10.8)

## 2019-07-04 PROCEDURE — 85007 BL SMEAR W/DIFF WBC COUNT: CPT | Performed by: INTERNAL MEDICINE

## 2019-07-04 PROCEDURE — 85025 COMPLETE CBC W/AUTO DIFF WBC: CPT | Performed by: INTERNAL MEDICINE

## 2019-07-04 PROCEDURE — 80053 COMPREHEN METABOLIC PANEL: CPT | Performed by: INTERNAL MEDICINE

## 2019-07-04 PROCEDURE — 86140 C-REACTIVE PROTEIN: CPT | Performed by: INTERNAL MEDICINE

## 2019-07-05 LAB
ANISOCYTOSIS BLD QL: ABNORMAL
BACTERIA SPEC AEROBE CULT: NORMAL
DEPRECATED RDW RBC AUTO: 53.2 FL (ref 37–54)
EOSINOPHIL # BLD MANUAL: 0.22 10*3/MM3 (ref 0–0.4)
EOSINOPHIL NFR BLD MANUAL: 2 % (ref 0.3–6.2)
ERYTHROCYTE [DISTWIDTH] IN BLOOD BY AUTOMATED COUNT: 19.3 % (ref 12.3–15.4)
GRAM STN SPEC: NORMAL
HCT VFR BLD AUTO: 41.4 % (ref 37.5–51)
HGB BLD-MCNC: 13.1 G/DL (ref 13–17.7)
HYPOCHROMIA BLD QL: ABNORMAL
INR PPP: 0.92 (ref 0.9–1.1)
LYMPHOCYTES # BLD MANUAL: 2.16 10*3/MM3 (ref 0.7–3.1)
LYMPHOCYTES NFR BLD MANUAL: 20 % (ref 19.6–45.3)
LYMPHOCYTES NFR BLD MANUAL: 7 % (ref 5–12)
MCH RBC QN AUTO: 26.2 PG (ref 26.6–33)
MCHC RBC AUTO-ENTMCNC: 31.6 G/DL (ref 31.5–35.7)
MCV RBC AUTO: 82.8 FL (ref 79–97)
METAMYELOCYTES NFR BLD MANUAL: 2 % (ref 0–0)
MONOCYTES # BLD AUTO: 0.75 10*3/MM3 (ref 0.1–0.9)
NEUTROPHILS # BLD AUTO: 7.44 10*3/MM3 (ref 1.7–7)
NEUTROPHILS NFR BLD MANUAL: 67 % (ref 42.7–76)
NEUTS BAND NFR BLD MANUAL: 2 % (ref 0–5)
PLAT MORPH BLD: NORMAL
PLATELET # BLD AUTO: 331 10*3/MM3 (ref 140–450)
PMV BLD AUTO: 10.4 FL (ref 6–12)
PROTHROMBIN TIME: 12.8 SECONDS (ref 11–15.4)
RBC # BLD AUTO: 5 10*6/MM3 (ref 4.14–5.8)
SCAN SLIDE: NORMAL
WBC NRBC COR # BLD: 10.78 10*3/MM3 (ref 3.4–10.8)

## 2019-07-05 PROCEDURE — 85025 COMPLETE CBC W/AUTO DIFF WBC: CPT | Performed by: PHYSICIAN ASSISTANT

## 2019-07-05 PROCEDURE — 85007 BL SMEAR W/DIFF WBC COUNT: CPT | Performed by: PHYSICIAN ASSISTANT

## 2019-07-05 PROCEDURE — 85610 PROTHROMBIN TIME: CPT | Performed by: PHYSICIAN ASSISTANT

## 2019-07-08 ENCOUNTER — APPOINTMENT (OUTPATIENT)
Dept: ULTRASOUND IMAGING | Facility: HOSPITAL | Age: 44
End: 2019-07-08

## 2019-07-08 LAB
ALBUMIN SERPL-MCNC: 3.38 G/DL (ref 3.5–5.2)
ALBUMIN/GLOB SERPL: 1 G/DL
ALP SERPL-CCNC: 130 U/L (ref 39–117)
ALT SERPL W P-5'-P-CCNC: 24 U/L (ref 1–41)
ANION GAP SERPL CALCULATED.3IONS-SCNC: 13.2 MMOL/L (ref 5–15)
AST SERPL-CCNC: 17 U/L (ref 1–40)
BASOPHILS # BLD AUTO: 0.04 10*3/MM3 (ref 0–0.2)
BASOPHILS NFR BLD AUTO: 0.5 % (ref 0–1.5)
BILIRUB SERPL-MCNC: <0.2 MG/DL (ref 0.2–1.2)
BUN BLD-MCNC: 16 MG/DL (ref 6–20)
BUN/CREAT SERPL: 30.2 (ref 7–25)
CALCIUM SPEC-SCNC: 9.1 MG/DL (ref 8.6–10.5)
CHLORIDE SERPL-SCNC: 103 MMOL/L (ref 98–107)
CO2 SERPL-SCNC: 25.8 MMOL/L (ref 22–29)
CREAT BLD-MCNC: 0.53 MG/DL (ref 0.76–1.27)
CRP SERPL-MCNC: 2.09 MG/DL (ref 0–0.5)
DEPRECATED RDW RBC AUTO: 60.7 FL (ref 37–54)
EOSINOPHIL # BLD AUTO: 0.23 10*3/MM3 (ref 0–0.4)
EOSINOPHIL NFR BLD AUTO: 2.9 % (ref 0.3–6.2)
ERYTHROCYTE [DISTWIDTH] IN BLOOD BY AUTOMATED COUNT: 20.3 % (ref 12.3–15.4)
GFR SERPL CREATININE-BSD FRML MDRD: >150 ML/MIN/1.73
GLOBULIN UR ELPH-MCNC: 3.5 GM/DL
GLUCOSE BLD-MCNC: 106 MG/DL (ref 65–99)
HCT VFR BLD AUTO: 39 % (ref 37.5–51)
HGB BLD-MCNC: 11.7 G/DL (ref 13–17.7)
IMM GRANULOCYTES # BLD AUTO: 0.1 10*3/MM3 (ref 0–0.05)
IMM GRANULOCYTES NFR BLD AUTO: 1.2 % (ref 0–0.5)
LYMPHOCYTES # BLD AUTO: 1.67 10*3/MM3 (ref 0.7–3.1)
LYMPHOCYTES NFR BLD AUTO: 20.8 % (ref 19.6–45.3)
MCH RBC QN AUTO: 25.8 PG (ref 26.6–33)
MCHC RBC AUTO-ENTMCNC: 30 G/DL (ref 31.5–35.7)
MCV RBC AUTO: 85.9 FL (ref 79–97)
MONOCYTES # BLD AUTO: 0.82 10*3/MM3 (ref 0.1–0.9)
MONOCYTES NFR BLD AUTO: 10.2 % (ref 5–12)
NEUTROPHILS # BLD AUTO: 5.15 10*3/MM3 (ref 1.7–7)
NEUTROPHILS NFR BLD AUTO: 64.4 % (ref 42.7–76)
PLATELET # BLD AUTO: 331 10*3/MM3 (ref 140–450)
PMV BLD AUTO: 11.5 FL (ref 6–12)
POTASSIUM BLD-SCNC: 3.8 MMOL/L (ref 3.5–5.2)
PROT SERPL-MCNC: 6.9 G/DL (ref 6–8.5)
RBC # BLD AUTO: 4.54 10*6/MM3 (ref 4.14–5.8)
SODIUM BLD-SCNC: 142 MMOL/L (ref 136–145)
WBC NRBC COR # BLD: 8.01 10*3/MM3 (ref 3.4–10.8)

## 2019-07-08 PROCEDURE — 80053 COMPREHEN METABOLIC PANEL: CPT | Performed by: PHYSICIAN ASSISTANT

## 2019-07-08 PROCEDURE — 93922 UPR/L XTREMITY ART 2 LEVELS: CPT | Performed by: RADIOLOGY

## 2019-07-08 PROCEDURE — 85025 COMPLETE CBC W/AUTO DIFF WBC: CPT | Performed by: PHYSICIAN ASSISTANT

## 2019-07-08 PROCEDURE — 93922 UPR/L XTREMITY ART 2 LEVELS: CPT

## 2019-07-08 PROCEDURE — 86140 C-REACTIVE PROTEIN: CPT | Performed by: PHYSICIAN ASSISTANT

## 2019-07-09 ENCOUNTER — APPOINTMENT (OUTPATIENT)
Dept: GENERAL RADIOLOGY | Facility: HOSPITAL | Age: 44
End: 2019-07-09

## 2019-07-09 LAB
BACTERIA UR QL AUTO: ABNORMAL /HPF
BILIRUB UR QL STRIP: NEGATIVE
CLARITY UR: CLEAR
COLOR UR: ABNORMAL
GLUCOSE UR STRIP-MCNC: NEGATIVE MG/DL
HGB UR QL STRIP.AUTO: NEGATIVE
HYALINE CASTS UR QL AUTO: ABNORMAL /LPF
KETONES UR QL STRIP: NEGATIVE
LEUKOCYTE ESTERASE UR QL STRIP.AUTO: ABNORMAL
NITRITE UR QL STRIP: NEGATIVE
PH UR STRIP.AUTO: 5.5 [PH] (ref 5–8)
PROT UR QL STRIP: ABNORMAL
RBC # UR: ABNORMAL /HPF
REF LAB TEST METHOD: ABNORMAL
SP GR UR STRIP: 1.03 (ref 1–1.03)
SQUAMOUS #/AREA URNS HPF: ABNORMAL /HPF
UROBILINOGEN UR QL STRIP: ABNORMAL
WBC UR QL AUTO: ABNORMAL /HPF

## 2019-07-09 PROCEDURE — 87040 BLOOD CULTURE FOR BACTERIA: CPT | Performed by: NURSE PRACTITIONER

## 2019-07-09 PROCEDURE — 87086 URINE CULTURE/COLONY COUNT: CPT | Performed by: NURSE PRACTITIONER

## 2019-07-09 PROCEDURE — 71045 X-RAY EXAM CHEST 1 VIEW: CPT | Performed by: RADIOLOGY

## 2019-07-09 PROCEDURE — 71045 X-RAY EXAM CHEST 1 VIEW: CPT

## 2019-07-09 PROCEDURE — 81001 URINALYSIS AUTO W/SCOPE: CPT | Performed by: NURSE PRACTITIONER

## 2019-07-10 DIAGNOSIS — S24.102S T6 SPINAL CORD INJURY, SEQUELA (HCC): ICD-10-CM

## 2019-07-10 LAB — BACTERIA SPEC AEROBE CULT: NO GROWTH

## 2019-07-10 RX ORDER — AMITRIPTYLINE HYDROCHLORIDE 25 MG/1
TABLET, FILM COATED ORAL
Qty: 180 TABLET | Refills: 4 | Status: SHIPPED | OUTPATIENT
Start: 2019-07-10 | End: 2020-02-14

## 2019-07-11 LAB
ALBUMIN SERPL-MCNC: 3.71 G/DL (ref 3.5–5.2)
ALBUMIN/GLOB SERPL: 1 G/DL
ALP SERPL-CCNC: 119 U/L (ref 39–117)
ALT SERPL W P-5'-P-CCNC: 40 U/L (ref 1–41)
ANION GAP SERPL CALCULATED.3IONS-SCNC: 11.8 MMOL/L (ref 5–15)
AST SERPL-CCNC: 23 U/L (ref 1–40)
BILIRUB SERPL-MCNC: 0.2 MG/DL (ref 0.2–1.2)
BUN BLD-MCNC: 14 MG/DL (ref 6–20)
BUN/CREAT SERPL: 26.9 (ref 7–25)
CALCIUM SPEC-SCNC: 9.2 MG/DL (ref 8.6–10.5)
CHLORIDE SERPL-SCNC: 103 MMOL/L (ref 98–107)
CO2 SERPL-SCNC: 26.2 MMOL/L (ref 22–29)
CREAT BLD-MCNC: 0.52 MG/DL (ref 0.76–1.27)
CRP SERPL-MCNC: 3.3 MG/DL (ref 0–0.5)
DEPRECATED RDW RBC AUTO: 62.8 FL (ref 37–54)
ERYTHROCYTE [DISTWIDTH] IN BLOOD BY AUTOMATED COUNT: 20.9 % (ref 12.3–15.4)
GFR SERPL CREATININE-BSD FRML MDRD: >150 ML/MIN/1.73
GLOBULIN UR ELPH-MCNC: 3.6 GM/DL
GLUCOSE BLD-MCNC: 88 MG/DL (ref 65–99)
HCT VFR BLD AUTO: 40.1 % (ref 37.5–51)
HGB BLD-MCNC: 12.1 G/DL (ref 13–17.7)
MCH RBC QN AUTO: 26 PG (ref 26.6–33)
MCHC RBC AUTO-ENTMCNC: 30.2 G/DL (ref 31.5–35.7)
MCV RBC AUTO: 86.1 FL (ref 79–97)
PLATELET # BLD AUTO: 260 10*3/MM3 (ref 140–450)
PMV BLD AUTO: 11.4 FL (ref 6–12)
POTASSIUM BLD-SCNC: 4.1 MMOL/L (ref 3.5–5.2)
PROT SERPL-MCNC: 7.3 G/DL (ref 6–8.5)
RBC # BLD AUTO: 4.66 10*6/MM3 (ref 4.14–5.8)
SODIUM BLD-SCNC: 141 MMOL/L (ref 136–145)
WBC NRBC COR # BLD: 6.56 10*3/MM3 (ref 3.4–10.8)

## 2019-07-11 PROCEDURE — 80053 COMPREHEN METABOLIC PANEL: CPT | Performed by: INTERNAL MEDICINE

## 2019-07-11 PROCEDURE — 85027 COMPLETE CBC AUTOMATED: CPT | Performed by: INTERNAL MEDICINE

## 2019-07-11 PROCEDURE — 86140 C-REACTIVE PROTEIN: CPT | Performed by: INTERNAL MEDICINE

## 2019-07-14 LAB
BACTERIA SPEC AEROBE CULT: NORMAL
BACTERIA SPEC AEROBE CULT: NORMAL

## 2019-07-15 LAB
ALBUMIN SERPL-MCNC: 3.63 G/DL (ref 3.5–5.2)
ALBUMIN/GLOB SERPL: 1 G/DL
ALP SERPL-CCNC: 112 U/L (ref 39–117)
ALT SERPL W P-5'-P-CCNC: 30 U/L (ref 1–41)
ANION GAP SERPL CALCULATED.3IONS-SCNC: 12.4 MMOL/L (ref 5–15)
AST SERPL-CCNC: 17 U/L (ref 1–40)
BASOPHILS # BLD AUTO: 0.02 10*3/MM3 (ref 0–0.2)
BASOPHILS NFR BLD AUTO: 0.3 % (ref 0–1.5)
BILIRUB SERPL-MCNC: 0.2 MG/DL (ref 0.2–1.2)
BUN BLD-MCNC: 18 MG/DL (ref 6–20)
BUN/CREAT SERPL: 36.7 (ref 7–25)
CALCIUM SPEC-SCNC: 9.8 MG/DL (ref 8.6–10.5)
CHLORIDE SERPL-SCNC: 101 MMOL/L (ref 98–107)
CO2 SERPL-SCNC: 27.6 MMOL/L (ref 22–29)
CREAT BLD-MCNC: 0.49 MG/DL (ref 0.76–1.27)
CRP SERPL-MCNC: 1.43 MG/DL (ref 0–0.5)
DEPRECATED RDW RBC AUTO: 62.8 FL (ref 37–54)
EOSINOPHIL # BLD AUTO: 0.53 10*3/MM3 (ref 0–0.4)
EOSINOPHIL NFR BLD AUTO: 7.6 % (ref 0.3–6.2)
ERYTHROCYTE [DISTWIDTH] IN BLOOD BY AUTOMATED COUNT: 20.3 % (ref 12.3–15.4)
GFR SERPL CREATININE-BSD FRML MDRD: >150 ML/MIN/1.73
GLOBULIN UR ELPH-MCNC: 3.5 GM/DL
GLUCOSE BLD-MCNC: 92 MG/DL (ref 65–99)
HCT VFR BLD AUTO: 41.8 % (ref 37.5–51)
HGB BLD-MCNC: 12.6 G/DL (ref 13–17.7)
IMM GRANULOCYTES # BLD AUTO: 0.04 10*3/MM3 (ref 0–0.05)
IMM GRANULOCYTES NFR BLD AUTO: 0.6 % (ref 0–0.5)
LYMPHOCYTES # BLD AUTO: 1.8 10*3/MM3 (ref 0.7–3.1)
LYMPHOCYTES NFR BLD AUTO: 25.7 % (ref 19.6–45.3)
MCH RBC QN AUTO: 26 PG (ref 26.6–33)
MCHC RBC AUTO-ENTMCNC: 30.1 G/DL (ref 31.5–35.7)
MCV RBC AUTO: 86.4 FL (ref 79–97)
MONOCYTES # BLD AUTO: 0.82 10*3/MM3 (ref 0.1–0.9)
MONOCYTES NFR BLD AUTO: 11.7 % (ref 5–12)
NEUTROPHILS # BLD AUTO: 3.8 10*3/MM3 (ref 1.7–7)
NEUTROPHILS NFR BLD AUTO: 54.1 % (ref 42.7–76)
PLATELET # BLD AUTO: 218 10*3/MM3 (ref 140–450)
PMV BLD AUTO: 11.2 FL (ref 6–12)
POTASSIUM BLD-SCNC: 4.3 MMOL/L (ref 3.5–5.2)
PROT SERPL-MCNC: 7.1 G/DL (ref 6–8.5)
RBC # BLD AUTO: 4.84 10*6/MM3 (ref 4.14–5.8)
SODIUM BLD-SCNC: 141 MMOL/L (ref 136–145)
WBC NRBC COR # BLD: 7.01 10*3/MM3 (ref 3.4–10.8)

## 2019-07-15 PROCEDURE — 86140 C-REACTIVE PROTEIN: CPT | Performed by: PHYSICIAN ASSISTANT

## 2019-07-15 PROCEDURE — 85025 COMPLETE CBC W/AUTO DIFF WBC: CPT | Performed by: PHYSICIAN ASSISTANT

## 2019-07-15 PROCEDURE — 80053 COMPREHEN METABOLIC PANEL: CPT | Performed by: PHYSICIAN ASSISTANT

## 2019-07-18 LAB
ALBUMIN SERPL-MCNC: 3.6 G/DL (ref 3.5–5.2)
ALBUMIN/GLOB SERPL: 1 G/DL
ALP SERPL-CCNC: 110 U/L (ref 39–117)
ALT SERPL W P-5'-P-CCNC: 34 U/L (ref 1–41)
ANION GAP SERPL CALCULATED.3IONS-SCNC: 8.3 MMOL/L (ref 5–15)
AST SERPL-CCNC: 22 U/L (ref 1–40)
BILIRUB SERPL-MCNC: 0.2 MG/DL (ref 0.2–1.2)
BUN BLD-MCNC: 16 MG/DL (ref 6–20)
BUN/CREAT SERPL: 32.7 (ref 7–25)
CALCIUM SPEC-SCNC: 9.6 MG/DL (ref 8.6–10.5)
CHLORIDE SERPL-SCNC: 99 MMOL/L (ref 98–107)
CO2 SERPL-SCNC: 29.7 MMOL/L (ref 22–29)
CREAT BLD-MCNC: 0.49 MG/DL (ref 0.76–1.27)
CRP SERPL-MCNC: 0.86 MG/DL (ref 0–0.5)
DEPRECATED RDW RBC AUTO: 60.8 FL (ref 37–54)
ERYTHROCYTE [DISTWIDTH] IN BLOOD BY AUTOMATED COUNT: 19.8 % (ref 12.3–15.4)
GFR SERPL CREATININE-BSD FRML MDRD: >150 ML/MIN/1.73
GLOBULIN UR ELPH-MCNC: 3.5 GM/DL
GLUCOSE BLD-MCNC: 79 MG/DL (ref 65–99)
HCT VFR BLD AUTO: 43.4 % (ref 37.5–51)
HGB BLD-MCNC: 13.2 G/DL (ref 13–17.7)
MCH RBC QN AUTO: 26.1 PG (ref 26.6–33)
MCHC RBC AUTO-ENTMCNC: 30.4 G/DL (ref 31.5–35.7)
MCV RBC AUTO: 85.8 FL (ref 79–97)
PLATELET # BLD AUTO: 188 10*3/MM3 (ref 140–450)
PMV BLD AUTO: 11.2 FL (ref 6–12)
POTASSIUM BLD-SCNC: 4.4 MMOL/L (ref 3.5–5.2)
PROT SERPL-MCNC: 7.1 G/DL (ref 6–8.5)
RBC # BLD AUTO: 5.06 10*6/MM3 (ref 4.14–5.8)
SODIUM BLD-SCNC: 137 MMOL/L (ref 136–145)
WBC NRBC COR # BLD: 7.38 10*3/MM3 (ref 3.4–10.8)

## 2019-07-18 PROCEDURE — 80053 COMPREHEN METABOLIC PANEL: CPT | Performed by: PHYSICIAN ASSISTANT

## 2019-07-18 PROCEDURE — 86140 C-REACTIVE PROTEIN: CPT | Performed by: PHYSICIAN ASSISTANT

## 2019-07-18 PROCEDURE — 85027 COMPLETE CBC AUTOMATED: CPT | Performed by: PHYSICIAN ASSISTANT

## 2019-07-22 LAB
ALBUMIN SERPL-MCNC: 3.83 G/DL (ref 3.5–5.2)
ALBUMIN/GLOB SERPL: 0.9 G/DL
ALP SERPL-CCNC: 124 U/L (ref 39–117)
ALT SERPL W P-5'-P-CCNC: 41 U/L (ref 1–41)
ANION GAP SERPL CALCULATED.3IONS-SCNC: 12.9 MMOL/L (ref 5–15)
AST SERPL-CCNC: 32 U/L (ref 1–40)
BASOPHILS # BLD AUTO: 0.03 10*3/MM3 (ref 0–0.2)
BASOPHILS NFR BLD AUTO: 0.4 % (ref 0–1.5)
BILIRUB SERPL-MCNC: 0.4 MG/DL (ref 0.2–1.2)
BUN BLD-MCNC: 17 MG/DL (ref 6–20)
BUN/CREAT SERPL: 28.3 (ref 7–25)
CALCIUM SPEC-SCNC: 9.7 MG/DL (ref 8.6–10.5)
CHLORIDE SERPL-SCNC: 100 MMOL/L (ref 98–107)
CO2 SERPL-SCNC: 26.1 MMOL/L (ref 22–29)
CREAT BLD-MCNC: 0.6 MG/DL (ref 0.76–1.27)
CRP SERPL-MCNC: 2.89 MG/DL (ref 0–0.5)
DEPRECATED RDW RBC AUTO: 61.8 FL (ref 37–54)
EOSINOPHIL # BLD AUTO: 0.24 10*3/MM3 (ref 0–0.4)
EOSINOPHIL NFR BLD AUTO: 3.1 % (ref 0.3–6.2)
ERYTHROCYTE [DISTWIDTH] IN BLOOD BY AUTOMATED COUNT: 20.6 % (ref 12.3–15.4)
GFR SERPL CREATININE-BSD FRML MDRD: 146 ML/MIN/1.73
GLOBULIN UR ELPH-MCNC: 4.3 GM/DL
GLUCOSE BLD-MCNC: 83 MG/DL (ref 65–99)
HCT VFR BLD AUTO: 45.9 % (ref 37.5–51)
HGB BLD-MCNC: 14.1 G/DL (ref 13–17.7)
IMM GRANULOCYTES # BLD AUTO: 0.07 10*3/MM3 (ref 0–0.05)
IMM GRANULOCYTES NFR BLD AUTO: 0.9 % (ref 0–0.5)
LYMPHOCYTES # BLD AUTO: 1.98 10*3/MM3 (ref 0.7–3.1)
LYMPHOCYTES NFR BLD AUTO: 25.5 % (ref 19.6–45.3)
MCH RBC QN AUTO: 26.1 PG (ref 26.6–33)
MCHC RBC AUTO-ENTMCNC: 30.7 G/DL (ref 31.5–35.7)
MCV RBC AUTO: 84.8 FL (ref 79–97)
MONOCYTES # BLD AUTO: 0.91 10*3/MM3 (ref 0.1–0.9)
MONOCYTES NFR BLD AUTO: 11.7 % (ref 5–12)
NEUTROPHILS # BLD AUTO: 4.53 10*3/MM3 (ref 1.7–7)
NEUTROPHILS NFR BLD AUTO: 58.4 % (ref 42.7–76)
PLATELET # BLD AUTO: 197 10*3/MM3 (ref 140–450)
PMV BLD AUTO: 11.2 FL (ref 6–12)
POTASSIUM BLD-SCNC: 4 MMOL/L (ref 3.5–5.2)
PROT SERPL-MCNC: 8.1 G/DL (ref 6–8.5)
RBC # BLD AUTO: 5.41 10*6/MM3 (ref 4.14–5.8)
SODIUM BLD-SCNC: 139 MMOL/L (ref 136–145)
WBC NRBC COR # BLD: 7.76 10*3/MM3 (ref 3.4–10.8)

## 2019-07-22 PROCEDURE — 86140 C-REACTIVE PROTEIN: CPT | Performed by: PHYSICIAN ASSISTANT

## 2019-07-22 PROCEDURE — 85025 COMPLETE CBC W/AUTO DIFF WBC: CPT | Performed by: PHYSICIAN ASSISTANT

## 2019-07-22 PROCEDURE — 80053 COMPREHEN METABOLIC PANEL: CPT | Performed by: PHYSICIAN ASSISTANT

## 2019-07-26 PROCEDURE — 93005 ELECTROCARDIOGRAM TRACING: CPT | Performed by: INTERNAL MEDICINE

## 2019-07-28 LAB
ALBUMIN SERPL-MCNC: 3.94 G/DL (ref 3.5–5.2)
ALBUMIN/GLOB SERPL: 0.9 G/DL
ALP SERPL-CCNC: 117 U/L (ref 39–117)
ALT SERPL W P-5'-P-CCNC: 35 U/L (ref 1–41)
ANION GAP SERPL CALCULATED.3IONS-SCNC: 13.1 MMOL/L (ref 5–15)
AST SERPL-CCNC: 24 U/L (ref 1–40)
BILIRUB SERPL-MCNC: 0.5 MG/DL (ref 0.2–1.2)
BUN BLD-MCNC: 14 MG/DL (ref 6–20)
BUN/CREAT SERPL: 25.9 (ref 7–25)
CALCIUM SPEC-SCNC: 9.5 MG/DL (ref 8.6–10.5)
CHLORIDE SERPL-SCNC: 99 MMOL/L (ref 98–107)
CO2 SERPL-SCNC: 25.9 MMOL/L (ref 22–29)
CREAT BLD-MCNC: 0.54 MG/DL (ref 0.76–1.27)
CRP SERPL-MCNC: 1.09 MG/DL (ref 0–0.5)
DEPRECATED RDW RBC AUTO: 58.7 FL (ref 37–54)
ERYTHROCYTE [DISTWIDTH] IN BLOOD BY AUTOMATED COUNT: 19.2 % (ref 12.3–15.4)
GFR SERPL CREATININE-BSD FRML MDRD: >150 ML/MIN/1.73
GLOBULIN UR ELPH-MCNC: 4.2 GM/DL
GLUCOSE BLD-MCNC: 90 MG/DL (ref 65–99)
HCT VFR BLD AUTO: 45.5 % (ref 37.5–51)
HGB BLD-MCNC: 14.4 G/DL (ref 13–17.7)
MCH RBC QN AUTO: 26.4 PG (ref 26.6–33)
MCHC RBC AUTO-ENTMCNC: 31.6 G/DL (ref 31.5–35.7)
MCV RBC AUTO: 83.3 FL (ref 79–97)
PLATELET # BLD AUTO: 225 10*3/MM3 (ref 140–450)
PMV BLD AUTO: 11.3 FL (ref 6–12)
POTASSIUM BLD-SCNC: 3.5 MMOL/L (ref 3.5–5.2)
PROT SERPL-MCNC: 8.1 G/DL (ref 6–8.5)
RBC # BLD AUTO: 5.46 10*6/MM3 (ref 4.14–5.8)
SODIUM BLD-SCNC: 138 MMOL/L (ref 136–145)
WBC NRBC COR # BLD: 10.41 10*3/MM3 (ref 3.4–10.8)

## 2019-07-28 PROCEDURE — 80053 COMPREHEN METABOLIC PANEL: CPT | Performed by: INTERNAL MEDICINE

## 2019-07-28 PROCEDURE — 86140 C-REACTIVE PROTEIN: CPT | Performed by: INTERNAL MEDICINE

## 2019-07-28 PROCEDURE — 85027 COMPLETE CBC AUTOMATED: CPT | Performed by: INTERNAL MEDICINE

## 2019-07-29 LAB
ALBUMIN SERPL-MCNC: 3.84 G/DL (ref 3.5–5.2)
ALBUMIN/GLOB SERPL: 1 G/DL
ALP SERPL-CCNC: 125 U/L (ref 39–117)
ALT SERPL W P-5'-P-CCNC: 40 U/L (ref 1–41)
ANION GAP SERPL CALCULATED.3IONS-SCNC: 12.3 MMOL/L (ref 5–15)
AST SERPL-CCNC: 30 U/L (ref 1–40)
BASOPHILS # BLD AUTO: 0.03 10*3/MM3 (ref 0–0.2)
BASOPHILS NFR BLD AUTO: 0.3 % (ref 0–1.5)
BILIRUB SERPL-MCNC: 0.5 MG/DL (ref 0.2–1.2)
BUN BLD-MCNC: 20 MG/DL (ref 6–20)
BUN/CREAT SERPL: 36.4 (ref 7–25)
CALCIUM SPEC-SCNC: 9.8 MG/DL (ref 8.6–10.5)
CHLORIDE SERPL-SCNC: 99 MMOL/L (ref 98–107)
CO2 SERPL-SCNC: 26.7 MMOL/L (ref 22–29)
CREAT BLD-MCNC: 0.55 MG/DL (ref 0.76–1.27)
CRP SERPL-MCNC: 2.18 MG/DL (ref 0–0.5)
DEPRECATED RDW RBC AUTO: 58.7 FL (ref 37–54)
EOSINOPHIL # BLD AUTO: 0.45 10*3/MM3 (ref 0–0.4)
EOSINOPHIL NFR BLD AUTO: 3.9 % (ref 0.3–6.2)
ERYTHROCYTE [DISTWIDTH] IN BLOOD BY AUTOMATED COUNT: 19.1 % (ref 12.3–15.4)
GFR SERPL CREATININE-BSD FRML MDRD: >150 ML/MIN/1.73
GLOBULIN UR ELPH-MCNC: 4 GM/DL
GLUCOSE BLD-MCNC: 102 MG/DL (ref 65–99)
HCT VFR BLD AUTO: 45.9 % (ref 37.5–51)
HGB BLD-MCNC: 14.8 G/DL (ref 13–17.7)
IMM GRANULOCYTES # BLD AUTO: 0.04 10*3/MM3 (ref 0–0.05)
IMM GRANULOCYTES NFR BLD AUTO: 0.3 % (ref 0–0.5)
LYMPHOCYTES # BLD AUTO: 0.62 10*3/MM3 (ref 0.7–3.1)
LYMPHOCYTES NFR BLD AUTO: 5.3 % (ref 19.6–45.3)
MCH RBC QN AUTO: 27.1 PG (ref 26.6–33)
MCHC RBC AUTO-ENTMCNC: 32.2 G/DL (ref 31.5–35.7)
MCV RBC AUTO: 84.1 FL (ref 79–97)
MONOCYTES # BLD AUTO: 0.5 10*3/MM3 (ref 0.1–0.9)
MONOCYTES NFR BLD AUTO: 4.3 % (ref 5–12)
NEUTROPHILS # BLD AUTO: 9.97 10*3/MM3 (ref 1.7–7)
NEUTROPHILS NFR BLD AUTO: 85.9 % (ref 42.7–76)
PLATELET # BLD AUTO: 208 10*3/MM3 (ref 140–450)
PMV BLD AUTO: 11.3 FL (ref 6–12)
POTASSIUM BLD-SCNC: 3.9 MMOL/L (ref 3.5–5.2)
PROT SERPL-MCNC: 7.8 G/DL (ref 6–8.5)
RBC # BLD AUTO: 5.46 10*6/MM3 (ref 4.14–5.8)
SODIUM BLD-SCNC: 138 MMOL/L (ref 136–145)
WBC NRBC COR # BLD: 11.61 10*3/MM3 (ref 3.4–10.8)

## 2019-07-29 PROCEDURE — 80053 COMPREHEN METABOLIC PANEL: CPT | Performed by: PHYSICIAN ASSISTANT

## 2019-07-29 PROCEDURE — 85025 COMPLETE CBC W/AUTO DIFF WBC: CPT | Performed by: PHYSICIAN ASSISTANT

## 2019-07-29 PROCEDURE — 86140 C-REACTIVE PROTEIN: CPT | Performed by: PHYSICIAN ASSISTANT

## 2019-07-31 LAB
6-ACETYL MORPHINE: NEGATIVE
ALBUMIN SERPL-MCNC: 4 G/DL (ref 3.5–5.2)
ALBUMIN/GLOB SERPL: 1 G/DL
ALP SERPL-CCNC: 119 U/L (ref 39–117)
ALT SERPL W P-5'-P-CCNC: 58 U/L (ref 1–41)
AMPHET+METHAMPHET UR QL: POSITIVE
ANION GAP SERPL CALCULATED.3IONS-SCNC: 11 MMOL/L (ref 5–15)
AST SERPL-CCNC: 23 U/L (ref 1–40)
BARBITURATES UR QL SCN: NEGATIVE
BASOPHILS # BLD AUTO: 0.02 10*3/MM3 (ref 0–0.2)
BASOPHILS NFR BLD AUTO: 0.3 % (ref 0–1.5)
BENZODIAZ UR QL SCN: NEGATIVE
BILIRUB SERPL-MCNC: 0.4 MG/DL (ref 0.2–1.2)
BUN BLD-MCNC: 13 MG/DL (ref 6–20)
BUN/CREAT SERPL: 21 (ref 7–25)
BUPRENORPHINE SERPL-MCNC: NEGATIVE NG/ML
CALCIUM SPEC-SCNC: 9.7 MG/DL (ref 8.6–10.5)
CANNABINOIDS SERPL QL: NEGATIVE
CHLORIDE SERPL-SCNC: 100 MMOL/L (ref 98–107)
CO2 SERPL-SCNC: 29 MMOL/L (ref 22–29)
COCAINE UR QL: NEGATIVE
CREAT BLD-MCNC: 0.62 MG/DL (ref 0.76–1.27)
CRP SERPL-MCNC: 2.46 MG/DL (ref 0–0.5)
DEPRECATED RDW RBC AUTO: 57.5 FL (ref 37–54)
EOSINOPHIL # BLD AUTO: 0.43 10*3/MM3 (ref 0–0.4)
EOSINOPHIL NFR BLD AUTO: 6.8 % (ref 0.3–6.2)
ERYTHROCYTE [DISTWIDTH] IN BLOOD BY AUTOMATED COUNT: 18.6 % (ref 12.3–15.4)
GFR SERPL CREATININE-BSD FRML MDRD: 141 ML/MIN/1.73
GLOBULIN UR ELPH-MCNC: 4.1 GM/DL
GLUCOSE BLD-MCNC: 98 MG/DL (ref 65–99)
HCT VFR BLD AUTO: 46.3 % (ref 37.5–51)
HGB BLD-MCNC: 14.9 G/DL (ref 13–17.7)
IMM GRANULOCYTES # BLD AUTO: 0.03 10*3/MM3 (ref 0–0.05)
IMM GRANULOCYTES NFR BLD AUTO: 0.5 % (ref 0–0.5)
LYMPHOCYTES # BLD AUTO: 1.34 10*3/MM3 (ref 0.7–3.1)
LYMPHOCYTES NFR BLD AUTO: 21 % (ref 19.6–45.3)
MCH RBC QN AUTO: 27 PG (ref 26.6–33)
MCHC RBC AUTO-ENTMCNC: 32.2 G/DL (ref 31.5–35.7)
MCV RBC AUTO: 83.9 FL (ref 79–97)
METHADONE UR QL SCN: NEGATIVE
MONOCYTES # BLD AUTO: 0.93 10*3/MM3 (ref 0.1–0.9)
MONOCYTES NFR BLD AUTO: 14.6 % (ref 5–12)
NEUTROPHILS # BLD AUTO: 3.62 10*3/MM3 (ref 1.7–7)
NEUTROPHILS NFR BLD AUTO: 56.8 % (ref 42.7–76)
OPIATES UR QL: NEGATIVE
OXYCODONE UR QL SCN: NEGATIVE
PCP UR QL SCN: NEGATIVE
PLATELET # BLD AUTO: 217 10*3/MM3 (ref 140–450)
PMV BLD AUTO: 11 FL (ref 6–12)
POTASSIUM BLD-SCNC: 3.8 MMOL/L (ref 3.5–5.2)
PROT SERPL-MCNC: 8.1 G/DL (ref 6–8.5)
RBC # BLD AUTO: 5.52 10*6/MM3 (ref 4.14–5.8)
SODIUM BLD-SCNC: 140 MMOL/L (ref 136–145)
WBC NRBC COR # BLD: 6.37 10*3/MM3 (ref 3.4–10.8)

## 2019-07-31 PROCEDURE — 85025 COMPLETE CBC W/AUTO DIFF WBC: CPT | Performed by: NURSE PRACTITIONER

## 2019-07-31 PROCEDURE — 80053 COMPREHEN METABOLIC PANEL: CPT | Performed by: NURSE PRACTITIONER

## 2019-07-31 PROCEDURE — 80307 DRUG TEST PRSMV CHEM ANLYZR: CPT | Performed by: NURSE PRACTITIONER

## 2019-07-31 PROCEDURE — 86140 C-REACTIVE PROTEIN: CPT | Performed by: NURSE PRACTITIONER

## 2019-07-31 PROCEDURE — 93005 ELECTROCARDIOGRAM TRACING: CPT | Performed by: NURSE PRACTITIONER

## 2019-07-31 PROCEDURE — G0480 DRUG TEST DEF 1-7 CLASSES: HCPCS | Performed by: NURSE PRACTITIONER

## 2019-07-31 PROCEDURE — 93010 ELECTROCARDIOGRAM REPORT: CPT | Performed by: INTERNAL MEDICINE

## 2019-08-05 LAB
AMPHETAMINES BLD QL SCN: 62 NG/ML
AMPHETAMINES SERPL QL SCN: ABNORMAL NG/ML
AMPHETAMINES SPEC QL: POSITIVE
BARBITURATES SERPLBLD QL: NEGATIVE UG/ML
BENZODIAZ SERPL QL: NEGATIVE NG/ML
BZE BLD QL SCN: NEGATIVE NG/ML
MDA SERPLBLD-MCNC: NEGATIVE NG/ML
MDEA SERPL-MCNC: NEGATIVE NG/ML
MDMA SERPLBLD-MCNC: NEGATIVE NG/ML
METHADONE UR QL: NEGATIVE NG/ML
METHAMPHET UR QL CFM: 207 NG/ML
OPIATES SERPL QL SCN: NEGATIVE NG/ML
OXYCODONE SERPL-MCNC: NEGATIVE NG/ML
PCP SPEC-MCNC: NEGATIVE NG/ML
PROPOXYPH SPEC QL: NEGATIVE NG/ML
THC SERPLBLD CFM-MCNC: NEGATIVE NG/ML

## 2019-08-09 NOTE — TELEPHONE ENCOUNTER
Spoke with [facility] yesterday and placed this order.         ----- Message from Delgado Rose MD sent at 4/10/2017 12:24 PM EDT -----  Will defer to their wound nurse   Will need nurse, and an aid  Probably doesn't need PT at present  ----- Message -----     From: Janie Carson MA     Sent: 4/10/2017  11:08 AM       To: Delgado Rose MD    What kind of wound care does he need to receive?    Does he need PT, nurse gabby, also?           
No

## 2019-08-19 DIAGNOSIS — S24.102S T6 SPINAL CORD INJURY, SEQUELA (HCC): ICD-10-CM

## 2019-08-19 RX ORDER — GABAPENTIN 800 MG/1
800 TABLET ORAL 3 TIMES DAILY
Qty: 90 TABLET | Refills: 1 | Status: SHIPPED | OUTPATIENT
Start: 2019-08-19 | End: 2019-09-07

## 2019-08-19 RX ORDER — SULFAMETHOXAZOLE AND TRIMETHOPRIM 800; 160 MG/1; MG/1
1 TABLET ORAL 2 TIMES DAILY
Qty: 20 TABLET | Refills: 0 | Status: SHIPPED | OUTPATIENT
Start: 2019-08-19 | End: 2019-08-29

## 2019-09-07 RX ORDER — AMOXICILLIN AND CLAVULANATE POTASSIUM 875; 125 MG/1; MG/1
1 TABLET, FILM COATED ORAL 2 TIMES DAILY
Qty: 20 TABLET | Refills: 0 | Status: SHIPPED | OUTPATIENT
Start: 2019-09-07 | End: 2019-10-14

## 2019-09-07 RX ORDER — FLUCONAZOLE 150 MG/1
150 TABLET ORAL 2 TIMES WEEKLY
Qty: 4 TABLET | Refills: 0 | Status: SHIPPED | OUTPATIENT
Start: 2019-09-09 | End: 2020-02-14

## 2019-09-19 DIAGNOSIS — F33.41 RECURRENT MAJOR DEPRESSIVE DISORDER, IN PARTIAL REMISSION (HCC): ICD-10-CM

## 2019-09-20 DIAGNOSIS — F33.41 RECURRENT MAJOR DEPRESSIVE DISORDER, IN PARTIAL REMISSION (HCC): ICD-10-CM

## 2019-09-20 RX ORDER — VENLAFAXINE HYDROCHLORIDE 75 MG/1
CAPSULE, EXTENDED RELEASE ORAL
Qty: 90 CAPSULE | Refills: 4 | Status: CANCELLED | OUTPATIENT
Start: 2019-09-20

## 2019-09-20 RX ORDER — VENLAFAXINE HYDROCHLORIDE 75 MG/1
CAPSULE, EXTENDED RELEASE ORAL
Qty: 30 CAPSULE | Refills: 5 | Status: SHIPPED | OUTPATIENT
Start: 2019-09-20 | End: 2019-12-16 | Stop reason: SDUPTHER

## 2019-09-21 RX ORDER — SULFAMETHOXAZOLE AND TRIMETHOPRIM 800; 160 MG/1; MG/1
1 TABLET ORAL 2 TIMES DAILY
Qty: 20 TABLET | Refills: 0 | Status: SHIPPED | OUTPATIENT
Start: 2019-09-21 | End: 2019-10-01

## 2019-10-14 ENCOUNTER — TELEPHONE (OUTPATIENT)
Dept: FAMILY MEDICINE CLINIC | Facility: CLINIC | Age: 44
End: 2019-10-14

## 2019-10-14 RX ORDER — SULFAMETHOXAZOLE AND TRIMETHOPRIM 800; 160 MG/1; MG/1
1 TABLET ORAL 2 TIMES DAILY
Qty: 20 TABLET | Refills: 0 | Status: SHIPPED | OUTPATIENT
Start: 2019-10-14 | End: 2019-10-24

## 2019-10-14 NOTE — TELEPHONE ENCOUNTER
Left a message for the patient to give us a call back.   ----- Message from Delgado Rose MD sent at 10/14/2019  9:43 AM EDT -----  [facility] sent a script for bactrim but he really should have a urinalysis and culture before starting as it is likely a multidrug resistant bacteria    ----- Message -----  From: Candace Cross MA  Sent: 10/14/2019   8:30 AM  To: Delgado Rose MD    Patient called in wanting to know if you could send him in another antibioitc and diflucan for a UTI.

## 2019-10-17 ENCOUNTER — OFFICE VISIT (OUTPATIENT)
Dept: FAMILY MEDICINE CLINIC | Facility: CLINIC | Age: 44
End: 2019-10-17

## 2019-10-17 VITALS
BODY MASS INDEX: 27.12 KG/M2 | SYSTOLIC BLOOD PRESSURE: 110 MMHG | DIASTOLIC BLOOD PRESSURE: 70 MMHG | OXYGEN SATURATION: 93 % | RESPIRATION RATE: 12 BRPM | HEART RATE: 112 BPM | HEIGHT: 72 IN | TEMPERATURE: 98.9 F

## 2019-10-17 DIAGNOSIS — F33.41 RECURRENT MAJOR DEPRESSIVE DISORDER, IN PARTIAL REMISSION (HCC): ICD-10-CM

## 2019-10-17 DIAGNOSIS — J30.2 CHRONIC SEASONAL ALLERGIC RHINITIS: Primary | ICD-10-CM

## 2019-10-17 DIAGNOSIS — M25.511 ACUTE PAIN OF RIGHT SHOULDER: ICD-10-CM

## 2019-10-17 DIAGNOSIS — K21.9 GASTROESOPHAGEAL REFLUX DISEASE WITHOUT ESOPHAGITIS: ICD-10-CM

## 2019-10-17 DIAGNOSIS — Z00.00 HEALTHCARE MAINTENANCE: ICD-10-CM

## 2019-10-17 DIAGNOSIS — L73.9 FOLLICULITIS: ICD-10-CM

## 2019-10-17 DIAGNOSIS — Z89.612 HISTORY OF LEFT LOWER EXTREMITY AMPUTATION (HCC): ICD-10-CM

## 2019-10-17 DIAGNOSIS — F17.200 SMOKER: ICD-10-CM

## 2019-10-17 DIAGNOSIS — M86.39 CHRONIC MULTIFOCAL OSTEOMYELITIS OF MULTIPLE SITES (HCC): ICD-10-CM

## 2019-10-17 DIAGNOSIS — N31.2 ATONIC BLADDER: ICD-10-CM

## 2019-10-17 DIAGNOSIS — S24.102S T6 SPINAL CORD INJURY, SEQUELA (HCC): ICD-10-CM

## 2019-10-17 PROBLEM — A41.9 SEPTIC SHOCK: Status: RESOLVED | Noted: 2019-06-18 | Resolved: 2019-10-17

## 2019-10-17 PROBLEM — R65.21 SEPTIC SHOCK: Status: RESOLVED | Noted: 2019-06-18 | Resolved: 2019-10-17

## 2019-10-17 PROBLEM — A49.8 PROTEUS MIRABILIS INFECTION: Status: RESOLVED | Noted: 2019-07-01 | Resolved: 2019-10-17

## 2019-10-17 PROBLEM — L89.159 PRESSURE ULCER OF COCCYGEAL REGION: Status: RESOLVED | Noted: 2019-07-01 | Resolved: 2019-10-17

## 2019-10-17 PROCEDURE — 99214 OFFICE O/P EST MOD 30 MIN: CPT | Performed by: GENERAL PRACTICE

## 2019-10-17 RX ORDER — GABAPENTIN 800 MG/1
TABLET ORAL
COMMUNITY
Start: 2019-09-16 | End: 2019-10-17 | Stop reason: SDUPTHER

## 2019-10-17 RX ORDER — GABAPENTIN 800 MG/1
800 TABLET ORAL 3 TIMES DAILY
Qty: 90 TABLET | Refills: 3 | Status: SHIPPED | OUTPATIENT
Start: 2019-10-17 | End: 2020-02-14 | Stop reason: SDUPTHER

## 2019-10-17 RX ORDER — IBUPROFEN 600 MG/1
600 TABLET ORAL 3 TIMES DAILY PRN
Qty: 90 TABLET | Refills: 0 | Status: SHIPPED | OUTPATIENT
Start: 2019-10-17 | End: 2019-11-14 | Stop reason: SDUPTHER

## 2019-10-17 RX ORDER — RIFAMPIN 150 MG/1
150 CAPSULE ORAL 3 TIMES DAILY
Qty: 6 CAPSULE | Refills: 0 | Status: SHIPPED | OUTPATIENT
Start: 2019-10-17 | End: 2020-02-14

## 2019-10-17 RX ORDER — OMEPRAZOLE 20 MG/1
20 CAPSULE, DELAYED RELEASE ORAL DAILY
Qty: 30 CAPSULE | Refills: 0 | Status: SHIPPED | OUTPATIENT
Start: 2019-10-17 | End: 2019-11-14 | Stop reason: SDUPTHER

## 2019-10-17 NOTE — PROGRESS NOTES
Subjective   Alex Tierney is a 44 y.o. male.     History of Present Illness     Spinal Cord Injury  Level T6 complicated by recurrent decubitus ulcers and osteomyelitis requiring a previous left BKA, bilateral hip osteotomies, permanent colostomy and a chronic indwelling bladder catheter. Underwent extensive surgery for closure of a number of sacral decubitus ulcers shortly following his last appointment.  He denies any recurrence.  He has been unable to return to straight caths.  He continues to feel that gabapentin helps with his back and lower extremity discomfort.      Right Shoulder Pain  He gives an approximate two week history of right shoulder pain.  There is no history of any strain or trauma nor any change in his activities.  The pain is described as sharp deep ache with activities above shoulder height.  This does not radiate elsewhere and is been unassociated with any other symptoms.  There is no history of any stiffness or swelling any denies any weakness, numbness, or tingling.  There is no history of any fever or chills.  He is right-hand dominant and gives no history of any previous problems with that joint.  He has started on ibuprofen 200 nightly and feels that it has helped some with no apparent side effects    Rash  He gives an approximate six month history of a rash about the groin that has gradually become generalized.  This tends to start as a bump that then drains and ulcerates.  The areas burn.  There is no history of any fever or chills.  He started on trimethoprim sulfamethoxazole several days ago    Depression  His mood has been fairly good. He denies any loss of interest in activities, difficulty concentrating, or any problem with his appetite or sleep.  He remains on venlafaxine er 225 qd. He apparently ran out of amitriptyline some time ago.  He denies any suicidal ideation.    Allergic Rhinitis  Patient has a history of allergic rhinitis. Patient's symptoms include sneezing, clear  rhinorrhea, nasal congestion, periorbital pressure and postnasal drip. These symptoms are perennial with seasonal exacerbation. The patient has been suffering from these symptoms for a number of years . The patient has tried prescription antihistamine - cetirizine and prescription nasal corticosteroid - fluticasone with good relief of symptoms.     The following portions of the patient's history were reviewed and updated as appropriate: allergies, current medications, past medical history, past social history and problem list.    Review of Systems   Constitutional: Positive for fatigue. Negative for appetite change, chills, fever and unexpected weight change.   HENT: Negative for congestion, ear pain, rhinorrhea, sneezing, sore throat and voice change.    Eyes: Negative for visual disturbance.   Respiratory: Negative for cough, shortness of breath and wheezing.    Cardiovascular: Negative for chest pain, palpitations and leg swelling.   Gastrointestinal: Negative for abdominal pain, blood in stool, constipation, diarrhea, nausea and vomiting.   Endocrine: Negative for polydipsia and polyuria.   Genitourinary: Negative for difficulty urinating, dysuria, frequency, hematuria and urgency.   Musculoskeletal: Positive for arthralgias and back pain. Negative for joint swelling, myalgias and neck pain.   Skin: Positive for rash. Negative for color change and wound.   Neurological: Positive for weakness and numbness. Negative for tremors and headaches.   Psychiatric/Behavioral: Positive for dysphoric mood (intermittent - mild). Negative for sleep disturbance and suicidal ideas. The patient is nervous/anxious.      Objective   Physical Exam   Constitutional: He is oriented to person, place, and time. He appears well-developed and well-nourished. He is cooperative. He does not have a sickly appearance. No distress.   Sitting in a wheelchair.  Status post left BKA. Bright and in fair spirits. No apparent distress at rest. No  pallor, jaundice, diaphoresis, or cyanosis   HENT:   Head: Atraumatic.   Right Ear: Tympanic membrane, external ear and ear canal normal.   Left Ear: Tympanic membrane, external ear and ear canal normal.   Mouth/Throat: Oropharynx is clear and moist.   Eyes: EOM are normal. Pupils are equal, round, and reactive to light. No scleral icterus.   Neck: No JVD present. Carotid bruit is not present. No tracheal deviation present. No thyromegaly present.   Cardiovascular: Normal rate, regular rhythm, S1 normal, S2 normal, normal heart sounds and intact distal pulses. Exam reveals no gallop.   No murmur heard.  Pulmonary/Chest: Breath sounds normal. He has no wheezes. He has no rales.   Abdominal: Soft. Normal aorta and bowel sounds are normal. He exhibits no abdominal bruit and no mass. There is no hepatosplenomegaly. There is no tenderness. No hernia.   Musculoskeletal: He exhibits no tenderness or deformity.        Right shoulder: He exhibits normal range of motion (however pain with abduction and flexion past 90 degrees as well as full rotation), no tenderness, no swelling, no crepitus and no deformity.   Lymphadenopathy:        Head (right side): No submandibular adenopathy present.        Head (left side): No submandibular adenopathy present.     He has no cervical adenopathy.   Neurological: He is alert and oriented to person, place, and time. No cranial nerve deficit.   Spastic paraplegia   Skin: Skin is warm and dry. Rash (scattered 0.5-1.5 cm erythematous macules, superficial ulcers, and scabs -most seem to surround a hair follicle) noted. He is not diaphoretic. No pallor. Nails show no clubbing.   Psychiatric: He has a normal mood and affect. His speech is normal and behavior is normal. Thought content normal.     Assessment/Plan   Problems Addressed this Visit        Respiratory    Chronic seasonal allergic rhinitis    Continue current medication           Digestive    Gastroesophageal reflux disease without  esophagitis   Symptoms are currently well controlled.  Reminded regarding lifestyle modification.  Will start on omeprazole while on ibuprofen    Relevant Medications    omeprazole (priLOSEC) 20 MG capsule       Nervous and Auditory    T6 spinal cord injury (CMS/HCC)  With atonic bladder  We will arrange a urology assessment    Relevant Medications    gabapentin (NEURONTIN) 800 MG tablet    Other Relevant Orders    Ambulatory Referral to Urology    Acute pain of right shoulder  Advised regarding rest, gentle exercise, ice and heat.  We will start on increased dose of ibuprofen  Encouraged to report if any worse, any new symptoms, or if not resolving over the next several weeks    Relevant Medications    ibuprofen (ADVIL,MOTRIN) 600 MG tablet       Musculoskeletal and Integument    Chronic multifocal osteomyelitis of multiple sites (CMS/HCC)    Relevant Medications    rifAMPin (RIFADIN) 150 MG capsule    Atonic bladder  As above.    Relevant Orders    Ambulatory Referral to Urology    Folliculitis  Advised regarding skin care  Encouraged to complete prescribed course of trimethoprim sulfamethoxazole  Will follow this with several days of rifampin  Will also add nasal mupirocin throughout  Encouraged to report if any worse, any new symptoms, or if not resolving over the next several weeks    Relevant Medications    mupirocin (BACTROBAN) 2 % nasal ointment    rifAMPin (RIFADIN) 150 MG capsule       Other    Recurrent major depressive disorder, in partial remission (CMS/HCC)  Stable.  Supportive therapy.   Continue current medication.    Smoker    History of left lower extremity amputation (CMS/HCC)    Healthcare maintenance  Patient will receive a flu shot through his pharmacy as clinic supply is exhausted

## 2019-11-14 DIAGNOSIS — K21.9 GASTROESOPHAGEAL REFLUX DISEASE WITHOUT ESOPHAGITIS: ICD-10-CM

## 2019-11-14 DIAGNOSIS — M25.511 ACUTE PAIN OF RIGHT SHOULDER: ICD-10-CM

## 2019-11-14 RX ORDER — IBUPROFEN 600 MG/1
TABLET ORAL
Qty: 90 TABLET | Refills: 0 | Status: SHIPPED | OUTPATIENT
Start: 2019-11-14 | End: 2019-12-15 | Stop reason: SDUPTHER

## 2019-11-14 RX ORDER — OMEPRAZOLE 20 MG/1
CAPSULE, DELAYED RELEASE ORAL
Qty: 30 CAPSULE | Refills: 0 | Status: SHIPPED | OUTPATIENT
Start: 2019-11-14 | End: 2019-12-15 | Stop reason: SDUPTHER

## 2019-11-18 RX ORDER — CIPROFLOXACIN 250 MG/1
250 TABLET, FILM COATED ORAL 2 TIMES DAILY
Qty: 20 TABLET | Refills: 0 | Status: SHIPPED | OUTPATIENT
Start: 2019-11-18 | End: 2019-11-28

## 2019-11-19 ENCOUNTER — TELEPHONE (OUTPATIENT)
Dept: FAMILY MEDICINE CLINIC | Facility: CLINIC | Age: 44
End: 2019-11-19

## 2019-11-19 NOTE — TELEPHONE ENCOUNTER
----- Message from Delgado Rose MD sent at 11/18/2019  4:42 PM EST -----  Emailed script for cipro    ----- Message -----  From: Candace Cross MA  Sent: 11/18/2019   1:04 PM  To: Delgado Rose MD    Patient wants to know if you could send him another antibiotic for a uti until he see's. Dr. Silva

## 2019-12-15 DIAGNOSIS — K21.9 GASTROESOPHAGEAL REFLUX DISEASE WITHOUT ESOPHAGITIS: ICD-10-CM

## 2019-12-15 DIAGNOSIS — M25.511 ACUTE PAIN OF RIGHT SHOULDER: ICD-10-CM

## 2019-12-16 DIAGNOSIS — F33.1 MAJOR DEPRESSIVE DISORDER, RECURRENT EPISODE, MODERATE WITH MIXED FEATURES (HCC): ICD-10-CM

## 2019-12-16 DIAGNOSIS — N31.2 ATONIC BLADDER: ICD-10-CM

## 2019-12-16 RX ORDER — VENLAFAXINE HYDROCHLORIDE 150 MG/1
CAPSULE, EXTENDED RELEASE ORAL
Qty: 90 CAPSULE | Refills: 0 | Status: SHIPPED | OUTPATIENT
Start: 2019-12-16 | End: 2020-03-16

## 2019-12-16 RX ORDER — OMEPRAZOLE 20 MG/1
CAPSULE, DELAYED RELEASE ORAL
Qty: 30 CAPSULE | Refills: 0 | Status: SHIPPED | OUTPATIENT
Start: 2019-12-16 | End: 2020-01-14

## 2019-12-16 RX ORDER — OXYBUTYNIN CHLORIDE 10 MG/1
TABLET, EXTENDED RELEASE ORAL
Qty: 90 TABLET | Refills: 4 | Status: SHIPPED | OUTPATIENT
Start: 2019-12-16 | End: 2020-06-15 | Stop reason: SDUPTHER

## 2019-12-16 RX ORDER — IBUPROFEN 600 MG/1
TABLET ORAL
Qty: 90 TABLET | Refills: 0 | Status: SHIPPED | OUTPATIENT
Start: 2019-12-16 | End: 2020-01-14

## 2019-12-18 ENCOUNTER — TELEPHONE (OUTPATIENT)
Dept: FAMILY MEDICINE CLINIC | Facility: CLINIC | Age: 44
End: 2019-12-18

## 2019-12-18 DIAGNOSIS — S24.102S T6 SPINAL CORD INJURY, SEQUELA (HCC): ICD-10-CM

## 2019-12-18 RX ORDER — BACLOFEN 10 MG/1
TABLET ORAL
Qty: 540 TABLET | Refills: 3 | Status: SHIPPED | OUTPATIENT
Start: 2019-12-18 | End: 2020-06-15

## 2019-12-18 RX ORDER — CIPROFLOXACIN 500 MG/1
500 TABLET, FILM COATED ORAL 2 TIMES DAILY
Qty: 20 TABLET | Refills: 0 | Status: SHIPPED | OUTPATIENT
Start: 2019-12-18 | End: 2019-12-28

## 2019-12-18 NOTE — TELEPHONE ENCOUNTER
----- Message from Delgado Rose MD sent at 12/18/2019  2:10 PM EST -----  Emailed script for cipro    ----- Message -----  From: Candace Cross MA  Sent: 12/18/2019   1:38 PM EST  To: Delgado Rose MD    Patient has another UTI infection could you send him in an antibiotic?

## 2020-01-05 ENCOUNTER — APPOINTMENT (OUTPATIENT)
Dept: GENERAL RADIOLOGY | Facility: HOSPITAL | Age: 45
End: 2020-01-05

## 2020-01-05 ENCOUNTER — HOSPITAL ENCOUNTER (EMERGENCY)
Facility: HOSPITAL | Age: 45
Discharge: ANOTHER HOSPITAL | End: 2020-01-05
Attending: FAMILY MEDICINE | Admitting: FAMILY MEDICINE

## 2020-01-05 VITALS
RESPIRATION RATE: 18 BRPM | TEMPERATURE: 98.2 F | BODY MASS INDEX: 26.51 KG/M2 | OXYGEN SATURATION: 100 % | HEIGHT: 73 IN | WEIGHT: 200 LBS | SYSTOLIC BLOOD PRESSURE: 128 MMHG | HEART RATE: 96 BPM | DIASTOLIC BLOOD PRESSURE: 86 MMHG

## 2020-01-05 DIAGNOSIS — J96.00 ACUTE RESPIRATORY FAILURE, UNSPECIFIED WHETHER WITH HYPOXIA OR HYPERCAPNIA (HCC): ICD-10-CM

## 2020-01-05 DIAGNOSIS — R20.9 COLD RIGHT FOOT: ICD-10-CM

## 2020-01-05 DIAGNOSIS — T43.014A TRICYCLIC ANTIDEPRESSANT OVERDOSE OF UNDETERMINED INTENT, INITIAL ENCOUNTER: Primary | ICD-10-CM

## 2020-01-05 DIAGNOSIS — I49.9 CARDIAC ARRHYTHMIA, UNSPECIFIED CARDIAC ARRHYTHMIA TYPE: ICD-10-CM

## 2020-01-05 DIAGNOSIS — I95.9 HYPOTENSION, UNSPECIFIED HYPOTENSION TYPE: ICD-10-CM

## 2020-01-05 DIAGNOSIS — F19.10 POLYSUBSTANCE ABUSE (HCC): ICD-10-CM

## 2020-01-05 LAB
6-ACETYL MORPHINE: NEGATIVE
A-A DO2: 233.1 MMHG (ref 0–300)
A-A DO2: 94.8 MMHG (ref 0–300)
ALBUMIN SERPL-MCNC: 3.17 G/DL (ref 3.5–5.2)
ALBUMIN/GLOB SERPL: 1 G/DL
ALP SERPL-CCNC: 87 U/L (ref 39–117)
ALT SERPL W P-5'-P-CCNC: 17 U/L (ref 1–41)
AMPHET+METHAMPHET UR QL: POSITIVE
ANION GAP SERPL CALCULATED.3IONS-SCNC: 17.9 MMOL/L (ref 5–15)
APAP SERPL-MCNC: <5 MCG/ML (ref 10–30)
ARTERIAL PATENCY WRIST A: ABNORMAL
ARTERIAL PATENCY WRIST A: POSITIVE
AST SERPL-CCNC: 27 U/L (ref 1–40)
ATMOSPHERIC PRESS: 729 MMHG
ATMOSPHERIC PRESS: 729 MMHG
BACTERIA UR QL AUTO: ABNORMAL /HPF
BARBITURATES UR QL SCN: NEGATIVE
BASE EXCESS BLDA CALC-SCNC: -1.1 MMOL/L (ref 0–2)
BASE EXCESS BLDA CALC-SCNC: -1.6 MMOL/L (ref 0–2)
BASOPHILS # BLD AUTO: 0.06 10*3/MM3 (ref 0–0.2)
BASOPHILS NFR BLD AUTO: 0.4 % (ref 0–1.5)
BDY SITE: ABNORMAL
BDY SITE: ABNORMAL
BENZODIAZ UR QL SCN: POSITIVE
BILIRUB SERPL-MCNC: 0.6 MG/DL (ref 0.2–1.2)
BILIRUB UR QL STRIP: NEGATIVE
BODY TEMPERATURE: 0 C
BODY TEMPERATURE: 0 C
BUN BLD-MCNC: 22 MG/DL (ref 6–20)
BUN/CREAT SERPL: 28.9 (ref 7–25)
BUPRENORPHINE SERPL-MCNC: NEGATIVE NG/ML
CALCIUM SPEC-SCNC: 8.3 MG/DL (ref 8.6–10.5)
CANNABINOIDS SERPL QL: NEGATIVE
CHLORIDE SERPL-SCNC: 101 MMOL/L (ref 98–107)
CK SERPL-CCNC: 333 U/L (ref 20–200)
CLARITY UR: ABNORMAL
CO2 BLDA-SCNC: 24.1 MMOL/L (ref 22–33)
CO2 BLDA-SCNC: 25.8 MMOL/L (ref 22–33)
CO2 SERPL-SCNC: 20.1 MMOL/L (ref 22–29)
COCAINE UR QL: NEGATIVE
COHGB MFR BLD: 0.9 % (ref 0–5)
COHGB MFR BLD: 1.2 % (ref 0–5)
COLOR UR: ABNORMAL
CREAT BLD-MCNC: 0.76 MG/DL (ref 0.76–1.27)
D-LACTATE SERPL-SCNC: 2.9 MMOL/L (ref 0.5–2)
DEPRECATED RDW RBC AUTO: 49 FL (ref 37–54)
EOSINOPHIL # BLD AUTO: 0.26 10*3/MM3 (ref 0–0.4)
EOSINOPHIL NFR BLD AUTO: 1.8 % (ref 0.3–6.2)
ERYTHROCYTE [DISTWIDTH] IN BLOOD BY AUTOMATED COUNT: 15.6 % (ref 12.3–15.4)
ETHANOL BLD-MCNC: <10 MG/DL (ref 0–10)
ETHANOL UR QL: <0.01 %
GFR SERPL CREATININE-BSD FRML MDRD: 111 ML/MIN/1.73
GLOBULIN UR ELPH-MCNC: 3 GM/DL
GLUCOSE BLD-MCNC: 79 MG/DL (ref 65–99)
GLUCOSE BLDC GLUCOMTR-MCNC: 75 MG/DL (ref 70–130)
GLUCOSE UR STRIP-MCNC: NEGATIVE MG/DL
HCO3 BLDA-SCNC: 23 MMOL/L (ref 20–26)
HCO3 BLDA-SCNC: 24.4 MMOL/L (ref 20–26)
HCT VFR BLD AUTO: 46 % (ref 37.5–51)
HCT VFR BLD CALC: 45.7 % (ref 38–51)
HCT VFR BLD CALC: 47.6 % (ref 38–51)
HGB BLD-MCNC: 14.3 G/DL (ref 13–17.7)
HGB BLDA-MCNC: 14.9 G/DL (ref 14–18)
HGB BLDA-MCNC: 15.5 G/DL (ref 14–18)
HGB UR QL STRIP.AUTO: ABNORMAL
HOLD SPECIMEN: NORMAL
HOROWITZ INDEX BLD+IHG-RTO: 100 %
HOROWITZ INDEX BLD+IHG-RTO: 60 %
HYALINE CASTS UR QL AUTO: ABNORMAL /LPF
IMM GRANULOCYTES # BLD AUTO: 0.15 10*3/MM3 (ref 0–0.05)
IMM GRANULOCYTES NFR BLD AUTO: 1 % (ref 0–0.5)
KETONES UR QL STRIP: ABNORMAL
LEUKOCYTE ESTERASE UR QL STRIP.AUTO: ABNORMAL
LYMPHOCYTES # BLD AUTO: 0.89 10*3/MM3 (ref 0.7–3.1)
LYMPHOCYTES NFR BLD AUTO: 6 % (ref 19.6–45.3)
Lab: ABNORMAL
Lab: ABNORMAL
MAGNESIUM SERPL-MCNC: 2 MG/DL (ref 1.6–2.6)
MCH RBC QN AUTO: 26.8 PG (ref 26.6–33)
MCHC RBC AUTO-ENTMCNC: 31.1 G/DL (ref 31.5–35.7)
MCV RBC AUTO: 86.1 FL (ref 79–97)
METHADONE UR QL SCN: NEGATIVE
METHGB BLD QL: 0.3 % (ref 0–3)
METHGB BLD QL: 0.4 % (ref 0–3)
MODALITY: ABNORMAL
MODALITY: ABNORMAL
MONOCYTES # BLD AUTO: 1.09 10*3/MM3 (ref 0.1–0.9)
MONOCYTES NFR BLD AUTO: 7.3 % (ref 5–12)
MYOGLOBIN SERPL-MCNC: 222.4 NG/ML (ref 28–72)
NEUTROPHILS # BLD AUTO: 12.38 10*3/MM3 (ref 1.7–7)
NEUTROPHILS NFR BLD AUTO: 83.5 % (ref 42.7–76)
NITRITE UR QL STRIP: POSITIVE
NOTE: ABNORMAL
NOTE: ABNORMAL
NRBC BLD AUTO-RTO: 0 /100 WBC (ref 0–0.2)
NT-PROBNP SERPL-MCNC: 30.5 PG/ML (ref 5–450)
OPIATES UR QL: NEGATIVE
OXYCODONE UR QL SCN: NEGATIVE
OXYHGB MFR BLDV: 98.3 % (ref 94–99)
OXYHGB MFR BLDV: 98.5 % (ref 94–99)
PCO2 BLDA: 36 MM HG (ref 35–45)
PCO2 BLDA: 45 MM HG (ref 35–45)
PCO2 TEMP ADJ BLD: ABNORMAL MM[HG]
PCO2 TEMP ADJ BLD: ABNORMAL MM[HG]
PCP UR QL SCN: NEGATIVE
PEEP RESPIRATORY: 5 CM[H2O]
PH BLDA: 7.34 PH UNITS (ref 7.35–7.45)
PH BLDA: 7.41 PH UNITS (ref 7.35–7.45)
PH UR STRIP.AUTO: <=5 [PH] (ref 5–8)
PH, TEMP CORRECTED: ABNORMAL
PH, TEMP CORRECTED: ABNORMAL
PLATELET # BLD AUTO: 184 10*3/MM3 (ref 140–450)
PMV BLD AUTO: 10.5 FL (ref 6–12)
PO2 BLDA: 266 MM HG (ref 83–108)
PO2 BLDA: 412 MM HG (ref 83–108)
PO2 TEMP ADJ BLD: ABNORMAL MM[HG]
PO2 TEMP ADJ BLD: ABNORMAL MM[HG]
POTASSIUM BLD-SCNC: 3.8 MMOL/L (ref 3.5–5.2)
PROT SERPL-MCNC: 6.2 G/DL (ref 6–8.5)
PROT UR QL STRIP: ABNORMAL
RBC # BLD AUTO: 5.34 10*6/MM3 (ref 4.14–5.8)
RBC # UR: ABNORMAL /HPF
REF LAB TEST METHOD: ABNORMAL
SALICYLATES SERPL-MCNC: <0.3 MG/DL
SAO2 % BLDCOA: >99.2 % (ref 94–99)
SAO2 % BLDCOA: >99.2 % (ref 94–99)
SET MECH RESP RATE: 18
SODIUM BLD-SCNC: 139 MMOL/L (ref 136–145)
SP GR UR STRIP: >=1.03 (ref 1–1.03)
SQUAMOUS #/AREA URNS HPF: ABNORMAL /HPF
TROPONIN T SERPL-MCNC: <0.01 NG/ML (ref 0–0.03)
TSH SERPL DL<=0.05 MIU/L-ACNC: 1.01 UIU/ML (ref 0.27–4.2)
UROBILINOGEN UR QL STRIP: ABNORMAL
VENTILATOR MODE: ABNORMAL
VENTILATOR MODE: ABNORMAL
VT ON VENT VENT: 500 ML
WBC NRBC COR # BLD: 14.83 10*3/MM3 (ref 3.4–10.8)
WBC UR QL AUTO: ABNORMAL /HPF

## 2020-01-05 PROCEDURE — 94003 VENT MGMT INPAT SUBQ DAY: CPT

## 2020-01-05 PROCEDURE — 87186 SC STD MICRODIL/AGAR DIL: CPT | Performed by: FAMILY MEDICINE

## 2020-01-05 PROCEDURE — 80307 DRUG TEST PRSMV CHEM ANLYZR: CPT | Performed by: FAMILY MEDICINE

## 2020-01-05 PROCEDURE — 82805 BLOOD GASES W/O2 SATURATION: CPT

## 2020-01-05 PROCEDURE — P9612 CATHETERIZE FOR URINE SPEC: HCPCS

## 2020-01-05 PROCEDURE — 83735 ASSAY OF MAGNESIUM: CPT | Performed by: FAMILY MEDICINE

## 2020-01-05 PROCEDURE — 80053 COMPREHEN METABOLIC PANEL: CPT | Performed by: FAMILY MEDICINE

## 2020-01-05 PROCEDURE — 36600 WITHDRAWAL OF ARTERIAL BLOOD: CPT

## 2020-01-05 PROCEDURE — 71045 X-RAY EXAM CHEST 1 VIEW: CPT

## 2020-01-05 PROCEDURE — 85025 COMPLETE CBC W/AUTO DIFF WBC: CPT | Performed by: FAMILY MEDICINE

## 2020-01-05 PROCEDURE — 84443 ASSAY THYROID STIM HORMONE: CPT | Performed by: FAMILY MEDICINE

## 2020-01-05 PROCEDURE — 96361 HYDRATE IV INFUSION ADD-ON: CPT

## 2020-01-05 PROCEDURE — 84484 ASSAY OF TROPONIN QUANT: CPT | Performed by: FAMILY MEDICINE

## 2020-01-05 PROCEDURE — 31500 INSERT EMERGENCY AIRWAY: CPT

## 2020-01-05 PROCEDURE — 96368 THER/DIAG CONCURRENT INF: CPT

## 2020-01-05 PROCEDURE — 83880 ASSAY OF NATRIURETIC PEPTIDE: CPT | Performed by: FAMILY MEDICINE

## 2020-01-05 PROCEDURE — 87077 CULTURE AEROBIC IDENTIFY: CPT | Performed by: FAMILY MEDICINE

## 2020-01-05 PROCEDURE — 93005 ELECTROCARDIOGRAM TRACING: CPT | Performed by: FAMILY MEDICINE

## 2020-01-05 PROCEDURE — 96366 THER/PROPH/DIAG IV INF ADDON: CPT

## 2020-01-05 PROCEDURE — 96376 TX/PRO/DX INJ SAME DRUG ADON: CPT

## 2020-01-05 PROCEDURE — 87086 URINE CULTURE/COLONY COUNT: CPT | Performed by: FAMILY MEDICINE

## 2020-01-05 PROCEDURE — 83605 ASSAY OF LACTIC ACID: CPT | Performed by: FAMILY MEDICINE

## 2020-01-05 PROCEDURE — 83050 HGB METHEMOGLOBIN QUAN: CPT

## 2020-01-05 PROCEDURE — 96367 TX/PROPH/DG ADDL SEQ IV INF: CPT

## 2020-01-05 PROCEDURE — 83874 ASSAY OF MYOGLOBIN: CPT | Performed by: FAMILY MEDICINE

## 2020-01-05 PROCEDURE — 94799 UNLISTED PULMONARY SVC/PX: CPT

## 2020-01-05 PROCEDURE — 93010 ELECTROCARDIOGRAM REPORT: CPT | Performed by: INTERNAL MEDICINE

## 2020-01-05 PROCEDURE — 82550 ASSAY OF CK (CPK): CPT | Performed by: FAMILY MEDICINE

## 2020-01-05 PROCEDURE — 94002 VENT MGMT INPAT INIT DAY: CPT

## 2020-01-05 PROCEDURE — 82962 GLUCOSE BLOOD TEST: CPT

## 2020-01-05 PROCEDURE — 82375 ASSAY CARBOXYHB QUANT: CPT

## 2020-01-05 PROCEDURE — 25010000002 PIPERACILLIN-TAZOBACTAM: Performed by: FAMILY MEDICINE

## 2020-01-05 PROCEDURE — 96365 THER/PROPH/DIAG IV INF INIT: CPT

## 2020-01-05 PROCEDURE — 99285 EMERGENCY DEPT VISIT HI MDM: CPT

## 2020-01-05 PROCEDURE — 81001 URINALYSIS AUTO W/SCOPE: CPT | Performed by: FAMILY MEDICINE

## 2020-01-05 PROCEDURE — C1751 CATH, INF, PER/CENT/MIDLINE: HCPCS

## 2020-01-05 PROCEDURE — 87040 BLOOD CULTURE FOR BACTERIA: CPT | Performed by: FAMILY MEDICINE

## 2020-01-05 RX ORDER — SODIUM CHLORIDE, SODIUM LACTATE, POTASSIUM CHLORIDE, CALCIUM CHLORIDE 600; 310; 30; 20 MG/100ML; MG/100ML; MG/100ML; MG/100ML
1000 INJECTION, SOLUTION INTRAVENOUS CONTINUOUS
Status: DISCONTINUED | OUTPATIENT
Start: 2020-01-05 | End: 2020-01-06 | Stop reason: HOSPADM

## 2020-01-05 RX ORDER — LINEZOLID 2 MG/ML
600 INJECTION, SOLUTION INTRAVENOUS EVERY 12 HOURS
Status: DISCONTINUED | OUTPATIENT
Start: 2020-01-05 | End: 2020-01-06 | Stop reason: HOSPADM

## 2020-01-05 RX ORDER — SODIUM CHLORIDE 0.9 % (FLUSH) 0.9 %
10 SYRINGE (ML) INJECTION AS NEEDED
Status: DISCONTINUED | OUTPATIENT
Start: 2020-01-05 | End: 2020-01-06 | Stop reason: HOSPADM

## 2020-01-05 RX ADMIN — SODIUM BICARBONATE 150 MEQ: 84 INJECTION, SOLUTION INTRAVENOUS at 18:00

## 2020-01-05 RX ADMIN — Medication 50 MEQ: at 17:37

## 2020-01-05 RX ADMIN — SODIUM CHLORIDE 1000 ML: 9 INJECTION, SOLUTION INTRAVENOUS at 17:30

## 2020-01-05 RX ADMIN — SODIUM CHLORIDE, POTASSIUM CHLORIDE, SODIUM LACTATE AND CALCIUM CHLORIDE 1000 ML: 600; 310; 30; 20 INJECTION, SOLUTION INTRAVENOUS at 17:20

## 2020-01-05 RX ADMIN — PIPERACILLIN SODIUM,TAZOBACTAM SODIUM 3.38 G: 3; .375 INJECTION, POWDER, FOR SOLUTION INTRAVENOUS at 19:47

## 2020-01-05 RX ADMIN — POISON ADSORBENT 50 G: 50 SUSPENSION ORAL at 18:26

## 2020-01-05 RX ADMIN — SODIUM BICARBONATE 50 MEQ: 84 INJECTION, SOLUTION INTRAVENOUS at 17:18

## 2020-01-05 RX ADMIN — NOREPINEPHRINE BITARTRATE 0.02 MCG/KG/MIN: 1 INJECTION INTRAVENOUS at 17:59

## 2020-01-05 NOTE — ED NOTES
Changed patients bedding. Placed a clean sheet and a draw sheet underneath patient with nurse.      Gita Tucker  01/05/20 5062

## 2020-01-05 NOTE — ED NOTES
Multiple round sores noted to saúl legs in various stages of healing     Marguerite Reese, RN  01/05/20 2400

## 2020-01-05 NOTE — ED NOTES
Spoke with Luz from Poison Control who informs that the goal pH is 7.45-7.50.  Levophed is the presser of choice.  Informs to watch for hypokalemia d/t the combination of Levophed and NS.  Informs to infuse a Bicarb drip.  Dr. Reeder notified.     Najma Lopez, LOB[facility]O  01/05/20 9985
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
## 2020-01-05 NOTE — ED NOTES
Called [facility], waiting on a call back at this time.     Rhina Briggs  01/05/20 1832

## 2020-01-06 NOTE — ED NOTES
Unable to palpate pulse in right lower extremity, skin is cyanotic, erythema noted to sacrum, fc in place from home draining yellow urine with sediment.     Marguerite Reese RN  01/06/20 075

## 2020-01-06 NOTE — ED NOTES
8.0 ETT in place, patent and secured upon transport, 18gIV in left wrist in place, patent, and secured. 20gIV RAC in place, patent and secured. 18 fr NG tube left nostril 55 @ nostril. VSS. NADN. Zyvox unable to be administrated due to no pumps. Given to [facility].      Josy Luna RN  01/05/20 2027

## 2020-01-06 NOTE — ED NOTES
Called [facility] spoke with Kerri requesting transport.     Nel Leone  01/05/20 2047

## 2020-01-06 NOTE — ED NOTES
[facility] returned patient at this time due to weather and fuel problems. Report received by [facility] with [employer] [facility] at this time.      Josy Luna RN  01/05/20 5802

## 2020-01-06 NOTE — ED NOTES
Called [facility] spoke with Aisha at this time no available Beds Provider aware.     Nel Leone  01/05/20 2041

## 2020-01-06 NOTE — ED NOTES
Report given to [facility] Kim and Tim, Medic with [facility] at this time.      Josy Luna RN  01/05/20 2045

## 2020-01-06 NOTE — ED NOTES
8.0 ETT in place, patent and secured upon transport, 18gIV in left wrist in place, patent, and secured. 20gIV RAC in place, patent and secured. Left IG triple lumen CL in place, patent and secured. REJ 18gIV in place, patent and secured. 18 fr NG tube left nostril 55 @ nostril. VSS. NADN. Colostomy in left abdomen secured. Sheldon Cath draining and secured. Report given to [facility] at this time.         Josy Luna, RN  01/05/20 5286

## 2020-01-06 NOTE — ED NOTES
Called [employer] at this time spoke with Maddi Accepted flight to [facility]. Nurse aware. ETA 20 mins     Nel Leone  01/05/20 9950

## 2020-01-09 LAB
BACTERIA SPEC AEROBE CULT: ABNORMAL
BACTERIA SPEC AEROBE CULT: ABNORMAL

## 2020-01-10 LAB
BACTERIA SPEC AEROBE CULT: NORMAL
BACTERIA SPEC AEROBE CULT: NORMAL

## 2020-01-14 DIAGNOSIS — K21.9 GASTROESOPHAGEAL REFLUX DISEASE WITHOUT ESOPHAGITIS: ICD-10-CM

## 2020-01-14 DIAGNOSIS — M25.511 ACUTE PAIN OF RIGHT SHOULDER: ICD-10-CM

## 2020-01-14 RX ORDER — IBUPROFEN 600 MG/1
TABLET ORAL
Qty: 90 TABLET | Refills: 0 | Status: SHIPPED | OUTPATIENT
Start: 2020-01-14 | End: 2020-02-17

## 2020-01-14 RX ORDER — OMEPRAZOLE 20 MG/1
CAPSULE, DELAYED RELEASE ORAL
Qty: 30 CAPSULE | Refills: 0 | Status: SHIPPED | OUTPATIENT
Start: 2020-01-14 | End: 2020-02-17

## 2020-01-16 NOTE — PLAN OF CARE
Problem: Patient Care Overview (Adult)  Goal: Adult Individualization and Mutuality  Outcome: Ongoing (interventions implemented as appropriate)    Problem: Activity Intolerance (Adult)  Goal: Activity Tolerance  Outcome: Ongoing (interventions implemented as appropriate)  Goal: Effective Energy Conservation Techniques  Outcome: Ongoing (interventions implemented as appropriate)    Problem: Fall Risk (Adult)  Goal: Absence of Falls  Outcome: Ongoing (interventions implemented as appropriate)    Problem: Skin Integrity Impairment, Risk/Actual (Adult)  Goal: Skin Integrity/Wound Healing  Outcome: Ongoing (interventions implemented as appropriate)    Problem: Mobility, Physical Impaired (Adult)  Goal: Enhanced Mobility Skills  Outcome: Ongoing (interventions implemented as appropriate)  Goal: Enhanced Functionality Ability  Outcome: Ongoing (interventions implemented as appropriate)       Radha Solorio(Attending)

## 2020-02-05 ENCOUNTER — TELEPHONE (OUTPATIENT)
Dept: FAMILY MEDICINE CLINIC | Facility: CLINIC | Age: 45
End: 2020-02-05

## 2020-02-05 RX ORDER — CLOTRIMAZOLE 1 %
CREAM (GRAM) TOPICAL 2 TIMES DAILY
Qty: 60 G | Refills: 0 | Status: SHIPPED | OUTPATIENT
Start: 2020-02-05 | End: 2020-02-14

## 2020-02-05 RX ORDER — CIPROFLOXACIN 500 MG/1
500 TABLET, FILM COATED ORAL 2 TIMES DAILY
Qty: 20 TABLET | Refills: 0 | Status: SHIPPED | OUTPATIENT
Start: 2020-02-05 | End: 2020-02-14

## 2020-02-05 NOTE — TELEPHONE ENCOUNTER
Left message for the patient with this information.     ----- Message from Delgado Rose MD sent at 2/5/2020  3:04 PM EST -----  Emailed    ----- Message -----  From: Candace Cross MA  Sent: 2/5/2020   1:59 PM EST  To: Delgado Rose MD    Patient called in and requested a prescription for an antibiotic for a UTI infection. He also asked if you could send him in some type of anti fungal cream?

## 2020-02-14 ENCOUNTER — OFFICE VISIT (OUTPATIENT)
Dept: FAMILY MEDICINE CLINIC | Facility: CLINIC | Age: 45
End: 2020-02-14

## 2020-02-14 VITALS
SYSTOLIC BLOOD PRESSURE: 117 MMHG | OXYGEN SATURATION: 98 % | BODY MASS INDEX: 26.39 KG/M2 | HEIGHT: 73 IN | HEART RATE: 89 BPM | DIASTOLIC BLOOD PRESSURE: 60 MMHG | RESPIRATION RATE: 12 BRPM | TEMPERATURE: 98.6 F

## 2020-02-14 DIAGNOSIS — F17.200 SMOKER: ICD-10-CM

## 2020-02-14 DIAGNOSIS — Z00.00 HEALTHCARE MAINTENANCE: ICD-10-CM

## 2020-02-14 DIAGNOSIS — S24.102S T6 SPINAL CORD INJURY, SEQUELA (HCC): ICD-10-CM

## 2020-02-14 DIAGNOSIS — I38 ENDOCARDITIS, VALVE: Primary | ICD-10-CM

## 2020-02-14 DIAGNOSIS — N31.2 ATONIC BLADDER: ICD-10-CM

## 2020-02-14 DIAGNOSIS — J30.2 CHRONIC SEASONAL ALLERGIC RHINITIS: ICD-10-CM

## 2020-02-14 DIAGNOSIS — F33.41 RECURRENT MAJOR DEPRESSIVE DISORDER, IN PARTIAL REMISSION (HCC): ICD-10-CM

## 2020-02-14 DIAGNOSIS — K21.9 GASTROESOPHAGEAL REFLUX DISEASE WITHOUT ESOPHAGITIS: ICD-10-CM

## 2020-02-14 DIAGNOSIS — M86.39 CHRONIC MULTIFOCAL OSTEOMYELITIS OF MULTIPLE SITES (HCC): ICD-10-CM

## 2020-02-14 DIAGNOSIS — L21.9 SEBORRHEIC DERMATITIS: ICD-10-CM

## 2020-02-14 DIAGNOSIS — L30.9 DERMATITIS: ICD-10-CM

## 2020-02-14 PROBLEM — M25.511 ACUTE PAIN OF RIGHT SHOULDER: Status: RESOLVED | Noted: 2019-10-17 | Resolved: 2020-02-14

## 2020-02-14 PROCEDURE — 99214 OFFICE O/P EST MOD 30 MIN: CPT | Performed by: GENERAL PRACTICE

## 2020-02-14 RX ORDER — CEFUROXIME AXETIL 500 MG/1
TABLET ORAL
COMMUNITY
Start: 2020-01-11 | End: 2020-02-14

## 2020-02-14 RX ORDER — GABAPENTIN 800 MG/1
800 TABLET ORAL 3 TIMES DAILY
Qty: 90 TABLET | Refills: 3 | Status: SHIPPED | OUTPATIENT
Start: 2020-02-14 | End: 2020-06-12

## 2020-02-14 RX ORDER — TRIAMCINOLONE ACETONIDE 1 MG/G
CREAM TOPICAL 2 TIMES DAILY
Qty: 80 G | Refills: 0 | Status: SHIPPED | OUTPATIENT
Start: 2020-02-14 | End: 2020-02-28 | Stop reason: SDUPTHER

## 2020-02-14 NOTE — PROGRESS NOTES
Subjective   Alex Tierney is a 44 y.o. male.     History of Present Illness     Depression  He admits to intermittent depression.  He was seen at [facility] ER on 1/5/2020 following an intentional overdose of amitriptyline.  He states this followed a particularly bad day.  He denies any further suicidal ideation but admits to difficulty with his sleep.  He is fairly active about the house but does not venture out much.  His father is quite supportive.  Together they have bought a horse and built a wagon that he can get into with his wheelchair. He remains on venlafaxine er 225 qd.     Spinal Cord Injury  Level T6 complicated by recurrent decubitus ulcers and osteomyelitis requiring a previous left BKA, bilateral hip osteotomies, permanent colostomy and a chronic indwelling bladder catheter. Underwent extensive surgery for closure of a number of sacral decubitus ulcers last year.  He denies any recurrence. He continues to feel that gabapentin helps with his back and lower extremity discomfort.      Rash  Since last here he has had a slightly pruritic rash about his face and upper anterior chest.  This has not occurred elsewhere and is been unassociated with any other symptoms.  To date, he has been unable to identify any precipitating or aggravating factors.  He has been applying topical clotrimazole with no effect     Allergic Rhinitis  Patient has a history of allergic rhinitis. Patient's symptoms include sneezing, clear rhinorrhea, nasal congestion, periorbital pressure and postnasal drip. These symptoms are perennial with seasonal exacerbation. The patient has been suffering from these symptoms for a number of years. The patient has tried prescription antihistamine - cetirizine and prescription nasal corticosteroid - fluticasone with good relief of symptoms.     The following portions of the patient's history were reviewed and updated as appropriate: allergies, current medications, past medical history, past social  history and problem list.    Review of Systems   Constitutional: Positive for fatigue. Negative for appetite change, chills, fever and unexpected weight change.   HENT: Negative for congestion, ear pain, rhinorrhea, sneezing, sore throat and voice change.    Eyes: Negative for visual disturbance.   Respiratory: Negative for cough, shortness of breath and wheezing.    Cardiovascular: Negative for chest pain, palpitations and leg swelling.   Gastrointestinal: Negative for abdominal pain, blood in stool, constipation, diarrhea, nausea and vomiting.   Genitourinary: Negative for difficulty urinating, dysuria, frequency, hematuria and urgency.   Musculoskeletal: Positive for arthralgias and back pain. Negative for joint swelling, myalgias and neck pain.   Skin: Positive for rash. Negative for color change and wound.   Neurological: Positive for weakness and numbness. Negative for headaches.   Psychiatric/Behavioral: Positive for dysphoric mood (intermittent - mild). Negative for sleep disturbance and suicidal ideas. The patient is nervous/anxious.      Objective   Physical Exam   Constitutional: He is oriented to person, place, and time. He appears well-developed and well-nourished. He is cooperative. He does not have a sickly appearance. No distress.   Sitting in a wheelchair. Status post left BKA. Alert and in fair spirits. No apparent distress at rest. No pallor, jaundice, diaphoresis, or cyanosis   HENT:   Head: Atraumatic.   Right Ear: Tympanic membrane, external ear and ear canal normal.   Left Ear: Tympanic membrane, external ear and ear canal normal.   Mouth/Throat: Oropharynx is clear and moist.   Eyes: Pupils are equal, round, and reactive to light. EOM are normal. No scleral icterus.   Neck: No JVD present. Carotid bruit is not present. No tracheal deviation present. No thyromegaly present.   Cardiovascular: Normal rate, regular rhythm, S1 normal, S2 normal, normal heart sounds and intact distal pulses. Exam  reveals no gallop.   No murmur heard.  Pulmonary/Chest: Breath sounds normal. He has no wheezes. He has no rales.   Abdominal: Soft. Normal aorta and bowel sounds are normal. He exhibits no abdominal bruit and no mass. There is no hepatosplenomegaly. There is no tenderness. No hernia.   Musculoskeletal: He exhibits no tenderness or deformity.   Lymphadenopathy:        Head (right side): No submandibular adenopathy present.        Head (left side): No submandibular adenopathy present.     He has no cervical adenopathy.   Neurological: He is alert and oriented to person, place, and time. No cranial nerve deficit.   Spastic paraplegia   Skin: Skin is warm and dry. Rash (blanchable erythematous maculopapular rash about the beard area and upper anterior chest) noted. He is not diaphoretic. No pallor. Nails show no clubbing.   Psychiatric: He has a normal mood and affect. His speech is normal and behavior is normal. Thought content normal.     Assessment/Plan   Problems Addressed this Visit        Cardiovascular and Mediastinum    Endocarditis, valve       Respiratory    Chronic seasonal allergic rhinitis  Continue current medication       Digestive    Gastroesophageal reflux disease without esophagitis  Continue current medication       Nervous and Auditory    T6 spinal cord injury (CMS/HCC)  Supportive therapy  Continue current medication    Relevant Medications    gabapentin (NEURONTIN) 800 MG tablet       Musculoskeletal and Integument    Chronic multifocal osteomyelitis of multiple sites (CMS/HCC)    Atonic bladder    Seborrheic dermatitis  Advised regarding skin care  Topical low potency corticosteroid  Encouraged to report if any worse, any new symptoms, or if no better over the next month    Relevant Medications    triamcinolone (KENALOG) 0.1 % cream       Other    Recurrent major depressive disorder, in partial remission (CMS/HCC)  With recent intentional amitriptyline overdose  Supportive therapy  Reviewed  options and agreed on a trial of cariprazine  Patient agrees to psychiatric follow-up and this will be arranged with Garima QUARLES    Relevant Medications    Cariprazine HCl (VRAYLAR) 1.5 MG capsule capsule    Smoker    Healthcare maintenance  Recommended a flu shot wherever available

## 2020-02-15 DIAGNOSIS — M25.511 ACUTE PAIN OF RIGHT SHOULDER: ICD-10-CM

## 2020-02-15 DIAGNOSIS — K21.9 GASTROESOPHAGEAL REFLUX DISEASE WITHOUT ESOPHAGITIS: ICD-10-CM

## 2020-02-17 RX ORDER — OMEPRAZOLE 20 MG/1
CAPSULE, DELAYED RELEASE ORAL
Qty: 30 CAPSULE | Refills: 5 | Status: SHIPPED | OUTPATIENT
Start: 2020-02-17 | End: 2020-06-15 | Stop reason: SDUPTHER

## 2020-02-17 RX ORDER — IBUPROFEN 600 MG/1
TABLET ORAL
Qty: 90 TABLET | Refills: 5 | Status: SHIPPED | OUTPATIENT
Start: 2020-02-17 | End: 2020-06-15 | Stop reason: SDUPTHER

## 2020-02-19 ENCOUNTER — TELEPHONE (OUTPATIENT)
Dept: FAMILY MEDICINE CLINIC | Facility: CLINIC | Age: 45
End: 2020-02-19

## 2020-02-19 NOTE — TELEPHONE ENCOUNTER
----- Message from Delgado Rose MD sent at 2/19/2020  1:59 PM EST -----  k - we'll wait and see what Garima recommends at his appt with her    ----- Message -----  From: Candace Cross MA  Sent: 2/17/2020   3:46 PM EST  To: Delgado Rose MD    Patient called and stated that he stopped taking the vraylar medicine. He said he was making him feel bad, and making him really on edge.

## 2020-02-28 DIAGNOSIS — L30.9 DERMATITIS: ICD-10-CM

## 2020-02-28 RX ORDER — CIPROFLOXACIN 500 MG/1
500 TABLET, FILM COATED ORAL 2 TIMES DAILY
Qty: 20 TABLET | Refills: 0 | Status: SHIPPED | OUTPATIENT
Start: 2020-02-28 | End: 2020-03-09

## 2020-02-28 RX ORDER — TRIAMCINOLONE ACETONIDE 1 MG/G
CREAM TOPICAL 2 TIMES DAILY
Qty: 80 G | Refills: 0 | Status: SHIPPED | OUTPATIENT
Start: 2020-02-28 | End: 2020-04-01 | Stop reason: SDUPTHER

## 2020-03-14 DIAGNOSIS — F33.1 MAJOR DEPRESSIVE DISORDER, RECURRENT EPISODE, MODERATE WITH MIXED FEATURES (HCC): ICD-10-CM

## 2020-03-16 RX ORDER — VENLAFAXINE HYDROCHLORIDE 150 MG/1
CAPSULE, EXTENDED RELEASE ORAL
Qty: 90 CAPSULE | Refills: 0 | Status: SHIPPED | OUTPATIENT
Start: 2020-03-16 | End: 2020-06-15 | Stop reason: SDUPTHER

## 2020-03-24 DIAGNOSIS — F33.41 RECURRENT MAJOR DEPRESSIVE DISORDER, IN PARTIAL REMISSION (HCC): ICD-10-CM

## 2020-04-01 ENCOUNTER — TELEPHONE (OUTPATIENT)
Dept: FAMILY MEDICINE CLINIC | Facility: CLINIC | Age: 45
End: 2020-04-01

## 2020-04-01 DIAGNOSIS — L30.9 DERMATITIS: ICD-10-CM

## 2020-04-01 RX ORDER — TRIAMCINOLONE ACETONIDE 1 MG/G
CREAM TOPICAL 2 TIMES DAILY
Qty: 80 G | Refills: 0 | Status: SHIPPED | OUTPATIENT
Start: 2020-04-01 | End: 2020-11-02

## 2020-04-01 RX ORDER — CIPROFLOXACIN 500 MG/1
500 TABLET, FILM COATED ORAL 2 TIMES DAILY
Qty: 20 TABLET | Refills: 0 | Status: SHIPPED | OUTPATIENT
Start: 2020-04-01 | End: 2020-04-11

## 2020-04-01 NOTE — TELEPHONE ENCOUNTER
----- Message from Delgado Rose MD sent at 4/1/2020  4:09 PM EDT -----  Emailed    ----- Message -----  From: Candace Cross MA  Sent: 4/1/2020   2:54 PM EDT  To: Delgado Rose MD    Patient wants to know if you could send him in some more antibiotics for a UTI.

## 2020-05-13 ENCOUNTER — TELEPHONE (OUTPATIENT)
Dept: FAMILY MEDICINE CLINIC | Facility: CLINIC | Age: 45
End: 2020-05-13

## 2020-05-13 RX ORDER — CIPROFLOXACIN 500 MG/1
500 TABLET, FILM COATED ORAL 2 TIMES DAILY
Qty: 20 TABLET | Refills: 0 | Status: SHIPPED | OUTPATIENT
Start: 2020-05-13 | End: 2020-05-23

## 2020-06-12 DIAGNOSIS — S24.102S T6 SPINAL CORD INJURY, SEQUELA (HCC): ICD-10-CM

## 2020-06-12 RX ORDER — GABAPENTIN 800 MG/1
TABLET ORAL
Qty: 90 TABLET | Refills: 0 | Status: SHIPPED | OUTPATIENT
Start: 2020-06-12 | End: 2020-06-15 | Stop reason: SDUPTHER

## 2020-06-15 ENCOUNTER — OFFICE VISIT (OUTPATIENT)
Dept: FAMILY MEDICINE CLINIC | Facility: CLINIC | Age: 45
End: 2020-06-15

## 2020-06-15 DIAGNOSIS — Z00.00 HEALTHCARE MAINTENANCE: ICD-10-CM

## 2020-06-15 DIAGNOSIS — M25.511 ACUTE PAIN OF RIGHT SHOULDER: ICD-10-CM

## 2020-06-15 DIAGNOSIS — J30.2 CHRONIC SEASONAL ALLERGIC RHINITIS: Primary | ICD-10-CM

## 2020-06-15 DIAGNOSIS — F17.200 SMOKER: ICD-10-CM

## 2020-06-15 DIAGNOSIS — S24.102S T6 SPINAL CORD INJURY, SEQUELA (HCC): ICD-10-CM

## 2020-06-15 DIAGNOSIS — F33.1 MAJOR DEPRESSIVE DISORDER, RECURRENT EPISODE, MODERATE WITH MIXED FEATURES (HCC): ICD-10-CM

## 2020-06-15 DIAGNOSIS — L30.9 DERMATITIS: ICD-10-CM

## 2020-06-15 DIAGNOSIS — I38 ENDOCARDITIS, VALVE: ICD-10-CM

## 2020-06-15 DIAGNOSIS — F33.41 RECURRENT MAJOR DEPRESSIVE DISORDER, IN PARTIAL REMISSION (HCC): ICD-10-CM

## 2020-06-15 DIAGNOSIS — Z89.612 HISTORY OF LEFT LOWER EXTREMITY AMPUTATION (HCC): ICD-10-CM

## 2020-06-15 DIAGNOSIS — K21.9 GASTROESOPHAGEAL REFLUX DISEASE WITHOUT ESOPHAGITIS: ICD-10-CM

## 2020-06-15 DIAGNOSIS — M86.39 CHRONIC MULTIFOCAL OSTEOMYELITIS OF MULTIPLE SITES (HCC): ICD-10-CM

## 2020-06-15 DIAGNOSIS — N31.2 ATONIC BLADDER: ICD-10-CM

## 2020-06-15 PROCEDURE — G2025 DIS SITE TELE SVCS RHC/FQHC: HCPCS | Performed by: GENERAL PRACTICE

## 2020-06-15 RX ORDER — VENLAFAXINE HYDROCHLORIDE 150 MG/1
150 CAPSULE, EXTENDED RELEASE ORAL DAILY
Qty: 30 CAPSULE | Refills: 5 | Status: SHIPPED | OUTPATIENT
Start: 2020-06-15 | End: 2020-10-28 | Stop reason: SDUPTHER

## 2020-06-15 RX ORDER — CIPROFLOXACIN 500 MG/1
500 TABLET, FILM COATED ORAL 2 TIMES DAILY
Qty: 20 TABLET | Refills: 0 | Status: SHIPPED | OUTPATIENT
Start: 2020-06-15 | End: 2020-06-25

## 2020-06-15 RX ORDER — OXYBUTYNIN CHLORIDE 10 MG/1
10 TABLET, EXTENDED RELEASE ORAL DAILY
Qty: 30 TABLET | Refills: 5 | Status: SHIPPED | OUTPATIENT
Start: 2020-06-15 | End: 2020-11-02

## 2020-06-15 RX ORDER — GABAPENTIN 800 MG/1
800 TABLET ORAL 3 TIMES DAILY
Qty: 90 TABLET | Refills: 2 | Status: SHIPPED | OUTPATIENT
Start: 2020-06-15 | End: 2020-10-06

## 2020-06-15 RX ORDER — FAMOTIDINE 20 MG/1
20 TABLET, FILM COATED ORAL 2 TIMES DAILY
Qty: 60 TABLET | Refills: 5 | Status: SHIPPED | OUTPATIENT
Start: 2020-06-15 | End: 2020-06-18 | Stop reason: SDUPTHER

## 2020-06-15 RX ORDER — FLUTICASONE PROPIONATE 50 MCG
2 SPRAY, SUSPENSION (ML) NASAL DAILY PRN
Qty: 16 G | Refills: 5 | Status: SHIPPED | OUTPATIENT
Start: 2020-06-15 | End: 2020-11-02

## 2020-06-15 RX ORDER — IBUPROFEN 600 MG/1
600 TABLET ORAL EVERY 8 HOURS PRN
Qty: 90 TABLET | Refills: 5 | Status: SHIPPED | OUTPATIENT
Start: 2020-06-15 | End: 2021-03-01

## 2020-06-15 RX ORDER — CYCLOBENZAPRINE HCL 10 MG
TABLET ORAL
Qty: 120 TABLET | Refills: 5 | Status: SHIPPED | OUTPATIENT
Start: 2020-06-15 | End: 2020-06-29

## 2020-06-15 RX ORDER — CETIRIZINE HYDROCHLORIDE 10 MG/1
10 TABLET ORAL NIGHTLY
Qty: 30 TABLET | Refills: 5 | Status: SHIPPED | OUTPATIENT
Start: 2020-06-15 | End: 2022-09-12

## 2020-06-15 RX ORDER — CLOTRIMAZOLE 1 %
CREAM (GRAM) TOPICAL 2 TIMES DAILY
Qty: 45 G | Refills: 0 | Status: SHIPPED | OUTPATIENT
Start: 2020-06-15 | End: 2020-11-02

## 2020-06-15 RX ORDER — OMEPRAZOLE 20 MG/1
20 CAPSULE, DELAYED RELEASE ORAL DAILY
Qty: 30 CAPSULE | Refills: 5 | Status: SHIPPED | OUTPATIENT
Start: 2020-06-15 | End: 2022-01-18 | Stop reason: SDUPTHER

## 2020-06-15 NOTE — PROGRESS NOTES
Subjective   Alex Tierney is a 45 y.o. male.     History of Present Illness     This visit has been rescheduled as a phone visit to comply with patient safety concerns in accordance with CDC recommendations. Total time of discussion was 12 minutes.    You have chosen to receive care through a telephone visit. Do you consent to use a telephone visit for your medical care today? Yes    Depression  He continues to have difficulty both falling and staying asleep. He denies any depression or suicidal ideation and has been active about the house.  His father is quite supportive. He is currently on venlafaxine er and cariprazine.  He admits to an increased appetite with the latter but denies any other apparent side effects    Spinal Cord Injury  Level T6 complicated by recurrent decubitus ulcers and osteomyelitis requiring a previous left BKA, bilateral hip osteotomies, permanent colostomy and a chronic indwelling bladder catheter. Underwent extensive surgery for closure of a number of sacral decubitus ulcers last year.  He denies any recurrence. He has had intermittent back spasms since last here.  He remains on baclofen with no apparent side effects.  He was previously tried on tizanidine but was unable to tolerate it.  He admits to intermittent leakage about his bladder catheter and has been on a number of antibiotics for suspected urinary tract infections.  He has been unable to provide a urine for testing    Allergic Rhinitis  Patient has a history of allergic rhinitis. Patient's symptoms include sneezing, clear rhinorrhea, nasal congestion, periorbital pressure and postnasal drip. These symptoms are perennial with seasonal exacerbation. The patient has been suffering from these symptoms for a number of years. The patient has tried prescription antihistamine - cetirizine and prescription nasal corticosteroid - fluticasone with good relief of symptoms.     The following portions of the patient's history were reviewed  and updated as appropriate: allergies, current medications, past medical history, past social history and problem list.    Review of Systems   Constitutional: Positive for fatigue. Negative for appetite change, chills, fever and unexpected weight change.   HENT: Negative for congestion, ear pain, rhinorrhea, sneezing, sore throat and voice change.    Eyes: Negative for visual disturbance.   Respiratory: Negative for cough, shortness of breath and wheezing.    Cardiovascular: Negative for chest pain, palpitations and leg swelling.   Gastrointestinal: Negative for abdominal pain, blood in stool, constipation, diarrhea, nausea and vomiting.   Genitourinary: Negative for hematuria.        Leakage about indwelling bladder catheter   Musculoskeletal: Positive for arthralgias and back pain. Negative for joint swelling and neck pain.   Skin: Positive for rash.   Neurological: Positive for weakness and numbness. Negative for headaches.   Psychiatric/Behavioral: Positive for sleep disturbance. Negative for dysphoric mood, hallucinations and suicidal ideas. The patient is nervous/anxious (occasional).      Objective   Physical Exam   Constitutional:   Alert and oriented.  Bright and in good spirits.  No apparent distress     Assessment/Plan   Problems Addressed this Visit        Cardiovascular and Mediastinum      Endocarditis, valve  Previous  Reminded to report if any flulike symptoms, fever, or chills       Respiratory    Chronic seasonal allergic rhinitis   Reminded regarding allergen avoidance.  Continue current medication    Relevant Medications    cetirizine (zyrTEC) 10 MG tablet    fluticasone (FLONASE) 50 MCG/ACT nasal spray    ibuprofen (ADVIL,MOTRIN) 600 MG tablet       Digestive    Gastroesophageal reflux disease without esophagitis    Relevant Medications    famotidine (PEPCID) 20 MG tablet    omeprazole (priLOSEC) 20 MG capsule       Nervous and Auditory    T6 spinal cord injury (CMS/HCC)    Level T6 complicated  by recurrent decubitus ulcers and osteomyelitis requiring a previous left BKA, bilateral   hip osteotomies, permanent colostomy and a chronic indwelling bladder catheter    Trial of cyclobenzaprine in place of baclofen    Encouraged to report if any worse or if any new symptoms or concerns.    Relevant Medications    cyclobenzaprine (FLEXERIL) 10 MG tablet    gabapentin (NEURONTIN) 800 MG tablet    Other Relevant Orders    Ambulatory Referral to Urology       Musculoskeletal and Integument    Atonic bladder  We will arrange a urology reassessment    Relevant Medications    oxybutynin XL (DITROPAN-XL) 10 MG 24 hr tablet    ciprofloxacin (CIPRO) 500 MG tablet    Other Relevant Orders    Ambulatory Referral to Urology    Chronic multifocal osteomyelitis of multiple sites (CMS/HCC)    Other   Healthcare maintenance  We will discussed the benefits of colon cancer screening at his return   History of left lower extremity amputation (CMS/HCC)   Recurrent major depressive disorder, in partial remission (CMS/HCC)  Stable.  Supportive therapy.   Continue current medication.   Relevant Medications   Cariprazine HCl (Vraylar) 1.5 MG capsule capsule   venlafaxine XR (EFFEXOR-XR) 150 MG 24 hr capsule   Smoker

## 2020-06-18 ENCOUNTER — TELEPHONE (OUTPATIENT)
Dept: FAMILY MEDICINE CLINIC | Facility: CLINIC | Age: 45
End: 2020-06-18

## 2020-06-18 DIAGNOSIS — K21.9 GASTROESOPHAGEAL REFLUX DISEASE WITHOUT ESOPHAGITIS: ICD-10-CM

## 2020-06-18 RX ORDER — FAMOTIDINE 40 MG/1
40 TABLET, FILM COATED ORAL 2 TIMES DAILY
Qty: 60 TABLET | Refills: 5 | Status: SHIPPED | OUTPATIENT
Start: 2020-06-18 | End: 2022-01-18

## 2020-06-18 NOTE — TELEPHONE ENCOUNTER
----- Message from Delgado Rose MD sent at 6/18/2020  1:21 PM EDT -----  Sure - new script emailed    ----- Message -----  From: Candace Cross MA  Sent: 6/18/2020   1:02 PM EDT  To: Delgado Rose MD    Patient's pharmacy is unable to get pepcid 20 mg in stock right now. They wanted to know if you would be okay with changing it to the 40 mg instead?

## 2020-06-29 ENCOUNTER — TELEPHONE (OUTPATIENT)
Dept: FAMILY MEDICINE CLINIC | Facility: CLINIC | Age: 45
End: 2020-06-29

## 2020-06-29 RX ORDER — TIZANIDINE HYDROCHLORIDE 4 MG/1
4 CAPSULE, GELATIN COATED ORAL 3 TIMES DAILY PRN
Qty: 90 CAPSULE | Refills: 5 | Status: SHIPPED | OUTPATIENT
Start: 2020-06-29 | End: 2020-11-24

## 2020-06-29 NOTE — PROGRESS NOTES
Inpatient Rehabilitation Functional Measures Assessment    Functional Measures  LYNETTE Eating:  Eating Score = 7. Patient is completely independent for eating.  There are no activity limitations.  LYNETTE Grooming: Activity was not observed.  LYNETTE Bathing:  Activity was not observed.  LYNETTE Upper Body Dressing:  Activity was not observed.  LYNETTE Lower Body Dressing:  Activity was not observed.  LYNETTE Toileting:  Patient requires minimal assistance for adjusting clothing  before using a toilet, commode, bedpan, or urinal. Patient requires minimal  assistance for hygiene. Patient requires minimal assistance for adjusting  clothing after using a toilet, commode, bedpan, or urinal. Patient performs 0 -  24% of toileting tasks.  Toileting Score = 1, Total Assistance. No assistive  devices were required.    LYNETTE Bladder Management  Level of Assistance:  Bladder Score = 1.  Patient is total assistance for  bladder management. Patient has an indwelling/Junior catheter.  Frequency/Number of Accidents this Shift:  Bladder accidents this shift:  0 .  Patient has not had an accident, but used a device/medication this shift  requiring: junior catheter .    LYNETTE Bowel Management  Level of Assistance: Bowel Score = 4. Patient performs greater than 75% of tasks  and requires minimal assistance for bowel management requiring assistive  device/method: Patient performs all colostomy care. Self-Care. .  Frequency/Number of Accidents this Shift: Bowel accidents this shift: 0 .  Patient has not had an accident, but used a device/medication this shift  requiring: Colostomy .    LYNETTE Bed/Chair/Wheelchair Transfer:  Bed/chair/wheelchair Transfer Score = 3.  Patient performs 50-74% of effort and requires moderate assistance (some  lifting) for transferring to and from the bed/chair/wheelchair. Patient requires  the following assistive device(s): Walker. Sliding board. Grab bars. Seating  system of wheelchair.  LYNETTE Toilet Transfer:  Activity was not  observed.  LYNETTE Tub/Shower Transfer:  Activity was not observed.    Previously Documented Mode of Locomotion at Discharge:  LYNETTE Expected Mode of Locomotion at Discharge:  LYNETTE Walk/Wheelchair:  LYNETTE Stairs:    LYNETTE Comprehension:  LYNETTE Expression:  [facility] Social Interaction:  [facility] Problem Solving:  LYNETTE Memory:    Therapy Mode Minutes  Occupational Therapy:  Physical Therapy:  Speech Language Pathology:    Discharge Functional Goals:    Signed by: TALI Dumont     Excision Depth: adipose tissue

## 2020-06-29 NOTE — TELEPHONE ENCOUNTER
Left message for the patient with this information.       ----- Message from Delgado Rose MD sent at 6/29/2020  2:28 PM EDT -----  Emailed script for tizanadine    ----- Message -----  From: Candace Cross MA  Sent: 6/29/2020   2:17 PM EDT  To: Delgado Rose MD    Patient called and wanted to know if you could change his flexeril to something else. He said it helps some with the muscle spasm but doesn't help him sleep at all.

## 2020-07-25 RX ORDER — CIPROFLOXACIN 500 MG/1
500 TABLET, FILM COATED ORAL 2 TIMES DAILY
Qty: 20 TABLET | Refills: 0 | Status: SHIPPED | OUTPATIENT
Start: 2020-07-25 | End: 2020-08-04

## 2020-07-30 NOTE — TELEPHONE ENCOUNTER
[facility]  [address]  [place] [address]  Phone:  [phone]  Fax:  [phone]                                    Deidre Braga   MRN: [id number]    Department:  [facility]   Date of Visit:  7/30/2020           After Visit Summary Signature Page    I have received my discharge instructions, and my questions have been answered. I have discussed any challenges I see with this plan with the nurse or doctor.    ..........................................................................................................................................  Patient/Patient Representative Signature      ..........................................................................................................................................  Patient Representative Print Name and Relationship to Patient    ..................................................               ................................................  Date                                   Time    ..........................................................................................................................................  Reviewed by Signature/Title    ...................................................              ..............................................  Date                                               Time          22EPIC Rev 08/18        Left voicemail for the patient with this information.     ----- Message from Delgado Rose MD sent at 5/13/2020 12:18 PM EDT -----  Emailed    ----- Message -----  From: Candace Cross MA  Sent: 5/13/2020  12:00 PM EDT  To: Delgado Rose MD    Patient called and stated that he needed a antibiotic for a UTI infection.

## 2020-08-07 NOTE — PLAN OF CARE
Problem: Patient Care Overview (Adult)  Goal: Plan of Care Review  Outcome: Ongoing (interventions implemented as appropriate)    Problem: Activity Intolerance (Adult)  Goal: Activity Tolerance  Outcome: Ongoing (interventions implemented as appropriate)  Goal: Effective Energy Conservation Techniques  Outcome: Ongoing (interventions implemented as appropriate)    Problem: Fall Risk (Adult)  Goal: Absence of Falls  Outcome: Ongoing (interventions implemented as appropriate)    Problem: Pressure Ulcer (Adult)  Goal: Signs and Symptoms of Listed Potential Problems Will be Absent or Manageable (Pressure Ulcer)  Outcome: Ongoing (interventions implemented as appropriate)    Problem: Skin Integrity Impairment, Risk/Actual (Adult)  Goal: Skin Integrity/Wound Healing  Outcome: Ongoing (interventions implemented as appropriate)    Problem: Mobility, Physical Impaired (Adult)  Goal: Enhanced Mobility Skills  Outcome: Ongoing (interventions implemented as appropriate)  Goal: Enhanced Functionality Ability  Outcome: Ongoing (interventions implemented as appropriate)       Limited

## 2020-08-31 ENCOUNTER — TELEPHONE (OUTPATIENT)
Dept: FAMILY MEDICINE CLINIC | Facility: CLINIC | Age: 45
End: 2020-08-31

## 2020-08-31 RX ORDER — CIPROFLOXACIN 500 MG/1
500 TABLET, FILM COATED ORAL 2 TIMES DAILY
Qty: 20 TABLET | Refills: 0 | Status: SHIPPED | OUTPATIENT
Start: 2020-08-31 | End: 2020-09-10

## 2020-08-31 NOTE — TELEPHONE ENCOUNTER
----- Message from Delgado Rose MD sent at 8/31/2020  2:33 PM EDT -----  Emailed script for ciprofloxacin    ----- Message -----  From: Candace Cross MA  Sent: 8/31/2020  11:17 AM EDT  To: Delgado Rose MD    Pt called in requesting a antibiotic for a UTI infection.

## 2020-09-26 DIAGNOSIS — N31.2 ATONIC BLADDER: ICD-10-CM

## 2020-09-26 DIAGNOSIS — S24.102S T6 SPINAL CORD INJURY, SEQUELA (HCC): Primary | ICD-10-CM

## 2020-09-26 DIAGNOSIS — Z87.440 HISTORY OF RECURRENT URINARY TRACT INFECTION: ICD-10-CM

## 2020-09-26 RX ORDER — CIPROFLOXACIN 500 MG/1
500 TABLET, FILM COATED ORAL 2 TIMES DAILY
Qty: 20 TABLET | Refills: 0 | Status: SHIPPED | OUTPATIENT
Start: 2020-09-26 | End: 2020-10-06

## 2020-09-28 ENCOUNTER — TELEPHONE (OUTPATIENT)
Dept: FAMILY MEDICINE CLINIC | Facility: CLINIC | Age: 45
End: 2020-09-28

## 2020-09-28 NOTE — TELEPHONE ENCOUNTER
----- Message from Delgado Rose MD sent at 9/26/2020  9:46 AM EDT -----  I have emailed a prescription for Cipro  I have also placed a referral to Dr. Mayo for a urology assessment  ----- Message -----  From: Candace Cross MA  Sent: 9/25/2020   1:45 PM EDT  To: Delgado Rose MD    Pt called wanting to know if you could send him in a antibiotic for a UTI infection?

## 2020-10-06 DIAGNOSIS — S24.102S T6 SPINAL CORD INJURY, SEQUELA (HCC): ICD-10-CM

## 2020-10-06 RX ORDER — GABAPENTIN 800 MG/1
TABLET ORAL
Qty: 90 TABLET | Refills: 0 | Status: SHIPPED | OUTPATIENT
Start: 2020-10-06 | End: 2020-11-02 | Stop reason: SDUPTHER

## 2020-10-22 NOTE — TELEPHONE ENCOUNTER
Mariana from [facility] called and said they cannot reach him to set up home health. I gave her another number to try before they cancel the order.    not applicable

## 2020-10-28 ENCOUNTER — TELEPHONE (OUTPATIENT)
Dept: FAMILY MEDICINE CLINIC | Facility: CLINIC | Age: 45
End: 2020-10-28

## 2020-10-28 DIAGNOSIS — F33.1 MAJOR DEPRESSIVE DISORDER, RECURRENT EPISODE, MODERATE WITH MIXED FEATURES (HCC): ICD-10-CM

## 2020-10-28 DIAGNOSIS — S24.102S T6 SPINAL CORD INJURY, SEQUELA (HCC): Primary | ICD-10-CM

## 2020-10-28 DIAGNOSIS — N31.2 ATONIC BLADDER: ICD-10-CM

## 2020-10-28 RX ORDER — VENLAFAXINE HYDROCHLORIDE 150 MG/1
150 CAPSULE, EXTENDED RELEASE ORAL DAILY
Qty: 30 CAPSULE | Refills: 5 | Status: SHIPPED | OUTPATIENT
Start: 2020-10-28 | End: 2021-07-31

## 2020-10-28 RX ORDER — BACLOFEN 10 MG/1
10 TABLET ORAL 3 TIMES DAILY PRN
Qty: 90 TABLET | Refills: 0 | Status: SHIPPED | OUTPATIENT
Start: 2020-10-28 | End: 2020-11-02

## 2020-10-28 RX ORDER — CIPROFLOXACIN 500 MG/1
500 TABLET, FILM COATED ORAL 2 TIMES DAILY
Qty: 20 TABLET | Refills: 0 | Status: SHIPPED | OUTPATIENT
Start: 2020-10-28 | End: 2020-11-07

## 2020-10-28 NOTE — TELEPHONE ENCOUNTER
Called and left this information on patient v mail ----- Message from Delgado Rose MD sent at 10/28/2020 11:39 AM EDT -----  I have emailed prescriptions for both ciprofloxacin and baclofen  I have also placed a referral to Dr. Melchor.  I will not continue to write antibiotics without him undergoing a urology assessment  ----- Message -----  From: Cris Fernando  Sent: 10/28/2020  11:01 AM EDT  To: Delgado Rose MD    Patient called requesting an antibiotic for UTI and baclofen for muscle spasms. Said he can not take the zanaflex while he is on antibiotic.

## 2020-11-01 DIAGNOSIS — S24.102S T6 SPINAL CORD INJURY, SEQUELA (HCC): ICD-10-CM

## 2020-11-02 ENCOUNTER — OFFICE VISIT (OUTPATIENT)
Dept: FAMILY MEDICINE CLINIC | Facility: CLINIC | Age: 45
End: 2020-11-02

## 2020-11-02 VITALS
SYSTOLIC BLOOD PRESSURE: 95 MMHG | TEMPERATURE: 97.1 F | RESPIRATION RATE: 14 BRPM | OXYGEN SATURATION: 96 % | DIASTOLIC BLOOD PRESSURE: 48 MMHG | HEART RATE: 104 BPM | BODY MASS INDEX: 26.39 KG/M2 | HEIGHT: 73 IN

## 2020-11-02 DIAGNOSIS — N31.2 ATONIC BLADDER: ICD-10-CM

## 2020-11-02 DIAGNOSIS — J30.2 CHRONIC SEASONAL ALLERGIC RHINITIS: ICD-10-CM

## 2020-11-02 DIAGNOSIS — F33.41 RECURRENT MAJOR DEPRESSIVE DISORDER, IN PARTIAL REMISSION (HCC): ICD-10-CM

## 2020-11-02 DIAGNOSIS — I38 ENDOCARDITIS, VALVE: ICD-10-CM

## 2020-11-02 DIAGNOSIS — L21.9 SEBORRHEIC DERMATITIS: ICD-10-CM

## 2020-11-02 DIAGNOSIS — Z89.612 HISTORY OF LEFT LOWER EXTREMITY AMPUTATION (HCC): ICD-10-CM

## 2020-11-02 DIAGNOSIS — Z23 ENCOUNTER FOR IMMUNIZATION: ICD-10-CM

## 2020-11-02 DIAGNOSIS — M86.39 CHRONIC MULTIFOCAL OSTEOMYELITIS OF MULTIPLE SITES (HCC): ICD-10-CM

## 2020-11-02 DIAGNOSIS — F17.200 SMOKER: ICD-10-CM

## 2020-11-02 DIAGNOSIS — K21.9 GASTROESOPHAGEAL REFLUX DISEASE WITHOUT ESOPHAGITIS: ICD-10-CM

## 2020-11-02 DIAGNOSIS — Z00.00 HEALTHCARE MAINTENANCE: ICD-10-CM

## 2020-11-02 DIAGNOSIS — S24.102S T6 SPINAL CORD INJURY, SEQUELA (HCC): Primary | ICD-10-CM

## 2020-11-02 PROCEDURE — G0008 ADMIN INFLUENZA VIRUS VAC: HCPCS | Performed by: GENERAL PRACTICE

## 2020-11-02 PROCEDURE — G0439 PPPS, SUBSEQ VISIT: HCPCS | Performed by: GENERAL PRACTICE

## 2020-11-02 PROCEDURE — 90686 IIV4 VACC NO PRSV 0.5 ML IM: CPT | Performed by: GENERAL PRACTICE

## 2020-11-02 RX ORDER — QUETIAPINE FUMARATE 50 MG/1
TABLET, FILM COATED ORAL
Qty: 30 TABLET | Refills: 5 | Status: SHIPPED | OUTPATIENT
Start: 2020-11-02 | End: 2020-12-07 | Stop reason: SDUPTHER

## 2020-11-02 RX ORDER — GABAPENTIN 800 MG/1
TABLET ORAL
Qty: 90 TABLET | Refills: 0 | OUTPATIENT
Start: 2020-11-02

## 2020-11-02 RX ORDER — GABAPENTIN 800 MG/1
800 TABLET ORAL 3 TIMES DAILY
Qty: 90 TABLET | Refills: 2 | Status: SHIPPED | OUTPATIENT
Start: 2020-11-02 | End: 2021-02-01

## 2020-11-02 NOTE — PATIENT INSTRUCTIONS
Advance Directive    Advance directives are legal documents that let you make choices ahead of time about your health care and medical treatment in case you become unable to communicate for yourself. Advance directives are a way for you to make known your wishes to family, friends, and health care providers. This can let others know about your end-of-life care if you become unable to communicate.  Discussing and writing advance directives should happen over time rather than all at once. Advance directives can be changed depending on your situation and what you want, even after you have signed the advance directives.  There are different types of advance directives, such as:  · Medical power of .  · Living will.  · Do not resuscitate (DNR) or do not attempt resuscitation (DNAR) order.  Health care proxy and medical power of   A health care proxy is also called a health care agent. This is a person who is appointed to make medical decisions for you in cases where you are unable to make the decisions yourself. Generally, people choose someone they know well and trust to represent their preferences. Make sure to ask this person for an agreement to act as your proxy. A proxy may have to exercise judgment in the event of a medical decision for which your wishes are not known.  A medical power of  is a legal document that names your health care proxy. Depending on the laws in your state, after the document is written, it may also need to be:  · Signed.  · Notarized.  · Dated.  · Copied.  · Witnessed.  · Incorporated into your medical record.  You may also want to appoint someone to manage your money in a situation in which you are unable to do so. This is called a durable power of  for finances. It is a separate legal document from the durable power of  for health care. You may choose the same person or someone different from your health care proxy to act as your agent in money  matters.  If you do not appoint a proxy, or if there is a concern that the proxy is not acting in your best interests, a court may appoint a guardian to act on your behalf.  Living will  A living will is a set of instructions that state your wishes about medical care when you cannot express them yourself. Health care providers should keep a copy of your living will in your medical record. You may want to give a copy to family members or friends. To alert caregivers in case of an emergency, you can place a card in your wallet to let them know that you have a living will and where they can find it. A living will is used if you become:  · Terminally ill.  · Disabled.  · Unable to communicate or make decisions.  Items to consider in your living will include:  · To use or not to use life-support equipment, such as dialysis machines and breathing machines (ventilators).  · A DNR or DNAR order. This tells health care providers not to use cardiopulmonary resuscitation (CPR) if breathing or heartbeat stops.  · To use or not to use tube feeding.  · To be given or not to be given food and fluids.  · Comfort (palliative) care when the goal becomes comfort rather than a cure.  · Donation of organs and tissues.  A living will does not give instructions for distributing your money and property if you should pass away.  DNR or DNAR  A DNR or DNAR order is a request not to have CPR in the event that your heart stops beating or you stop breathing. If a DNR or DNAR order has not been made and shared, a health care provider will try to help any patient whose heart has stopped or who has stopped breathing. If you plan to have surgery, talk with your health care provider about how your DNR or DNAR order will be followed if problems occur.  What if I do not have an advance directive?  If you do not have an advance directive, some states assign family decision makers to act on your behalf based on how closely you are related to them. Each  state has its own laws about advance directives. You may want to check with your health care provider, , or state representative about the laws in your state.  Summary  · Advance directives are the legal documents that allow you to make choices ahead of time about your health care and medical treatment in case you become unable to tell others about your care.  · The process of discussing and writing advance directives should happen over time. You can change the advance directives, even after you have signed them.  · Advance directives include DNR or DNAR orders, living ho, and designating an agent as your medical power of .  This information is not intended to replace advice given to you by your health care provider. Make sure you discuss any questions you have with your health care provider.  Document Released: 03/26/2009 Document Revised: 07/16/2020 Document Reviewed: 07/16/2020  Elsevier Patient Education © 2020 [employer]

## 2020-11-02 NOTE — PROGRESS NOTES
The ABCs of the Annual Wellness Visit  Subsequent Medicare Wellness Visit    Subjective   History of Present Illness:  Alex Tierney is a 45 y.o. male who presents for a Subsequent Medicare Wellness Visit.    Depression  He continues to have difficulty both falling and staying asleep. He denies any depression or suicidal ideation and has been active about the house.  His father is quite supportive. He remains on venlafaxine er but discontinued cariprazine as he did not feel it was adding anything.  He would like to resume quetiapine as he feels that it helped in the past    Spinal Cord Injury  Level T6 complicated by recurrent decubitus ulcers and osteomyelitis requiring a previous left BKA, bilateral hip osteotomies, permanent colostomy and a chronic indwelling bladder catheter. Underwent extensive surgery for closure of a number of sacral decubitus ulcers last year.  He denies any recurrence. He continues to have intermittent back and lower extremity muscle spasms.  He has resumed tizanidine and feels that it works much better than baclofen.  He denies any apparent side effects.  He is scheduled to undergo a urology assessment with Dr. Melchor on 1/6/2021    Allergic Rhinitis  Patient has a history of allergic rhinitis. Patient's symptoms include sneezing, clear rhinorrhea, nasal congestion, periorbital pressure and postnasal drip. These symptoms are perennial with seasonal exacerbation. The patient has been suffering from these symptoms for a number of years. The patient remains on cetirizine with fairly good effect and no apparent side effects    HEALTH RISK ASSESSMENT    Recent Hospitalizations:  Recently treated at the following:  Other: [facility]    Current Medical Providers:  Patient Care Team:  Delgado Rose MD as PCP - General (Family Medicine)    Smoking Status:  Social History     Tobacco Use   Smoking Status Current Every Day Smoker   • Packs/day: 1.00   • Years: 20.00   • Pack years: 20.00    • Types: Cigarettes   Smokeless Tobacco Never Used       Alcohol Consumption:  Social History     Substance and Sexual Activity   Alcohol Use No       Depression Screen:   PHQ-2/PHQ-9 Depression Screening 11/2/2020   Little interest or pleasure in doing things 0   Feeling down, depressed, or hopeless 3   Trouble falling or staying asleep, or sleeping too much 2   Feeling tired or having little energy 3   Poor appetite or overeating 3   Feeling bad about yourself - or that you are a failure or have let yourself or your family down 0   Trouble concentrating on things, such as reading the newspaper or watching television 3   Moving or speaking so slowly that other people could have noticed. Or the opposite - being so fidgety or restless that you have been moving around a lot more than usual 3   Thoughts that you would be better off dead, or of hurting yourself in some way 0   Total Score 17   If you checked off any problems, how difficult have these problems made it for you to do your work, take care of things at home, or get along with other people? Somewhat difficult     Fall Risk Screen:  ANAHY Fall Risk Assessment has not been completed.    Health Habits and Functional and Cognitive Screening:  Functional & Cognitive Status 11/2/2020   Do you have difficulty preparing food and eating? No   Do you have difficulty bathing yourself, getting dressed or grooming yourself? No   Do you have difficulty using the toilet? No   Do you have difficulty moving around from place to place? No   Do you have trouble with steps or getting out of a bed or a chair? No   Current Diet Low Carb Diet   Dental Exam Not up to date   Eye Exam Not up to date   Exercise (times per week) 7 times per week   Current Exercises Include Light Weights   Do you need help using the phone?  No   Are you deaf or do you have serious difficulty hearing?  No   Do you need help with transportation? Yes   Do you need help shopping? Yes   Do you need help  preparing meals?  No   Do you need help with housework?  Yes   Do you need help with laundry? Yes   Do you need help taking your medications? No   Do you need help managing money? No   Do you ever drive or ride in a car without wearing a seat belt? No   Have you felt unusual stress, anger or loneliness in the last month? Yes   Who do you live with? Other   If you need help, do you have trouble finding someone available to you? No   Have you been bothered in the last four weeks by sexual problems? No   Do you have difficulty concentrating, remembering or making decisions? No     Does the patient have evidence of cognitive impairment? No    Asprin use counseling:Does not need ASA (and currently is not on it)    Age-appropriate Screening Schedule:  Refer to the list below for future screening recommendations based on patient's age, sex and/or medical conditions. Orders for these recommended tests are listed in the plan section. The patient has been provided with a written plan.    Health Maintenance   Topic Date Due   • INFLUENZA VACCINE  08/01/2020   • PROSTATE CANCER SCREENING  12/15/2022 (Originally 9/20/2016)   • DXA SCAN  01/23/2023 (Originally 9/20/2016)   • TDAP/TD VACCINES (2 - Td) 05/01/2025          The following portions of the patient's history were reviewed and updated as appropriate: allergies, current medications, past family history, past medical history, past social history, past surgical history and problem list.    Outpatient Medications Prior to Visit   Medication Sig Dispense Refill   • aspirin 81 MG EC tablet Take 1 tablet by mouth Daily. 30 tablet 5   • cetirizine (zyrTEC) 10 MG tablet Take 1 tablet by mouth Every Night. 30 tablet 5   • ciprofloxacin (CIPRO) 500 MG tablet Take 1 tablet by mouth 2 (Two) Times a Day for 10 days. 20 tablet 0   • famotidine (PEPCID) 40 MG tablet Take 1 tablet by mouth 2 (Two) Times a Day. 60 tablet 5   • folic acid (FOLVITE) 1 MG tablet Take 1 tablet by mouth Daily.  30 tablet 5   • ibuprofen (ADVIL,MOTRIN) 600 MG tablet Take 1 tablet by mouth Every 8 (Eight) Hours As Needed for Mild Pain . 90 tablet 5   • multivitamin (DAILY BRIAN) tablet tablet Take 1 tablet by mouth Daily. 30 tablet 5   • omeprazole (priLOSEC) 20 MG capsule Take 1 capsule by mouth Daily. 30 capsule 5   • venlafaxine XR (EFFEXOR-XR) 150 MG 24 hr capsule Take 1 capsule by mouth Daily. 30 capsule 5   • baclofen (LIORESAL) 10 MG tablet Take 1 tablet by mouth 3 (Three) Times a Day As Needed for Muscle Spasms. 90 tablet 0   • Cariprazine HCl (Vraylar) 1.5 MG capsule capsule 1 capsule 2-3 hours before hs 30 capsule 5   • clotrimazole (LOTRIMIN) 1 % cream Apply  topically to the appropriate area as directed 2 (Two) Times a Day. 45 g 0   • fluticasone (FLONASE) 50 MCG/ACT nasal spray 2 sprays into the nostril(s) as directed by provider Daily As Needed for Rhinitis. 16 g 5   • gabapentin (NEURONTIN) 800 MG tablet TAKE ONE TABLET BY MOUTH THREE TIMES A DAY 90 tablet 0   • oxybutynin XL (DITROPAN-XL) 10 MG 24 hr tablet Take 1 tablet by mouth Daily. 30 tablet 5   • TiZANidine (ZANAFLEX) 4 MG capsule Take 1 capsule by mouth 3 (Three) Times a Day As Needed for Muscle Spasms. 90 capsule 5   • triamcinolone (KENALOG) 0.1 % cream Apply  topically to the appropriate area as directed 2 (Two) Times a Day. 80 g 0     No facility-administered medications prior to visit.      Patient Active Problem List   Diagnosis   • Chronic seasonal allergic rhinitis   • Recurrent major depressive disorder, in partial remission (CMS/HCC)   • Microcytic anemia   • Smoker   • T6 spinal cord injury (CMS/HCC)   • History of left lower extremity amputation (CMS/HCC)   • Healthcare maintenance   • Chronic multifocal osteomyelitis of multiple sites (CMS/HCC)   • Atonic bladder   • Encounter for immunization   • Gastroesophageal reflux disease without esophagitis   • Endocarditis, valve   • Seborrheic dermatitis     Advanced Care Planning:  ACP discussion  "was held with the patient during this visit. Patient does not have an advance directive, information provided.    Review of Systems   Constitutional: Positive for fatigue. Negative for appetite change, chills, fever and unexpected weight change.   HENT: Negative for congestion, ear pain, rhinorrhea, sneezing, sore throat and voice change.    Eyes: Negative for visual disturbance.   Respiratory: Negative for cough, shortness of breath and wheezing.    Cardiovascular: Negative for chest pain, palpitations and leg swelling.   Gastrointestinal: Negative for abdominal pain, blood in stool, constipation, diarrhea, nausea and vomiting.   Genitourinary: Negative for hematuria.        Leakage about indwelling bladder catheter   Musculoskeletal: Positive for arthralgias, back pain and myalgias. Negative for joint swelling and neck pain.   Skin: Negative for rash.   Neurological: Positive for weakness and numbness. Negative for headaches.   Psychiatric/Behavioral: Positive for sleep disturbance. Negative for dysphoric mood, hallucinations and suicidal ideas. The patient is nervous/anxious (occasional).      Compared to one year ago, the patient feels his physical health is the same.  Compared to one year ago, the patient feels his mental health is the same.    Reviewed chart for potential of high risk medication in the elderly: yes  Reviewed chart for potential of harmful drug interactions in the elderly:yes    Objective      Vitals:    11/02/20 1424   BP: 95/48   Pulse: 104   Resp: 14   Temp: 97.1 °F (36.2 °C)   TempSrc: Temporal   SpO2: 96%   Height: 185.4 cm (73\")       Body mass index is 26.39 kg/m².  Discussed the patient's BMI with him. The BMI is in the acceptable range.    Physical Exam  Constitutional:       General: He is not in acute distress.     Appearance: Normal appearance. He is well-developed. He is not ill-appearing or diaphoretic.      Comments: Alert and oriented.  Bright and in fair spirits.  Sitting in " wheelchair.  No apparent distress.  No pallor, jaundice, cyanosis, or diaphoresis   HENT:      Head: Atraumatic.      Right Ear: Tympanic membrane, ear canal and external ear normal.      Left Ear: Tympanic membrane, ear canal and external ear normal.   Eyes:      Conjunctiva/sclera: Conjunctivae normal.   Neck:      Thyroid: No thyroid mass or thyromegaly.      Vascular: No carotid bruit or JVD.      Trachea: Trachea normal. No tracheal deviation.   Cardiovascular:      Rate and Rhythm: Normal rate and regular rhythm.      Heart sounds: Normal heart sounds, S1 normal and S2 normal. No murmur. No gallop. No S3 or S4 sounds.    Pulmonary:      Effort: Pulmonary effort is normal.      Breath sounds: Normal breath sounds.   Abdominal:      General: Bowel sounds are normal. There is no distension.      Palpations: Abdomen is soft. There is no hepatomegaly, splenomegaly or mass.      Tenderness: There is no abdominal tenderness.      Hernia: No hernia is present.   Musculoskeletal:      Right lower leg: No edema.      Left lower leg: No edema.   Lymphadenopathy:      Head:      Right side of head: No submental, submandibular, tonsillar, preauricular, posterior auricular or occipital adenopathy.      Left side of head: No submental, submandibular, tonsillar, preauricular, posterior auricular or occipital adenopathy.      Cervical: No cervical adenopathy.      Upper Body:      Right upper body: No supraclavicular adenopathy.      Left upper body: No supraclavicular adenopathy.   Skin:     General: Skin is warm and dry.      Coloration: Skin is not cyanotic, jaundiced or pale.      Findings: No rash.      Nails: There is no clubbing.     Neurological:      Mental Status: He is alert and oriented to person, place, and time.      Cranial Nerves: No cranial nerve deficit.      Motor: Weakness (spastic paraparesis) and abnormal muscle tone present. No tremor.      Coordination: Coordination normal.   Psychiatric:          Attention and Perception: Attention normal.         Mood and Affect: Mood normal.         Speech: Speech normal.         Behavior: Behavior normal.         Thought Content: Thought content normal. Thought content does not include suicidal ideation.       Assessment/Plan   Medicare Risks and Personalized Health Plan  CMS Preventative Services Quick Reference  Advance Directive Discussion  Chronic Pain   Depression/Dysphoria  Immunizations Discussed/Encouraged (specific immunizations; Influenza )  Tobacco Use/Dependance (use dotphrase .tobaccocessation for documentation)    The above risks/problems have been discussed with the patient.  Pertinent information has been shared with the patient in the After Visit Summary.  Follow up plans and orders are seen below in the Assessment/Plan Section.    Diagnoses and all orders for this visit:    1. T6 spinal cord injury, sequela (CMS/[facility])   Supportive therapy  Continue current medication  -     gabapentin (NEURONTIN) 800 MG tablet; Take 1 tablet by mouth 3 (Three) Times a Day.  Dispense: 90 tablet; Refill: 2    2. Chronic multifocal osteomyelitis of multiple sites (CMS/[facility])  Reminded regarding skin care    3. History of left lower extremity amputation (CMS/[facility])    4. Endocarditis, valve    5. Atonic bladder  Follow up with urology    6. Recurrent major depressive disorder, in partial remission (CMS/[facility])  Significant situational component.   Supportive therapy.   We will resume quetiapine  Encouraged to report if any worse or if any new symptoms or concerns.  -     QUEtiapine (SEROquel) 50 MG tablet; 1 tablet 2-3 hours before bedtime  Dispense: 30 tablet; Refill: 5    7. Chronic seasonal allergic rhinitis  Reminded regarding allergen avoidance.  Continue current medication    8. Gastroesophageal reflux disease without esophagitis    9. Seborrheic dermatitis    10. Smoker    11. Healthcare maintenance  Recommended a flu shot  -     Fluarix/Fluzone/Afluria Quad>6  Months    Follow Up:  Return in about 3 months (around 2/2/2021).     An After Visit Summary and PPPS were given to the patient.

## 2020-11-24 DIAGNOSIS — N31.2 ATONIC BLADDER: ICD-10-CM

## 2020-11-24 RX ORDER — BACLOFEN 10 MG/1
TABLET ORAL
Qty: 90 TABLET | Refills: 0 | Status: SHIPPED | OUTPATIENT
Start: 2020-11-24 | End: 2020-12-29

## 2020-12-07 DIAGNOSIS — F33.41 RECURRENT MAJOR DEPRESSIVE DISORDER, IN PARTIAL REMISSION (HCC): ICD-10-CM

## 2020-12-07 RX ORDER — QUETIAPINE FUMARATE 100 MG/1
100 TABLET, FILM COATED ORAL NIGHTLY
Qty: 30 TABLET | Refills: 2 | Status: SHIPPED | OUTPATIENT
Start: 2020-12-07 | End: 2021-02-03 | Stop reason: SDUPTHER

## 2020-12-23 ENCOUNTER — TELEPHONE (OUTPATIENT)
Dept: FAMILY MEDICINE CLINIC | Facility: CLINIC | Age: 45
End: 2020-12-23

## 2020-12-23 NOTE — TELEPHONE ENCOUNTER
PATIENT CALLED REQUESTING A SCRIPT FOR AN ANTIBIOTIC FOR A UTI. PATIENT HAS A CATHETER AND SAYS HE HAS SMELLY URINE AND A FEVER.      [facility]       PLEASE ADVISE AND CALL PATIENT BACK [phone]

## 2020-12-28 RX ORDER — CIPROFLOXACIN 500 MG/1
500 TABLET, FILM COATED ORAL 2 TIMES DAILY
Qty: 20 TABLET | Refills: 0 | Status: SHIPPED | OUTPATIENT
Start: 2020-12-28 | End: 2021-01-07

## 2020-12-28 NOTE — TELEPHONE ENCOUNTER
Caller: Alex Tierney    Relationship: Self    Best call back number: [phone]    What medication are you requesting: ANTIBIOTIC    What are your current symptoms: URINARY ISSUE    How long have you been experiencing symptoms: 1 WEEK  Have you had these symptoms before:    [x] Yes  [] No    Have you been treated for these symptoms before:   [x] Yes  [] No    If a prescription is needed, what is your preferred pharmacy and phone number: [facility] [address] - [place], [place] - [address] [facility] [address] [facility] [phone] [facility] [phone] FX     Additional notes:

## 2020-12-29 DIAGNOSIS — N31.2 ATONIC BLADDER: ICD-10-CM

## 2020-12-29 RX ORDER — BACLOFEN 10 MG/1
TABLET ORAL
Qty: 90 TABLET | Refills: 0 | Status: SHIPPED | OUTPATIENT
Start: 2020-12-29 | End: 2021-02-01

## 2021-01-30 DIAGNOSIS — N31.2 ATONIC BLADDER: ICD-10-CM

## 2021-02-01 DIAGNOSIS — S24.102S T6 SPINAL CORD INJURY, SEQUELA (HCC): ICD-10-CM

## 2021-02-01 RX ORDER — GABAPENTIN 800 MG/1
TABLET ORAL
Qty: 90 TABLET | Refills: 0 | Status: SHIPPED | OUTPATIENT
Start: 2021-02-01 | End: 2021-03-01

## 2021-02-01 RX ORDER — BACLOFEN 10 MG/1
TABLET ORAL
Qty: 90 TABLET | Refills: 0 | Status: SHIPPED | OUTPATIENT
Start: 2021-02-01 | End: 2021-03-01

## 2021-02-03 ENCOUNTER — TELEPHONE (OUTPATIENT)
Dept: FAMILY MEDICINE CLINIC | Facility: CLINIC | Age: 46
End: 2021-02-03

## 2021-02-03 DIAGNOSIS — F33.41 RECURRENT MAJOR DEPRESSIVE DISORDER, IN PARTIAL REMISSION (HCC): ICD-10-CM

## 2021-02-03 RX ORDER — QUETIAPINE FUMARATE 200 MG/1
200 TABLET, FILM COATED ORAL NIGHTLY
Qty: 30 TABLET | Refills: 5 | Status: SHIPPED | OUTPATIENT
Start: 2021-02-03 | End: 2021-07-31

## 2021-02-03 NOTE — TELEPHONE ENCOUNTER
PT CALLED REQUESTING AN INCREASE IN DOSAGE FOR:  QUEtiapine (SEROquel) 100 MG tablet    PT IS ALSO REQUESTING A SCRIPT FOR AN ANTIBIOTIC FOR A UTI    PLEASE ADVISE AT:[phone]       [facility] [address] - [place], [place] - [address] AT [facility] [address] - [phone]  - [phone] FX

## 2021-02-08 DIAGNOSIS — N31.2 ATONIC BLADDER: Primary | ICD-10-CM

## 2021-02-08 DIAGNOSIS — Z87.440 HISTORY OF RECURRENT URINARY TRACT INFECTION: ICD-10-CM

## 2021-02-25 ENCOUNTER — TELEPHONE (OUTPATIENT)
Dept: FAMILY MEDICINE CLINIC | Facility: CLINIC | Age: 46
End: 2021-02-25

## 2021-02-25 NOTE — TELEPHONE ENCOUNTER
PT CALLED REQUESTING A SCRIPT FOR A EMI CUSHION BE SENT TO [facility] IN [place]    PLEASE ADVISE AT: [phone]

## 2021-03-01 DIAGNOSIS — M25.511 ACUTE PAIN OF RIGHT SHOULDER: ICD-10-CM

## 2021-03-01 DIAGNOSIS — S24.102S T6 SPINAL CORD INJURY, SEQUELA (HCC): ICD-10-CM

## 2021-03-01 DIAGNOSIS — N31.2 ATONIC BLADDER: ICD-10-CM

## 2021-03-01 RX ORDER — IBUPROFEN 600 MG/1
TABLET ORAL
Qty: 90 TABLET | Refills: 4 | Status: SHIPPED | OUTPATIENT
Start: 2021-03-01 | End: 2021-08-09

## 2021-03-01 RX ORDER — BACLOFEN 10 MG/1
TABLET ORAL
Qty: 90 TABLET | Refills: 0 | Status: SHIPPED | OUTPATIENT
Start: 2021-03-01 | End: 2021-03-31

## 2021-03-01 RX ORDER — GABAPENTIN 800 MG/1
TABLET ORAL
Qty: 90 TABLET | Refills: 0 | Status: SHIPPED | OUTPATIENT
Start: 2021-03-01 | End: 2021-04-03 | Stop reason: SDUPTHER

## 2021-03-04 DIAGNOSIS — F33.41 RECURRENT MAJOR DEPRESSIVE DISORDER, IN PARTIAL REMISSION (HCC): ICD-10-CM

## 2021-03-04 RX ORDER — QUETIAPINE FUMARATE 100 MG/1
TABLET, FILM COATED ORAL
Qty: 90 TABLET | Refills: 1 | OUTPATIENT
Start: 2021-03-04

## 2021-03-16 ENCOUNTER — BULK ORDERING (OUTPATIENT)
Dept: CASE MANAGEMENT | Facility: OTHER | Age: 46
End: 2021-03-16

## 2021-03-16 DIAGNOSIS — Z23 IMMUNIZATION DUE: ICD-10-CM

## 2021-03-31 DIAGNOSIS — N31.2 ATONIC BLADDER: ICD-10-CM

## 2021-03-31 DIAGNOSIS — S24.102S T6 SPINAL CORD INJURY, SEQUELA (HCC): ICD-10-CM

## 2021-03-31 DIAGNOSIS — F33.41 RECURRENT MAJOR DEPRESSIVE DISORDER, IN PARTIAL REMISSION (HCC): ICD-10-CM

## 2021-03-31 RX ORDER — BACLOFEN 10 MG/1
TABLET ORAL
Qty: 90 TABLET | Refills: 5 | Status: SHIPPED | OUTPATIENT
Start: 2021-03-31 | End: 2021-10-12

## 2021-03-31 RX ORDER — GABAPENTIN 800 MG/1
TABLET ORAL
Qty: 90 TABLET | Refills: 0 | OUTPATIENT
Start: 2021-03-31

## 2021-03-31 RX ORDER — CARIPRAZINE 1.5 MG/1
CAPSULE, GELATIN COATED ORAL
Qty: 30 CAPSULE | Refills: 5 | Status: SHIPPED | OUTPATIENT
Start: 2021-03-31 | End: 2021-04-03

## 2021-04-02 ENCOUNTER — OFFICE VISIT (OUTPATIENT)
Dept: FAMILY MEDICINE CLINIC | Facility: CLINIC | Age: 46
End: 2021-04-02

## 2021-04-02 DIAGNOSIS — F17.200 SMOKER: ICD-10-CM

## 2021-04-02 DIAGNOSIS — K92.1 HEMATOCHEZIA: ICD-10-CM

## 2021-04-02 DIAGNOSIS — S24.102S T6 SPINAL CORD INJURY, SEQUELA (HCC): ICD-10-CM

## 2021-04-02 DIAGNOSIS — F33.41 RECURRENT MAJOR DEPRESSIVE DISORDER, IN PARTIAL REMISSION (HCC): ICD-10-CM

## 2021-04-02 DIAGNOSIS — I38 ENDOCARDITIS, VALVE: ICD-10-CM

## 2021-04-02 DIAGNOSIS — M86.39 CHRONIC MULTIFOCAL OSTEOMYELITIS OF MULTIPLE SITES (HCC): ICD-10-CM

## 2021-04-02 DIAGNOSIS — J30.2 CHRONIC SEASONAL ALLERGIC RHINITIS: Primary | ICD-10-CM

## 2021-04-02 DIAGNOSIS — K21.9 GASTROESOPHAGEAL REFLUX DISEASE WITHOUT ESOPHAGITIS: ICD-10-CM

## 2021-04-02 DIAGNOSIS — Z00.00 HEALTHCARE MAINTENANCE: ICD-10-CM

## 2021-04-02 DIAGNOSIS — Z89.612 HISTORY OF LEFT LOWER EXTREMITY AMPUTATION (HCC): ICD-10-CM

## 2021-04-02 DIAGNOSIS — N31.2 ATONIC BLADDER: ICD-10-CM

## 2021-04-02 DIAGNOSIS — M81.0 AGE-RELATED OSTEOPOROSIS WITHOUT CURRENT PATHOLOGICAL FRACTURE: ICD-10-CM

## 2021-04-02 PROCEDURE — 82607 VITAMIN B-12: CPT | Performed by: GENERAL PRACTICE

## 2021-04-02 PROCEDURE — 87086 URINE CULTURE/COLONY COUNT: CPT | Performed by: GENERAL PRACTICE

## 2021-04-02 PROCEDURE — 82306 VITAMIN D 25 HYDROXY: CPT | Performed by: GENERAL PRACTICE

## 2021-04-02 PROCEDURE — 84466 ASSAY OF TRANSFERRIN: CPT | Performed by: GENERAL PRACTICE

## 2021-04-02 PROCEDURE — 82728 ASSAY OF FERRITIN: CPT | Performed by: GENERAL PRACTICE

## 2021-04-02 PROCEDURE — 80061 LIPID PANEL: CPT | Performed by: GENERAL PRACTICE

## 2021-04-02 PROCEDURE — 81001 URINALYSIS AUTO W/SCOPE: CPT | Performed by: GENERAL PRACTICE

## 2021-04-02 PROCEDURE — 83540 ASSAY OF IRON: CPT | Performed by: GENERAL PRACTICE

## 2021-04-02 PROCEDURE — 85025 COMPLETE CBC W/AUTO DIFF WBC: CPT | Performed by: GENERAL PRACTICE

## 2021-04-02 PROCEDURE — 80053 COMPREHEN METABOLIC PANEL: CPT | Performed by: GENERAL PRACTICE

## 2021-04-02 PROCEDURE — 84443 ASSAY THYROID STIM HORMONE: CPT | Performed by: GENERAL PRACTICE

## 2021-04-02 PROCEDURE — 99214 OFFICE O/P EST MOD 30 MIN: CPT | Performed by: GENERAL PRACTICE

## 2021-04-02 NOTE — PROGRESS NOTES
Subjective   Alex Tierney is a 45 y.o. male.     History of Present Illness     Depression  His mood has been fairly good since last here and he has been sleeping better.  He admits to intermittent nervousness and is reluctant to leave the house because of how he feels he is reviewed.  He denies any difficulty with his concentration or memory and there is no history of any suicidal ideation.  His family is quite supportive. He remains on venlafaxine er and quetiapine with no apparent side effects    Spinal Cord Injury  Level T6 complicated by recurrent decubitus ulcers and osteomyelitis requiring a previous left BKA, bilateral hip osteotomies, permanent colostomy and a chronic indwelling bladder catheter. Underwent extensive surgery for closure of a number of sacral decubitus ulcers several years ago.  He denies any recurrence. He continues to have intermittent back and lower extremity muscle spasms.  He has been treated for recurrent urinary tract infections and feels he has another at present.  He admits to a sense of pelvic pressure.  He has not followed up with urology.  He has passed several blood clots through his colostomy since last here.  He denies any change in the consistency of his stool and has had no melena.    Allergic Rhinitis  Patient has a history of allergic rhinitis. Patient's symptoms include sneezing, clear rhinorrhea, nasal congestion, periorbital pressure and postnasal drip. These symptoms are perennial with seasonal exacerbation. The patient has been suffering from these symptoms for a number of years. The patient remains on cetirizine with fairly good effect and no apparent side effects    The following portions of the patient's history were reviewed and updated as appropriate: allergies, current medications, past medical history, past social history and problem list.    Review of Systems   Constitutional: Positive for fatigue. Negative for appetite change, chills, fever and unexpected  weight change.   HENT: Negative for congestion, ear pain, rhinorrhea, sneezing, sore throat and voice change.    Eyes: Negative for visual disturbance.   Respiratory: Negative for cough, shortness of breath and wheezing.    Cardiovascular: Negative for chest pain, palpitations and leg swelling.   Gastrointestinal: Positive for abdominal pain (pelvic pressure) and blood in stool (intermittent). Negative for constipation, diarrhea, nausea and vomiting.   Genitourinary: Negative for hematuria.   Musculoskeletal: Positive for arthralgias, back pain and myalgias. Negative for joint swelling and neck pain.   Skin: Negative for rash.   Neurological: Positive for weakness and numbness. Negative for headaches.   Psychiatric/Behavioral: Negative for dysphoric mood, hallucinations, sleep disturbance and suicidal ideas. The patient is nervous/anxious (intermittent).      Objective   Physical Exam  Constitutional:       General: He is not in acute distress.     Appearance: Normal appearance. He is well-developed. He is not ill-appearing or diaphoretic.      Comments: Alert and oriented.  Bright and in fair spirits.  Sitting in wheelchair.  No apparent distress.  No pallor, jaundice, cyanosis, or diaphoresis   HENT:      Head: Atraumatic.      Right Ear: Tympanic membrane, ear canal and external ear normal.      Left Ear: Tympanic membrane, ear canal and external ear normal.   Eyes:      Conjunctiva/sclera: Conjunctivae normal.   Neck:      Thyroid: No thyroid mass or thyromegaly.      Vascular: No carotid bruit or JVD.      Trachea: Trachea normal. No tracheal deviation.   Cardiovascular:      Rate and Rhythm: Normal rate and regular rhythm.      Heart sounds: Normal heart sounds, S1 normal and S2 normal. No murmur heard.   No gallop. No S3 or S4 sounds.    Pulmonary:      Effort: Pulmonary effort is normal.      Breath sounds: Normal breath sounds.   Abdominal:      General: Bowel sounds are normal. There is no distension.       Palpations: Abdomen is soft. There is no hepatomegaly, splenomegaly or mass.      Tenderness: There is no abdominal tenderness.      Hernia: No hernia is present.   Musculoskeletal:      Right lower leg: No edema.      Left lower leg: No edema.   Lymphadenopathy:      Head:      Right side of head: No submental, submandibular, tonsillar, preauricular, posterior auricular or occipital adenopathy.      Left side of head: No submental, submandibular, tonsillar, preauricular, posterior auricular or occipital adenopathy.      Cervical: No cervical adenopathy.      Upper Body:      Right upper body: No supraclavicular adenopathy.      Left upper body: No supraclavicular adenopathy.   Skin:     General: Skin is warm and dry.      Coloration: Skin is not cyanotic, jaundiced or pale.      Findings: No rash.      Nails: There is no clubbing.   Neurological:      Mental Status: He is alert and oriented to person, place, and time.      Cranial Nerves: No cranial nerve deficit.      Motor: Weakness (spastic paraparesis) and abnormal muscle tone present. No tremor.      Coordination: Coordination normal.   Psychiatric:         Attention and Perception: Attention normal.         Mood and Affect: Mood normal.         Speech: Speech normal.         Behavior: Behavior normal.         Thought Content: Thought content normal. Thought content does not include suicidal ideation.       Assessment/Plan   Problems Addressed this Visit        Allergies and Adverse Reactions    Chronic seasonal allergic rhinitis  Reminded regarding allergen avoidance.   Continue current medication       Cardiac and Vasculature    Endocarditis, valve       Gastrointestinal Abdominal     Gastroesophageal reflux disease without esophagitis    Relevant Orders    CBC & Differential (Completed)    Hematochezia  Intermittent  Reviewed options and agreed on a GI assessment.  Encouraged report if any worse or if any new associated symptoms in the meantime    Relevant  Orders    CBC & Differential (Completed)    Iron (Completed)    Transferrin (Completed)    Ferritin (Completed)    Ambulatory Referral to Gastroenterology       Genitourinary and Reproductive     Atonic bladder  Urinalysis with culture  We will arrange a urology reassessment    Relevant Orders    Ambulatory Referral to Urology    Urinalysis With Culture If Indicated - Urine, Catheter (Completed)    Urinalysis, Microscopic Only - Urine, Catheter (Completed)    Urine Culture - Urine, Urine, Catheter       Health Encounters    Healthcare maintenance  Encouraged to obtain a COVID-19 immunization.  Lengthy discussion regarding the potential benefits and risks.  Patient would like to proceed and we will try to arrange    Relevant Orders    Comprehensive Metabolic Panel (Completed)    Lipid Panel (Completed)    Vitamin D 25 Hydroxy (Completed)    Vitamin B12 (Completed)       Mental Health    Recurrent major depressive disorder, in partial remission (CMS/HCC)  Improved  Supportive therapy.   Trial of hydroxyzine as needed    Relevant Medications    hydrOXYzine pamoate (VISTARIL) 25 MG capsule    Other Relevant Orders    TSH (Completed)       Musculoskeletal and Injuries    History of left lower extremity amputation (CMS/HCC)       Neuro    T6 spinal cord injury (CMS/HCC)  Supportive therapy  Continue current medication    Relevant Medications    gabapentin (NEURONTIN) 800 MG tablet    Other Relevant Orders    Ambulatory Referral to Urology       Tobacco    Smoker       Other    Chronic multifocal osteomyelitis of multiple sites (CMS/HCC)         Diagnoses       Codes Comments    Chronic seasonal allergic rhinitis    -  Primary ICD-10-CM: J30.2  ICD-9-CM: 477.8     Gastroesophageal reflux disease without esophagitis     ICD-10-CM: K21.9  ICD-9-CM: 530.81     Atonic bladder     ICD-10-CM: N31.2  ICD-9-CM: 596.4     Healthcare maintenance     ICD-10-CM: Z00.00  ICD-9-CM: V70.0     Recurrent major depressive disorder, in  partial remission (CMS/HCC)     ICD-10-CM: F33.41  ICD-9-CM: 296.35     History of left lower extremity amputation (CMS/HCC)     ICD-10-CM: Z89.612  ICD-9-CM: V49.70     T6 spinal cord injury, sequela (CMS/HCC)     ICD-10-CM: S24.102S  ICD-9-CM: 907.2     Smoker     ICD-10-CM: F17.200  ICD-9-CM: 305.1     Chronic multifocal osteomyelitis of multiple sites (CMS/HCC)     ICD-10-CM: M86.39  ICD-9-CM: 730.19     Endocarditis, valve     ICD-10-CM: I38  ICD-9-CM: 424.90     Hematochezia     ICD-10-CM: K92.1  ICD-9-CM: 578.1     Age-related osteoporosis without current pathological fracture      ICD-10-CM: M81.0  ICD-9-CM: 733.01

## 2021-04-03 VITALS
HEART RATE: 100 BPM | SYSTOLIC BLOOD PRESSURE: 122 MMHG | BODY MASS INDEX: 26.39 KG/M2 | DIASTOLIC BLOOD PRESSURE: 50 MMHG | TEMPERATURE: 97.1 F | HEIGHT: 73 IN | RESPIRATION RATE: 14 BRPM | OXYGEN SATURATION: 96 %

## 2021-04-03 DIAGNOSIS — E55.9 VITAMIN D DEFICIENCY: Primary | ICD-10-CM

## 2021-04-03 LAB
25(OH)D3 SERPL-MCNC: 20.9 NG/ML (ref 30–100)
ALBUMIN SERPL-MCNC: 3.9 G/DL (ref 3.5–5.2)
ALBUMIN/GLOB SERPL: 1.2 G/DL
ALP SERPL-CCNC: 93 U/L (ref 39–117)
ALT SERPL W P-5'-P-CCNC: 33 U/L (ref 1–41)
ANION GAP SERPL CALCULATED.3IONS-SCNC: 12.8 MMOL/L (ref 5–15)
AST SERPL-CCNC: 24 U/L (ref 1–40)
BACTERIA UR QL AUTO: ABNORMAL /HPF
BASOPHILS # BLD AUTO: 0.06 10*3/MM3 (ref 0–0.2)
BASOPHILS NFR BLD AUTO: 0.5 % (ref 0–1.5)
BILIRUB SERPL-MCNC: 0.2 MG/DL (ref 0–1.2)
BILIRUB UR QL STRIP: NEGATIVE
BUN SERPL-MCNC: 15 MG/DL (ref 6–20)
BUN/CREAT SERPL: 27.3 (ref 7–25)
CALCIUM SPEC-SCNC: 8.8 MG/DL (ref 8.6–10.5)
CHLORIDE SERPL-SCNC: 102 MMOL/L (ref 98–107)
CHOLEST SERPL-MCNC: 150 MG/DL (ref 0–200)
CLARITY UR: ABNORMAL
CO2 SERPL-SCNC: 23.2 MMOL/L (ref 22–29)
COLOR UR: YELLOW
CREAT SERPL-MCNC: 0.55 MG/DL (ref 0.76–1.27)
DEPRECATED RDW RBC AUTO: 42.6 FL (ref 37–54)
EOSINOPHIL # BLD AUTO: 0.33 10*3/MM3 (ref 0–0.4)
EOSINOPHIL NFR BLD AUTO: 2.9 % (ref 0.3–6.2)
ERYTHROCYTE [DISTWIDTH] IN BLOOD BY AUTOMATED COUNT: 13.7 % (ref 12.3–15.4)
FERRITIN SERPL-MCNC: 169 NG/ML (ref 30–400)
GFR SERPL CREATININE-BSD FRML MDRD: >150 ML/MIN/1.73
GLOBULIN UR ELPH-MCNC: 3.2 GM/DL
GLUCOSE SERPL-MCNC: 81 MG/DL (ref 65–99)
GLUCOSE UR STRIP-MCNC: NEGATIVE MG/DL
HCT VFR BLD AUTO: 48.1 % (ref 37.5–51)
HDLC SERPL-MCNC: 31 MG/DL (ref 40–60)
HGB BLD-MCNC: 16.7 G/DL (ref 13–17.7)
HGB UR QL STRIP.AUTO: ABNORMAL
HYALINE CASTS UR QL AUTO: ABNORMAL /LPF
IMM GRANULOCYTES # BLD AUTO: 0.07 10*3/MM3 (ref 0–0.05)
IMM GRANULOCYTES NFR BLD AUTO: 0.6 % (ref 0–0.5)
IRON 24H UR-MRATE: 30 MCG/DL (ref 59–158)
KETONES UR QL STRIP: NEGATIVE
LDLC SERPL CALC-MCNC: 78 MG/DL (ref 0–100)
LDLC/HDLC SERPL: 2.23 {RATIO}
LEUKOCYTE ESTERASE UR QL STRIP.AUTO: ABNORMAL
LYMPHOCYTES # BLD AUTO: 1.4 10*3/MM3 (ref 0.7–3.1)
LYMPHOCYTES NFR BLD AUTO: 12.2 % (ref 19.6–45.3)
MCH RBC QN AUTO: 29.8 PG (ref 26.6–33)
MCHC RBC AUTO-ENTMCNC: 34.7 G/DL (ref 31.5–35.7)
MCV RBC AUTO: 85.7 FL (ref 79–97)
MONOCYTES # BLD AUTO: 0.88 10*3/MM3 (ref 0.1–0.9)
MONOCYTES NFR BLD AUTO: 7.7 % (ref 5–12)
NEUTROPHILS NFR BLD AUTO: 76.1 % (ref 42.7–76)
NEUTROPHILS NFR BLD AUTO: 8.71 10*3/MM3 (ref 1.7–7)
NITRITE UR QL STRIP: POSITIVE
NRBC BLD AUTO-RTO: 0.1 /100 WBC (ref 0–0.2)
PH UR STRIP.AUTO: 6.5 [PH] (ref 5–8)
PLATELET # BLD AUTO: 218 10*3/MM3 (ref 140–450)
PMV BLD AUTO: 12 FL (ref 6–12)
POTASSIUM SERPL-SCNC: 4.3 MMOL/L (ref 3.5–5.2)
PROT SERPL-MCNC: 7.1 G/DL (ref 6–8.5)
PROT UR QL STRIP: ABNORMAL
RBC # BLD AUTO: 5.61 10*6/MM3 (ref 4.14–5.8)
RBC # UR: ABNORMAL /HPF
REF LAB TEST METHOD: ABNORMAL
SODIUM SERPL-SCNC: 138 MMOL/L (ref 136–145)
SP GR UR STRIP: 1.03 (ref 1–1.03)
SQUAMOUS #/AREA URNS HPF: ABNORMAL /HPF
TRANSFERRIN SERPL-MCNC: 228 MG/DL (ref 200–360)
TRIGL SERPL-MCNC: 249 MG/DL (ref 0–150)
TSH SERPL DL<=0.05 MIU/L-ACNC: 1.09 UIU/ML (ref 0.27–4.2)
UROBILINOGEN UR QL STRIP: ABNORMAL
VIT B12 BLD-MCNC: 639 PG/ML (ref 211–946)
VLDLC SERPL-MCNC: 41 MG/DL (ref 5–40)
WBC # BLD AUTO: 11.45 10*3/MM3 (ref 3.4–10.8)
WBC UR QL AUTO: ABNORMAL /HPF

## 2021-04-03 RX ORDER — ERGOCALCIFEROL 1.25 MG/1
50000 CAPSULE ORAL WEEKLY
Qty: 4 CAPSULE | Refills: 5 | Status: SHIPPED | OUTPATIENT
Start: 2021-04-03 | End: 2021-10-11

## 2021-04-03 RX ORDER — GABAPENTIN 800 MG/1
800 TABLET ORAL 3 TIMES DAILY
Qty: 90 TABLET | Refills: 3 | Status: SHIPPED | OUTPATIENT
Start: 2021-04-03 | End: 2021-08-09

## 2021-04-03 RX ORDER — HYDROXYZINE PAMOATE 25 MG/1
25 CAPSULE ORAL DAILY PRN
Qty: 30 CAPSULE | Refills: 5 | Status: SHIPPED | OUTPATIENT
Start: 2021-04-03 | End: 2022-09-12

## 2021-04-04 LAB — BACTERIA SPEC AEROBE CULT: NORMAL

## 2021-07-21 ENCOUNTER — TELEPHONE (OUTPATIENT)
Dept: FAMILY MEDICINE CLINIC | Facility: CLINIC | Age: 46
End: 2021-07-21

## 2021-07-21 NOTE — TELEPHONE ENCOUNTER
Caller: Alex Tierney    Relationship: Self    Best call back number: [phone]    What medication are you requesting: SOMETHING FOR A UTI    What are your current symptoms: CATHETER IS STOPPED UP  How long have you been experiencing symptoms:  A DAY AND A HALF    Have you had these symptoms before:    [x] Yes  [] No    Have you been treated for these symptoms before:   [x] Yes  [] No    If a prescription is needed, what is your preferred pharmacy and phone number: [facility] [address] [facility] [address] [address] [facility] [address] [facility] [address]  - [phone]  - [phone] FX

## 2021-07-22 RX ORDER — CIPROFLOXACIN 500 MG/1
500 TABLET, FILM COATED ORAL 2 TIMES DAILY
Qty: 20 TABLET | Refills: 0 | Status: SHIPPED | OUTPATIENT
Start: 2021-07-22 | End: 2021-08-01

## 2021-07-29 DIAGNOSIS — F33.1 MAJOR DEPRESSIVE DISORDER, RECURRENT EPISODE, MODERATE WITH MIXED FEATURES (HCC): ICD-10-CM

## 2021-07-30 DIAGNOSIS — F33.41 RECURRENT MAJOR DEPRESSIVE DISORDER, IN PARTIAL REMISSION (HCC): ICD-10-CM

## 2021-07-31 RX ORDER — VENLAFAXINE HYDROCHLORIDE 150 MG/1
CAPSULE, EXTENDED RELEASE ORAL
Qty: 90 CAPSULE | Refills: 3 | Status: SHIPPED | OUTPATIENT
Start: 2021-07-31 | End: 2022-08-08

## 2021-07-31 RX ORDER — QUETIAPINE FUMARATE 200 MG/1
TABLET, FILM COATED ORAL
Qty: 90 TABLET | Refills: 3 | Status: SHIPPED | OUTPATIENT
Start: 2021-07-31 | End: 2022-08-08

## 2021-08-07 DIAGNOSIS — M25.511 ACUTE PAIN OF RIGHT SHOULDER: ICD-10-CM

## 2021-08-09 DIAGNOSIS — S24.102S T6 SPINAL CORD INJURY, SEQUELA (HCC): ICD-10-CM

## 2021-08-09 RX ORDER — IBUPROFEN 600 MG/1
TABLET ORAL
Qty: 90 TABLET | Refills: 4 | Status: SHIPPED | OUTPATIENT
Start: 2021-08-09 | End: 2022-09-12

## 2021-08-09 RX ORDER — GABAPENTIN 800 MG/1
TABLET ORAL
Qty: 90 TABLET | Refills: 0 | Status: SHIPPED | OUTPATIENT
Start: 2021-08-09 | End: 2021-09-13

## 2021-09-02 ENCOUNTER — TELEPHONE (OUTPATIENT)
Dept: FAMILY MEDICINE CLINIC | Facility: CLINIC | Age: 46
End: 2021-09-02

## 2021-09-02 RX ORDER — CIPROFLOXACIN 250 MG/1
250 TABLET, FILM COATED ORAL 2 TIMES DAILY
Qty: 20 TABLET | Refills: 0 | Status: SHIPPED | OUTPATIENT
Start: 2021-09-02 | End: 2021-09-12

## 2021-09-02 NOTE — TELEPHONE ENCOUNTER
Caller: NiallAlex    Relationship: Self    Best call back number: [phone]    What medication are you requesting: ANTIBIOTIC     What are your current symptoms: UTI SYMPTOMS     How long have you been experiencing symptoms:     Have you had these symptoms before:    [x] Yes  [] No    Have you been treated for these symptoms before:   [x] Yes  [] No    If a prescription is needed, what is your preferred pharmacy and phone number: [facility] [place], [place] [facility] [address] [address] [facility] [address] [facility] [address]  - [phone]  - [phone] FX     Additional notes:

## 2021-09-11 DIAGNOSIS — S24.102S T6 SPINAL CORD INJURY, SEQUELA (HCC): ICD-10-CM

## 2021-09-13 RX ORDER — GABAPENTIN 800 MG/1
TABLET ORAL
Qty: 90 TABLET | Refills: 0 | Status: SHIPPED | OUTPATIENT
Start: 2021-09-13 | End: 2021-10-12

## 2021-09-28 RX ORDER — CARIPRAZINE 1.5 MG/1
CAPSULE, GELATIN COATED ORAL
Qty: 90 CAPSULE | Refills: 3 | Status: SHIPPED | OUTPATIENT
Start: 2021-09-28 | End: 2022-09-12

## 2021-10-10 DIAGNOSIS — E55.9 VITAMIN D DEFICIENCY: ICD-10-CM

## 2021-10-11 RX ORDER — ERGOCALCIFEROL 1.25 MG/1
CAPSULE ORAL
Qty: 4 CAPSULE | Refills: 5 | Status: SHIPPED | OUTPATIENT
Start: 2021-10-11 | End: 2022-09-12 | Stop reason: SDUPTHER

## 2021-10-12 DIAGNOSIS — S24.102S T6 SPINAL CORD INJURY, SEQUELA (HCC): ICD-10-CM

## 2021-10-12 DIAGNOSIS — N31.2 ATONIC BLADDER: ICD-10-CM

## 2021-10-12 RX ORDER — GABAPENTIN 800 MG/1
TABLET ORAL
Qty: 90 TABLET | Refills: 0 | Status: SHIPPED | OUTPATIENT
Start: 2021-10-12 | End: 2021-12-06

## 2021-10-12 RX ORDER — BACLOFEN 10 MG/1
TABLET ORAL
Qty: 90 TABLET | Refills: 5 | Status: SHIPPED | OUTPATIENT
Start: 2021-10-12 | End: 2022-05-31

## 2021-11-29 ENCOUNTER — TELEPHONE (OUTPATIENT)
Dept: FAMILY MEDICINE CLINIC | Facility: CLINIC | Age: 46
End: 2021-11-29

## 2021-11-29 NOTE — TELEPHONE ENCOUNTER
Caller: Alex Tierney    Relationship: Self    Best call back number: [phone]    What medication are you requesting: ANTIBIOTIC    What are your current symptoms: HAS CATHETER/ PARALIZED/ POSSIBLE UTI    How long have you been experiencing symptoms:     Have you had these symptoms before:    [x] Yes  [] No    Have you been treated for these symptoms before:   [x] Yes  [] No    If a prescription is needed, what is your preferred pharmacy and phone number:      [facility] [address] - [address] [address] [facility] [address] [facility] [phone]  - [phone] FX        Additional notes:

## 2021-11-29 NOTE — TELEPHONE ENCOUNTER
Caller: Alex Tierney    Relationship to patient: Self    Best call back number: [phone]    Chief complaint: FOLLOW UP    Type of visit: VIRTUAL VISIT    Requested date: ANYTIME    If rescheduling, when is the original appointment:      Additional notes:

## 2021-11-30 ENCOUNTER — OFFICE VISIT (OUTPATIENT)
Dept: FAMILY MEDICINE CLINIC | Facility: CLINIC | Age: 46
End: 2021-11-30

## 2021-11-30 VITALS — HEIGHT: 73 IN | BODY MASS INDEX: 29.82 KG/M2 | WEIGHT: 225 LBS

## 2021-11-30 DIAGNOSIS — N39.0 URINARY TRACT INFECTION ASSOCIATED WITH INDWELLING URETHRAL CATHETER, INITIAL ENCOUNTER (HCC): Primary | ICD-10-CM

## 2021-11-30 DIAGNOSIS — T83.511A URINARY TRACT INFECTION ASSOCIATED WITH INDWELLING URETHRAL CATHETER, INITIAL ENCOUNTER (HCC): Primary | ICD-10-CM

## 2021-11-30 PROCEDURE — 99213 OFFICE O/P EST LOW 20 MIN: CPT | Performed by: NURSE PRACTITIONER

## 2021-11-30 RX ORDER — CIPROFLOXACIN 500 MG/1
500 TABLET, FILM COATED ORAL 2 TIMES DAILY
Qty: 14 TABLET | Refills: 0 | Status: SHIPPED | OUTPATIENT
Start: 2021-11-30 | End: 2022-01-18

## 2021-11-30 NOTE — PROGRESS NOTES
You have chosen to receive care through a telehealth visit.  Do you consent to use a video/audio connection for your medical care today? Yes    Patient has chosen to have a telehealth/video visit due to current pandemic is not recommended to present to the office for face-to-face evaluation.       Chief Complaint   Patient presents with   • Urinary Tract Infection       Brief HPI/ROS obtained as follows:    Patient presents today with complaints of fatigue, fever on Sunday that has since resolved, concentrated urine, and concern for UTI. Patient has diagnosis of atonic bladder with an indwelling urinary catheter. He states he has had UTI's in the past and he believes he is experiencing one now. He has had no chills or body aches. He denies CVA tenderness. Patient does report having some nausea but no vomiting.     The following portions of the patient's history were reviewed and updated as appropriate: CC, ROS, allergies, current medications, past family history, past medical history, past social history, past surgical history and problem list.    Review of Systems   Constitutional: Negative.    HENT: Negative.    Eyes: Negative.    Respiratory: Negative.    Cardiovascular: Negative.    Gastrointestinal: Negative.    Genitourinary:        Sediment in urine, fever   Musculoskeletal: Negative.    Skin: Negative.    Neurological: Negative.    Psychiatric/Behavioral: Negative.    All other systems reviewed and are negative.      Assessment     Physical Exam   Constitutional: He appears well-developed and well-nourished.   Pulmonary/Chest: Effort normal.  No respiratory distress.  Neurological: He is alert.       Diagnoses and all orders for this visit:    1. Urinary tract infection associated with indwelling urethral catheter, initial encounter ([facility]) (Primary)  -     ciprofloxacin (Cipro) 500 MG tablet; Take 1 tablet by mouth 2 (Two) Times a Day.  Dispense: 14 tablet; Refill: 0   -encouraged patient to come to clinic  for clean catch urine sample from catheter if symptoms worsen or do not improve.    Scheduling has been made aware to make patient a follow up appointment with Dr. Rose for follow up on chronic conditions and medication refills per patient request.      Patient instructed and advised to call if symptoms are increasing or new symptoms occur.    Understands reasons for urgent and emergent care.  Patient (& family) verbalized agreement for treatment plan.     Generalized precautions advised including social distancing, hand washing, cough/sneeze hygiene.         This document has been electronically signed by ROBINA Worthy  November 30, 2021 10:19 EST    This is an audio and video enabled enabled encounter.          Dragon disclaimer:    Part of this note may be an electronic transcription/translation of spoken language to printed text using the Dragon Dictation System.

## 2021-12-05 DIAGNOSIS — S24.102S T6 SPINAL CORD INJURY, SEQUELA (HCC): ICD-10-CM

## 2021-12-06 RX ORDER — GABAPENTIN 800 MG/1
TABLET ORAL
Qty: 90 TABLET | Refills: 0 | Status: SHIPPED | OUTPATIENT
Start: 2021-12-06 | End: 2022-01-06

## 2022-01-06 DIAGNOSIS — S24.102S T6 SPINAL CORD INJURY, SEQUELA: ICD-10-CM

## 2022-01-06 RX ORDER — GABAPENTIN 800 MG/1
TABLET ORAL
Qty: 90 TABLET | Refills: 0 | Status: SHIPPED | OUTPATIENT
Start: 2022-01-06 | End: 2022-01-18 | Stop reason: SDUPTHER

## 2022-01-18 ENCOUNTER — TELEMEDICINE (OUTPATIENT)
Dept: FAMILY MEDICINE CLINIC | Facility: CLINIC | Age: 47
End: 2022-01-18

## 2022-01-18 DIAGNOSIS — J30.2 CHRONIC SEASONAL ALLERGIC RHINITIS: Primary | ICD-10-CM

## 2022-01-18 DIAGNOSIS — F33.41 RECURRENT MAJOR DEPRESSIVE DISORDER, IN PARTIAL REMISSION: ICD-10-CM

## 2022-01-18 DIAGNOSIS — Z86.79 HISTORY OF ENDOCARDITIS: ICD-10-CM

## 2022-01-18 DIAGNOSIS — N31.2 ATONIC BLADDER: ICD-10-CM

## 2022-01-18 DIAGNOSIS — F17.200 SMOKER: ICD-10-CM

## 2022-01-18 DIAGNOSIS — S24.102S T6 SPINAL CORD INJURY, SEQUELA: ICD-10-CM

## 2022-01-18 DIAGNOSIS — E55.9 VITAMIN D DEFICIENCY: ICD-10-CM

## 2022-01-18 DIAGNOSIS — K21.9 GASTROESOPHAGEAL REFLUX DISEASE WITHOUT ESOPHAGITIS: ICD-10-CM

## 2022-01-18 DIAGNOSIS — Z00.00 HEALTHCARE MAINTENANCE: ICD-10-CM

## 2022-01-18 DIAGNOSIS — Z89.612 HISTORY OF LEFT LOWER EXTREMITY AMPUTATION: ICD-10-CM

## 2022-01-18 PROBLEM — K92.1 HEMATOCHEZIA: Status: RESOLVED | Noted: 2021-04-02 | Resolved: 2022-01-18

## 2022-01-18 PROCEDURE — 99214 OFFICE O/P EST MOD 30 MIN: CPT | Performed by: GENERAL PRACTICE

## 2022-01-18 RX ORDER — GABAPENTIN 800 MG/1
800 TABLET ORAL 3 TIMES DAILY
Qty: 90 TABLET | Refills: 2 | Status: SHIPPED | OUTPATIENT
Start: 2022-01-18 | End: 2022-05-31

## 2022-01-18 RX ORDER — OMEPRAZOLE 40 MG/1
40 CAPSULE, DELAYED RELEASE ORAL
Qty: 30 CAPSULE | Refills: 5 | Status: SHIPPED | OUTPATIENT
Start: 2022-01-18 | End: 2022-09-12 | Stop reason: SDUPTHER

## 2022-01-18 NOTE — PROGRESS NOTES
Subjective   Alex Tierney is a 46 y.o. male.     Chief Complaint  He returns for a scheduled reassessment of multiple medical problems including a previous T6 spinal cord injury, previous left lower extremity amputation for osteomyelitis, gastroesophageal reflux disease, depression, and chronic allergic rhinitis    History of Present Illness     Spinal Cord Injury  Level T6 complicated by recurrent decubitus ulcers and osteomyelitis requiring a previous left BKA, bilateral hip osteotomies, permanent colostomy and a chronic indwelling bladder catheter. Underwent extensive surgery for closure of a number of sacral decubitus ulcers several years ago. He continues to have intermittent back and lower extremity muscle spasms.  He has been treated for recurrent urinary tract infections.  He has not followed up with urology.  He denies any further blood through his colostomy.  He has not followed up with GI.      Gastroesophageal Reflux Disease  He admits to intermittent heartburn.  This is described as a burning epigastric and retrosternal discomfort most frequently in the evening.  The pain does not radiate elsewhere and has been unassociated with any other symptoms.  There is no history of any difficulty swallowing and he denies any nausea or vomiting.  He remains on omeprazole 20 every morning.    Depression  His mood has been fairly good since and he has been sleeping well.  He admits to intermittent nervousness and remains reluctant to leave the house because of how he feels he is viewed.  He denies any difficulty with his concentration or memory and there is no history of any suicidal ideation.  His family is quite supportive. He remains on venlafaxine er and quetiapine with no apparent side effects    Allergic Rhinitis  His long history of intermittent sneezing, clear rhinorrhea, nasal congestion, periorbital pressure and postnasal drip. These symptoms are perennial with seasonal exacerbation. The patient has been  suffering from these symptoms for a number of years. The patient remains on cetirizine with fairly good effect and no apparent side effects    The following portions of the patient's history were reviewed and updated as appropriate: allergies, current medications, past medical history, past social history and problem list.    Review of Systems   Constitutional: Positive for fatigue. Negative for appetite change, chills, fever and unexpected weight change.   HENT: Negative for congestion, ear pain, rhinorrhea, sneezing, sore throat and voice change.    Eyes: Negative for visual disturbance.   Respiratory: Negative for cough, shortness of breath and wheezing.    Cardiovascular: Negative for chest pain, palpitations and leg swelling.   Gastrointestinal: Negative for abdominal pain, blood in stool, constipation, diarrhea, nausea and vomiting.        Intermittent heartburn   Genitourinary: Negative for hematuria.   Musculoskeletal: Positive for arthralgias, back pain and myalgias. Negative for joint swelling and neck pain.   Skin: Negative for rash.   Neurological: Positive for weakness and numbness. Negative for headaches.   Psychiatric/Behavioral: Negative for dysphoric mood, hallucinations, sleep disturbance and suicidal ideas. The patient is nervous/anxious (intermittent).      Objective   Physical Exam  Constitutional:       Comments: Sitting on his back porch smoking a cigarette.  Bright and in fair spirits. No apparent distress. No pallor, jaundice, diaphoresis, or cyanosis.     Pulmonary:      Comments: Normal respiratory effort.  No cough or audible wheezing.  Skin:     Coloration: Skin is not cyanotic, jaundiced or pale.   Neurological:      Mental Status: He is alert and oriented to person, place, and time.      Cranial Nerves: Cranial nerves are intact.   Psychiatric:         Attention and Perception: Attention normal.         Mood and Affect: Mood normal.         Speech: Speech normal.         Behavior:  Behavior normal.         Thought Content: Thought content normal.       Assessment/Plan   Problems Addressed this Visit        Allergies and Adverse Reactions    Chronic seasonal allergic rhinitis   Reminded regarding allergen avoidance.  Continue current medication       Cardiac and Vasculature    History of endocarditis       Endocrine and Metabolic    Vitamin D deficiency       Gastrointestinal Abdominal     Gastroesophageal reflux disease without esophagitis   Symptoms are currently intermittent  Reminded regarding lifestyle modification.  Omeprazole will be titrated to 40 daily and dose just prior to supper    Relevant Medications    omeprazole (priLOSEC) 40 MG capsule       Genitourinary and Reproductive     Atonic bladder  With chronic indwelling bladder catheter  Strongly recommended a urology assessment.  Patient uninterested in pursuing at present but will consider       Health Encounters    Healthcare maintenance  Strongly recommended a flu shot and COVID-19 vaccination.  Patient uninterested at present but will consider       Mental Health    Recurrent major depressive disorder, in partial remission (HCC)  Stable.  Supportive therapy.   Continue current medication.       Musculoskeletal and Injuries    History of left lower extremity amputation (HCC)       Neuro    T6 spinal cord injury (HCC)  Supportive therapy  Encouraged to report if any worse or if any new symptoms or concerns.    Relevant Medications    gabapentin (NEURONTIN) 800 MG tablet       Tobacco    Smoker  Reminded of the importance of smoking cessation and the options with respect to this      Diagnoses       Codes Comments    Chronic seasonal allergic rhinitis    -  Primary ICD-10-CM: J30.2  ICD-9-CM: 477.8     History of endocarditis     ICD-10-CM: Z86.79  ICD-9-CM: V12.59     Vitamin D deficiency     ICD-10-CM: E55.9  ICD-9-CM: 268.9     Gastroesophageal reflux disease without esophagitis     ICD-10-CM: K21.9  ICD-9-CM: 530.81     Atonic  bladder     ICD-10-CM: N31.2  ICD-9-CM: 596.4     Healthcare maintenance     ICD-10-CM: Z00.00  ICD-9-CM: V70.0     Recurrent major depressive disorder, in partial remission (HCC)     ICD-10-CM: F33.41  ICD-9-CM: 296.35     History of left lower extremity amputation (HCC)     ICD-10-CM: Z89.612  ICD-9-CM: V49.70     T6 spinal cord injury, sequela (HCC)     ICD-10-CM: S24.102S  ICD-9-CM: 907.2     Smoker     ICD-10-CM: F17.200  ICD-9-CM: 305.1

## 2022-01-19 PROBLEM — M86.39 CHRONIC MULTIFOCAL OSTEOMYELITIS OF MULTIPLE SITES: Status: RESOLVED | Noted: 2018-03-03 | Resolved: 2022-01-19

## 2022-05-04 ENCOUNTER — TELEPHONE (OUTPATIENT)
Dept: FAMILY MEDICINE CLINIC | Facility: CLINIC | Age: 47
End: 2022-05-04

## 2022-05-04 RX ORDER — CIPROFLOXACIN 500 MG/1
500 TABLET, FILM COATED ORAL 2 TIMES DAILY
Qty: 20 TABLET | Refills: 0 | Status: SHIPPED | OUTPATIENT
Start: 2022-05-04 | End: 2022-05-14

## 2022-05-04 NOTE — TELEPHONE ENCOUNTER
Caller: Alex Tierney    Relationship: Self    Best call back number: [phone]    What medication are you requesting: ANTIBIOTIC    What are your current symptoms: FEVER, URINE ODOR    How long have you been experiencing symptoms: 2 DAYS    Have you had these symptoms before:    [x] Yes  [] No    Have you been treated for these symptoms before:   [x] Yes  [] No    If a prescription is needed, what is your preferred pharmacy and phone number:      [facility] [address] [facility] [address] [facility] [address] [facility] [address] [facility] [phone] [facility] [phone] FX        Additional notes:

## 2022-05-30 DIAGNOSIS — N31.2 ATONIC BLADDER: ICD-10-CM

## 2022-05-30 DIAGNOSIS — S24.102S T6 SPINAL CORD INJURY, SEQUELA: ICD-10-CM

## 2022-05-31 RX ORDER — BACLOFEN 10 MG/1
TABLET ORAL
Qty: 90 TABLET | Refills: 5 | Status: SHIPPED | OUTPATIENT
Start: 2022-05-31 | End: 2022-09-12 | Stop reason: SDUPTHER

## 2022-05-31 RX ORDER — GABAPENTIN 800 MG/1
TABLET ORAL
Qty: 90 TABLET | Refills: 0 | Status: SHIPPED | OUTPATIENT
Start: 2022-05-31 | End: 2022-06-30

## 2022-06-03 ENCOUNTER — TELEPHONE (OUTPATIENT)
Dept: FAMILY MEDICINE CLINIC | Facility: CLINIC | Age: 47
End: 2022-06-03

## 2022-06-03 NOTE — TELEPHONE ENCOUNTER
Caller: Alex Tierney    Relationship: Self    Best call back number: [phone]    What medication are you requesting: something for UTI    What are your current symptoms: burning when urinate and thick urine    How long have you been experiencing symptoms: 1 days    Have you had these symptoms before:    [x] Yes  [] No    Have you been treated for these symptoms before:   [x] Yes  [] No    If a prescription is needed, what is your preferred pharmacy and phone number: [facility] [address] - [address] [address] [facility] [address] [facility] [address]  - [phone]  - [phone]      Additional notes:

## 2022-06-04 RX ORDER — CIPROFLOXACIN 500 MG/1
500 TABLET, FILM COATED ORAL 2 TIMES DAILY
Qty: 20 TABLET | Refills: 0 | Status: SHIPPED | OUTPATIENT
Start: 2022-06-04 | End: 2022-06-06

## 2022-06-06 ENCOUNTER — OFFICE VISIT (OUTPATIENT)
Dept: FAMILY MEDICINE CLINIC | Facility: CLINIC | Age: 47
End: 2022-06-06

## 2022-06-06 VITALS
RESPIRATION RATE: 14 BRPM | HEIGHT: 73 IN | HEART RATE: 112 BPM | TEMPERATURE: 102.4 F | SYSTOLIC BLOOD PRESSURE: 108 MMHG | DIASTOLIC BLOOD PRESSURE: 60 MMHG | BODY MASS INDEX: 29.69 KG/M2 | OXYGEN SATURATION: 97 %

## 2022-06-06 DIAGNOSIS — Z00.00 HEALTHCARE MAINTENANCE: ICD-10-CM

## 2022-06-06 DIAGNOSIS — N31.2 ATONIC BLADDER: ICD-10-CM

## 2022-06-06 DIAGNOSIS — N10 ACUTE PYELONEPHRITIS: Primary | ICD-10-CM

## 2022-06-06 DIAGNOSIS — S24.102S T6 SPINAL CORD INJURY, SEQUELA: ICD-10-CM

## 2022-06-06 DIAGNOSIS — Z89.612 HISTORY OF LEFT LOWER EXTREMITY AMPUTATION: ICD-10-CM

## 2022-06-06 DIAGNOSIS — M81.0 AGE-RELATED OSTEOPOROSIS WITHOUT CURRENT PATHOLOGICAL FRACTURE: ICD-10-CM

## 2022-06-06 DIAGNOSIS — F33.41 RECURRENT MAJOR DEPRESSIVE DISORDER, IN PARTIAL REMISSION: ICD-10-CM

## 2022-06-06 DIAGNOSIS — J30.2 CHRONIC SEASONAL ALLERGIC RHINITIS: ICD-10-CM

## 2022-06-06 DIAGNOSIS — F33.1 MAJOR DEPRESSIVE DISORDER, RECURRENT EPISODE, MODERATE WITH MIXED FEATURES: ICD-10-CM

## 2022-06-06 DIAGNOSIS — L97.516 ULCER OF RIGHT FOOT WITH BONE INVOLVEMENT WITHOUT EVIDENCE OF NECROSIS: ICD-10-CM

## 2022-06-06 DIAGNOSIS — F17.200 SMOKER: ICD-10-CM

## 2022-06-06 DIAGNOSIS — E55.9 VITAMIN D DEFICIENCY: ICD-10-CM

## 2022-06-06 DIAGNOSIS — K21.9 GASTROESOPHAGEAL REFLUX DISEASE WITHOUT ESOPHAGITIS: ICD-10-CM

## 2022-06-06 DIAGNOSIS — Z86.79 HISTORY OF ENDOCARDITIS: ICD-10-CM

## 2022-06-06 LAB
BILIRUB BLD-MCNC: ABNORMAL MG/DL
CLARITY, POC: ABNORMAL
COLOR UR: ABNORMAL
GLUCOSE UR STRIP-MCNC: NEGATIVE MG/DL
KETONES UR QL: NEGATIVE
LEUKOCYTE EST, POC: ABNORMAL
NITRITE UR-MCNC: POSITIVE MG/ML
PH UR: 5.5 [PH] (ref 5–8)
PROT UR STRIP-MCNC: ABNORMAL MG/DL
RBC # UR STRIP: ABNORMAL /UL
SP GR UR: 1.01 (ref 1–1.03)
UROBILINOGEN UR QL: NORMAL

## 2022-06-06 PROCEDURE — 85652 RBC SED RATE AUTOMATED: CPT | Performed by: GENERAL PRACTICE

## 2022-06-06 PROCEDURE — 81015 MICROSCOPIC EXAM OF URINE: CPT | Performed by: GENERAL PRACTICE

## 2022-06-06 PROCEDURE — 1159F MED LIST DOCD IN RCRD: CPT | Performed by: GENERAL PRACTICE

## 2022-06-06 PROCEDURE — 87086 URINE CULTURE/COLONY COUNT: CPT | Performed by: GENERAL PRACTICE

## 2022-06-06 PROCEDURE — 80053 COMPREHEN METABOLIC PANEL: CPT | Performed by: GENERAL PRACTICE

## 2022-06-06 PROCEDURE — G0439 PPPS, SUBSEQ VISIT: HCPCS | Performed by: GENERAL PRACTICE

## 2022-06-06 PROCEDURE — 82607 VITAMIN B-12: CPT | Performed by: GENERAL PRACTICE

## 2022-06-06 PROCEDURE — 81002 URINALYSIS NONAUTO W/O SCOPE: CPT | Performed by: GENERAL PRACTICE

## 2022-06-06 PROCEDURE — 1170F FXNL STATUS ASSESSED: CPT | Performed by: GENERAL PRACTICE

## 2022-06-06 PROCEDURE — 36415 COLL VENOUS BLD VENIPUNCTURE: CPT | Performed by: GENERAL PRACTICE

## 2022-06-06 PROCEDURE — 86140 C-REACTIVE PROTEIN: CPT | Performed by: GENERAL PRACTICE

## 2022-06-06 PROCEDURE — 85025 COMPLETE CBC W/AUTO DIFF WBC: CPT | Performed by: GENERAL PRACTICE

## 2022-06-06 PROCEDURE — 80061 LIPID PANEL: CPT | Performed by: GENERAL PRACTICE

## 2022-06-06 PROCEDURE — 83036 HEMOGLOBIN GLYCOSYLATED A1C: CPT | Performed by: GENERAL PRACTICE

## 2022-06-06 PROCEDURE — 84443 ASSAY THYROID STIM HORMONE: CPT | Performed by: GENERAL PRACTICE

## 2022-06-06 PROCEDURE — 82306 VITAMIN D 25 HYDROXY: CPT | Performed by: GENERAL PRACTICE

## 2022-06-06 RX ORDER — AMOXICILLIN AND CLAVULANATE POTASSIUM 875; 125 MG/1; MG/1
1 TABLET, FILM COATED ORAL 2 TIMES DAILY
Qty: 28 TABLET | Refills: 0 | Status: SHIPPED | OUTPATIENT
Start: 2022-06-06 | End: 2022-09-12

## 2022-06-06 NOTE — PROGRESS NOTES
The ABCs of the Annual Wellness Visit  Subsequent Medicare Wellness Visit    Chief Complaint  Subsequent Medicare Wellness Visit    Subjective    History of Present Illness:  Alex Tierney is a 47 y.o. male who presents for a Subsequent Medicare Wellness Visit.    Spinal Cord Injury  Level T6 complicated by recurrent decubitus ulcers and osteomyelitis requiring a previous left BKA, bilateral hip osteotomies, permanent colostomy and a chronic indwelling bladder catheter. He underwent extensive surgery for closure of a number of sacral decubitus ulcers several years ago.  He gives an approximate 1 month history of a sore over his right lateral foot.  This has drained on and off but has otherwise been asymptomatic.  He gives an approximate 1 week history of lower abdominal pain, foul-smelling urine, intermittent hematuria, fever, and chills. He has been treated for recurrent urinary tract infections.  He has not followed up with several urology referrals.  He was started on ciprofloxacin 2 days ago.  He has had multiple courses over the last year or two    Gastroesophageal Reflux Disease  He admits to occasional heartburn.  This is described as a burning epigastric and retrosternal discomfort most frequently in the evening.  The pain does not radiate elsewhere and has been unassociated with any other symptoms.  There is no history of any difficulty swallowing and he denies any nausea or vomiting.  He has not had a bowel movement for 3 days.  He denies any hematochezia or melena.  He remains on omeprazole 20 every morning.    Depression  His mood has been fairly good since and he has been sleeping well.  He admits to intermittent nervousness and remains reluctant to leave the house because of how he feels he is viewed.  He denies any difficulty with his concentration or memory and there is no history of any suicidal ideation.  His family is quite supportive.  He will be spending this coming weekend with his son.  He  remains on venlafaxine er and quetiapine with no apparent side effects    Allergic Rhinitis  He has a long history of intermittent sneezing, clear rhinorrhea, nasal congestion, periorbital pressure and postnasal drip. These symptoms are perennial with seasonal exacerbation. The patient has been suffering from these symptoms for a number of years. The patient remains on cetirizine with fairly good effect and no apparent side effects    The following portions of the patient's history were reviewed and   updated as appropriate: allergies, current medications, past family history, past medical history, past social history, past surgical history and problem list.    Compared to one year ago, the patient feels his physical   health is worse.    Compared to one year ago, the patient feels his mental   health is the same.    Recent Hospitalizations:  He was not admitted to the hospital during the last year.     Current Medical Providers:  Patient Care Team:  Delgado Rose MD as PCP - General (Family Medicine)    Outpatient Medications Prior to Visit   Medication Sig Dispense Refill   • aspirin 81 MG EC tablet Take 1 tablet by mouth Daily. 30 tablet 5   • baclofen (LIORESAL) 10 MG tablet TAKE ONE TABLET BY MOUTH THREE TIMES A DAY AS NEEDED FOR MUSCLE SPASMS 90 tablet 5   • cetirizine (zyrTEC) 10 MG tablet Take 1 tablet by mouth Every Night. 30 tablet 5   • folic acid (FOLVITE) 1 MG tablet Take 1 tablet by mouth Daily. 30 tablet 5   • gabapentin (NEURONTIN) 800 MG tablet TAKE ONE TABLET BY MOUTH THREE TIMES A DAY 90 tablet 0   • hydrOXYzine pamoate (VISTARIL) 25 MG capsule Take 1 capsule by mouth Daily As Needed for Itching or Anxiety. 30 capsule 5   • ibuprofen (ADVIL,MOTRIN) 600 MG tablet TAKE ONE TABLET BY MOUTH THREE TIMES A DAY AS NEEDED FOR MILD OR MODERATE PAIN 90 tablet 4   • multivitamin (DAILY BRIAN) tablet tablet Take 1 tablet by mouth Daily. 30 tablet 5   • omeprazole (priLOSEC) 40 MG capsule Take 1  capsule by mouth Daily With Dinner. 30 capsule 5   • QUEtiapine (SEROquel) 200 MG tablet TAKE 1 TABLET BY MOUTH EVERY NIGHT 2 TO 3 HOURS BEFORE BEDTIME. 90 tablet 3   • venlafaxine XR (EFFEXOR-XR) 150 MG 24 hr capsule TAKE ONE CAPSULE BY MOUTH DAILY 90 capsule 3   • vitamin D (ERGOCALCIFEROL) 1.25 MG (22514 UT) capsule capsule TAKE ONE CAPSULE BY MOUTH ONCE PER WEEK 4 capsule 5   • Vraylar 1.5 MG capsule capsule TAKE ONE CAPSULE BY MOUTH ONCE NIGHTLY 90 capsule 3   • ciprofloxacin (CIPRO) 500 MG tablet Take 1 tablet by mouth 2 (Two) Times a Day for 10 days. 20 tablet 0     No facility-administered medications prior to visit.     No opioid medication identified on active medication list. I have reviewed chart for other potential  high risk medication/s and harmful drug interactions in the elderly.          Aspirin is on active medication list. Aspirin use is not indicated based on review of current medical condition/s. Risk of harm outweighs potential benefits. Patient instructed to discontinue this medication.  .    Patient Active Problem List   Diagnosis   • Chronic seasonal allergic rhinitis   • Recurrent major depressive disorder, in partial remission (HCC)   • Microcytic anemia   • Smoker   • T6 spinal cord injury (HCC)   • History of left lower extremity amputation (HCC)   • Healthcare maintenance   • Atonic bladder   • Encounter for immunization   • Gastroesophageal reflux disease without esophagitis   • History of endocarditis   • Seborrheic dermatitis   • Vitamin D deficiency   • Ulcer of right foot with bone involvement without evidence of necrosis (HCC)     Advance Care Planning  Advance Directive is not on file.  ACP discussion was held with the patient during this visit. Patient does not have an advance directive, information provided.    Review of Systems   Constitutional: Positive for fatigue and fever. Negative for appetite change and chills.   HENT: Negative for congestion, ear pain, postnasal drip,  "rhinorrhea, sneezing, sore throat and voice change.    Eyes: Negative for visual disturbance.   Respiratory: Negative for cough, shortness of breath and wheezing.    Cardiovascular: Negative for chest pain, palpitations and leg swelling.   Gastrointestinal: Positive for constipation. Negative for abdominal pain, blood in stool, diarrhea, nausea and vomiting.        Occasional heartburn   Genitourinary: Positive for hematuria. Negative for dysuria, frequency and urgency.   Musculoskeletal: Positive for arthralgias, back pain and myalgias. Negative for joint swelling and neck pain.   Skin: Positive for wound (sore over right lateral foot). Negative for rash.   Neurological: Negative for weakness and numbness.   Psychiatric/Behavioral: Negative for dysphoric mood and sleep disturbance. The patient is nervous/anxious.         Objective    Vitals:    06/06/22 1348   BP: 108/60   Pulse: 112   Resp: 14   Temp: (!) 102.4 °F (39.1 °C)   TempSrc: Temporal   SpO2: 97%   Height: 185.4 cm (73\")     Body mass index is 29.69 kg/m².    Does the patient have evidence of cognitive impairment? No    Physical Exam  Constitutional:       General: He is not in acute distress.     Appearance: Normal appearance. He is well-developed. He is not ill-appearing or diaphoretic.      Comments: Alert and oriented.  Bright and in fair spirits.  Sitting in wheelchair.  No apparent distress.  No pallor, jaundice, cyanosis, or diaphoresis   HENT:      Head: Atraumatic.      Right Ear: Tympanic membrane, ear canal and external ear normal.      Left Ear: Tympanic membrane, ear canal and external ear normal.   Eyes:      Conjunctiva/sclera: Conjunctivae normal.   Neck:      Thyroid: No thyroid mass or thyromegaly.      Vascular: No carotid bruit or JVD.      Trachea: Trachea normal. No tracheal deviation.   Cardiovascular:      Rate and Rhythm: Normal rate and regular rhythm.      Pulses:           Dorsalis pedis pulses are 1+ on the right side.        " Posterior tibial pulses are 1+ on the right side.      Heart sounds: Normal heart sounds, S1 normal and S2 normal. No murmur heard.    No gallop. No S3 or S4 sounds.   Pulmonary:      Effort: Pulmonary effort is normal.      Breath sounds: Normal breath sounds.   Chest:   Breasts:      Right: No supraclavicular adenopathy.      Left: No supraclavicular adenopathy.       Abdominal:      General: Bowel sounds are normal. There is no distension.      Palpations: Abdomen is soft. There is no hepatomegaly, splenomegaly or mass.      Tenderness: There is no abdominal tenderness.      Hernia: No hernia is present.   Musculoskeletal:      Right lower le+ Edema present.      Comments: Left BKA   Lymphadenopathy:      Head:      Right side of head: No submental, submandibular, tonsillar, preauricular, posterior auricular or occipital adenopathy.      Left side of head: No submental, submandibular, tonsillar, preauricular, posterior auricular or occipital adenopathy.      Cervical: No cervical adenopathy.      Upper Body:      Right upper body: No supraclavicular adenopathy.      Left upper body: No supraclavicular adenopathy.   Skin:     General: Skin is warm and dry.      Coloration: Skin is not cyanotic, jaundiced or pale.      Findings: No rash.      Nails: There is no clubbing.      Comments: 1.5 cm punched out ulcer right lateral foot -minimal surrounding erythema -no drainage -no induration, tenderness, or warmth   Neurological:      Mental Status: He is alert and oriented to person, place, and time.      Cranial Nerves: No cranial nerve deficit.      Motor: Weakness (spastic paraparesis) and abnormal muscle tone present. No tremor.      Coordination: Coordination normal.   Psychiatric:         Attention and Perception: Attention normal.         Mood and Affect: Mood normal.         Speech: Speech normal.         Behavior: Behavior normal.         Thought Content: Thought content normal. Thought content does not  include suicidal ideation.       HEALTH RISK ASSESSMENT    Smoking Status:  Social History     Tobacco Use   Smoking Status Current Every Day Smoker   • Packs/day: 1.00   • Years: 20.00   • Pack years: 20.00   • Types: Cigarettes   Smokeless Tobacco Never Used     Alcohol Consumption:  Social History     Substance and Sexual Activity   Alcohol Use No     Fall Risk Screen:    ANAHY Fall Risk Assessment has not been completed.    Depression Screening:  PHQ-2/PHQ-9 Depression Screening 6/6/2022   Retired Total Score -   Little Interest or Pleasure in Doing Things 0-->not at all   Feeling Down, Depressed or Hopeless 0-->not at all   PHQ-9: Brief Depression Severity Measure Score 0       Health Habits and Functional and Cognitive Screening:  Functional & Cognitive Status 6/6/2022   Do you have difficulty preparing food and eating? No   Do you have difficulty bathing yourself, getting dressed or grooming yourself? No   Do you have difficulty using the toilet? No   Do you have difficulty moving around from place to place? No   Do you have trouble with steps or getting out of a bed or a chair? No   Current Diet Well Balanced Diet   Dental Exam Not up to date   Eye Exam Not up to date   Exercise (times per week) 0 times per week   Current Exercises Include No Regular Exercise   Do you need help using the phone?  No   Are you deaf or do you have serious difficulty hearing?  No   Do you need help with transportation? No   Do you need help shopping? No   Do you need help preparing meals?  No   Do you need help with housework?  No   Do you need help with laundry? No   Do you need help taking your medications? No   Do you need help managing money? No   Do you ever drive or ride in a car without wearing a seat belt? No   Have you felt unusual stress, anger or loneliness in the last month? No   Who do you live with? Other   If you need help, do you have trouble finding someone available to you? No   Have you been bothered in the  last four weeks by sexual problems? No   Do you have difficulty concentrating, remembering or making decisions? No       Age-appropriate Screening Schedule:  Refer to the list below for future screening recommendations based on patient's age, sex and/or medical conditions. Orders for these recommended tests are listed in the plan section. The patient has been provided with a written plan.    Health Maintenance   Topic Date Due   • PROSTATE CANCER SCREENING  12/15/2022 (Originally 9/20/2016)   • DXA SCAN  01/23/2023 (Originally 1975)   • INFLUENZA VACCINE  08/01/2022   • TDAP/TD VACCINES (2 - Td or Tdap) 05/01/2025              Assessment & Plan   CMS Preventative Services Quick Reference  Risk Factors Identified During Encounter  Chronic Pain   Depression/Dysphoria  Immunizations Discussed/Encouraged (specific Immunizations; Hepatitis A Vaccine/Series, Influenza, Prevnar 20 (Pneumococcal 20-valent conjugate) and COVID19  Inactivity/Sedentary  Tobacco Use/Dependance (use dotphrase .tobaccocessation for documentation)  Urinary Incontinence  The above risks/problems have been discussed with the patient.  Follow up actions/plans if indicated are seen below in the Assessment/Plan Section.  Pertinent information has been shared with the patient in the After Visit Summary.    Diagnoses and all orders for this visit:    1. Acute pyelonephritis   Urine sent for culture  Will replace ciprofloxacin with amoxicillin/clavulanate  -     POCT urinalysis dipstick, manual  -     Urine Culture - Urine, Urine, Catheter  -     Urinalysis, Microscopic Only - Urine, Catheter  -     amoxicillin-clavulanate (Augmentin) 875-125 MG per tablet; Take 1 tablet by mouth 2 (Two) Times a Day.  Dispense: 28 tablet; Refill: 0    2. Chronic seasonal allergic rhinitis    3. History of endocarditis  -     CBC & Differential  -     Comprehensive Metabolic Panel    4. Vitamin D deficiency    5. Gastroesophageal reflux disease without esophagitis    Symptoms are currently stable.  Reminded regarding lifestyle modification.  Continue current medication.  -     CBC & Differential  -     Comprehensive Metabolic Panel    6. Atonic bladder    7. Healthcare maintenance  Strongly recommended COVID-19 vaccination  Will discuss Prevnar 13 at his return  -     Comprehensive Metabolic Panel  -     Lipid Panel  -     TSH  -     Hemoglobin A1c  -     Vitamin D 25 Hydroxy  -     Vitamin B12    8. Recurrent major depressive disorder, in partial remission (HCC)  Stable.  Supportive therapy.   Continue current medication.  -     TSH    9. History of left lower extremity amputation (HCC)    10. T6 spinal cord injury, sequela (HCC)  Supportive therapy  -     XR Foot 3+ View Right; Future    11. Smoker    12. Ulcer of right foot with bone involvement without evidence of necrosis (HCC)  As above  Labs drawn  Plain films of the right foot arranged  We will arrange home health as well as an orthopedic assessment as soon as possible  -     amoxicillin-clavulanate (Augmentin) 875-125 MG per tablet; Take 1 tablet by mouth 2 (Two) Times a Day.  Dispense: 28 tablet; Refill: 0  -     CBC & Differential  -     Sedimentation Rate  -     C-reactive Protein  -     XR Foot 3+ View Right; Future    Follow Up:   Return in about 3 months (around 9/8/2022).     An After Visit Summary and PPPS were made available to the patient.

## 2022-06-06 NOTE — PATIENT INSTRUCTIONS
"Critical care medicine: Principles of diagnosis and management in the adult (4th ed., pp. 0364-6871). Vidal.\"> Tucker's anesthesia (8th ed., pp. 232-250). Vidal.\">   Advance Directive    Advance directives are legal documents that allow you to make decisions about your health care and medical treatment in case you become unable to communicate for yourself. Advance directives let your wishes be known to family, friends, and health care providers.  Discussing and writing advance directives should happen over time rather than all at once. Advance directives can be changed and updated at any time. There are different types of advance directives, such as:  Medical power of .  Living will.  Do not resuscitate (DNR) order or do not attempt resuscitation (DNAR) order.  Health care proxy and medical power of   A health care proxy is also called a health care agent. This person is appointed to make medical decisions for you when you are unable to make decisions for yourself. Generally, people ask a trusted friend or family member to act as their proxy and represent their preferences. Make sure you have an agreement with your trusted person to act as your proxy. A proxy may have to make a medical decision on your behalf if your wishes are not known.  A medical power of , also called a durable power of  for health care, is a legal document that names your health care proxy. Depending on the laws in your state, the document may need to be:  Signed.  Notarized.  Dated.  Copied.  Witnessed.  Incorporated into your medical record.  You may also want to appoint a trusted person to manage your money in the event you are unable to do so. This is called a durable power of  for finances. It is a separate legal document from the durable power of  for health care. You may choose your health care proxy or someone different to act as your agent in money matters.  If you do not appoint a " proxy, or there is a concern that the proxy is not acting in your best interest, a court may appoint a guardian to act on your behalf.  Living will  A living will is a set of instructions that state your wishes about medical care when you cannot express them yourself. Health care providers should keep a copy of your living will in your medical record. You may want to give a copy to family members or friends. To alert caregivers in case of an emergency, you can place a card in your wallet to let them know that you have a living will and where they can find it. A living will is used if you become:  Terminally ill.  Disabled.  Unable to communicate or make decisions.  The following decisions should be included in your living will:  To use or not to use life support equipment, such as dialysis machines and breathing machines (ventilators).  Whether you want a DNR or DNAR order. This tells health care providers not to use cardiopulmonary resuscitation (CPR) if breathing or heartbeat stops.  To use or not to use tube feeding.  To be given or not to be given food and fluids.  Whether you want comfort (palliative) care when the goal becomes comfort rather than a cure.  Whether you want to donate your organs and tissues.  A living will does not give instructions for distributing your money and property if you should pass away.  DNR or DNAR  A DNR or DNAR order is a request not to have CPR in the event that your heart stops beating or you stop breathing. If a DNR or DNAR order has not been made and shared, a health care provider will try to help any patient whose heart has stopped or who has stopped breathing. If you plan to have surgery, talk with your health care provider about how your DNR or DNAR order will be followed if problems occur.  What if I do not have an advance directive?  Some states assign family decision makers to act on your behalf if you do not have an advance directive. Each state has its own laws about  "advance directives. You may want to check with your health care provider, , or state representative about the laws in your state.  Summary  Advance directives are legal documents that allow you to make decisions about your health care and medical treatment in case you become unable to communicate for yourself.  The process of discussing and writing advance directives should happen over time. You can change and update advance directives at any time.  Advance directives may include a medical power of , a living will, and a DNR or DNAR order.  This information is not intended to replace advice given to you by your health care provider. Make sure you discuss any questions you have with your health care provider.  Document Revised: 09/21/2021 Document Reviewed: 09/21/2021  [employer] Patient Education © 2021 [employer]  https://www.cancer.org/cancer/colon-rectal-cancer/causes-risks-prevention/risk-factors.html\">   Colorectal Cancer Screening    Colorectal cancer screening is a group of tests that are used to check for colorectal cancer before symptoms develop. Colorectal refers to the colon and rectum. The colon and rectum are located at the end of the digestive tract and carry stool (feces) out of the body.  Who should have screening?  All adults who are 45-75 years old should have screening. Your health care provider may recommend screening before age 45. You will have tests every 1-10 years, depending on your results and the type of screening test. Screening recommendations for adults who are 76-85 years old vary depending on a person's health. People older than age 85 should no longer get colorectal cancer screening.  You may have screening tests starting before age 45, or more often than other people, if you have any of these risk factors:  A personal or family history of colorectal cancer or abnormal growths known as polyps in your colon.  Inflammatory bowel disease, such as ulcerative colitis or " Crohn's disease.  A history of having radiation treatment to the abdomen or the area between the hip bones (pelvic area) for cancer.  A type of genetic syndrome that is passed from parent to child (hereditary), such as:  [place] syndrome.  Familial adenomatous polyposis.  Turcot syndrome.  Peutz-Jeghers syndrome.  MUTYH-associated polyposis (MAP).  A personal history of diabetes.  Types of tests  There are several types of colorectal screening tests. You may have one or more of the following:  Guaiac-based fecal occult blood testing. For this test, a stool sample is checked for hidden (occult) blood, which could be a sign of colorectal cancer.  Fecal immunochemical test (FIT). For this test, a stool sample is checked for blood, which could be a sign of colorectal cancer.  Stool DNA test. For this test, a stool sample is checked for blood and changes in DNA that could lead to colorectal cancer.  Sigmoidoscopy. During this test, a thin, flexible tube with a camera on the end, called a sigmoidoscope, is used to examine the rectum and the lower colon.  Colonoscopy. During this test, a long, flexible tube with a camera on the end, called a colonoscope, is used to examine the entire colon and rectum. Also, sometimes a tissue sample is taken to be looked at under a microscope (biopsy) or small polyps are removed during this test.  Virtual colonoscopy. Instead of a colonoscope, this type of colonoscopy uses a CT scan to take pictures of the colon and rectum. A CT scan is a type of X-ray that is made using computers.  What are the benefits of screening?  Screening reduces your risk for colorectal cancer and can help identify cancer at an early stage, when the cancer can be removed or treated more easily. It is common for polyps to form in the lining of the colon, especially as you age. These polyps may be cancerous or become cancerous over time. Screening can identify these polyps.  What are the risks of screening?  Generally,  these are safe tests. However, problems may occur, including:  The need for more tests to confirm results from a stool sample test. Stool sample tests have fewer risks than other types of screening tests.  Being exposed to low levels of radiation, if you had a test involving X-rays. This may slightly increase your cancer risk. The benefit of detecting cancer outweighs the slight increase in risk.  Bleeding, damage to the intestine, or infection caused by a sigmoidoscopy or colonoscopy.  A reaction to medicines given during a sigmoidoscopy or colonoscopy.  Talk with your health care provider to understand your risk for colorectal cancer and to make a screening plan that is right for you.  Questions to ask your health care provider  When should I start colorectal cancer screening?  What is my risk for colorectal cancer?  How often do I need screening?  Which screening tests do I need?  How do I get my test results?  What do my results mean?  Where to find more information  Learn more about colorectal cancer screening from:  The American Cancer Society: cancer.org  National Cancer [place]: cancer.gov  Summary  Colorectal cancer screening is a group of tests used to check for colorectal cancer before symptoms develop.  All adults who are 45-75 years old should have screening. Your health care provider may recommend screening before age 45.  You may have screening tests starting before age 45, or more often than other people, if you have certain risk factors.  Screening reduces your risk for colorectal cancer and can help identify cancer at an early stage, when the cancer can be removed or treated more easily.  Talk with your health care provider to understand your risk for colorectal cancer and to make a screening plan that is right for you.  This information is not intended to replace advice given to you by your health care provider. Make sure you discuss any questions you have with your health care  provider.  Document Revised: 04/07/2021 Document Reviewed: 04/07/2021  Elsevier Patient Education © 2021 [employer].

## 2022-06-07 ENCOUNTER — HOSPITAL ENCOUNTER (OUTPATIENT)
Dept: HOSPITAL 79 - RAD | Age: 47
End: 2022-06-07
Attending: GENERAL PRACTICE
Payer: MEDICARE

## 2022-06-07 DIAGNOSIS — Z89.612: ICD-10-CM

## 2022-06-07 DIAGNOSIS — L97.516: Primary | ICD-10-CM

## 2022-06-07 DIAGNOSIS — S24.102S: ICD-10-CM

## 2022-06-07 LAB
25(OH)D3 SERPL-MCNC: 24.5 NG/ML (ref 30–100)
ALBUMIN SERPL-MCNC: 3.6 G/DL (ref 3.5–5.2)
ALBUMIN/GLOB SERPL: 1 G/DL
ALP SERPL-CCNC: 81 U/L (ref 39–117)
ALT SERPL W P-5'-P-CCNC: 18 U/L (ref 1–41)
ANION GAP SERPL CALCULATED.3IONS-SCNC: 15.5 MMOL/L (ref 5–15)
AST SERPL-CCNC: 13 U/L (ref 1–40)
BACTERIA SPEC AEROBE CULT: NORMAL
BACTERIA UR QL AUTO: ABNORMAL /HPF
BASOPHILS # BLD AUTO: 0.07 10*3/MM3 (ref 0–0.2)
BASOPHILS NFR BLD AUTO: 0.6 % (ref 0–1.5)
BILIRUB SERPL-MCNC: 0.2 MG/DL (ref 0–1.2)
BUN SERPL-MCNC: 20 MG/DL (ref 6–20)
BUN/CREAT SERPL: 22 (ref 7–25)
CALCIUM SPEC-SCNC: 9.2 MG/DL (ref 8.6–10.5)
CHLORIDE SERPL-SCNC: 101 MMOL/L (ref 98–107)
CHOLEST SERPL-MCNC: 118 MG/DL (ref 0–200)
CO2 SERPL-SCNC: 19.5 MMOL/L (ref 22–29)
CREAT SERPL-MCNC: 0.91 MG/DL (ref 0.76–1.27)
CRP SERPL-MCNC: 16.47 MG/DL (ref 0–0.5)
DEPRECATED RDW RBC AUTO: 45.8 FL (ref 37–54)
EGFRCR SERPLBLD CKD-EPI 2021: 104.6 ML/MIN/1.73
EOSINOPHIL # BLD AUTO: 0.13 10*3/MM3 (ref 0–0.4)
EOSINOPHIL NFR BLD AUTO: 1.2 % (ref 0.3–6.2)
ERYTHROCYTE [DISTWIDTH] IN BLOOD BY AUTOMATED COUNT: 15.6 % (ref 12.3–15.4)
ERYTHROCYTE [SEDIMENTATION RATE] IN BLOOD: 80 MM/HR (ref 0–15)
GLOBULIN UR ELPH-MCNC: 3.7 GM/DL
GLUCOSE SERPL-MCNC: 75 MG/DL (ref 65–99)
HBA1C MFR BLD: 6.2 % (ref 4.8–5.6)
HCT VFR BLD AUTO: 39.7 % (ref 37.5–51)
HDLC SERPL-MCNC: 28 MG/DL (ref 40–60)
HGB BLD-MCNC: 13.5 G/DL (ref 13–17.7)
HYALINE CASTS UR QL AUTO: ABNORMAL /LPF
IMM GRANULOCYTES # BLD AUTO: 0.06 10*3/MM3 (ref 0–0.05)
IMM GRANULOCYTES NFR BLD AUTO: 0.5 % (ref 0–0.5)
LDLC SERPL CALC-MCNC: 71 MG/DL (ref 0–100)
LDLC/HDLC SERPL: 2.52 {RATIO}
LYMPHOCYTES # BLD AUTO: 1.17 10*3/MM3 (ref 0.7–3.1)
LYMPHOCYTES NFR BLD AUTO: 10.6 % (ref 19.6–45.3)
MCH RBC QN AUTO: 27.8 PG (ref 26.6–33)
MCHC RBC AUTO-ENTMCNC: 34 G/DL (ref 31.5–35.7)
MCV RBC AUTO: 81.7 FL (ref 79–97)
MONOCYTES # BLD AUTO: 1.13 10*3/MM3 (ref 0.1–0.9)
MONOCYTES NFR BLD AUTO: 10.2 % (ref 5–12)
NEUTROPHILS NFR BLD AUTO: 76.9 % (ref 42.7–76)
NEUTROPHILS NFR BLD AUTO: 8.5 10*3/MM3 (ref 1.7–7)
NRBC BLD AUTO-RTO: 0 /100 WBC (ref 0–0.2)
PLATELET # BLD AUTO: 250 10*3/MM3 (ref 140–450)
PMV BLD AUTO: 12.2 FL (ref 6–12)
POTASSIUM SERPL-SCNC: 4 MMOL/L (ref 3.5–5.2)
PROT SERPL-MCNC: 7.3 G/DL (ref 6–8.5)
RBC # BLD AUTO: 4.86 10*6/MM3 (ref 4.14–5.8)
RBC # UR STRIP: ABNORMAL /HPF
REF LAB TEST METHOD: ABNORMAL
SODIUM SERPL-SCNC: 136 MMOL/L (ref 136–145)
SQUAMOUS #/AREA URNS HPF: ABNORMAL /HPF
TRIGL SERPL-MCNC: 97 MG/DL (ref 0–150)
TSH SERPL DL<=0.05 MIU/L-ACNC: 0.96 UIU/ML (ref 0.27–4.2)
VIT B12 BLD-MCNC: 410 PG/ML (ref 211–946)
VLDLC SERPL-MCNC: 19 MG/DL (ref 5–40)
WBC # UR STRIP: ABNORMAL /HPF
WBC NRBC COR # BLD: 11.06 10*3/MM3 (ref 3.4–10.8)

## 2022-06-09 DIAGNOSIS — E55.9 VITAMIN D DEFICIENCY: Primary | ICD-10-CM

## 2022-06-09 DIAGNOSIS — L97.516 ULCER OF RIGHT FOOT WITH BONE INVOLVEMENT WITHOUT EVIDENCE OF NECROSIS: ICD-10-CM

## 2022-06-10 ENCOUNTER — TELEPHONE (OUTPATIENT)
Dept: FAMILY MEDICINE CLINIC | Facility: CLINIC | Age: 47
End: 2022-06-10

## 2022-06-10 NOTE — TELEPHONE ENCOUNTER
----- Message from Delgado Rose MD sent at 6/9/2022  9:11 PM EDT -----  K - I have placed a referral to wound care at [facility] in [facility]  Thanks    ----- Message -----  From: Janie Carson MA  Sent: 6/9/2022   2:00 PM EDT  To: Delgado Rose MD    Should not take long to get him in the wound care clinic.     [facility] [place] [facility]       ----- Message -----  From: Delgado Rose MD  Sent: 6/6/2022   9:27 PM EDT  To: Jaine Carson MA    Needs home health asap for nursing, wound care, and pt  How soon can we get him into the wound clinic at [facility]?

## 2022-06-30 DIAGNOSIS — S24.102S T6 SPINAL CORD INJURY, SEQUELA: ICD-10-CM

## 2022-06-30 RX ORDER — GABAPENTIN 800 MG/1
TABLET ORAL
Qty: 90 TABLET | Refills: 2 | Status: SHIPPED | OUTPATIENT
Start: 2022-06-30 | End: 2022-09-12 | Stop reason: SDUPTHER

## 2022-08-02 ENCOUNTER — TELEPHONE (OUTPATIENT)
Dept: FAMILY MEDICINE CLINIC | Facility: CLINIC | Age: 47
End: 2022-08-02

## 2022-08-02 RX ORDER — CIPROFLOXACIN 500 MG/1
500 TABLET, FILM COATED ORAL 2 TIMES DAILY
Qty: 20 TABLET | Refills: 0 | Status: SHIPPED | OUTPATIENT
Start: 2022-08-02 | End: 2022-08-12

## 2022-08-02 NOTE — TELEPHONE ENCOUNTER
Caller: Alex Tierney    Relationship: Self    Best call back number: [phone]    What medication are you requesting: ANTIBIOTICS    What are your current symptoms: CATHETER, FEVER, POSSIBLE UTI    How long have you been experiencing symptoms: 07/30/22    Have you had these symptoms before:    [x] Yes  [] No    Have you been treated for these symptoms before:   [x] Yes  [] No    If a prescription is needed, what is your preferred pharmacy and phone number: [facility] [address] - [address] [facility] [address] [facility] [address]  - [phone]  - [phone] FX

## 2022-08-07 DIAGNOSIS — F33.1 MAJOR DEPRESSIVE DISORDER, RECURRENT EPISODE, MODERATE WITH MIXED FEATURES: ICD-10-CM

## 2022-08-07 DIAGNOSIS — F33.41 RECURRENT MAJOR DEPRESSIVE DISORDER, IN PARTIAL REMISSION: ICD-10-CM

## 2022-08-08 RX ORDER — QUETIAPINE FUMARATE 200 MG/1
TABLET, FILM COATED ORAL
Qty: 90 TABLET | Refills: 3 | Status: SHIPPED | OUTPATIENT
Start: 2022-08-08

## 2022-08-08 RX ORDER — VENLAFAXINE HYDROCHLORIDE 150 MG/1
CAPSULE, EXTENDED RELEASE ORAL
Qty: 90 CAPSULE | Refills: 3 | Status: SHIPPED | OUTPATIENT
Start: 2022-08-08 | End: 2022-10-10

## 2022-09-12 ENCOUNTER — OFFICE VISIT (OUTPATIENT)
Dept: FAMILY MEDICINE CLINIC | Facility: CLINIC | Age: 47
End: 2022-09-12

## 2022-09-12 VITALS
HEART RATE: 108 BPM | OXYGEN SATURATION: 95 % | RESPIRATION RATE: 14 BRPM | HEIGHT: 73 IN | SYSTOLIC BLOOD PRESSURE: 112 MMHG | DIASTOLIC BLOOD PRESSURE: 60 MMHG | BODY MASS INDEX: 29.69 KG/M2 | TEMPERATURE: 98.6 F

## 2022-09-12 DIAGNOSIS — Z86.79 HISTORY OF ENDOCARDITIS: ICD-10-CM

## 2022-09-12 DIAGNOSIS — F17.200 SMOKER: ICD-10-CM

## 2022-09-12 DIAGNOSIS — E55.9 VITAMIN D DEFICIENCY: ICD-10-CM

## 2022-09-12 DIAGNOSIS — L97.516 ULCER OF RIGHT FOOT WITH BONE INVOLVEMENT WITHOUT EVIDENCE OF NECROSIS: ICD-10-CM

## 2022-09-12 DIAGNOSIS — J30.2 CHRONIC SEASONAL ALLERGIC RHINITIS: Primary | ICD-10-CM

## 2022-09-12 DIAGNOSIS — F33.41 RECURRENT MAJOR DEPRESSIVE DISORDER, IN PARTIAL REMISSION: ICD-10-CM

## 2022-09-12 DIAGNOSIS — R73.03 PREDIABETES: ICD-10-CM

## 2022-09-12 DIAGNOSIS — K21.9 GASTROESOPHAGEAL REFLUX DISEASE WITHOUT ESOPHAGITIS: ICD-10-CM

## 2022-09-12 DIAGNOSIS — N31.2 ATONIC BLADDER: ICD-10-CM

## 2022-09-12 DIAGNOSIS — Z23 ENCOUNTER FOR IMMUNIZATION: ICD-10-CM

## 2022-09-12 DIAGNOSIS — Z89.612 HISTORY OF LEFT LOWER EXTREMITY AMPUTATION: ICD-10-CM

## 2022-09-12 DIAGNOSIS — S24.102S T6 SPINAL CORD INJURY, SEQUELA: ICD-10-CM

## 2022-09-12 DIAGNOSIS — Z00.00 HEALTHCARE MAINTENANCE: ICD-10-CM

## 2022-09-12 PROCEDURE — G0008 ADMIN INFLUENZA VIRUS VAC: HCPCS | Performed by: GENERAL PRACTICE

## 2022-09-12 PROCEDURE — G0009 ADMIN PNEUMOCOCCAL VACCINE: HCPCS | Performed by: GENERAL PRACTICE

## 2022-09-12 PROCEDURE — 99214 OFFICE O/P EST MOD 30 MIN: CPT | Performed by: GENERAL PRACTICE

## 2022-09-12 PROCEDURE — 90677 PCV20 VACCINE IM: CPT | Performed by: GENERAL PRACTICE

## 2022-09-12 PROCEDURE — 90686 IIV4 VACC NO PRSV 0.5 ML IM: CPT | Performed by: GENERAL PRACTICE

## 2022-09-12 RX ORDER — SEMAGLUTIDE 1.34 MG/ML
INJECTION, SOLUTION SUBCUTANEOUS
Qty: 1.5 ML | Refills: 0 | Status: SHIPPED | OUTPATIENT
Start: 2022-09-12 | End: 2022-10-10

## 2022-09-12 RX ORDER — GABAPENTIN 800 MG/1
800 TABLET ORAL 3 TIMES DAILY
Qty: 90 TABLET | Refills: 2 | Status: SHIPPED | OUTPATIENT
Start: 2022-09-12 | End: 2023-01-03

## 2022-09-12 RX ORDER — OMEPRAZOLE 40 MG/1
40 CAPSULE, DELAYED RELEASE ORAL
Qty: 90 CAPSULE | Refills: 3 | Status: SHIPPED | OUTPATIENT
Start: 2022-09-12 | End: 2023-03-15 | Stop reason: SDUPTHER

## 2022-09-12 RX ORDER — BACLOFEN 10 MG/1
10 TABLET ORAL 3 TIMES DAILY PRN
Qty: 270 TABLET | Refills: 3 | Status: SHIPPED | OUTPATIENT
Start: 2022-09-12

## 2022-09-12 RX ORDER — ERGOCALCIFEROL 1.25 MG/1
50000 CAPSULE ORAL WEEKLY
Qty: 12 CAPSULE | Refills: 1 | Status: SHIPPED | OUTPATIENT
Start: 2022-09-12

## 2022-09-12 NOTE — PROGRESS NOTES
Subjective   Alex Tierney is a 47 y.o. male.     Chief Complaint  He returns for a scheduled reassessment of multiple medical problems including previous T6 spinal cord injury, gastroesophageal reflux disease, depression, and chronic allergic rhinitis    History of Present Illness     Spinal Cord Injury  Level T6 complicated by recurrent decubitus ulcers and osteomyelitis requiring a previous left BKA, bilateral hip osteotomies, permanent colostomy and a chronic indwelling bladder catheter.  He has a history of frequent symptomatic urinary tract infections.  He underwent extensive surgery for closure of a number of sacral decubitus ulcers several years ago.  The sore over his right lateral foot healed since last here.  Plain films of the right foot performed on 6/6/2022 revealed no bony abnormality  Lab Results   Component Value Date    WBC 11.06 (H) 06/06/2022    HGB 13.5 06/06/2022    HCT 39.7 06/06/2022    MCV 81.7 06/06/2022     06/06/2022     Lab Results   Component Value Date    SEDRATE 80 (H) 06/06/2022     Lab Results   Component Value Date    CRP 16.47 (H) 06/06/2022     Gastroesophageal Reflux Disease  He denies any recent heartburn.  There is no history of any difficulty swallowing and he continues to deny any nausea or vomiting. He remains on omeprazole 20 every morning.    Depression  His mood has been fairly good since and he has been sleeping well.  He admits to intermittent nervousness and remains reluctant to leave the house because of how he feels he is viewed.  He denies any difficulty with his concentration or memory and there is no history of any suicidal ideation.  His family is quite supportive. He remains on venlafaxine er and quetiapine with no apparent side effects  Lab Results   Component Value Date    TSH 0.958 06/06/2022     Chronic Allergic Rhinitis  He has a long history of intermittent sneezing, clear rhinorrhea, nasal congestion, periorbital pressure and postnasal drip. These  symptoms are perennial with seasonal exacerbation. He has been suffering from these symptoms for a number of years and remains on cetirizine with fairly good control    Labs  Most recent vitamin D 24.5 with a B12 of 410  Lab Results   Component Value Date    GLUCOSE 75 06/06/2022    BUN 20 06/06/2022    CREATININE 0.91 06/06/2022    EGFRIFNONA >150 04/02/2021    EGFRIFAFRI  06/18/2019      Comment:      <15 Indicative of kidney failure.    BCR 22.0 06/06/2022    K 4.0 06/06/2022    CO2 19.5 (L) 06/06/2022    CALCIUM 9.2 06/06/2022    ALBUMIN 3.60 06/06/2022    LABIL2 1.0 (L) 11/23/2015    AST 13 06/06/2022    ALT 18 06/06/2022     Lab Results   Component Value Date    ALKPHOS 81 06/06/2022     Lab Results   Component Value Date    CHOL 118 06/06/2022    CHLPL 180 11/23/2015    TRIG 97 06/06/2022    HDL 28 (L) 06/06/2022    LDL 71 06/06/2022     Lab Results   Component Value Date    HGBA1C 6.20 (H) 06/06/2022     The following portions of the patient's history were reviewed and updated as appropriate: allergies, current medications, past medical history, past social history, past surgical history and problem list.    Review of Systems   Constitutional: Positive for fatigue. Negative for appetite change, chills, fever and unexpected weight change.   HENT: Negative for congestion, ear pain, rhinorrhea, sneezing, sore throat and voice change.    Eyes: Negative for visual disturbance.   Respiratory: Negative for cough, shortness of breath and wheezing.    Cardiovascular: Negative for chest pain, palpitations and leg swelling.   Gastrointestinal: Negative for abdominal pain, blood in stool, constipation, diarrhea, nausea and vomiting.        Intermittent heartburn   Genitourinary: Negative for hematuria.   Musculoskeletal: Positive for arthralgias, back pain and myalgias. Negative for joint swelling and neck pain.   Skin: Negative for rash.   Neurological: Positive for weakness and numbness. Negative for headaches.    Psychiatric/Behavioral: Negative for dysphoric mood, hallucinations, sleep disturbance and suicidal ideas. The patient is nervous/anxious (intermittent).      Objective   Physical Exam  Constitutional:       General: He is not in acute distress.     Appearance: Normal appearance. He is well-developed. He is not ill-appearing or diaphoretic.      Comments: Alert and oriented.  Bright and in fair spirits.  Sitting in wheelchair.  No apparent distress.  No pallor, jaundice, cyanosis, or diaphoresis   HENT:      Head: Atraumatic.      Right Ear: Tympanic membrane, ear canal and external ear normal.      Left Ear: Tympanic membrane, ear canal and external ear normal.   Eyes:      Conjunctiva/sclera: Conjunctivae normal.   Neck:      Thyroid: No thyroid mass or thyromegaly.      Vascular: No carotid bruit or JVD.      Trachea: Trachea normal. No tracheal deviation.   Cardiovascular:      Rate and Rhythm: Regular rhythm. Tachycardia present.      Pulses:           Dorsalis pedis pulses are 1+ on the right side.        Posterior tibial pulses are 1+ on the right side.      Heart sounds: Normal heart sounds, S1 normal and S2 normal. No murmur heard.    No gallop. No S3 or S4 sounds.   Pulmonary:      Effort: Pulmonary effort is normal.      Breath sounds: Normal breath sounds.   Chest:   Breasts:      Right: No supraclavicular adenopathy.      Left: No supraclavicular adenopathy.       Abdominal:      General: Bowel sounds are normal. There is no distension.      Palpations: Abdomen is soft. There is no hepatomegaly, splenomegaly or mass.      Tenderness: There is no abdominal tenderness.      Hernia: No hernia is present.   Musculoskeletal:      Right lower le+ Edema present.      Comments: Left BKA   Lymphadenopathy:      Head:      Right side of head: No submental, submandibular, tonsillar, preauricular, posterior auricular or occipital adenopathy.      Left side of head: No submental, submandibular, tonsillar,  preauricular, posterior auricular or occipital adenopathy.      Cervical: No cervical adenopathy.      Upper Body:      Right upper body: No supraclavicular adenopathy.      Left upper body: No supraclavicular adenopathy.   Skin:     General: Skin is warm and dry.      Coloration: Skin is not cyanotic, jaundiced or pale.      Findings: No rash.      Nails: There is no clubbing.      Comments: small indentation with thin scab right lateral foot - no erythema, induration, or warmth   Neurological:      Mental Status: He is alert and oriented to person, place, and time.      Cranial Nerves: No cranial nerve deficit.      Motor: Weakness (spastic paraparesis) and abnormal muscle tone present. No tremor.      Coordination: Coordination normal.   Psychiatric:         Attention and Perception: Attention normal.         Mood and Affect: Mood normal.         Speech: Speech normal.         Behavior: Behavior normal.         Thought Content: Thought content normal. Thought content does not include suicidal ideation.       Assessment & Plan   Problems Addressed this Visit        Allergies and Adverse Reactions    Chronic seasonal allergic rhinitis   Reminded regarding allergen avoidance.  Continue current medication  Encouraged to report if any worse or if any new symptoms or concerns.       Cardiac and Vasculature    History of endocarditis       Endocrine and Metabolic    Prediabetes  Encouraged to continue to work on his diet and what exercise he can tolerate  Reviewed options and agreed on a trial of semaglutide if covered by his insurance.  Advised of the potential side effects  Update labs to be arranged at his return    Relevant Medications    Semaglutide,0.25 or 0.5MG/DOS, (Ozempic, 0.25 or 0.5 MG/DOSE,) 2 MG/1.5ML solution pen-injector    Vitamin D deficiency  Resume supplementation with monitoring.    Relevant Medications    vitamin D (ERGOCALCIFEROL) 1.25 MG (59100 UT) capsule capsule       Gastrointestinal Abdominal      Gastroesophageal reflux disease without esophagitis   Symptoms are currently well controlled.  Reminded regarding lifestyle modification.  Continue current medication.    Relevant Medications    omeprazole (priLOSEC) 40 MG capsule       Genitourinary and Reproductive     Atonic bladder    We will arrange a urology opinion        Other Relevant Orders    Ambulatory Referral to Urology       Health Encounters    Healthcare maintenance  Prevnar 20 administered  Encouraged to obtain an updated bivalent COVID-19 vaccination and a flu shot when available  Will discuss colon cancer screening at his return    Relevant Orders    Pneumococcal Conjugate Vaccine 20-Valent All (Completed)       Mental Health    Recurrent major depressive disorder, in partial remission (HCC)  Stable.  Supportive therapy.   Continue current medication.       Musculoskeletal and Injuries    History of left lower extremity amputation (HCC)       Neuro    T6 spinal cord injury (HCC)  Supportive therapy  Continue current medication    Relevant Medications    gabapentin (NEURONTIN) 800 MG tablet    Other Relevant Orders    Ambulatory Referral to Urology       Tobacco    Smoker       Other    Encounter for immunization    Relevant Orders    Pneumococcal Conjugate Vaccine 20-Valent All (Completed)    FluLaval/Fluzone >6 mos (6151-2043) (Completed)    Ulcer of right foot with bone involvement without evidence of necrosis (HCC)  Healed  Reminded regarding skin care  Encouraged to report if any further sores      Diagnoses       Codes Comments    Chronic seasonal allergic rhinitis    -  Primary ICD-10-CM: J30.2  ICD-9-CM: 477.8     History of endocarditis     ICD-10-CM: Z86.79  ICD-9-CM: V12.59     Vitamin D deficiency     ICD-10-CM: E55.9  ICD-9-CM: 268.9     Gastroesophageal reflux disease without esophagitis     ICD-10-CM: K21.9  ICD-9-CM: 530.81     Healthcare maintenance     ICD-10-CM: Z00.00  ICD-9-CM: V70.0     Atonic bladder     ICD-10-CM:  N31.2  ICD-9-CM: 596.4     Recurrent major depressive disorder, in partial remission (HCC)     ICD-10-CM: F33.41  ICD-9-CM: 296.35     History of left lower extremity amputation (HCC)     ICD-10-CM: Z89.612  ICD-9-CM: V49.70     T6 spinal cord injury, sequela (HCC)     ICD-10-CM: S24.102S  ICD-9-CM: 907.2     Smoker     ICD-10-CM: F17.200  ICD-9-CM: 305.1     Encounter for immunization     ICD-10-CM: Z23  ICD-9-CM: V03.89     Ulcer of right foot with bone involvement without evidence of necrosis (HCC)     ICD-10-CM: L97.516  ICD-9-CM: 707.15     Prediabetes     ICD-10-CM: R73.03  ICD-9-CM: 790.29

## 2022-09-14 ENCOUNTER — OFFICE VISIT (OUTPATIENT)
Dept: UROLOGY | Facility: CLINIC | Age: 47
End: 2022-09-14

## 2022-09-14 VITALS — BODY MASS INDEX: 31.15 KG/M2 | HEIGHT: 72 IN | WEIGHT: 230 LBS

## 2022-09-14 DIAGNOSIS — N31.2 ATONIC BLADDER: Primary | ICD-10-CM

## 2022-09-14 PROCEDURE — 99203 OFFICE O/P NEW LOW 30 MIN: CPT

## 2022-09-14 RX ORDER — GENTAMICIN SULFATE 80 MG/100ML
80 INJECTION, SOLUTION INTRAVENOUS ONCE
Status: CANCELLED | OUTPATIENT
Start: 2022-10-12 | End: 2022-09-14
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
## 2022-09-15 ENCOUNTER — TELEPHONE (OUTPATIENT)
Dept: FAMILY MEDICINE CLINIC | Facility: CLINIC | Age: 47
End: 2022-09-15

## 2022-09-15 NOTE — TELEPHONE ENCOUNTER
----- Message from Delgado Rose MD sent at 9/14/2022  9:52 AM EDT -----  Hey cool - thanks!    ----- Message -----  From: Janie Carson MA  Sent: 9/13/2022   2:42 PM EDT  To: Delgado Rose MD    $0 Copay :) They already notified him it was ready.       ----- Message -----  From: Delgado Rose MD  Sent: 9/12/2022   9:04 PM EDT  To: Janie Carson MA    Emailed prescription for Ozempic  Please check with his pharmacy if this is covered and if he will have a significant co-pay

## 2022-09-19 RX ORDER — CIPROFLOXACIN 500 MG/1
500 TABLET, FILM COATED ORAL 2 TIMES DAILY
Qty: 20 TABLET | Refills: 0 | Status: SHIPPED | OUTPATIENT
Start: 2022-09-19 | End: 2022-09-29

## 2022-09-19 NOTE — TELEPHONE ENCOUNTER
Caller: Alex Tierney    Relationship: Self    Best call back number: [phone]    What medication are you requesting: ANTIBIOTIC FOR UTI  What are your current symptoms:   SMELLY URINE    How long have you been experiencing symptoms:   2 DAYS    Have you had these symptoms before:    [x] Yes  [] No    Have you been treated for these symptoms before:   [x] Yes  [] No    If a prescription is needed, what is your preferred pharmacy and phone number: [facility] - [place], [place] - [address] [facility] [address]  - [phone] [facility] [phone] FX     Additional notes:

## 2022-09-30 ENCOUNTER — TELEPHONE (OUTPATIENT)
Dept: FAMILY MEDICINE CLINIC | Facility: CLINIC | Age: 47
End: 2022-09-30

## 2022-09-30 NOTE — TELEPHONE ENCOUNTER
----- Message from Delgado Rose MD sent at 9/30/2022  9:34 AM EDT -----  Thanks Tatiana!    ----- Message -----  From: Candace Sharma MA  Sent: 9/29/2022   1:45 PM EDT  To: Delgado Rose MD    I tried to reach him a couple times to provide him with some phone numbers I received from the health department. He didn't answer so I added this info into a letter and have mailed it to him.    ----- Message -----  From: Delgado Rose MD  Sent: 9/14/2022   1:35 PM EDT  To: Candace Sharma MA    Do you know how to set him up with waiver program that his dad gets reimbursed for helping to look after him?

## 2022-10-06 ENCOUNTER — TELEPHONE (OUTPATIENT)
Dept: FAMILY MEDICINE CLINIC | Facility: CLINIC | Age: 47
End: 2022-10-06

## 2022-10-06 NOTE — TELEPHONE ENCOUNTER
Provider: DOCTOR REAL  Caller: PATIENT       Phone Number: [phone]  Reason for Call: PATIENT IN HOSPITAL       Where is it located: [facility]     PATIENT STATES THAT HE IS IN [facility] HE STATES THAT THE HOSPITAL WANTS TO MOVE HIM TO A DIFFERENT LOCATION THE PATIENT STATES THAT HE WANTS TO GO TO [facility] PATIENT HE STATES THAT HE HAS A SURGERY SCHEDULED AT [facility] ON 10/10/2022 HE WOULD LIKE THE DOCTOR TO CALL [facility] AND ARRANGE FOR HIM TO BE SENT TO [facility]

## 2022-10-06 NOTE — TELEPHONE ENCOUNTER
Called and let the patient know that Dr. Rose doesn't have the ability to have him transferred to [facility] in [place].

## 2022-10-10 ENCOUNTER — PRE-ADMISSION TESTING (OUTPATIENT)
Dept: PREADMISSION TESTING | Facility: HOSPITAL | Age: 47
End: 2022-10-10

## 2022-10-10 RX ORDER — METRONIDAZOLE 500 MG/1
500 TABLET ORAL 3 TIMES DAILY
COMMUNITY
End: 2023-03-15

## 2022-10-10 RX ORDER — MULTIPLE VITAMINS W/ MINERALS TAB 9MG-400MCG
1 TAB ORAL DAILY
COMMUNITY

## 2022-10-10 RX ORDER — CEPHALEXIN 500 MG/1
500 CAPSULE ORAL 3 TIMES DAILY
COMMUNITY
End: 2023-03-15

## 2022-10-12 ENCOUNTER — APPOINTMENT (OUTPATIENT)
Dept: GENERAL RADIOLOGY | Facility: HOSPITAL | Age: 47
End: 2022-10-12

## 2022-10-12 ENCOUNTER — ANESTHESIA EVENT (OUTPATIENT)
Dept: PERIOP | Facility: HOSPITAL | Age: 47
End: 2022-10-12

## 2022-10-12 ENCOUNTER — HOSPITAL ENCOUNTER (OUTPATIENT)
Facility: HOSPITAL | Age: 47
Setting detail: HOSPITAL OUTPATIENT SURGERY
Discharge: HOME | End: 2022-10-12
Attending: UROLOGY | Admitting: UROLOGY

## 2022-10-12 ENCOUNTER — ANESTHESIA (OUTPATIENT)
Dept: PERIOP | Facility: HOSPITAL | Age: 47
End: 2022-10-12

## 2022-10-12 VITALS
WEIGHT: 230 LBS | HEIGHT: 70 IN | DIASTOLIC BLOOD PRESSURE: 88 MMHG | HEART RATE: 88 BPM | TEMPERATURE: 97.9 F | RESPIRATION RATE: 14 BRPM | OXYGEN SATURATION: 100 % | BODY MASS INDEX: 32.93 KG/M2 | SYSTOLIC BLOOD PRESSURE: 128 MMHG

## 2022-10-12 DIAGNOSIS — N31.2 ATONIC BLADDER: ICD-10-CM

## 2022-10-12 LAB — GLUCOSE BLDC GLUCOMTR-MCNC: 88 MG/DL (ref 70–130)

## 2022-10-12 PROCEDURE — 25010000002 MIDAZOLAM PER 1 MG: Performed by: NURSE ANESTHETIST, CERTIFIED REGISTERED

## 2022-10-12 PROCEDURE — 25010000002 GENTAMICIN PER 80 MG

## 2022-10-12 PROCEDURE — 25010000002 PROPOFOL 10 MG/ML EMULSION: Performed by: NURSE ANESTHETIST, CERTIFIED REGISTERED

## 2022-10-12 PROCEDURE — 25010000002 PROPOFOL 200 MG/20ML EMULSION: Performed by: NURSE ANESTHETIST, CERTIFIED REGISTERED

## 2022-10-12 PROCEDURE — 25010000002 FENTANYL CITRATE (PF) 50 MCG/ML SOLUTION: Performed by: NURSE ANESTHETIST, CERTIFIED REGISTERED

## 2022-10-12 PROCEDURE — 51102 DRAIN BL W/CATH INSERTION: CPT | Performed by: UROLOGY

## 2022-10-12 PROCEDURE — C1894 INTRO/SHEATH, NON-LASER: HCPCS | Performed by: UROLOGY

## 2022-10-12 PROCEDURE — 25010000002 KETOROLAC TROMETHAMINE PER 15 MG: Performed by: NURSE ANESTHETIST, CERTIFIED REGISTERED

## 2022-10-12 PROCEDURE — 82962 GLUCOSE BLOOD TEST: CPT

## 2022-10-12 RX ORDER — HYDROCODONE BITARTRATE AND ACETAMINOPHEN 10; 325 MG/1; MG/1
1 TABLET ORAL EVERY 6 HOURS PRN
Qty: 12 TABLET | Refills: 0 | Status: SHIPPED | OUTPATIENT
Start: 2022-10-12 | End: 2023-03-15

## 2022-10-12 RX ORDER — IPRATROPIUM BROMIDE AND ALBUTEROL SULFATE 2.5; .5 MG/3ML; MG/3ML
3 SOLUTION RESPIRATORY (INHALATION) ONCE AS NEEDED
Status: DISCONTINUED | OUTPATIENT
Start: 2022-10-12 | End: 2022-10-12 | Stop reason: HOSPADM

## 2022-10-12 RX ORDER — MIDAZOLAM HYDROCHLORIDE 1 MG/ML
1 INJECTION INTRAMUSCULAR; INTRAVENOUS
Status: DISCONTINUED | OUTPATIENT
Start: 2022-10-12 | End: 2022-10-12 | Stop reason: HOSPADM

## 2022-10-12 RX ORDER — KETOROLAC TROMETHAMINE 30 MG/ML
30 INJECTION, SOLUTION INTRAMUSCULAR; INTRAVENOUS EVERY 6 HOURS PRN
Status: COMPLETED | OUTPATIENT
Start: 2022-10-12 | End: 2022-10-12

## 2022-10-12 RX ORDER — SODIUM CHLORIDE, SODIUM LACTATE, POTASSIUM CHLORIDE, CALCIUM CHLORIDE 600; 310; 30; 20 MG/100ML; MG/100ML; MG/100ML; MG/100ML
INJECTION, SOLUTION INTRAVENOUS CONTINUOUS PRN
Status: DISCONTINUED | OUTPATIENT
Start: 2022-10-12 | End: 2022-10-12 | Stop reason: SURG

## 2022-10-12 RX ORDER — ONDANSETRON 2 MG/ML
4 INJECTION INTRAMUSCULAR; INTRAVENOUS AS NEEDED
Status: DISCONTINUED | OUTPATIENT
Start: 2022-10-12 | End: 2022-10-12 | Stop reason: HOSPADM

## 2022-10-12 RX ORDER — MEPERIDINE HYDROCHLORIDE 25 MG/ML
12.5 INJECTION INTRAMUSCULAR; INTRAVENOUS; SUBCUTANEOUS
Status: DISCONTINUED | OUTPATIENT
Start: 2022-10-12 | End: 2022-10-12 | Stop reason: HOSPADM

## 2022-10-12 RX ORDER — CEPHALEXIN 500 MG/1
500 CAPSULE ORAL 2 TIMES DAILY
Qty: 8 CAPSULE | Refills: 0 | Status: SHIPPED | OUTPATIENT
Start: 2022-10-12 | End: 2022-12-05 | Stop reason: SDUPTHER

## 2022-10-12 RX ORDER — OXYCODONE HYDROCHLORIDE AND ACETAMINOPHEN 5; 325 MG/1; MG/1
1 TABLET ORAL ONCE AS NEEDED
Status: DISCONTINUED | OUTPATIENT
Start: 2022-10-12 | End: 2022-10-12 | Stop reason: HOSPADM

## 2022-10-12 RX ORDER — HYDROCODONE BITARTRATE AND ACETAMINOPHEN 10; 325 MG/1; MG/1
1 TABLET ORAL EVERY 4 HOURS PRN
Qty: 12 TABLET | Refills: 0 | Status: SHIPPED | OUTPATIENT
Start: 2022-10-12 | End: 2023-03-15

## 2022-10-12 RX ORDER — SODIUM CHLORIDE 0.9 % (FLUSH) 0.9 %
10 SYRINGE (ML) INJECTION AS NEEDED
Status: DISCONTINUED | OUTPATIENT
Start: 2022-10-12 | End: 2022-10-12 | Stop reason: HOSPADM

## 2022-10-12 RX ORDER — FENTANYL CITRATE 50 UG/ML
INJECTION, SOLUTION INTRAMUSCULAR; INTRAVENOUS AS NEEDED
Status: DISCONTINUED | OUTPATIENT
Start: 2022-10-12 | End: 2022-10-12 | Stop reason: SURG

## 2022-10-12 RX ORDER — MIDAZOLAM HYDROCHLORIDE 1 MG/ML
INJECTION INTRAMUSCULAR; INTRAVENOUS AS NEEDED
Status: DISCONTINUED | OUTPATIENT
Start: 2022-10-12 | End: 2022-10-12 | Stop reason: SURG

## 2022-10-12 RX ORDER — SODIUM CHLORIDE, SODIUM LACTATE, POTASSIUM CHLORIDE, CALCIUM CHLORIDE 600; 310; 30; 20 MG/100ML; MG/100ML; MG/100ML; MG/100ML
100 INJECTION, SOLUTION INTRAVENOUS ONCE AS NEEDED
Status: DISCONTINUED | OUTPATIENT
Start: 2022-10-12 | End: 2022-10-12 | Stop reason: HOSPADM

## 2022-10-12 RX ORDER — CEPHALEXIN 500 MG/1
500 CAPSULE ORAL 2 TIMES DAILY
Qty: 8 CAPSULE | Refills: 0 | Status: SHIPPED | OUTPATIENT
Start: 2022-10-12 | End: 2023-03-15

## 2022-10-12 RX ORDER — GENTAMICIN SULFATE 80 MG/100ML
80 INJECTION, SOLUTION INTRAVENOUS ONCE
Status: COMPLETED | OUTPATIENT
Start: 2022-10-12 | End: 2022-10-12

## 2022-10-12 RX ORDER — SODIUM CHLORIDE, SODIUM LACTATE, POTASSIUM CHLORIDE, CALCIUM CHLORIDE 600; 310; 30; 20 MG/100ML; MG/100ML; MG/100ML; MG/100ML
125 INJECTION, SOLUTION INTRAVENOUS ONCE
Status: COMPLETED | OUTPATIENT
Start: 2022-10-12 | End: 2022-10-12

## 2022-10-12 RX ORDER — PROPOFOL 10 MG/ML
INJECTION, EMULSION INTRAVENOUS AS NEEDED
Status: DISCONTINUED | OUTPATIENT
Start: 2022-10-12 | End: 2022-10-12 | Stop reason: SURG

## 2022-10-12 RX ORDER — SODIUM CHLORIDE 0.9 % (FLUSH) 0.9 %
10 SYRINGE (ML) INJECTION EVERY 12 HOURS SCHEDULED
Status: DISCONTINUED | OUTPATIENT
Start: 2022-10-12 | End: 2022-10-12 | Stop reason: HOSPADM

## 2022-10-12 RX ORDER — FENTANYL CITRATE 50 UG/ML
50 INJECTION, SOLUTION INTRAMUSCULAR; INTRAVENOUS
Status: DISCONTINUED | OUTPATIENT
Start: 2022-10-12 | End: 2022-10-12 | Stop reason: HOSPADM

## 2022-10-12 RX ADMIN — SODIUM CHLORIDE, POTASSIUM CHLORIDE, SODIUM LACTATE AND CALCIUM CHLORIDE: 600; 310; 30; 20 INJECTION, SOLUTION INTRAVENOUS at 07:23

## 2022-10-12 RX ADMIN — KETOROLAC TROMETHAMINE 30 MG: 30 INJECTION, SOLUTION INTRAMUSCULAR at 08:25

## 2022-10-12 RX ADMIN — GENTAMICIN SULFATE 80 MG: 80 INJECTION, SOLUTION INTRAVENOUS at 07:23

## 2022-10-12 RX ADMIN — FENTANYL CITRATE 100 MCG: 50 INJECTION INTRAMUSCULAR; INTRAVENOUS at 07:27

## 2022-10-12 RX ADMIN — PROPOFOL 40 MG: 10 INJECTION, EMULSION INTRAVENOUS at 07:52

## 2022-10-12 RX ADMIN — SODIUM CHLORIDE, POTASSIUM CHLORIDE, SODIUM LACTATE AND CALCIUM CHLORIDE 125 ML/HR: 600; 310; 30; 20 INJECTION, SOLUTION INTRAVENOUS at 07:06

## 2022-10-12 RX ADMIN — PROPOFOL 80 MG: 10 INJECTION, EMULSION INTRAVENOUS at 07:27

## 2022-10-12 RX ADMIN — MIDAZOLAM HYDROCHLORIDE 2 MG: 1 INJECTION, SOLUTION INTRAMUSCULAR; INTRAVENOUS at 07:27

## 2022-10-12 RX ADMIN — PROPOFOL 140 MCG/KG/MIN: 10 INJECTION, EMULSION INTRAVENOUS at 07:27
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
## 2022-10-12 NOTE — ANESTHESIA POSTPROCEDURE EVALUATION
Patient: Alex Tierney    Procedure Summary     Date: 10/12/22 Room / Location: [facility] OR  /  [facility] OR    Anesthesia Start: 0723 Anesthesia Stop: 0819    Procedures:       SUPRAPUBIC CATHETER INSERTION (Abdomen)      CYSTOSCOPY (Urethra) Diagnosis:       Atonic bladder      (Atonic bladder [N31.2])    Surgeons: Arthur Silva MD Provider: Dandy Padilla MD    Anesthesia Type: general ASA Status: 3          Anesthesia Type: general    Vitals  Vitals Value Taken Time   /81 10/12/22 0825   Temp 98 °F (36.7 °C) 10/12/22 0820   Pulse 89 10/12/22 0825   Resp 16 10/12/22 0825   SpO2 100 % 10/12/22 0825           Post Anesthesia Care and Evaluation    Patient location during evaluation: PACU  Patient participation: complete - patient participated  Level of consciousness: responsive to verbal stimuli  Pain score: 0  Pain management: satisfactory to patient    Airway patency: patent  Anesthetic complications: No anesthetic complications  PONV Status: none  Cardiovascular status: hemodynamically stable  Respiratory status: nasal cannula  Hydration status: acceptable

## 2022-10-17 ENCOUNTER — TELEPHONE (OUTPATIENT)
Dept: UROLOGY | Facility: CLINIC | Age: 47
End: 2022-10-17

## 2022-10-17 NOTE — TELEPHONE ENCOUNTER
Caller:   PT  Relationship: SELF    Best call back number: [phone]  Requested Prescriptions:   Requested Prescriptions      No prescriptions requested or ordered in this encounter      HYDROcodone-acetaminophen (NORCO)  MG per tablet       [facility]    Additional details provided by patient:   RECEIVED A CALL FROM PT  HE WANTED TO KNOW IF HE CAN GET ANOTHER PRESCRIPTION FOR PAIN.   Does the patient have less than a 3 day supply:  [x] Yes  [] No    Usman Wagoner Rep   10/17/22 14:58 EDT

## 2022-10-20 ENCOUNTER — OFFICE VISIT (OUTPATIENT)
Dept: UROLOGY | Facility: CLINIC | Age: 47
End: 2022-10-20

## 2022-10-20 DIAGNOSIS — Z43.5 ENCOUNTER FOR CARE OR REPLACEMENT OF SUPRAPUBIC TUBE: ICD-10-CM

## 2022-10-20 DIAGNOSIS — N31.2 ATONIC BLADDER: Primary | ICD-10-CM

## 2022-10-20 PROCEDURE — 99213 OFFICE O/P EST LOW 20 MIN: CPT
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
## 2022-10-21 PROBLEM — Z43.5 ENCOUNTER FOR CARE OR REPLACEMENT OF SUPRAPUBIC TUBE (HCC): Status: ACTIVE | Noted: 2022-10-21

## 2022-10-22 DIAGNOSIS — R73.03 PREDIABETES: ICD-10-CM

## 2022-10-24 RX ORDER — SEMAGLUTIDE 1.34 MG/ML
0.5 INJECTION, SOLUTION SUBCUTANEOUS WEEKLY
Qty: 1.5 ML | Refills: 0 | Status: SHIPPED | OUTPATIENT
Start: 2022-10-24

## 2022-10-24 NOTE — TELEPHONE ENCOUNTER
Dr. Rose is out of the office this week. Could you refill this?     Pt was last seen on 9/12/2022.

## 2022-11-03 RX ORDER — CARIPRAZINE 1.5 MG/1
CAPSULE, GELATIN COATED ORAL
Qty: 90 CAPSULE | Refills: 3 | OUTPATIENT
Start: 2022-11-03

## 2022-12-05 DIAGNOSIS — N31.2 ATONIC BLADDER: ICD-10-CM

## 2022-12-05 NOTE — TELEPHONE ENCOUNTER
Caller: Alex Tierney    Relationship: Self    Best call back number:   [phone]  Requested Prescriptions:   Requested Prescriptions     Pending Prescriptions Disp Refills   • cephalexin (Keflex) 500 MG capsule 8 capsule 0     Sig: Take 1 capsule by mouth 2 (Two) Times a Day.        Pharmacy where request should be sent: [facility] [id number] - [place], [place] - [address] AT [facility] [address]  - [phone] [facility] [phone] FX     Additional details provided by patient:  REQUESTING FOR A UTI     Does the patient have less than a 3 day supply:  [x] Yes  [] No    Would you like a call back once the refill request has been completed:   [x] Yes [x] No    If the office needs to give you a call back, can they leave a voicemail: [] Yes [x] No

## 2022-12-07 RX ORDER — CEPHALEXIN 500 MG/1
500 CAPSULE ORAL 2 TIMES DAILY
Qty: 8 CAPSULE | Refills: 0 | Status: SHIPPED | OUTPATIENT
Start: 2022-12-07 | End: 2023-03-15

## 2022-12-08 ENCOUNTER — OFFICE VISIT (OUTPATIENT)
Dept: UROLOGY | Facility: CLINIC | Age: 47
End: 2022-12-08

## 2022-12-08 VITALS — HEIGHT: 70 IN | BODY MASS INDEX: 32.93 KG/M2 | WEIGHT: 230 LBS

## 2022-12-08 DIAGNOSIS — Z43.5 ENCOUNTER FOR CARE OR REPLACEMENT OF SUPRAPUBIC TUBE: Primary | ICD-10-CM

## 2022-12-08 PROCEDURE — 99213 OFFICE O/P EST LOW 20 MIN: CPT

## 2022-12-08 PROCEDURE — 51705 CHANGE OF BLADDER TUBE: CPT

## 2022-12-08 RX ORDER — SULFAMETHOXAZOLE AND TRIMETHOPRIM 800; 160 MG/1; MG/1
1 TABLET ORAL 2 TIMES DAILY
Qty: 20 TABLET | Refills: 0 | Status: SHIPPED | OUTPATIENT
Start: 2022-12-08 | End: 2023-03-15
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
## 2022-12-21 ENCOUNTER — TELEPHONE (OUTPATIENT)
Dept: FAMILY MEDICINE CLINIC | Facility: CLINIC | Age: 47
End: 2022-12-21

## 2022-12-21 NOTE — TELEPHONE ENCOUNTER
Caller: Alex Tierney    Relationship: Self    Best call back number: [phone]    What orders are you requesting (i.e. lab or imaging): COLOSTOMY AND CATHETER SUPPLIES    In what timeframe would the patient need to come in: ASAP    Where will you receive your lab/imaging services: [facility]

## 2023-01-02 DIAGNOSIS — S24.102S T6 SPINAL CORD INJURY, SEQUELA: ICD-10-CM

## 2023-01-03 RX ORDER — GABAPENTIN 800 MG/1
TABLET ORAL
Qty: 90 TABLET | Refills: 0 | Status: SHIPPED | OUTPATIENT
Start: 2023-01-03 | End: 2023-02-02

## 2023-02-02 DIAGNOSIS — S24.102S T6 SPINAL CORD INJURY, SEQUELA: ICD-10-CM

## 2023-02-02 RX ORDER — GABAPENTIN 800 MG/1
TABLET ORAL
Qty: 90 TABLET | Refills: 0 | Status: SHIPPED | OUTPATIENT
Start: 2023-02-02 | End: 2023-03-06

## 2023-03-04 DIAGNOSIS — S24.102S T6 SPINAL CORD INJURY, SEQUELA: ICD-10-CM

## 2023-03-06 RX ORDER — GABAPENTIN 800 MG/1
TABLET ORAL
Qty: 90 TABLET | Refills: 0 | Status: SHIPPED | OUTPATIENT
Start: 2023-03-06 | End: 2023-04-03

## 2023-03-15 ENCOUNTER — TELEPHONE (OUTPATIENT)
Dept: FAMILY MEDICINE CLINIC | Facility: CLINIC | Age: 48
End: 2023-03-15

## 2023-03-15 DIAGNOSIS — K21.9 GASTROESOPHAGEAL REFLUX DISEASE WITHOUT ESOPHAGITIS: ICD-10-CM

## 2023-03-15 DIAGNOSIS — N31.2 ATONIC BLADDER: Primary | ICD-10-CM

## 2023-03-15 RX ORDER — OMEPRAZOLE 40 MG/1
40 CAPSULE, DELAYED RELEASE ORAL
Qty: 90 CAPSULE | Refills: 3 | Status: CANCELLED | OUTPATIENT
Start: 2023-03-15

## 2023-03-15 RX ORDER — SULFAMETHOXAZOLE AND TRIMETHOPRIM 800; 160 MG/1; MG/1
1 TABLET ORAL 2 TIMES DAILY
Qty: 20 TABLET | Refills: 0 | Status: SHIPPED | OUTPATIENT
Start: 2023-03-15 | End: 2023-03-25

## 2023-03-15 RX ORDER — OMEPRAZOLE 40 MG/1
40 CAPSULE, DELAYED RELEASE ORAL
Qty: 90 CAPSULE | Refills: 3 | Status: SHIPPED | OUTPATIENT
Start: 2023-03-15

## 2023-03-15 NOTE — TELEPHONE ENCOUNTER
Caller: Alex Tierney    Relationship: Self    Best call back number: [phone]    What medication are you requesting: ANTIBIOTIC & 24 [place] CATHATER; ALSO, NEEDS PRESCRIPTION FOR OMEPRAZOLE 40 MG    What are your current symptoms: UTI    How long have you been experiencing symptoms: SINCE Monday, MISSED APPOINTMENT YESTERDAY BECAUSE FELT TO BAD TO COME IN.    Have you had these symptoms before:    [x] Yes  [] No    Have you been treated for these symptoms before:   [x] Yes  [] No    If a prescription is needed, what is your preferred pharmacy and phone number: [facility] [id number] [place], [place] - [address] AT [facility] [address]  [phone] [facility] [phone] FX     Additional notes:

## 2023-04-02 DIAGNOSIS — S24.102S T6 SPINAL CORD INJURY, SEQUELA: ICD-10-CM

## 2023-04-03 RX ORDER — GABAPENTIN 800 MG/1
TABLET ORAL
Qty: 90 TABLET | Refills: 0 | Status: SHIPPED | OUTPATIENT
Start: 2023-04-03

## 2023-04-30 DIAGNOSIS — S24.102S T6 SPINAL CORD INJURY, SEQUELA: ICD-10-CM

## 2023-05-01 ENCOUNTER — TELEPHONE (OUTPATIENT)
Dept: FAMILY MEDICINE CLINIC | Facility: CLINIC | Age: 48
End: 2023-05-01
Payer: MEDICARE

## 2023-05-01 RX ORDER — GABAPENTIN 800 MG/1
TABLET ORAL
Qty: 90 TABLET | OUTPATIENT
Start: 2023-05-01

## 2023-05-04 ENCOUNTER — OFFICE VISIT (OUTPATIENT)
Dept: FAMILY MEDICINE CLINIC | Facility: CLINIC | Age: 48
End: 2023-05-04
Payer: MEDICARE

## 2023-05-04 VITALS
RESPIRATION RATE: 14 BRPM | DIASTOLIC BLOOD PRESSURE: 68 MMHG | TEMPERATURE: 98.2 F | BODY MASS INDEX: 33 KG/M2 | OXYGEN SATURATION: 97 % | HEART RATE: 92 BPM | HEIGHT: 70 IN | SYSTOLIC BLOOD PRESSURE: 118 MMHG

## 2023-05-04 DIAGNOSIS — Z00.00 HEALTHCARE MAINTENANCE: ICD-10-CM

## 2023-05-04 DIAGNOSIS — Z86.79 HISTORY OF ENDOCARDITIS: ICD-10-CM

## 2023-05-04 DIAGNOSIS — F33.41 RECURRENT MAJOR DEPRESSIVE DISORDER, IN PARTIAL REMISSION: ICD-10-CM

## 2023-05-04 DIAGNOSIS — F17.200 SMOKER: ICD-10-CM

## 2023-05-04 DIAGNOSIS — Z89.612 HISTORY OF LEFT LOWER EXTREMITY AMPUTATION: ICD-10-CM

## 2023-05-04 DIAGNOSIS — R73.03 PREDIABETES: ICD-10-CM

## 2023-05-04 DIAGNOSIS — E55.9 VITAMIN D DEFICIENCY: ICD-10-CM

## 2023-05-04 DIAGNOSIS — S24.102S T6 SPINAL CORD INJURY, SEQUELA: ICD-10-CM

## 2023-05-04 DIAGNOSIS — M81.0 AGE-RELATED OSTEOPOROSIS WITHOUT CURRENT PATHOLOGICAL FRACTURE: Primary | ICD-10-CM

## 2023-05-04 DIAGNOSIS — K21.9 GASTROESOPHAGEAL REFLUX DISEASE WITHOUT ESOPHAGITIS: ICD-10-CM

## 2023-05-04 DIAGNOSIS — Z51.81 ENCOUNTER FOR THERAPEUTIC DRUG LEVEL MONITORING: ICD-10-CM

## 2023-05-04 DIAGNOSIS — N31.2 ATONIC BLADDER: ICD-10-CM

## 2023-05-04 DIAGNOSIS — Z12.11 ENCOUNTER FOR SCREENING FOR MALIGNANT NEOPLASM OF COLON: ICD-10-CM

## 2023-05-04 DIAGNOSIS — J30.2 CHRONIC SEASONAL ALLERGIC RHINITIS: ICD-10-CM

## 2023-05-04 PROBLEM — L97.516 ULCER OF RIGHT FOOT WITH BONE INVOLVEMENT WITHOUT EVIDENCE OF NECROSIS: Status: RESOLVED | Noted: 2022-06-06 | Resolved: 2023-05-04

## 2023-05-04 PROBLEM — Z43.5 ENCOUNTER FOR CARE OR REPLACEMENT OF SUPRAPUBIC TUBE: Status: RESOLVED | Noted: 2022-10-21 | Resolved: 2023-05-04

## 2023-05-04 PROCEDURE — 85025 COMPLETE CBC W/AUTO DIFF WBC: CPT | Performed by: GENERAL PRACTICE

## 2023-05-04 PROCEDURE — 80061 LIPID PANEL: CPT | Performed by: GENERAL PRACTICE

## 2023-05-04 PROCEDURE — 82306 VITAMIN D 25 HYDROXY: CPT | Performed by: GENERAL PRACTICE

## 2023-05-04 PROCEDURE — 83036 HEMOGLOBIN GLYCOSYLATED A1C: CPT | Performed by: GENERAL PRACTICE

## 2023-05-04 PROCEDURE — 85652 RBC SED RATE AUTOMATED: CPT | Performed by: GENERAL PRACTICE

## 2023-05-04 PROCEDURE — 80053 COMPREHEN METABOLIC PANEL: CPT | Performed by: GENERAL PRACTICE

## 2023-05-04 PROCEDURE — 84443 ASSAY THYROID STIM HORMONE: CPT | Performed by: GENERAL PRACTICE

## 2023-05-04 PROCEDURE — 86140 C-REACTIVE PROTEIN: CPT | Performed by: GENERAL PRACTICE

## 2023-05-04 RX ORDER — OMEPRAZOLE 40 MG/1
40 CAPSULE, DELAYED RELEASE ORAL
Qty: 90 CAPSULE | Refills: 3 | Status: SHIPPED | OUTPATIENT
Start: 2023-05-04

## 2023-05-04 RX ORDER — GABAPENTIN 800 MG/1
800 TABLET ORAL 3 TIMES DAILY
Qty: 90 TABLET | Refills: 3 | Status: SHIPPED | OUTPATIENT
Start: 2023-05-04

## 2023-05-04 RX ORDER — BACLOFEN 10 MG/1
10 TABLET ORAL 3 TIMES DAILY PRN
Qty: 270 TABLET | Refills: 3 | Status: SHIPPED | OUTPATIENT
Start: 2023-05-04

## 2023-05-04 RX ORDER — SEMAGLUTIDE 1.34 MG/ML
INJECTION, SOLUTION SUBCUTANEOUS
Qty: 1.5 ML | Refills: 5 | Status: SHIPPED | OUTPATIENT
Start: 2023-05-04 | End: 2023-05-05 | Stop reason: SDUPTHER

## 2023-05-04 RX ORDER — QUETIAPINE FUMARATE 200 MG/1
200 TABLET, FILM COATED ORAL NIGHTLY
Qty: 90 TABLET | Refills: 3 | Status: SHIPPED | OUTPATIENT
Start: 2023-05-04

## 2023-05-04 NOTE — PROGRESS NOTES
Subjective   Alex Tierney is a 48 y.o. male.     Chief Complaint  He returns for a scheduled reassessment of multiple medical problems including a previous T6 spinal cord injury, Gastrosoft reflux disease, depression, and chronic allergic rhinitis    History of Present Illness     Spinal Cord Injury  Level T6 complicated by recurrent decubitus ulcers and osteomyelitis requiring a previous left BKA, bilateral hip osteotomies, permanent colostomy and a chronic indwelling bladder catheter.  He was hospitalized at [facility] in early October for an apparent septic arthritis of the right hip.  While records are unavailable, he states he did not require surgery but was simply treated with antibiotics.  He denies any further swelling or drainage and has had no flulike symptoms, fever, or chills.  He has a history of frequent symptomatic urinary tract infections.  He underwent placement of a suprapubic catheter by Dr. Mayo on 10/12/2022.     Gastroesophageal Reflux Disease  He remains heartburn free on omeprazole.  He continues to deny any difficulty swallowing and there is no history of any nausea or vomiting.     Depression  His mood has been fairly good since and he has continued to sleep well.  He admits to intermittent nervousness and remains reluctant to leave the house because of how he feels he is viewed.  He continues to deny any difficulty with his concentration or memory and there is no history of any suicidal ideation.  His family is quite supportive. He remains on quetiapine with no apparent side effects    Chronic Allergic Rhinitis  He has a long history of intermittent sneezing, clear rhinorrhea, nasal congestion, periorbital pressure and postnasal drip. These symptoms are perennial with seasonal exacerbation. He  remains on cetirizine with fairly good control    The following portions of the patient's history were reviewed and updated as appropriate: allergies, current medications, past  medical history, past social history and problem list.    Review of Systems   Constitutional: Positive for fatigue. Negative for appetite change, chills, fever and unexpected weight change.   HENT: Negative for congestion, ear pain, rhinorrhea, sneezing, sore throat and voice change.    Eyes: Negative for visual disturbance.   Respiratory: Negative for cough, shortness of breath and wheezing.    Cardiovascular: Negative for chest pain, palpitations and leg swelling.   Gastrointestinal: Negative for abdominal pain, blood in stool, constipation, diarrhea, nausea and vomiting.   Genitourinary: Negative for hematuria.   Musculoskeletal: Positive for arthralgias, back pain and myalgias. Negative for joint swelling and neck pain.   Skin: Negative for rash.   Neurological: Positive for weakness and numbness. Negative for headaches.   Psychiatric/Behavioral: Negative for dysphoric mood, hallucinations, sleep disturbance and suicidal ideas. The patient is nervous/anxious (intermittent).      Objective   Physical Exam  Constitutional:       General: He is not in acute distress.     Appearance: Normal appearance. He is well-developed. He is not ill-appearing or diaphoretic.      Comments: Bright and in good spirits.  Sitting in wheelchair.  No apparent distress.  No pallor, jaundice, cyanosis, or diaphoresis   HENT:      Head: Atraumatic.      Right Ear: Tympanic membrane, ear canal and external ear normal.      Left Ear: Tympanic membrane, ear canal and external ear normal.      Mouth/Throat:      Lips: No lesions.      Mouth: Mucous membranes are moist. No oral lesions.      Pharynx: No oropharyngeal exudate or posterior oropharyngeal erythema.   Eyes:      General: Lids are normal. Gaze aligned appropriately.      Extraocular Movements: Extraocular movements intact.      Conjunctiva/sclera: Conjunctivae normal.      Pupils: Pupils are equal.   Neck:      Thyroid: No thyroid mass or thyromegaly.      Vascular: No carotid  bruit or JVD.      Trachea: Trachea normal. No tracheal deviation.   Cardiovascular:      Rate and Rhythm: Normal rate and regular rhythm.      Pulses:           Dorsalis pedis pulses are 1+ on the right side.        Posterior tibial pulses are 1+ on the right side.      Heart sounds: Normal heart sounds, S1 normal and S2 normal. No murmur heard.    No gallop. No S3 or S4 sounds.   Pulmonary:      Effort: Pulmonary effort is normal.      Breath sounds: Normal breath sounds.   Abdominal:      General: Bowel sounds are normal. There is no distension or abdominal bruit.      Palpations: Abdomen is soft. There is no hepatomegaly, splenomegaly or mass.      Tenderness: There is no abdominal tenderness.      Hernia: No hernia is present.   Musculoskeletal:      Right lower leg: No edema.      Comments: Left BKA   Lymphadenopathy:      Head:      Right side of head: No submental, submandibular, tonsillar, preauricular, posterior auricular or occipital adenopathy.      Left side of head: No submental, submandibular, tonsillar, preauricular, posterior auricular or occipital adenopathy.      Cervical: No cervical adenopathy.      Upper Body:      Right upper body: No supraclavicular adenopathy.      Left upper body: No supraclavicular adenopathy.   Skin:     General: Skin is warm and dry.      Coloration: Skin is not cyanotic, jaundiced or pale.      Findings: No rash.      Nails: There is no clubbing.   Neurological:      Mental Status: He is alert and oriented to person, place, and time.      Cranial Nerves: No cranial nerve deficit, dysarthria or facial asymmetry.      Sensory: No sensory deficit.      Motor: Weakness (spastic paraparesis) and abnormal muscle tone present. No tremor.      Coordination: Coordination normal.      Gait: Gait normal.   Psychiatric:         Attention and Perception: Attention normal.         Mood and Affect: Mood normal.         Speech: Speech normal.         Behavior: Behavior normal.          Thought Content: Thought content normal. Thought content does not include suicidal ideation.       Assessment & Plan   Problems Addressed this Visit        Allergies and Adverse Reactions    Chronic seasonal allergic rhinitis  Continue current medication  Encouraged to report if any worse or if any new symptoms or concerns.       Cardiac and Vasculature    History of endocarditis    Relevant Orders    CBC & Differential    Sedimentation Rate    C-reactive Protein       Endocrine and Metabolic    Prediabetes  Encouraged to continue to work on his diet and what exercise he can  Semaglutide will be resumed at a dose of 0.25 weekly for 6 weeks, then 0.5 weekly afterward  Updated labs drawn    Relevant Medications    Semaglutide,0.25 or 0.5MG/DOS, (Ozempic, 0.25 or 0.5 MG/DOSE,) 2 MG/1.5ML solution pen-injector    Other Relevant Orders    Comprehensive Metabolic Panel    TSH    Hemoglobin A1c    Vitamin D deficiency    Relevant Orders    Vitamin D,25-Hydroxy       Gastrointestinal Abdominal     Gastroesophageal reflux disease without esophagitis   Symptoms are currently well controlled.  Encouraged to report if this should change.  Continue current medication    Relevant Medications    omeprazole (priLOSEC) 40 MG capsule    Other Relevant Orders    CBC & Differential       Genitourinary and Reproductive     Atonic bladder    Relevant Medications    baclofen (LIORESAL) 10 MG tablet       Health Encounters    Healthcare maintenance  Patient remains uninterested in COVID-19 vaccination  Reviewed the potential benefits and risks of colon cancer screening.  Patient uninterested in pursuing a colonoscopy but agrees to Cologuard and this will be arranged    Relevant Orders    Comprehensive Metabolic Panel    Lipid Panel    Cologuard - Stool, Per Rectum       Mental Health    Recurrent major depressive disorder, in partial remission  Significant situational component.   Supportive therapy.   Continue current  medication.  Encouraged to report if any worse or if any new symptoms or concerns.    Relevant Medications    QUEtiapine (SEROquel) 200 MG tablet    Other Relevant Orders    TSH       Musculoskeletal and Injuries    History of left lower extremity amputation    Relevant Orders    CBC & Differential    Sedimentation Rate    C-reactive Protein       Neuro    T6 spinal cord injury  Complicated by recurrent decubitus ulcers and osteomyelitis requiring a previous left BKA, bilateral hip osteotomies, permanent colostomy and a suprapubic catheter  Supportive therapy  We will try to obtain records from [facility]    Relevant Medications    gabapentin (NEURONTIN) 800 MG tablet       Tobacco    Smoker         Diagnoses       Codes Comments    Age-related osteoporosis without current pathological fracture    -  Primary ICD-10-CM: M81.0  ICD-9-CM: 733.01     Encounter for therapeutic drug level monitoring     ICD-10-CM: Z51.81  ICD-9-CM: V58.83     Chronic seasonal allergic rhinitis     ICD-10-CM: J30.2  ICD-9-CM: 477.8     History of endocarditis     ICD-10-CM: Z86.79  ICD-9-CM: V12.59     Vitamin D deficiency     ICD-10-CM: E55.9  ICD-9-CM: 268.9     Prediabetes     ICD-10-CM: R73.03  ICD-9-CM: 790.29     Gastroesophageal reflux disease without esophagitis     ICD-10-CM: K21.9  ICD-9-CM: 530.81     Atonic bladder     ICD-10-CM: N31.2  ICD-9-CM: 596.4     Healthcare maintenance     ICD-10-CM: Z00.00  ICD-9-CM: V70.0     Recurrent major depressive disorder, in partial remission     ICD-10-CM: F33.41  ICD-9-CM: 296.35     T6 spinal cord injury, sequela     ICD-10-CM: S24.102S  ICD-9-CM: 907.2     Smoker     ICD-10-CM: F17.200  ICD-9-CM: 305.1     History of left lower extremity amputation     ICD-10-CM: Z89.612  ICD-9-CM: V49.70     Encounter for screening for malignant neoplasm of colon     ICD-10-CM: Z12.11  ICD-9-CM: V76.51

## 2023-05-05 ENCOUNTER — TELEPHONE (OUTPATIENT)
Dept: FAMILY MEDICINE CLINIC | Facility: CLINIC | Age: 48
End: 2023-05-05

## 2023-05-05 DIAGNOSIS — R73.03 PREDIABETES: ICD-10-CM

## 2023-05-05 LAB
25(OH)D3 SERPL-MCNC: 22.5 NG/ML (ref 30–100)
ALBUMIN SERPL-MCNC: 4.4 G/DL (ref 3.5–5.2)
ALBUMIN/GLOB SERPL: 1.3 G/DL
ALP SERPL-CCNC: 118 U/L (ref 39–117)
ALT SERPL W P-5'-P-CCNC: 14 U/L (ref 1–41)
ANION GAP SERPL CALCULATED.3IONS-SCNC: 13.3 MMOL/L (ref 5–15)
AST SERPL-CCNC: 11 U/L (ref 1–40)
BASOPHILS # BLD AUTO: 0.08 10*3/MM3 (ref 0–0.2)
BASOPHILS NFR BLD AUTO: 1 % (ref 0–1.5)
BILIRUB SERPL-MCNC: <0.2 MG/DL (ref 0–1.2)
BUN SERPL-MCNC: 16 MG/DL (ref 6–20)
BUN/CREAT SERPL: 18 (ref 7–25)
CALCIUM SPEC-SCNC: 9.6 MG/DL (ref 8.6–10.5)
CHLORIDE SERPL-SCNC: 101 MMOL/L (ref 98–107)
CHOLEST SERPL-MCNC: 202 MG/DL (ref 0–200)
CO2 SERPL-SCNC: 25.7 MMOL/L (ref 22–29)
CREAT SERPL-MCNC: 0.89 MG/DL (ref 0.76–1.27)
CRP SERPL-MCNC: 0.43 MG/DL (ref 0–0.5)
DEPRECATED RDW RBC AUTO: 47 FL (ref 37–54)
EGFRCR SERPLBLD CKD-EPI 2021: 105.7 ML/MIN/1.73
EOSINOPHIL # BLD AUTO: 0.4 10*3/MM3 (ref 0–0.4)
EOSINOPHIL NFR BLD AUTO: 5 % (ref 0.3–6.2)
ERYTHROCYTE [DISTWIDTH] IN BLOOD BY AUTOMATED COUNT: 16.9 % (ref 12.3–15.4)
ERYTHROCYTE [SEDIMENTATION RATE] IN BLOOD: 20 MM/HR (ref 0–15)
GLOBULIN UR ELPH-MCNC: 3.3 GM/DL
GLUCOSE SERPL-MCNC: 97 MG/DL (ref 65–99)
HBA1C MFR BLD: 5.4 % (ref 4.8–5.6)
HCT VFR BLD AUTO: 55.4 % (ref 37.5–51)
HDLC SERPL-MCNC: 44 MG/DL (ref 40–60)
HGB BLD-MCNC: 18.6 G/DL (ref 13–17.7)
IMM GRANULOCYTES # BLD AUTO: 0.06 10*3/MM3 (ref 0–0.05)
IMM GRANULOCYTES NFR BLD AUTO: 0.8 % (ref 0–0.5)
LDLC SERPL CALC-MCNC: 127 MG/DL (ref 0–100)
LDLC/HDLC SERPL: 2.81 {RATIO}
LYMPHOCYTES # BLD AUTO: 2 10*3/MM3 (ref 0.7–3.1)
LYMPHOCYTES NFR BLD AUTO: 25.2 % (ref 19.6–45.3)
MCH RBC QN AUTO: 27.8 PG (ref 26.6–33)
MCHC RBC AUTO-ENTMCNC: 33.6 G/DL (ref 31.5–35.7)
MCV RBC AUTO: 82.8 FL (ref 79–97)
MONOCYTES # BLD AUTO: 0.53 10*3/MM3 (ref 0.1–0.9)
MONOCYTES NFR BLD AUTO: 6.7 % (ref 5–12)
NEUTROPHILS NFR BLD AUTO: 4.88 10*3/MM3 (ref 1.7–7)
NEUTROPHILS NFR BLD AUTO: 61.3 % (ref 42.7–76)
NRBC BLD AUTO-RTO: 0 /100 WBC (ref 0–0.2)
PLATELET # BLD AUTO: 191 10*3/MM3 (ref 140–450)
PMV BLD AUTO: 12.1 FL (ref 6–12)
POTASSIUM SERPL-SCNC: 5.2 MMOL/L (ref 3.5–5.2)
PROT SERPL-MCNC: 7.7 G/DL (ref 6–8.5)
RBC # BLD AUTO: 6.69 10*6/MM3 (ref 4.14–5.8)
SODIUM SERPL-SCNC: 140 MMOL/L (ref 136–145)
TRIGL SERPL-MCNC: 172 MG/DL (ref 0–150)
TSH SERPL DL<=0.05 MIU/L-ACNC: 1.26 UIU/ML (ref 0.27–4.2)
VLDLC SERPL-MCNC: 31 MG/DL (ref 5–40)
WBC NRBC COR # BLD: 7.95 10*3/MM3 (ref 3.4–10.8)

## 2023-05-05 RX ORDER — SEMAGLUTIDE 1.34 MG/ML
INJECTION, SOLUTION SUBCUTANEOUS
Qty: 1.5 ML | Refills: 5 | Status: SHIPPED | OUTPATIENT
Start: 2023-05-05

## 2023-05-05 NOTE — TELEPHONE ENCOUNTER
Caller: Alex Tierney    Relationship to patient: Self    Best call back number: [phone]    PATIENT STATES THAT INSURANCE IS NOT GOING TO COVER HIS OZEMPIC.AND WANTED TO KNOW HIS OTHER OPTIONS.  PLEASE ADVISE.

## 2023-05-10 ENCOUNTER — TELEPHONE (OUTPATIENT)
Dept: FAMILY MEDICINE CLINIC | Facility: CLINIC | Age: 48
End: 2023-05-10

## 2023-05-10 DIAGNOSIS — G89.29 CHRONIC PAIN OF BOTH SHOULDERS: Primary | ICD-10-CM

## 2023-05-10 DIAGNOSIS — M25.512 CHRONIC PAIN OF BOTH SHOULDERS: Primary | ICD-10-CM

## 2023-05-10 DIAGNOSIS — M25.511 CHRONIC PAIN OF BOTH SHOULDERS: Primary | ICD-10-CM

## 2023-05-10 RX ORDER — IBUPROFEN 600 MG/1
600 TABLET ORAL 3 TIMES DAILY PRN
Qty: 90 TABLET | Refills: 2 | Status: SHIPPED | OUTPATIENT
Start: 2023-05-10

## 2023-05-10 NOTE — TELEPHONE ENCOUNTER
Caller: Alex Tierney    Relationship: Self    Best call back number: [phone]    What medication are you requesting: IBUPROFEN    What are your current symptoms: SHOULDER PAIN FROM WHEELCHAIR USE    How long have you been experiencing symptoms: ONGOING    Have you had these symptoms before:    [x] Yes  [] No    Have you been treated for these symptoms before:   [x] Yes  [] No    If a prescription is needed, what is your preferred pharmacy and phone number: [facility] [address] [place] [address] [phone] [facility] [phone] FX     Additional notes: PLEASE ADVISE

## 2023-05-18 ENCOUNTER — TELEPHONE (OUTPATIENT)
Dept: FAMILY MEDICINE CLINIC | Facility: CLINIC | Age: 48
End: 2023-05-18

## 2023-05-18 NOTE — TELEPHONE ENCOUNTER
Caller: Alex Tierney    Relationship: Self    Best call back number:    [phone]    Equipment requested:     MEDICAL AIR MATTRESS AND PUMP    Reason for the request:     PATIENT IS PARAPLEGIC    Prescribing Provider:     DR REAL    Additional information or concerns:     PATIENT REQUESTED THE MEDICAL EQUIPMENT ORDER TO BE FORWARDED TO:    [facility]    TELEPHONE CONTACT:    [phone]

## 2023-05-23 ENCOUNTER — TELEPHONE (OUTPATIENT)
Dept: FAMILY MEDICINE CLINIC | Facility: CLINIC | Age: 48
End: 2023-05-23

## 2023-05-23 NOTE — TELEPHONE ENCOUNTER
Caller: Alex Tierney    Relationship: Self    Best call back number: [phone]    What medication are you requesting: ANTIBIOTIC OR SOMETHING TO HELP WITH SYMPTOMS     What are your current symptoms: URINE HAS ODOR, SLIGHT TEMPETURE, FATIGUE     How long have you been experiencing symptoms:2 DAYS        If a prescription is needed, what is your preferred pharmacy and phone number: [facility] [address] [place] [address] [phone] [facility] [phone] FX     Additional notes:   PATIENT HAS A CATHETER IN

## 2023-05-24 RX ORDER — CIPROFLOXACIN 250 MG/1
250 TABLET, FILM COATED ORAL 2 TIMES DAILY
Qty: 20 TABLET | Refills: 0 | Status: SHIPPED | OUTPATIENT
Start: 2023-05-24 | End: 2023-06-03

## 2023-07-24 ENCOUNTER — TELEPHONE (OUTPATIENT)
Dept: FAMILY MEDICINE CLINIC | Facility: CLINIC | Age: 48
End: 2023-07-24

## 2023-07-24 DIAGNOSIS — F33.41 RECURRENT MAJOR DEPRESSIVE DISORDER, IN PARTIAL REMISSION: ICD-10-CM

## 2023-07-24 NOTE — TELEPHONE ENCOUNTER
Caller: Niall Jaclambert    Relationship: Self    Best call back number: [phone]    Requested Prescriptions:   Requested Prescriptions      No prescriptions requested or ordered in this encounter        QUEtiapine (SEROquel) 200 MG tablet     Pharmacy where request should be sent: [facility] [id number]  [place], [place] - [address] [facility] [address] [facility] [phone] [facility] [phone] FX     Last office visit with prescribing clinician: 5/4/2023   Last telemedicine visit with prescribing clinician: Visit date not found   Next office visit with prescribing clinician: 9/5/2023       Does the patient have less than a 3 day supply:  [x] Yes  [] No    Would you like a call back once the refill request has been completed: [] Yes [x] No    If the office needs to give you a call back, can they leave a voicemail: [] Yes [x] No    Johana Noriega, PCT   07/24/23 16:29 EDT

## 2023-07-25 RX ORDER — QUETIAPINE FUMARATE 200 MG/1
TABLET, FILM COATED ORAL
Qty: 90 TABLET | Refills: 3 | Status: SHIPPED | OUTPATIENT
Start: 2023-07-25

## 2023-09-06 DIAGNOSIS — S24.102S T6 SPINAL CORD INJURY, SEQUELA: ICD-10-CM

## 2023-09-06 RX ORDER — GABAPENTIN 800 MG/1
TABLET ORAL
Qty: 90 TABLET | Refills: 0 | Status: SHIPPED | OUTPATIENT
Start: 2023-09-06

## 2023-09-28 ENCOUNTER — TELEPHONE (OUTPATIENT)
Dept: FAMILY MEDICINE CLINIC | Facility: CLINIC | Age: 48
End: 2023-09-28

## 2023-09-28 DIAGNOSIS — S24.102S T6 SPINAL CORD INJURY, SEQUELA: ICD-10-CM

## 2023-09-28 RX ORDER — GABAPENTIN 800 MG/1
800 TABLET ORAL 3 TIMES DAILY
Qty: 90 TABLET | Refills: 0 | Status: CANCELLED | OUTPATIENT
Start: 2023-09-28

## 2023-09-28 NOTE — TELEPHONE ENCOUNTER
Caller: Alex Tierney    Relationship: Self    Best call back number: [phone]    Requested Prescriptions:   Requested Prescriptions     Pending Prescriptions Disp Refills    gabapentin (NEURONTIN) 800 MG tablet 90 tablet 0     Sig: Take 1 tablet by mouth 3 (Three) Times a Day.        Pharmacy where request should be sent: [facility] [address] [place] [address] [phone] [facility] [phone]      Last office visit with prescribing clinician: 5/4/2023   Last telemedicine visit with prescribing clinician: Visit date not found   Next office visit with prescribing clinician: 10/23/2023     Additional details provided by patient: REQUESTING EARLY REFILL; PATIENT IS OUT COMPLETELY; PATIENT TAKES EXTRA WHEN PAIN IS WORSE OR WOULD LIKE TO KNOW HOW TO GET THESE INCREASED      PLEASE ADVISE    Does the patient have less than a 3 day supply:  [x] Yes  [] No    Would you like a call back once the refill request has been completed: [x] Yes [] No    If the office needs to give you a call back, can they leave a voicemail: [x] Yes [] No    Usman Cuba Rep   09/28/23 12:18 EDT

## 2023-09-28 NOTE — TELEPHONE ENCOUNTER
Please ask pharmacy to refill early if they can  Please let patient know that early refills can't be ok'd again and that its important he keep his appointments

## 2023-09-29 NOTE — TELEPHONE ENCOUNTER
Pt is aware of this information.  He needs a new script sent to [facility]. They do not have one on file.

## 2023-09-30 DIAGNOSIS — S24.102S T6 SPINAL CORD INJURY, SEQUELA: ICD-10-CM

## 2023-09-30 RX ORDER — GABAPENTIN 800 MG/1
800 TABLET ORAL 3 TIMES DAILY
Qty: 90 TABLET | Refills: 0 | Status: SHIPPED | OUTPATIENT
Start: 2023-09-30

## 2023-10-04 DIAGNOSIS — S24.102S T6 SPINAL CORD INJURY, SEQUELA: ICD-10-CM

## 2023-10-04 RX ORDER — GABAPENTIN 800 MG/1
TABLET ORAL
Qty: 90 TABLET | OUTPATIENT
Start: 2023-10-04

## 2023-11-16 DIAGNOSIS — S24.102S T6 SPINAL CORD INJURY, SEQUELA: ICD-10-CM

## 2023-11-17 RX ORDER — GABAPENTIN 800 MG/1
800 TABLET ORAL 3 TIMES DAILY
Qty: 90 TABLET | Refills: 0 | Status: SHIPPED | OUTPATIENT
Start: 2023-11-17

## 2023-12-21 ENCOUNTER — TELEPHONE (OUTPATIENT)
Dept: FAMILY MEDICINE CLINIC | Facility: CLINIC | Age: 48
End: 2023-12-21

## 2023-12-21 DIAGNOSIS — S24.102S T6 SPINAL CORD INJURY, SEQUELA: ICD-10-CM

## 2023-12-21 NOTE — TELEPHONE ENCOUNTER
RELAY; left voicemail he needs to make appointment with Dr Rose for gabapentin refills cause he has no showed appointments twice

## 2023-12-21 NOTE — TELEPHONE ENCOUNTER
Caller: Alex Tierney    Relationship: Self    Best call back number: [phone]     Equipment requested: AIR MATTRESS, AND PUMP FOR MATTRESS, 24 [employer] CATHETER    Reason for the request: PATIENTS NEEDS NEW EQUIPMENT    Prescribing Provider: CLARE REAL MD    Additional information or concerns: PLEASE SEND ORDERS TO [facility] IN [place].

## 2023-12-22 ENCOUNTER — TELEMEDICINE (OUTPATIENT)
Dept: FAMILY MEDICINE CLINIC | Facility: CLINIC | Age: 48
End: 2023-12-22
Payer: MEDICARE

## 2023-12-22 VITALS — BODY MASS INDEX: 30.06 KG/M2 | HEIGHT: 70 IN | WEIGHT: 210 LBS

## 2023-12-22 DIAGNOSIS — S24.102S T6 SPINAL CORD INJURY, SEQUELA: Primary | Chronic | ICD-10-CM

## 2023-12-22 DIAGNOSIS — G82.20 PARAPLEGIA: Chronic | ICD-10-CM

## 2023-12-22 DIAGNOSIS — R73.03 PREDIABETES: Chronic | ICD-10-CM

## 2023-12-22 DIAGNOSIS — E55.9 VITAMIN D DEFICIENCY: Chronic | ICD-10-CM

## 2023-12-22 DIAGNOSIS — Z79.899 LONG TERM CURRENT USE OF THERAPEUTIC DRUG: ICD-10-CM

## 2023-12-22 PROBLEM — Z97.8 CHRONIC INDWELLING FOLEY CATHETER: Status: ACTIVE | Noted: 2022-10-01

## 2023-12-22 PROBLEM — E11.9 TYPE 2 DIABETES MELLITUS: Status: ACTIVE | Noted: 2022-10-01

## 2023-12-22 RX ORDER — GABAPENTIN 800 MG/1
800 TABLET ORAL 3 TIMES DAILY
Qty: 90 TABLET | Refills: 2 | Status: SHIPPED | OUTPATIENT
Start: 2023-12-22

## 2023-12-22 RX ORDER — SEMAGLUTIDE 1.34 MG/ML
0.5 INJECTION, SOLUTION SUBCUTANEOUS WEEKLY
Qty: 1.5 ML | Refills: 5 | Status: SHIPPED | OUTPATIENT
Start: 2023-12-22

## 2023-12-22 RX ORDER — GABAPENTIN 800 MG/1
800 TABLET ORAL 3 TIMES DAILY
Qty: 90 TABLET | OUTPATIENT
Start: 2023-12-22

## 2023-12-22 RX ORDER — ERGOCALCIFEROL 1.25 MG/1
50000 CAPSULE ORAL WEEKLY
Qty: 12 CAPSULE | Refills: 3 | Status: SHIPPED | OUTPATIENT
Start: 2023-12-22

## 2023-12-22 NOTE — PROGRESS NOTES
"Video Telehealth Visit    This service was conducted via video including audio/visual technology through the use of [employer].    The patient has high risk comorbidities which necessitates this telehealth service.    The patient is located at their home.  I am located at the [facility] office.  Other participants in this video visit include patient    You have chosen to receive care through a telehealth visit.  Do you consent to use a video/audio connection for your medical care today? Yes        Subjective   Alex Tierney is a 48 y.o. male.       Chief Complaint -spinal cord injury    History of Present Illness -       Spinal cord injury-  He has a history of level T6 complicated by recurrent decubitus ulcers and osteomyelitis that required previous left BKA and bilateral hip osteotomies, permanent colostomy and a chronic indwelling bladder catheter.  He has a history of recurrent symptomatic urinary tract infections.  He states that he is air mattress and pump are no longer working and needs those replaced.  He does have paraplegia.    Prediabetes-  Stable with Ozempic 0.5 mg weekly and dietary modification    Vitamin D deficiency-  Not at goal by lab work earlier in 2023.  He reports that he has not gotten the vitamin D from the pharmacy recently.    5/4/2023 vitamin D low = 22.5    The following portions of the patient's history were reviewed and updated as appropriate: allergies, current medications, past family history, past medical history, past social history, past surgical history and problem list.        Objective  Vital signs:  Ht 177.8 cm (70\")   Wt 95.3 kg (210 lb) Comment: Patient reported all vitals today  BMI 30.13 kg/m²     Physical Exam  Vitals and nursing note reviewed.   Pulmonary:      Effort: Pulmonary effort is normal.      Breath sounds: Normal breath sounds. No wheezing.      Comments: No wheezing or heavy breathing noted on phone today  Neurological:      " Mental Status: He is alert and oriented to person, place, and time.   Psychiatric:         Mood and Affect: Mood normal.         Thought Content: Thought content normal.                  Lab Results   Component Value Date    BUN 16 05/04/2023    CREATININE 0.89 05/04/2023    EGFR 105.7 05/04/2023    ALT 14 05/04/2023    AST 11 05/04/2023    WBC 7.95 05/04/2023    HGB 18.6 (H) 05/04/2023    HCT 55.4 (H) 05/04/2023     05/04/2023    CHOL 202 (H) 05/04/2023    TRIG 172 (H) 05/04/2023    HDL 44 05/04/2023     (H) 05/04/2023    TSH 1.260 05/04/2023    HGBA1C 5.40 05/04/2023           Assessment & Plan     Diagnoses and all orders for this visit:    1. T6 spinal cord injury, sequela (Primary)  Comments:  Continue gabapentin  [facility] #[id number] reviewed and consistent  Orders:  -     gabapentin (NEURONTIN) 800 MG tablet; Take 1 tablet by mouth 3 (Three) Times a Day.  Dispense: 90 tablet; Refill: 2    2. Long term current use of therapeutic drug  -     Urine Drug Screen - Urine, Clean Catch; Future    3. Paraplegia  Comments:  Follow-up work mention advised and prescription for air mattress with pump to use with his hospital bed written today    4. Prediabetes  Comments:  Continue Ozempic 0.5 mg weekly  Advised a low carbohydrate diabetic diet  Orders:  -     Semaglutide,0.25 or 0.5MG/DOS, (Ozempic, 0.25 or 0.5 MG/DOSE,) 2 MG/1.5ML solution pen-injector; Inject 0.5 mg under the skin into the appropriate area as directed 1 (One) Time Per Week. 0.25 mg SC weekly x 6, then 0.5 mg SC weekly afterward  Dispense: 1.5 mL; Refill: 5    5. Vitamin D deficiency  Comments:  Advised patient to restart vitamin D 50,000 IUs weekly  Orders:  -     vitamin D (ERGOCALCIFEROL) 1.25 MG (19047 UT) capsule capsule; Take 1 capsule by mouth 1 (One) Time Per Week.  Dispense: 12 capsule; Refill: 3                   Patient was given instructions and counseling regarding his condition or for health maintenance advice. Please see  specific information pulled into the AVS if appropriate      This document has been electronically signed by:  Marcela Brewster PA-C

## 2023-12-26 ENCOUNTER — TELEPHONE (OUTPATIENT)
Dept: FAMILY MEDICINE CLINIC | Facility: CLINIC | Age: 48
End: 2023-12-26
Payer: MEDICARE

## 2023-12-26 DIAGNOSIS — S24.102S T6 SPINAL CORD INJURY, SEQUELA: Chronic | ICD-10-CM

## 2023-12-26 RX ORDER — GABAPENTIN 800 MG/1
800 TABLET ORAL 3 TIMES DAILY
Qty: 90 TABLET | Refills: 2 | Status: SHIPPED | OUTPATIENT
Start: 2023-12-26

## 2023-12-26 NOTE — TELEPHONE ENCOUNTER
Caller: Alex Tierney    Relationship: Self    Best call back number: [phone]    Equipment requested: HOSPITAL BED PRESCRIPTION    Reason for the request: PARALYZED    Prescribing Provider: ADDIE    Additional information or concerns: SEND TO [facility] IN [place]

## 2023-12-26 NOTE — TELEPHONE ENCOUNTER
Caller: Alex Tierney    Relationship: Self    Best call back number: [phone]    Which medication are you concerned about: gabapentin (NEURONTIN) 800 MG tablet     Who prescribed you this medication: SARAI    When did you start taking this medication: N/A    What are your concerns: PATIENT STATED THAT [facility] DOES NOT HAVE THIS IN STOCK AND WOULD LIKE TO HAVE MEDICATION SENT TO [facility] PLEASE    How long have you had these concerns: TODAY

## 2023-12-28 ENCOUNTER — TELEPHONE (OUTPATIENT)
Dept: FAMILY MEDICINE CLINIC | Facility: CLINIC | Age: 48
End: 2023-12-28

## 2023-12-28 DIAGNOSIS — G89.29 CHRONIC PAIN OF BOTH SHOULDERS: ICD-10-CM

## 2023-12-28 DIAGNOSIS — M25.512 CHRONIC PAIN OF BOTH SHOULDERS: ICD-10-CM

## 2023-12-28 DIAGNOSIS — M25.511 CHRONIC PAIN OF BOTH SHOULDERS: ICD-10-CM

## 2023-12-28 RX ORDER — SULFAMETHOXAZOLE AND TRIMETHOPRIM 800; 160 MG/1; MG/1
1 TABLET ORAL 2 TIMES DAILY
Qty: 20 TABLET | Refills: 0 | Status: SHIPPED | OUTPATIENT
Start: 2023-12-28

## 2023-12-28 RX ORDER — IBUPROFEN 600 MG/1
600 TABLET ORAL 3 TIMES DAILY PRN
Qty: 90 TABLET | Refills: 0 | Status: SHIPPED | OUTPATIENT
Start: 2023-12-28

## 2023-12-28 NOTE — TELEPHONE ENCOUNTER
Caller: Alex Tierney    Relationship: Self    Best call back number: [phone]     Requested Prescriptions:   Requested Prescriptions     Pending Prescriptions Disp Refills    ibuprofen (ADVIL,MOTRIN) 600 MG tablet [Pharmacy Med Name: Ibuprofen 600 MG Oral Tablet] 90 tablet 0     Sig: TAKE 1 TABLET BY MOUTH 3 (THREE) TIMES A DAY AS NEEDED FOR MILD PAIN OR MODERATE PAIN      Pharmacy where request should be sent: [place] [phone] [place], [place] [phone] FX     Last office visit with prescribing clinician: 5/4/2023   Last telemedicine visit with prescribing clinician: 12/22/2023   Next office visit with prescribing clinician: 12/28/2023     Does the patient have less than a 3 day supply:  [x] Yes  [] No    Usman Vann Rep   12/28/23 12:05 EST

## 2023-12-28 NOTE — TELEPHONE ENCOUNTER
PT CALLED TO CHECK STATUS FOR AIR LOSS MATTRESS, PT STATED THAT PROVIDER MENTIONED THAT HE HAD TO HAVE A   STAGE 3 PRESSURE ULCER. PT STATED THAT THE MATTRESS THAT HE JUST RECEIVED WILL NOT WORK WITH HIM BEING PARALYZED     PLEASE ADVISE.CALL BACK:[phone]

## 2023-12-28 NOTE — TELEPHONE ENCOUNTER
Unable to reach the patient. Could not leave voicemail. Will try again later.     Hub to read.   '

## 2023-12-28 NOTE — TELEPHONE ENCOUNTER
Will you call the DME company and see what mattresses they have and would recommend?  Perhaps the patient can tell us what kind of air mattress he had before that he liked so that we could order a similar mattress.

## 2023-12-28 NOTE — TELEPHONE ENCOUNTER
Caller: Alex Tierney    Relationship: Self    Best call back number: [phone]     What was the call regarding: PATIENT CALLED TO REQUEST A LOW AIR LOSS MATTRESS.  PATIENT STAED THAT WRONG MATTRESS WAS SENT.  PATIENT ASKED FOR THIS TO GO TO [facility]. PATIENT ASKED FOR THIS TO BE DONE TODAY.

## 2023-12-28 NOTE — TELEPHONE ENCOUNTER
Spoke to the DME company the mattress the patient wants is not covered on his insurance. Unable to reach the patient. Could not leave voicemail. Will try again later.     Hub to read.

## 2023-12-28 NOTE — TELEPHONE ENCOUNTER
Caller: Alex Tierney    Relationship: Self    Best call back number: [phone]     What medication are you requesting: ANTIBIOTIC FOR UTI     What are your current symptoms: BLOOD IN URINE     How long have you been experiencing symptoms: 2 DAYS    Have you had these symptoms before:    [x] Yes  [] No    Have you been treated for these symptoms before:   [x] Yes  [] No    If a prescription is needed, what is your preferred pharmacy and phone number: [address] [phone] [facility] [phone]      Additional notes: PLEASE CALL PATIENT BACK, IF NO ANSWER LEAVE A DETAILED MESSAGE.

## 2024-01-23 ENCOUNTER — TELEPHONE (OUTPATIENT)
Dept: UROLOGY | Facility: CLINIC | Age: 49
End: 2024-01-23
Payer: MEDICARE

## 2024-01-23 NOTE — TELEPHONE ENCOUNTER
Alva at [facility] called and said that the pt had contacted them in regards to ordering catheters. I looked the pt up and se have not seen him since December of 2022.  When he did come last we did a SP tube catheter change. So I am unsure of who is changing his catheters but it is not us. The pt may change them himself which is fine. However since we have not seen him in so long we have no documentation that would allow his insurance to cover his supplies. I told this to Alva and she said yes with Medicare they need to be seen every 6 mths for them to pay for supplies. She said that she would tough base with the pt and tell hime he needs an appt.

## 2024-02-22 NOTE — ED PROVIDER NOTES
February 27, 2024      Raffi ANN Julian  [address] [place] [address]          Dear Mr. Jordan:    Walter Hoang MD has ordered a colonoscopy for you and we would like to schedule that procedure. Our attempt to reach you by phone have been unsuccessful.    Please call [facility] [phone] at your earliest convenience. Let the  know that your paperwork is started, and that you are calling to arrange a date and time for the procedure.      If you have any questions or concerns, we would be more than happy to discuss them with you. We look forward to assisting you with your health care needs.      Sincerely,  [facility] [phone]  Surgery Scheduler for Walter Hoang MD  [facility] Pre-Admit Department     Subjective   Patient is a 41 y.o. male presenting with wound check.   History provided by:  Patient   used: No    Wound Check   Location:  Buttocks, lower extremities  Quality:  Requests dressing change  Severity:  Moderate  Onset quality:  Gradual  Duration:  3 days  Timing:  Constant  Progression:  Waxing and waning  Chronicity:  Chronic  Context:  Patient has numerous decubitus ulcers on lower extremities and buttocks  Relieved by:  Nothing  Worsened by:  Nothing  Ineffective treatments:  None tried  Associated symptoms: no abdominal pain, no chest pain, no congestion, no cough, no diarrhea, no ear pain, no fatigue, no fever, no headaches, no loss of consciousness, no myalgias, no nausea, no rash, no rhinorrhea, no sore throat, no vomiting and no wheezing    Risk factors:  Immobililty      Review of Systems   Constitutional: Negative.  Negative for fatigue and fever.   HENT: Negative.  Negative for congestion, ear pain, rhinorrhea and sore throat.    Eyes: Negative.    Respiratory: Negative.  Negative for cough and wheezing.    Cardiovascular: Negative.  Negative for chest pain.   Gastrointestinal: Negative.  Negative for abdominal pain, diarrhea, nausea and vomiting.   Endocrine: Negative.    Genitourinary: Negative.    Musculoskeletal: Negative.  Negative for myalgias.   Skin: Negative.  Negative for rash.   Allergic/Immunologic: Negative.    Neurological: Negative.  Negative for loss of consciousness and headaches.   Hematological: Negative.    Psychiatric/Behavioral: Negative.        Past Medical History:   Diagnosis Date   • Allergic rhinitis    • Depression    • Iron deficiency    • T6 spinal cord injury        No Known Allergies    Past Surgical History:   Procedure Laterality Date   • AMPUTATION      left foot   • VASECTOMY      RECORDED 11.23.15       Family History   Problem Relation Age of Onset   • Cancer Maternal Aunt    • Cancer Maternal Grandmother    • Anxiety disorder Other     • Hypertension Other        Social History     Social History   • Marital status: Single     Spouse name: N/A   • Number of children: N/A   • Years of education: N/A     Social History Main Topics   • Smoking status: Current Every Day Smoker     Packs/day: 1.00     Types: Cigarettes   • Smokeless tobacco: None   • Alcohol use No   • Drug use: No   • Sexual activity: Not Asked     Other Topics Concern   • None     Social History Narrative           Objective   Physical Exam   Constitutional: He is oriented to person, place, and time. He appears well-developed and well-nourished.   HENT:   Head: Normocephalic.   Right Ear: External ear normal.   Left Ear: External ear normal.   Mouth/Throat: Oropharynx is clear and moist.   Eyes: EOM are normal. Pupils are equal, round, and reactive to light.   Neck: Normal range of motion. Neck supple.   Cardiovascular: Normal rate and regular rhythm.    Pulmonary/Chest: Effort normal and breath sounds normal.   Abdominal: Soft. Bowel sounds are normal.   Musculoskeletal: Normal range of motion.   Neurological: He is alert and oriented to person, place, and time.   Skin: Skin is warm and dry.   Patient has numeours decubitus ulcers on buttocks and lower extremities in varying stages.  One area on coccyx appears to be stage IV, with numerous at varying stages on posterior lower extremities   Psychiatric: He has a normal mood and affect. His behavior is normal.   Nursing note and vitals reviewed.      Procedures         ED Course  ED Course   Comment By Time    has been in room numeours times to set up proper follow up for care.  Patient reported previously he needed help with physical rehab and help with methamphetamine addiction. Mohsen Cheek, ROBINA 04/07 1538                  [facility]    Final diagnoses:   Decubitus skin ulcer, unspecified pressure ulcer stage   Methamphetamine abuse            ROBINA Lancaster  04/07/17 3126       Mohsen Cheek,  APRN  04/07/17 2225       Mohsen Cheek, APRN  04/07/17 1491

## 2024-02-23 ENCOUNTER — TELEPHONE (OUTPATIENT)
Dept: FAMILY MEDICINE CLINIC | Facility: CLINIC | Age: 49
End: 2024-02-23

## 2024-02-23 RX ORDER — CIPROFLOXACIN 500 MG/1
500 TABLET, FILM COATED ORAL 2 TIMES DAILY
Qty: 20 TABLET | Refills: 0 | Status: SHIPPED | OUTPATIENT
Start: 2024-02-23 | End: 2024-03-04

## 2024-02-23 NOTE — TELEPHONE ENCOUNTER
Caller: Alex Tierney    Relationship: Self    Best call back number: [phone]    What medication are you requesting: ANTIBIOTIC    What are your current symptoms: THICK, SMELLY URINE    How long have you been experiencing symptoms: 3 DAYS    Have you had these symptoms before:    [x] Yes  [] No    Have you been treated for these symptoms before:   [x] Yes  [] No    If a prescription is needed, what is your preferred pharmacy and phone number: [facility] [address] [place] [address] [phone] [facility] [phone]      Additional notes:

## 2024-02-26 ENCOUNTER — TELEPHONE (OUTPATIENT)
Dept: FAMILY MEDICINE CLINIC | Facility: CLINIC | Age: 49
End: 2024-02-26
Payer: MEDICARE

## 2024-02-26 NOTE — TELEPHONE ENCOUNTER
----- Message from Delgado Rose MD sent at 2/23/2024  7:27 PM EST -----  K - will leave script with you  Thanks    ----- Message -----  From: Candace Sharma MA  Sent: 2/23/2024   1:29 PM EST  To: Delgado Rose MD    Patient stated that he needs a prescription for his cath supplies.

## 2024-03-05 ENCOUNTER — TELEPHONE (OUTPATIENT)
Dept: UROLOGY | Facility: CLINIC | Age: 49
End: 2024-03-05
Payer: MEDICARE

## 2024-03-05 NOTE — TELEPHONE ENCOUNTER
The pt's mother called the pt has been seen here in the past and wanted to know if we had any 24 Telugu junior catheters for her sons SP tube change. Their catheter supply place changed and his supplies are late. I got two for the pt to do them until the supplies come in.

## 2024-03-23 ENCOUNTER — READMISSION MANAGEMENT (OUTPATIENT)
Dept: CALL CENTER | Facility: HOSPITAL | Age: 49
End: 2024-03-23
Payer: MEDICARE
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
## 2024-03-24 ENCOUNTER — TRANSITIONAL CARE MANAGEMENT TELEPHONE ENCOUNTER (OUTPATIENT)
Dept: CALL CENTER | Facility: HOSPITAL | Age: 49
End: 2024-03-24
Payer: MEDICARE

## 2024-03-24 NOTE — OUTREACH NOTE
Call Center TCM Note      Flowsheet Row Responses   [facility] patient discharged from? Non-BH   Does the patient have one of the following disease processes/diagnoses(primary or secondary)? Other   TCM attempt successful? No   Unsuccessful attempts Attempt 1            Raquel Oliveira RN    3/24/2024, 10:06 EDT

## 2024-03-25 ENCOUNTER — TRANSITIONAL CARE MANAGEMENT TELEPHONE ENCOUNTER (OUTPATIENT)
Dept: CALL CENTER | Facility: HOSPITAL | Age: 49
End: 2024-03-25
Payer: MEDICARE

## 2024-03-25 NOTE — OUTREACH NOTE
Call Center TCM Note      Flowsheet Row Responses   [facility] patient discharged from? Non-  []   Does the patient have one of the following disease processes/diagnoses(primary or secondary)? Other   TCM attempt successful? No   Unsuccessful attempts Attempt 2            Maira Damon LPN    3/25/2024, 14:46 EDT

## 2024-03-26 ENCOUNTER — TRANSITIONAL CARE MANAGEMENT TELEPHONE ENCOUNTER (OUTPATIENT)
Dept: CALL CENTER | Facility: HOSPITAL | Age: 49
End: 2024-03-26
Payer: MEDICARE

## 2024-03-26 NOTE — OUTREACH NOTE
Call Center TCM Note      Flowsheet Row Responses   [facility] patient discharged from? Non-BH   Does the patient have one of the following disease processes/diagnoses(primary or secondary)? Other   TCM attempt successful? No   Unsuccessful attempts Attempt 3            Jess Potter RN    3/26/2024, 15:57 EDT

## 2024-04-10 DIAGNOSIS — S24.102S T6 SPINAL CORD INJURY, SEQUELA: Chronic | ICD-10-CM

## 2024-04-10 RX ORDER — GABAPENTIN 800 MG/1
800 TABLET ORAL 3 TIMES DAILY
Qty: 90 TABLET | Refills: 2 | Status: SHIPPED | OUTPATIENT
Start: 2024-04-10

## 2024-04-10 NOTE — TELEPHONE ENCOUNTER
Caller: Alex Tierney    Relationship: Self    Best call back number: [phone]     Requested Prescriptions:   Requested Prescriptions     Pending Prescriptions Disp Refills    gabapentin (NEURONTIN) 800 MG tablet 90 tablet 2     Sig: Take 1 tablet by mouth 3 (Three) Times a Day.        Pharmacy where request should be sent: [facility] [address] [place] [address] [phone] [facility] [phone]      Last office visit with prescribing clinician: 5/4/2023   Last telemedicine visit with prescribing clinician: 12/22/2023   Next office visit with prescribing clinician: Visit date not found     Additional details provided by patient: PATIENT IS ALSO REQUESTING A PRESCRIPTION FOR UTI, HE IS STARTING TO EXPERIENCE LOW GRADE FEVER, ODOR TO URINE, AND THICKNESS TO URINE.    Does the patient have less than a 3 day supply:  [x] Yes  [] No        Usman Cruz Rep   04/10/24 13:54 EDT

## 2024-04-11 ENCOUNTER — TELEMEDICINE (OUTPATIENT)
Dept: FAMILY MEDICINE CLINIC | Facility: CLINIC | Age: 49
End: 2024-04-11
Payer: MEDICARE

## 2024-04-11 DIAGNOSIS — Z00.00 HEALTHCARE MAINTENANCE: ICD-10-CM

## 2024-04-11 DIAGNOSIS — F33.41 RECURRENT MAJOR DEPRESSIVE DISORDER, IN PARTIAL REMISSION: ICD-10-CM

## 2024-04-11 DIAGNOSIS — K21.9 GASTROESOPHAGEAL REFLUX DISEASE WITHOUT ESOPHAGITIS: ICD-10-CM

## 2024-04-11 DIAGNOSIS — G82.20 PARAPLEGIA: ICD-10-CM

## 2024-04-11 DIAGNOSIS — F17.200 SMOKER: ICD-10-CM

## 2024-04-11 DIAGNOSIS — N31.2 ATONIC BLADDER: ICD-10-CM

## 2024-04-11 DIAGNOSIS — S24.102S T6 SPINAL CORD INJURY, SEQUELA: ICD-10-CM

## 2024-04-11 DIAGNOSIS — R73.03 PREDIABETES: ICD-10-CM

## 2024-04-11 DIAGNOSIS — E55.9 VITAMIN D DEFICIENCY: Chronic | ICD-10-CM

## 2024-04-11 DIAGNOSIS — Z97.8 CHRONIC INDWELLING FOLEY CATHETER: ICD-10-CM

## 2024-04-11 DIAGNOSIS — E55.9 VITAMIN D DEFICIENCY: ICD-10-CM

## 2024-04-11 DIAGNOSIS — J30.2 CHRONIC SEASONAL ALLERGIC RHINITIS: Primary | ICD-10-CM

## 2024-04-11 DIAGNOSIS — Z86.79 HISTORY OF ENDOCARDITIS: ICD-10-CM

## 2024-04-11 RX ORDER — OMEPRAZOLE 40 MG/1
40 CAPSULE, DELAYED RELEASE ORAL
Qty: 90 CAPSULE | Refills: 3 | Status: SHIPPED | OUTPATIENT
Start: 2024-04-11

## 2024-04-11 RX ORDER — ERGOCALCIFEROL 1.25 MG/1
50000 CAPSULE ORAL WEEKLY
Qty: 12 CAPSULE | Refills: 3 | Status: SHIPPED | OUTPATIENT
Start: 2024-04-11

## 2024-04-11 RX ORDER — BACLOFEN 10 MG/1
10 TABLET ORAL 3 TIMES DAILY PRN
Qty: 270 TABLET | Refills: 3 | Status: SHIPPED | OUTPATIENT
Start: 2024-04-11

## 2024-04-11 RX ORDER — SULFAMETHOXAZOLE AND TRIMETHOPRIM 800; 160 MG/1; MG/1
1 TABLET ORAL 2 TIMES DAILY
Qty: 20 TABLET | Refills: 0 | Status: SHIPPED | OUTPATIENT
Start: 2024-04-11

## 2024-04-11 RX ORDER — QUETIAPINE FUMARATE 200 MG/1
200 TABLET, FILM COATED ORAL NIGHTLY
Qty: 90 TABLET | Refills: 3 | Status: SHIPPED | OUTPATIENT
Start: 2024-04-11
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
## 2024-04-15 ENCOUNTER — TELEPHONE (OUTPATIENT)
Dept: FAMILY MEDICINE CLINIC | Facility: CLINIC | Age: 49
End: 2024-04-15
Payer: MEDICARE

## 2024-04-15 NOTE — TELEPHONE ENCOUNTER
HUB TO RELAY    Called to make patient aware and had to lvm    Patient is scheduled to see Dr. Chambers on 4/23/24 at 2:30 pm   He will need to take insurance, ID, and Med list

## 2024-05-15 DIAGNOSIS — K21.9 GASTROESOPHAGEAL REFLUX DISEASE WITHOUT ESOPHAGITIS: ICD-10-CM

## 2024-05-15 RX ORDER — OMEPRAZOLE 40 MG/1
40 CAPSULE, DELAYED RELEASE ORAL
Qty: 90 CAPSULE | Refills: 3 | Status: SHIPPED | OUTPATIENT
Start: 2024-05-15

## 2024-05-23 ENCOUNTER — TELEPHONE (OUTPATIENT)
Dept: FAMILY MEDICINE CLINIC | Facility: CLINIC | Age: 49
End: 2024-05-23

## 2024-05-23 RX ORDER — AMOXICILLIN AND CLAVULANATE POTASSIUM 875; 125 MG/1; MG/1
1 TABLET, FILM COATED ORAL 2 TIMES DAILY
Qty: 20 TABLET | Refills: 0 | Status: SHIPPED | OUTPATIENT
Start: 2024-05-23

## 2024-05-23 NOTE — TELEPHONE ENCOUNTER
Caller: Alex Tierney    Relationship: Self    Best call back number: [phone]    What medication are you requesting:     ANTIBIOTIC    What are your current symptoms:     THINKS HE HAS URINARY INFECTION  AND WOUND ON FOOT    If a prescription is needed, what is your preferred pharmacy and phone number:      [facility] [address] [place] [address] [phone] [facility] [phone]

## 2024-07-09 DIAGNOSIS — S24.102S T6 SPINAL CORD INJURY, SEQUELA: Chronic | ICD-10-CM

## 2024-07-09 DIAGNOSIS — R73.03 PREDIABETES: Chronic | ICD-10-CM

## 2024-07-09 RX ORDER — GABAPENTIN 800 MG/1
800 TABLET ORAL 3 TIMES DAILY
Qty: 90 TABLET | Refills: 0 | Status: SHIPPED | OUTPATIENT
Start: 2024-07-09

## 2024-07-09 RX ORDER — SEMAGLUTIDE 1.34 MG/ML
0.5 INJECTION, SOLUTION SUBCUTANEOUS WEEKLY
Qty: 1.5 ML | Refills: 0 | Status: SHIPPED | OUTPATIENT
Start: 2024-07-09

## 2024-07-09 NOTE — TELEPHONE ENCOUNTER
DELETE AFTER REVIEWING: Send the encounter HIGH priority, If patient has less than a 3 day supply. If the patient will run out of medication over the weekend add that information to the additional details line. Send to the appropriate pool. Check your call action grid or workflows.    Caller: Yoselyn Tierney    Relationship: Self    Best call back number: [phone]     Requested Prescriptions:   Requested Prescriptions     Pending Prescriptions Disp Refills    gabapentin (NEURONTIN) 800 MG tablet 90 tablet 2     Sig: Take 1 tablet by mouth 3 (Three) Times a Day.    Semaglutide,0.25 or 0.5MG/DOS, (Ozempic, 0.25 or 0.5 MG/DOSE,) 2 MG/1.5ML solution pen-injector 1.5 mL 5     Sig: Inject 0.5 mg under the skin into the appropriate area as directed 1 (One) Time Per Week. 0.25 mg SC weekly x 6, then 0.5 mg SC weekly afterward        Pharmacy where request should be sent: [facility] [address] [place] [address] [address] [place]     Last office visit with prescribing clinician: 5/4/2023   Last telemedicine visit with prescribing clinician: 4/11/2024   Next office visit with prescribing clinician: 7/12/2024     Additional details provided by patient: YOSELYN SAYS HE HAS NONE LEFT     Does the patient have less than a 3 day supply:  [x] Yes  [] No    Would you like a call back once the refill request has been completed: [x] Yes [] No    If the office needs to give you a call back, can they leave a voicemail: [] Yes [] No    Usman Castillo   07/09/24 09:56 EDT

## 2024-07-26 DIAGNOSIS — N52.8 OTHER MALE ERECTILE DYSFUNCTION: Primary | ICD-10-CM

## 2024-07-26 RX ORDER — SILDENAFIL 100 MG/1
TABLET, FILM COATED ORAL
Qty: 10 TABLET | Refills: 0 | Status: SHIPPED | OUTPATIENT
Start: 2024-07-26

## 2024-08-06 DIAGNOSIS — S24.102S T6 SPINAL CORD INJURY, SEQUELA: Chronic | ICD-10-CM

## 2024-08-06 RX ORDER — GABAPENTIN 800 MG/1
800 TABLET ORAL 3 TIMES DAILY
Qty: 90 TABLET | Refills: 0 | Status: SHIPPED | OUTPATIENT
Start: 2024-08-06

## 2024-08-07 DIAGNOSIS — S24.102S T6 SPINAL CORD INJURY, SEQUELA: Chronic | ICD-10-CM

## 2024-08-07 RX ORDER — GABAPENTIN 800 MG/1
800 TABLET ORAL 3 TIMES DAILY
Qty: 90 TABLET | Refills: 0 | OUTPATIENT
Start: 2024-08-07

## 2024-08-07 NOTE — TELEPHONE ENCOUNTER
Caller: Alex Tierney    Relationship: Self    Best call back number: [phone]     Requested Prescriptions:   Requested Prescriptions     Pending Prescriptions Disp Refills    gabapentin (NEURONTIN) 800 MG tablet 90 tablet 0     Sig: Take 1 tablet by mouth 3 (Three) Times a Day.        Pharmacy where request should be sent: [facility] [address] [place] [address] [phone] [facility] [phone] FX     Last office visit with prescribing clinician: 5/4/2023   Last telemedicine visit with prescribing clinician: 4/11/2024   Next office visit with prescribing clinician: 9/12/2024     Additional details provided by patient: PATIENT OUT OF MEDICATION    Does the patient have less than a 3 day supply:  [x] Yes  [] No    Would you like a call back once the refill request has been completed: [] Yes [x] No    If the office needs to give you a call back, can they leave a voicemail: [] Yes [x] No    Raiza Nichole   08/07/24 08:40 EDT

## 2024-08-13 ENCOUNTER — TELEPHONE (OUTPATIENT)
Dept: FAMILY MEDICINE CLINIC | Facility: CLINIC | Age: 49
End: 2024-08-13

## 2024-08-13 NOTE — TELEPHONE ENCOUNTER
Caller: Alex Tierney    Relationship: Self    Best call back number:      Equipment requested: REQUESTING A WHEELCHAIR MOTOR, THE KIND THAT HOOKS TO THE FRONT OF THE WHEELCHAIR, HE SAID THE MODEL IS FIRELY; PATIENT DOESN'T KNOW WHERE TO ORDER THIS FROM; THIS IMAKES A MANUAL CHAIR INTO AN ELECTRIC ONE      Prescribing Provider: DR REAL    Additional information or concerns: PLEASE CALL TO ADVISE

## 2024-08-22 ENCOUNTER — TELEPHONE (OUTPATIENT)
Dept: FAMILY MEDICINE CLINIC | Facility: CLINIC | Age: 49
End: 2024-08-22

## 2024-09-06 DIAGNOSIS — S24.102S T6 SPINAL CORD INJURY, SEQUELA: Chronic | ICD-10-CM

## 2024-09-09 RX ORDER — GABAPENTIN 800 MG/1
800 TABLET ORAL 3 TIMES DAILY
Qty: 90 TABLET | Refills: 0 | Status: SHIPPED | OUTPATIENT
Start: 2024-09-09

## 2024-09-18 DIAGNOSIS — N31.2 ATONIC BLADDER: ICD-10-CM

## 2024-09-18 DIAGNOSIS — S24.102S T6 SPINAL CORD INJURY, SEQUELA: Chronic | ICD-10-CM

## 2024-09-18 DIAGNOSIS — F33.41 RECURRENT MAJOR DEPRESSIVE DISORDER, IN PARTIAL REMISSION: ICD-10-CM

## 2024-09-18 RX ORDER — BACLOFEN 10 MG/1
10 TABLET ORAL 3 TIMES DAILY PRN
Qty: 270 TABLET | Refills: 3 | Status: SHIPPED | OUTPATIENT
Start: 2024-09-18

## 2024-09-18 RX ORDER — GABAPENTIN 800 MG/1
800 TABLET ORAL 3 TIMES DAILY
Qty: 90 TABLET | Refills: 0 | Status: SHIPPED | OUTPATIENT
Start: 2024-09-18

## 2024-09-18 RX ORDER — QUETIAPINE FUMARATE 200 MG/1
200 TABLET, FILM COATED ORAL NIGHTLY
Qty: 90 TABLET | Refills: 3 | Status: SHIPPED | OUTPATIENT
Start: 2024-09-18

## 2024-09-30 DIAGNOSIS — F33.41 RECURRENT MAJOR DEPRESSIVE DISORDER, IN PARTIAL REMISSION: ICD-10-CM

## 2024-09-30 RX ORDER — QUETIAPINE FUMARATE 200 MG/1
200 TABLET, FILM COATED ORAL NIGHTLY
Qty: 90 TABLET | Refills: 0 | Status: SHIPPED | OUTPATIENT
Start: 2024-09-30

## 2024-10-06 DIAGNOSIS — S24.102S T6 SPINAL CORD INJURY, SEQUELA: Chronic | ICD-10-CM

## 2024-10-07 RX ORDER — GABAPENTIN 800 MG/1
800 TABLET ORAL 3 TIMES DAILY
Qty: 90 TABLET | Refills: 0 | Status: SHIPPED | OUTPATIENT
Start: 2024-10-07

## 2024-11-03 DIAGNOSIS — S24.102S T6 SPINAL CORD INJURY, SEQUELA: Chronic | ICD-10-CM

## 2024-11-04 RX ORDER — GABAPENTIN 800 MG/1
800 TABLET ORAL 3 TIMES DAILY
Qty: 90 TABLET | Refills: 0 | Status: SHIPPED | OUTPATIENT
Start: 2024-11-04

## 2024-11-06 DIAGNOSIS — S24.102S T6 SPINAL CORD INJURY, SEQUELA: Chronic | ICD-10-CM

## 2024-11-07 RX ORDER — GABAPENTIN 800 MG/1
800 TABLET ORAL 3 TIMES DAILY
Qty: 90 TABLET | OUTPATIENT
Start: 2024-11-07

## 2024-12-03 DIAGNOSIS — S24.102S T6 SPINAL CORD INJURY, SEQUELA: Chronic | ICD-10-CM

## 2024-12-03 NOTE — TELEPHONE ENCOUNTER
Caller: Alex Tierney    Relationship: Self    Best call back number: [phone]     Requested Prescriptions:   Requested Prescriptions     Pending Prescriptions Disp Refills    gabapentin (NEURONTIN) 800 MG tablet 90 tablet 0     Sig: Take 1 tablet by mouth 3 (Three) Times a Day.        Pharmacy where request should be sent: [facility] [address] [place] [address] [phone] [facility] [phone]      Last office visit with prescribing clinician: 5/4/2023   Last telemedicine visit with prescribing clinician: Visit date not found   Next office visit with prescribing clinician: Visit date not found     Additional details provided by patient: PATIENT OUT OF MEDICATION    Does the patient have less than a 3 day supply:  [x] Yes  [] No    Would you like a call back once the refill request has been completed: [] Yes [x] No    If the office needs to give you a call back, can they leave a voicemail: [] Yes [x] No    Raiza Nichole   12/03/24 14:27 EST

## 2024-12-04 RX ORDER — GABAPENTIN 800 MG/1
800 TABLET ORAL 3 TIMES DAILY
Qty: 90 TABLET | Refills: 0 | OUTPATIENT
Start: 2024-12-04

## 2024-12-10 PROBLEM — E11.9 TYPE 2 DIABETES MELLITUS: Status: RESOLVED | Noted: 2022-10-01 | Resolved: 2024-12-10

## 2024-12-13 DIAGNOSIS — S24.102S T6 SPINAL CORD INJURY, SEQUELA: Chronic | ICD-10-CM

## 2024-12-13 RX ORDER — GABAPENTIN 800 MG/1
800 TABLET ORAL 3 TIMES DAILY
Qty: 90 TABLET | Refills: 0 | OUTPATIENT
Start: 2024-12-13

## 2024-12-13 NOTE — TELEPHONE ENCOUNTER
Caller: [facility] - [place], [place] - [address] - [phone] [facility] [phone] FX    Relationship: Pharmacy    Best call back number: [phone]     Requested Prescriptions:   Requested Prescriptions     Pending Prescriptions Disp Refills    gabapentin (NEURONTIN) 800 MG tablet 90 tablet 0     Sig: Take 1 tablet by mouth 3 (Three) Times a Day.        Pharmacy where request should be sent: [facility] - [place], [place] - [address] [facility] - [phone] [facility] [phone]     Last office visit with prescribing clinician: 5/4/2023   Last telemedicine visit with prescribing clinician: Visit date not found   Next office visit with prescribing clinician: 1/27/2025     Additional details provided by patient:     Does the patient have less than a 3 day supply:  [] Yes  [x] No    Would you like a call back once the refill request has been completed: [] Yes [x] No    If the office needs to give you a call back, can they leave a voicemail: [] Yes [x] No    Raiza Nichole   12/13/24 11:31 EST

## 2024-12-20 ENCOUNTER — TELEPHONE (OUTPATIENT)
Dept: FAMILY MEDICINE CLINIC | Facility: CLINIC | Age: 49
End: 2024-12-20

## 2024-12-20 DIAGNOSIS — N31.2 ATONIC BLADDER: ICD-10-CM

## 2024-12-20 DIAGNOSIS — Z97.8 CHRONIC INDWELLING FOLEY CATHETER: ICD-10-CM

## 2024-12-20 RX ORDER — SULFAMETHOXAZOLE AND TRIMETHOPRIM 800; 160 MG/1; MG/1
1 TABLET ORAL 2 TIMES DAILY
Qty: 20 TABLET | Refills: 0 | Status: SHIPPED | OUTPATIENT
Start: 2024-12-20

## 2024-12-20 NOTE — TELEPHONE ENCOUNTER
Caller: Alex Tierney    Relationship: Self    Best call back number: [phone]    What medication are you requesting:     SOMETHING FOR SYMPTOMS    What are your current symptoms:     CATHETER  HE HAS URINARY TRACT INFECTION    Have you had these symptoms before:    [x] Yes  [] No    Have you been treated for these symptoms before:   [x] Yes  [] No    If a prescription is needed, what is your preferred pharmacy and phone number: [facility] [address] [place], [place] - [address] [facility] [phone] [facility] [phone]

## 2024-12-27 DIAGNOSIS — S24.102S T6 SPINAL CORD INJURY, SEQUELA: Chronic | ICD-10-CM

## 2024-12-27 NOTE — TELEPHONE ENCOUNTER
Caller: Alex Tierney    Relationship: Self    Best call back number:[phone]     Requested Prescriptions:   Requested Prescriptions     Pending Prescriptions Disp Refills    gabapentin (NEURONTIN) 800 MG tablet 90 tablet 0     Sig: Take 1 tablet by mouth 3 (Three) Times a Day.        Pharmacy where request should be sent: [facility] [address] [place] [address] [phone] [facility] [phone]      Last office visit with prescribing clinician: 5/4/2023   Last telemedicine visit with prescribing clinician: Visit date not found   Next office visit with prescribing clinician: 1/27/2025     Additional details provided by patient: PATIENT HAS 2 DAYS REMAINING

## 2024-12-29 DIAGNOSIS — S24.102S T6 SPINAL CORD INJURY, SEQUELA: Chronic | ICD-10-CM

## 2024-12-30 RX ORDER — GABAPENTIN 800 MG/1
800 TABLET ORAL 3 TIMES DAILY
Qty: 90 TABLET | Refills: 0 | OUTPATIENT
Start: 2024-12-30

## 2024-12-31 ENCOUNTER — TELEPHONE (OUTPATIENT)
Dept: FAMILY MEDICINE CLINIC | Facility: CLINIC | Age: 49
End: 2024-12-31
Payer: MEDICARE

## 2024-12-31 RX ORDER — GABAPENTIN 800 MG/1
800 TABLET ORAL 3 TIMES DAILY
Qty: 90 TABLET | Refills: 0 | OUTPATIENT
Start: 2024-12-31

## 2025-01-27 ENCOUNTER — OFFICE VISIT (OUTPATIENT)
Dept: FAMILY MEDICINE CLINIC | Facility: CLINIC | Age: 50
End: 2025-01-27
Payer: MEDICARE

## 2025-01-27 DIAGNOSIS — F17.200 SMOKER: ICD-10-CM

## 2025-01-27 DIAGNOSIS — M25.511 CHRONIC PAIN OF BOTH SHOULDERS: ICD-10-CM

## 2025-01-27 DIAGNOSIS — R73.03 PREDIABETES: ICD-10-CM

## 2025-01-27 DIAGNOSIS — K21.9 GASTROESOPHAGEAL REFLUX DISEASE WITHOUT ESOPHAGITIS: ICD-10-CM

## 2025-01-27 DIAGNOSIS — F33.41 RECURRENT MAJOR DEPRESSIVE DISORDER, IN PARTIAL REMISSION: ICD-10-CM

## 2025-01-27 DIAGNOSIS — N31.2 ATONIC BLADDER: ICD-10-CM

## 2025-01-27 DIAGNOSIS — R73.03 PREDIABETES: Chronic | ICD-10-CM

## 2025-01-27 DIAGNOSIS — E55.9 VITAMIN D DEFICIENCY: ICD-10-CM

## 2025-01-27 DIAGNOSIS — M25.512 CHRONIC PAIN OF BOTH SHOULDERS: ICD-10-CM

## 2025-01-27 DIAGNOSIS — S24.102S T6 SPINAL CORD INJURY, SEQUELA: ICD-10-CM

## 2025-01-27 DIAGNOSIS — J30.2 CHRONIC SEASONAL ALLERGIC RHINITIS: Primary | ICD-10-CM

## 2025-01-27 DIAGNOSIS — S24.102S T6 SPINAL CORD INJURY, SEQUELA: Chronic | ICD-10-CM

## 2025-01-27 DIAGNOSIS — Z86.79 HISTORY OF ENDOCARDITIS: ICD-10-CM

## 2025-01-27 DIAGNOSIS — G89.29 CHRONIC PAIN OF BOTH SHOULDERS: ICD-10-CM

## 2025-01-27 DIAGNOSIS — Z00.00 HEALTHCARE MAINTENANCE: ICD-10-CM

## 2025-01-27 DIAGNOSIS — Z89.612 HISTORY OF LEFT LOWER EXTREMITY AMPUTATION: ICD-10-CM

## 2025-01-27 PROCEDURE — 1170F FXNL STATUS ASSESSED: CPT | Performed by: GENERAL PRACTICE

## 2025-01-27 PROCEDURE — 99214 OFFICE O/P EST MOD 30 MIN: CPT | Performed by: GENERAL PRACTICE

## 2025-01-27 PROCEDURE — G0439 PPPS, SUBSEQ VISIT: HCPCS | Performed by: GENERAL PRACTICE

## 2025-01-27 RX ORDER — IBUPROFEN 600 MG/1
600 TABLET, FILM COATED ORAL 3 TIMES DAILY PRN
Qty: 90 TABLET | Refills: 0 | Status: SHIPPED | OUTPATIENT
Start: 2025-01-27

## 2025-01-27 RX ORDER — BACLOFEN 20 MG/1
20 TABLET ORAL 3 TIMES DAILY
Qty: 270 TABLET | Refills: 3 | Status: SHIPPED | OUTPATIENT
Start: 2025-01-27

## 2025-01-27 RX ORDER — QUETIAPINE FUMARATE 200 MG/1
200 TABLET, FILM COATED ORAL NIGHTLY
Qty: 90 TABLET | Refills: 3 | Status: SHIPPED | OUTPATIENT
Start: 2025-01-27 | End: 2025-01-27 | Stop reason: SDUPTHER

## 2025-01-27 RX ORDER — GABAPENTIN 800 MG/1
800 TABLET ORAL 3 TIMES DAILY
Qty: 90 TABLET | Refills: 2 | Status: SHIPPED | OUTPATIENT
Start: 2025-01-27

## 2025-01-27 RX ORDER — BACLOFEN 20 MG/1
20 TABLET ORAL 3 TIMES DAILY
Qty: 270 TABLET | Refills: 3 | Status: SHIPPED | OUTPATIENT
Start: 2025-01-27 | End: 2025-01-27 | Stop reason: SDUPTHER

## 2025-01-27 RX ORDER — GABAPENTIN 800 MG/1
800 TABLET ORAL 3 TIMES DAILY
Qty: 90 TABLET | Refills: 0 | OUTPATIENT
Start: 2025-01-27

## 2025-01-27 RX ORDER — QUETIAPINE FUMARATE 200 MG/1
200 TABLET, FILM COATED ORAL NIGHTLY
Qty: 90 TABLET | Refills: 3 | Status: SHIPPED | OUTPATIENT
Start: 2025-01-27

## 2025-01-27 RX ORDER — SEMAGLUTIDE 0.68 MG/ML
INJECTION, SOLUTION SUBCUTANEOUS
Qty: 3 ML | Refills: 2 | Status: SHIPPED | OUTPATIENT
Start: 2025-01-27

## 2025-01-27 RX ORDER — OMEPRAZOLE 40 MG/1
40 CAPSULE, DELAYED RELEASE ORAL
Qty: 90 CAPSULE | Refills: 3 | Status: SHIPPED | OUTPATIENT
Start: 2025-01-27

## 2025-01-27 RX ORDER — OMEPRAZOLE 40 MG/1
40 CAPSULE, DELAYED RELEASE ORAL
Qty: 90 CAPSULE | Refills: 3 | Status: SHIPPED | OUTPATIENT
Start: 2025-01-27 | End: 2025-01-27 | Stop reason: SDUPTHER
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
## 2025-01-28 NOTE — ASSESSMENT & PLAN NOTE
As above  Orders:    Semaglutide,0.25 or 0.5MG/DOS, (Ozempic, 0.25 or 0.5 MG/DOSE,) 2 MG/3ML solution pen-injector; 0.25 mg SC weekly for 6 weeks, then 0.5 mg SC weekly afterward

## 2025-01-28 NOTE — ASSESSMENT & PLAN NOTE
Reminded regarding symptomatic treatment.   Continue current medication  Encouraged to report if any worse or if any new symptoms or concerns  Orders:    gabapentin (NEURONTIN) 800 MG tablet; Take 1 tablet by mouth 3 (Three) Times a Day.

## 2025-01-28 NOTE — ASSESSMENT & PLAN NOTE
Recommended a flu and COVID-19 shot  Reviewed the potential benefits of colon and lung cancer screening.  Patient like to consider and this will be discussed again at his return

## 2025-01-28 NOTE — ASSESSMENT & PLAN NOTE
With increasing anxiety and agoraphobia  Patient willing to undergo a psychiatric assessment if the first visit or two can be arranged by telehealth.  Will investigate  Encouraged report if any worse or if any new symptoms or concerns in the meantime  Orders:    QUEtiapine (SEROquel) 200 MG tablet; Take 1 tablet by mouth Every Night.

## 2025-01-28 NOTE — ASSESSMENT & PLAN NOTE
Encouraged to continue to work on his diet and exercise plan.  Patient is interested in trying semaglutide if this can be approved by his insurance  We will update lab work when he is able to leave the house  Orders:    Semaglutide,0.25 or 0.5MG/DOS, (Ozempic, 0.25 or 0.5 MG/DOSE,) 2 MG/3ML solution pen-injector; 0.25 mg SC weekly for 6 weeks, then 0.5 mg SC weekly afterward

## 2025-01-28 NOTE — ASSESSMENT & PLAN NOTE
Orders:    gabapentin (NEURONTIN) 800 MG tablet; Take 1 tablet by mouth 3 (Three) Times a Day.

## 2025-01-28 NOTE — PROGRESS NOTES
Subjective   The ABCs of the Annual Wellness Visit  Medicare Wellness Visit    Alex Tierney is a 49 y.o. patient who presents for a Medicare Wellness Visit.    The following portions of the patient's history were reviewed and   updated as appropriate: allergies, current medications, past family history, past medical history, past social history, past surgical history, and problem list.    Compared to one year ago, the patient's physical   health is better.  Compared to one year ago, the patient's mental   health is worse.    Recent Hospitalizations:  He was admitted within the past 365 days at [facility].     Current Medical Providers:  Patient Care Team:  Delgado Rose MD as PCP - General (Family Medicine)    Outpatient Medications Prior to Visit   Medication Sig Dispense Refill    sildenafil (VIAGRA) 100 MG tablet 1/2 - 1 po daily prn 10 tablet 0    amoxicillin-clavulanate (AUGMENTIN) 875-125 MG per tablet Take 1 tablet by mouth 2 (Two) Times a Day. 20 tablet 0    baclofen (LIORESAL) 10 MG tablet Take 1 tablet by mouth 3 (Three) Times a Day As Needed for Muscle Spasms. 270 tablet 3    gabapentin (NEURONTIN) 800 MG tablet TAKE 1 TABLET THREE TIMES DAILY 90 tablet 0    ibuprofen (ADVIL,MOTRIN) 600 MG tablet TAKE 1 TABLET BY MOUTH 3 (THREE) TIMES A DAY AS NEEDED FOR MILD PAIN OR MODERATE PAIN 90 tablet 0    multivitamin with minerals tablet tablet Take 1 tablet by mouth Daily.      omeprazole (priLOSEC) 40 MG capsule Take 1 capsule by mouth Daily With Dinner. 90 capsule 3    QUEtiapine (SEROquel) 200 MG tablet Take 1 tablet by mouth Every Night. 90 tablet 0    Semaglutide,0.25 or 0.5MG/DOS, (Ozempic, 0.25 or 0.5 MG/DOSE,) 2 MG/1.5ML solution pen-injector Inject 0.5 mg under the skin into the appropriate area as directed 1 (One) Time Per Week. 1.5 mL 0    sulfamethoxazole-trimethoprim (Bactrim DS) 800-160 MG per tablet Take 1 tablet by mouth 2 (Two) Times a Day. 20 tablet 0    vitamin D (ERGOCALCIFEROL)  "1.25 MG (11848 UT) capsule capsule Take 1 capsule by mouth 1 (One) Time Per Week. 12 capsule 3     No facility-administered medications prior to visit.     No opioid medication identified on active medication list. I have reviewed chart for other potential  high risk medication/s and harmful drug interactions in the elderly.      Aspirin is not on active medication list.  Aspirin use is not indicated based on review of current medical condition/s. Risk of harm outweighs potential benefits.  .    Patient Active Problem List   Diagnosis    Chronic seasonal allergic rhinitis    Recurrent major depressive disorder, in partial remission    Microcytic anemia    Smoker    T6 spinal cord injury    History of left lower extremity amputation    Healthcare maintenance    [facility]    Encounter for immunization    Gastroesophageal reflux disease without esophagitis    History of endocarditis    Seborrheic dermatitis    Vitamin D deficiency    Prediabetes    Paraplegia    Chronic indwelling Sheldon catheter     Advance Care Planning Advance Directive is not on file.  ACP discussion was held with the patient during this visit. Patient does not have an advance directive, information provided.      Objective   There were no vitals filed for this visit.    Estimated body mass index is 30.13 kg/m² as calculated from the following:    Height as of 12/22/23: 177.8 cm (70\").    Weight as of 12/22/23: 95.3 kg (210 lb).    Does the patient have evidence of cognitive impairment? No                                                                                           Health  Risk Assessment    Smoking Status:  Social History     Tobacco Use   Smoking Status Every Day    Current packs/day: 1.00    Average packs/day: 1 pack/day for 20.0 years (20.0 ttl pk-yrs)    Types: Cigarettes   Smokeless Tobacco Never     Alcohol Consumption:  Social History     Substance and Sexual Activity   Alcohol Use No    Comment: FORMER     Fall Risk " Screen  STEADI Fall Risk Assessment was completed, and patient is at MODERATE risk for falls. Assessment completed on:2025    Depression Screening   Little interest or pleasure in doing things? Not at all   Feeling down, depressed, or hopeless? Not at all   PHQ-2 Total Score 0      Health Habits and Functional and Cognitive Screenin/27/2025     3:24 PM   Functional & Cognitive Status   Do you have difficulty preparing food and eating? No   Do you have difficulty bathing yourself, getting dressed or grooming yourself? No   Do you have difficulty using the toilet? No   Do you have difficulty moving around from place to place? No   Do you have trouble with steps or getting out of a bed or a chair? No   Current Diet Well Balanced Diet   Dental Exam Not up to date   Eye Exam Not up to date   Exercise (times per week) 0 times per week   Current Exercises Include No Regular Exercise   Do you need help using the phone?  No   Are you deaf or do you have serious difficulty hearing?  No   Do you need help to go to places out of walking distance? No   Do you need help shopping? No   Do you need help preparing meals?  No   Do you need help with housework?  No   Do you need help with laundry? No   Do you need help taking your medications? No   Do you need help managing money? No   Do you ever drive or ride in a car without wearing a seat belt? No   Have you felt unusual stress, anger or loneliness in the last month? No   Who do you live with? Other   If you need help, do you have trouble finding someone available to you? No   Have you been bothered in the last four weeks by sexual problems? No   Do you have difficulty concentrating, remembering or making decisions? No           Age-appropriate Screening Schedule:  Refer to the list below for future screening recommendations based on patient's age, sex and/or medical conditions. Orders for these recommended tests are listed in the plan section. The patient has been  provided with a written plan.    Health Maintenance List  Health Maintenance   Topic Date Due    COLORECTAL CANCER SCREENING  Never done    DIABETIC EYE EXAM  Never done    URINE MICROALBUMIN  Never done    Hepatitis B (1 of 3 - 19+ 3-dose series) Never done    PROSTATE CANCER SCREENING  Never done    ANNUAL WELLNESS VISIT  06/06/2023    INFLUENZA VACCINE  07/01/2024    COVID-19 Vaccine (1 - 2024-25 season) Never done    HEMOGLOBIN A1C  09/21/2024    TDAP/TD VACCINES (3 - Td or Tdap) 03/20/2034    HEPATITIS C SCREENING  Completed    Pneumococcal Vaccine 0-64  Completed                                                                                                                                              CMS Preventative Services Quick Reference  Risk Factors Identified During Encounter  Chronic Pain: Natural history and expected course discussed. Questions answered.  Depression/Dysphoria: Referral to Mental Health specialist  Immunizations Discussed/Encouraged: Influenza, Shingrix, and COVID19  Inadequate Social Support, Isolation, Loneliness, Lack of Transportation,   Tobacco Use/Dependance Risk (use dotphrase .tobaccocessation for documentation)    The above risks/problems have been discussed with the patient.  Pertinent information has been shared with the patient in the After Visit Summary.  An After Visit Summary and PPPS were made available to the patient.    Follow Up:   Next Medicare Wellness visit to be scheduled in 1 year.     Additional E&M Note during same encounter follows:  Patient has additional, significant, and separately identifiable condition(s)/problem(s) that require work above and beyond the Medicare Wellness Visit     Chief Complaint  He returns for a scheduled reassessment of multiple medical problems including a previous spinal cord injury, anxiety with previous depression, gastroesophageal reflux disease, and chronic allergic rhinitis    Subjective   HPI  Edward is also being seen today  for additional medical problem/s.    Spinal Cord Injury  Level T6 complicated by recurrent decubitus ulcers and osteomyelitis requiring a previous left BKA, bilateral hip osteotomies, permanent colostomy and a chronic indwelling bladder catheter.  He has a history of frequent symptomatic urinary tract infections.  He underwent placement of a suprapubic catheter by Dr. Mayo on 10/12/2022.     Anxiety/Depression  He complains of increased nervousness, worrying, and difficulty sleeping.  He has become increasingly reluctant to leave the house.  He denies any depression or loss of interest in activities.  He continues to deny any difficulty with his concentration or memory, and there is no history of any suicidal ideation.  His family is quite supportive. He remains on quetiapine with no apparent side effects.  He has tried a number of antidepressants with either intolerable side effects or lack of benefit.  He would like to resume alprazolam    Gastroesophageal Reflux Disease  He remains heartburn free on omeprazole.  He continues to deny any difficulty swallowing and there is no history of any nausea or vomiting.     Chronic Allergic Rhinitis  He has a long history of intermittent sneezing, clear rhinorrhea, nasal congestion, periorbital pressure and postnasal drip. These symptoms are perennial with seasonal exacerbation. He  remains on cetirizine with fairly good control    Review of Systems   Constitutional:  Positive for fatigue. Negative for appetite change, chills and fever.   HENT:  Negative for congestion, ear pain, postnasal drip, rhinorrhea, sneezing, sore throat and voice change.    Eyes:  Negative for visual disturbance.   Respiratory:  Negative for cough, shortness of breath and wheezing.    Cardiovascular:  Negative for chest pain, palpitations and leg swelling.   Gastrointestinal:  Negative for abdominal pain, blood in stool, constipation, diarrhea, nausea and vomiting.        Occasional heartburn    Genitourinary:  Positive for hematuria.   Musculoskeletal:  Positive for arthralgias, back pain and myalgias. Negative for joint swelling and neck pain.   Skin:  Negative for rash.   Neurological:  Positive for weakness and numbness.   Psychiatric/Behavioral:  Positive for sleep disturbance. Negative for dysphoric mood and suicidal ideas. The patient is nervous/anxious.         Objective   Vital Signs:  There were no vitals taken for this visit.    Physical Exam  Constitutional:       Comments: Sitting on his back porch.  Bright and in fair spirits. No apparent distress. No pallor, jaundice, diaphoresis, or cyanosis.     Pulmonary:      Comments: Normal respiratory effort.  No cough or audible wheezing.  Skin:     Coloration: Skin is not cyanotic, jaundiced or pale.   Neurological:      Mental Status: He is alert and oriented to person, place, and time.   Psychiatric:         Attention and Perception: Attention normal.         Mood and Affect: Mood normal.         Speech: Speech normal.         Behavior: Behavior normal.         Thought Content: Thought content normal.           Assessment and Plan Additional age appropriate preventative wellness advice topics were discussed during today's preventative wellness exam(some topics already addressed during AWV portion of the note above):    Physical Activity: Advised cardiovascular activity 150 minutes per week as tolerated. (example brisk walk for 30 minutes, 5 days a week).     Nutrition: Discussed nutrition plan with patient. Information shared in after visit summary. Goal is for a well balanced diet to enhance overall health.     Tobacco Misuse Discussion: Information shared in after visit summary.     Chronic seasonal allergic rhinitis  Continue current medication  Encouraged to report if any worse or if any new symptoms or concerns       History of endocarditis       Vitamin D deficiency       Prediabetes  Encouraged to continue to work on his diet and exercise  plan.  Patient is interested in trying semaglutide if this can be approved by his insurance  We will update lab work when he is able to leave the house  Orders:    Semaglutide,0.25 or 0.5MG/DOS, (Ozempic, 0.25 or 0.5 MG/DOSE,) 2 MG/3ML solution pen-injector; 0.25 mg SC weekly for 6 weeks, then 0.5 mg SC weekly afterward    Gastroesophageal reflux disease without esophagitis   Symptoms are currently well controlled.  Reminded regarding lifestyle modification.  Continue current medication.  Orders:    omeprazole (priLOSEC) 40 MG capsule; Take 1 capsule by mouth Daily With Dinner.    Atonic bladder  S/P placement suprapubic bladder catheter  Orders:    baclofen (LIORESAL) 20 MG tablet; Take 1 tablet by mouth 3 (Three) Times a Day.    Healthcare maintenance  Recommended a flu and COVID-19 shot  Reviewed the potential benefits of colon and lung cancer screening.  Patient like to consider and this will be discussed again at his return       Recurrent major depressive disorder, in partial remission  With increasing anxiety and agoraphobia  Patient willing to undergo a psychiatric assessment if the first visit or two can be arranged by telehealth.  Will investigate  Encouraged report if any worse or if any new symptoms or concerns in the meantime  Orders:    QUEtiapine (SEROquel) 200 MG tablet; Take 1 tablet by mouth Every Night.    History of left lower extremity amputation       T6 spinal cord injury, sequela  Reminded regarding symptomatic treatment.   Continue current medication  Encouraged to report if any worse or if any new symptoms or concerns  Orders:    gabapentin (NEURONTIN) 800 MG tablet; Take 1 tablet by mouth 3 (Three) Times a Day.    Smoker  Lengthy discussion regarding the potential sequela of continued tobacco use and the options with respect to cessation.  Patient uninterested in pursuing at present but will consider.       Prediabetes  As above  Orders:    Semaglutide,0.25 or 0.5MG/DOS, (Ozempic, 0.25 or  0.5 MG/DOSE,) 2 MG/3ML solution pen-injector; 0.25 mg SC weekly for 6 weeks, then 0.5 mg SC weekly afterward    Chronic pain of both shoulders    Orders:    ibuprofen (ADVIL,MOTRIN) 600 MG tablet; Take 1 tablet by mouth 3 (Three) Times a Day As Needed for Mild Pain or Moderate Pain.    T6 spinal cord injury, sequela    Orders:    gabapentin (NEURONTIN) 800 MG tablet; Take 1 tablet by mouth 3 (Three) Times a Day.            Follow Up   Return in about 3 months (around 5/9/2025).  Patient was given instructions and counseling regarding his condition or for health maintenance advice. Please see specific information pulled into the AVS if appropriate.

## 2025-01-28 NOTE — PATIENT INSTRUCTIONS
Colorectal Cancer Screening    Colorectal cancer screening is a group of tests that are used to check for colorectal cancer before symptoms develop. Colorectal refers to the colon and rectum. The colon and rectum are located at the end of the digestive tract and carry stool (feces) out of the body.  Who should have screening?  All adults who are 45-75 years old should have screening. Your health care provider may recommend screening before age 45. You will have tests every 1-10 years, depending on your results and the type of screening test. Screening recommendations for adults who are 76-85 years old vary depending on a person's health. People older than age 85 should no longer get colorectal cancer screening.  You may have screening tests starting before age 45, or more often than other people, if you have any of these risk factors:  A personal or family history of colorectal cancer or abnormal growths known as polyps in your colon.  Inflammatory bowel disease, such as ulcerative colitis or Crohn's disease.  A history of having radiation treatment to the abdomen or the area between the hip bones (pelvic area) for cancer.  A type of genetic syndrome that is passed from parent to child (hereditary), such as:  [place] syndrome.  Familial adenomatous polyposis.  Turcot syndrome.  Peutz-Jeghers syndrome.  MUTYH-associated polyposis (MAP).  A personal history of diabetes.  Types of tests  There are several types of colorectal screening tests. You may have one or more of the following:  Guaiac-based fecal occult blood testing. For this test, a stool sample is checked for hidden (occult) blood, which could be a sign of colorectal cancer.  Fecal immunochemical test (FIT). For this test, a stool sample is checked for blood, which could be a sign of colorectal cancer.  Stool DNA test. For this test, a stool sample is checked for blood and changes in DNA that could lead to colorectal cancer.  Sigmoidoscopy. During this test, a  thin, flexible tube with a camera on the end, called a sigmoidoscope, is used to examine the rectum and the lower colon.  Colonoscopy. During this test, a long, flexible tube with a camera on the end, called a colonoscope, is used to examine the entire colon and rectum. Also, sometimes a tissue sample is taken to be looked at under a microscope (biopsy) or small polyps are removed during this test.  Virtual colonoscopy. Instead of a colonoscope, this type of colonoscopy uses a CT scan to take pictures of the colon and rectum. A CT scan is a type of X-ray that is made using computers.  What are the benefits of screening?  Screening reduces your risk for colorectal cancer and can help identify cancer at an early stage, when the cancer can be removed or treated more easily. It is common for polyps to form in the lining of the colon, especially as you age. These polyps may be cancerous or become cancerous over time. Screening can identify these polyps.  What are the risks of screening?  Generally, these are safe tests. However, problems may occur, including:  The need for more tests to confirm results from a stool sample test. Stool sample tests have fewer risks than other types of screening tests.  Being exposed to low levels of radiation, if you had a test involving X-rays. This may slightly increase your cancer risk. The benefit of detecting cancer outweighs the slight increase in risk.  Bleeding, damage to the intestine, or infection caused by a sigmoidoscopy or colonoscopy.  A reaction to medicines given during a sigmoidoscopy or colonoscopy.  Talk with your health care provider to understand your risk for colorectal cancer and to make a screening plan that is right for you.  Questions to ask your health care provider  When should I start colorectal cancer screening?  What is my risk for colorectal cancer?  How often do I need screening?  Which screening tests do I need?  How do I get my test results?  What do my  results mean?  Where to find more information  Learn more about colorectal cancer screening from:  The American Cancer Society: cancer.org  National Cancer [place]: cancer.gov  Summary  Colorectal cancer screening is a group of tests used to check for colorectal cancer before symptoms develop.  All adults who are 45-75 years old should have screening. Your health care provider may recommend screening before age 45.  You may have screening tests starting before age 45, or more often than other people, if you have certain risk factors.  Screening reduces your risk for colorectal cancer and can help identify cancer at an early stage, when the cancer can be removed or treated more easily.  Talk with your health care provider to understand your risk for colorectal cancer and to make a screening plan that is right for you.  This information is not intended to replace advice given to you by your health care provider. Make sure you discuss any questions you have with your health care provider.  Document Revised: 04/07/2021 Document Reviewed: 04/07/2021  [employer] Patient Education © 2024 [employer]Lung Cancer Screening  A lung cancer screening is a test that checks for lung cancer when there are no symptoms or history of that disease. The screening is done to look for lung cancer in its very early stages. Finding cancer early improves the chances of successful treatment. It may save your life.  Who should have a screening?  You should be screened for lung cancer if all of these apply:  You currently smoke or you used to smoke.  You are between the ages of 50 and 80 years old. Screening may be recommended up to age 80 depending on your overall health and other factors.  You have a smoking history of 1 pack of cigarettes a day for 20 years or 2 packs a day for 10 years.  How is screening done?    The recommended screening test is a low-dose computed tomography (LDCT) scan. This scan takes detailed images of the lungs.  This allows a health care provider to look for abnormal cells. If you are at risk for lung cancer, it is recommended that you get screened once a year. Talk to your health care provider about the risks, benefits, and limitations of screening.  What are the benefits of screening?  Screening can find lung cancer early, before symptoms start and before it has spread outside of the lungs. The chances of curing lung cancer are greater if the cancer is diagnosed early.  What are the risks of screening?  The screening may show lung cancer when no cancer is present. Talk with your health care provider about what your results mean. In some cases, your health care provider may do more testing to confirm the results.  The screening may not find lung cancer when it is present.  You will be exposed to radiation from repeated LDCT tests, which can cause cancer in otherwise healthy people.  How can I lower my risk of lung cancer?  Make these lifestyle changes to lower your risk of developing lung cancer:  Do not use any products that contain nicotine or tobacco. These products include cigarettes, chewing tobacco, and vaping devices, such as e-cigarettes. If you need help quitting, ask your health care provider.  Avoid secondhand smoke.  Avoid exposure to radiation.  Avoid exposure to radon gas. Have your home checked for radon regularly.  Avoid things that cause cancer (carcinogens).  Avoid living or working in places with high air pollution or diesel exhaust.  Questions to ask your health care provider  Am I eligible for lung cancer screening?  Does my health insurance cover the cost of lung cancer screening?  What happens if the lung cancer screening shows something of concern?  How soon will I have results from my lung cancer screening?  Is there anything that I need to do to prepare for my lung cancer screening?  What happens if I decide not to have lung cancer screening?  Where to find more information  Ask your health care  provider about the risks and benefits of screening. More information and resources are available from these organizations:  American Cancer Society (ACS): cancer.org  American Lung Association: lung.org  National Cancer [place]: cancer.gov  Contact a health care provider if:  You start to show symptoms of lung cancer, including:  A cough that will not go away.  High-pitched whistling sounds when you breathe, most often when you breathe out (wheezing).  Chest pain.  Coughing up blood.  Shortness of breath.  Weight loss that cannot be explained.  Constant tiredness (fatigue).  Hoarse voice.  Summary  Lung cancer screening may find lung cancer before symptoms appear. Finding cancer early improves the chances of successful treatment. It may save your life.  The recommended screening test is a low-dose computed tomography (LDCT) scan that looks for abnormal cells in the lungs. If you are at risk for lung cancer, it is recommended that you get screened once a year.  You can make lifestyle changes to lower your risk of lung cancer.  Ask your health care provider about the risks and benefits of screening.  This information is not intended to replace advice given to you by your health care provider. Make sure you discuss any questions you have with your health care provider.  Document Revised: 12/26/2023 Document Reviewed: 06/08/2022  Elsevier Patient Education © 2024 [employer]

## 2025-01-28 NOTE — ASSESSMENT & PLAN NOTE
Symptoms are currently well controlled.  Reminded regarding lifestyle modification.  Continue current medication.  Orders:    omeprazole (priLOSEC) 40 MG capsule; Take 1 capsule by mouth Daily With Dinner.

## 2025-01-28 NOTE — ASSESSMENT & PLAN NOTE
S/P placement suprapubic bladder catheter  Orders:    baclofen (LIORESAL) 20 MG tablet; Take 1 tablet by mouth 3 (Three) Times a Day.

## 2025-01-28 NOTE — ASSESSMENT & PLAN NOTE
Continue current medication  Encouraged to report if any worse or if any new symptoms or concerns

## 2025-02-04 ENCOUNTER — TELEPHONE (OUTPATIENT)
Dept: FAMILY MEDICINE CLINIC | Facility: CLINIC | Age: 50
End: 2025-02-04
Payer: MEDICARE

## 2025-02-04 NOTE — TELEPHONE ENCOUNTER
----- Message from Delgado Rose sent at 2/3/2025 10:22 AM EST -----  [place]  Please let him know   He should let us know if any prob filling  ----- Message -----  From: Janie Carson MA  Sent: 2/3/2025  10:19 AM EST  To: Delgado Rose MD    He already has a PA on file that is good until the end of year.  ----- Message -----  From: Delgado Rose MD  Sent: 1/27/2025   8:06 PM EST  To: Janie Carson MA    Please try to PA Ozempic  Diagnosis-prediabetes

## 2025-02-05 ENCOUNTER — TELEPHONE (OUTPATIENT)
Dept: FAMILY MEDICINE CLINIC | Facility: CLINIC | Age: 50
End: 2025-02-05
Payer: MEDICARE

## 2025-02-05 ENCOUNTER — TELEPHONE (OUTPATIENT)
Dept: FAMILY MEDICINE CLINIC | Facility: CLINIC | Age: 50
End: 2025-02-05

## 2025-02-05 RX ORDER — CIPROFLOXACIN 500 MG/1
500 TABLET, FILM COATED ORAL 2 TIMES DAILY
Qty: 20 TABLET | Refills: 0 | Status: SHIPPED | OUTPATIENT
Start: 2025-02-05 | End: 2025-02-15

## 2025-02-05 NOTE — TELEPHONE ENCOUNTER
Faxed to [facility]      ----- Message from Delgado Rose sent at 2/4/2025  6:26 PM EST -----  Will do  ----- Message -----  From: Janie Carson MA  Sent: 2/4/2025   3:27 PM EST  To: Delgado Rose MD    Can you write this on a script and bring to me?  ----- Message -----  From: Delgado Rose MD  Sent: 1/27/2025   8:17 PM EST  To: Janie Carson MA    Needs a new air flotation mattress   ?  Save right ?  [facility] ? Other  Diagnosis -T6 spinal cord injury with recurrent decubitus ulcers, osteomyelitis, and amputation

## 2025-02-05 NOTE — TELEPHONE ENCOUNTER
----- Message from Delgado Rose sent at 2/5/2025 12:27 PM EST -----  Will leave with you - thanks!  ----- Message -----  From: Candace Sharma MA  Sent: 2/5/2025  11:56 AM EST  To: Delgado Rose MD    [facility] pharmacy called and stated that they do not have the mattress topper you ordered previously. They have a alternating pressure pad or a gel overlay pad. Would either of these work, if so can you write out a prescription for this.

## 2025-02-05 NOTE — TELEPHONE ENCOUNTER
Caller: NiallAlex dean    Relationship: Self    Best call back number: [phone]     What medication are you requesting: ANTIBIOTIC     What are your current symptoms: UTI     How long have you been experiencing symptoms: TWO DAYS     Have you had these symptoms before:    [x] Yes  [] No    Have you been treated for these symptoms before:   [x] Yes  [] No    If a prescription is needed, what is your preferred pharmacy and phone number:    [facility] [address] [place] [address] - [phone] [place] [phone]  [phone]     Additional notes:  PATIENT WOULD LIKE FOR MEDICATION TO BE CALLED INTO THE PHARMACY TO HELP CLEAR UP A UTI     PATIENT STATED THAT HE HAS A CATHETER AND IS ONLY GIVEN ONE TUBE PER MONTH AND IS CONCERNED ABOUT THIS CAUSING ONGOING UTI AND WOULD LIKE A CALL BACK TO DISCUSS GETTING MORE TUBING A MONTH FOR THE CATHETER

## 2025-03-18 DIAGNOSIS — N31.2 ATONIC BLADDER: ICD-10-CM

## 2025-03-18 DIAGNOSIS — S24.102S T6 SPINAL CORD INJURY, SEQUELA: Chronic | ICD-10-CM

## 2025-03-18 NOTE — TELEPHONE ENCOUNTER
[facility] [place] [place] - [address] - [phone]  - [phone] FX    Relationship: Pharmacy    Best call back number: [phone]    Requested Prescriptions:   Requested Prescriptions     Pending Prescriptions Disp Refills    gabapentin (NEURONTIN) 800 MG tablet 90 tablet 2     Sig: Take 1 tablet by mouth 3 (Three) Times a Day.    baclofen (LIORESAL) 20 MG tablet 270 tablet 3     Sig: Take 1 tablet by mouth 3 (Three) Times a Day.        Pharmacy where request should be sent: [facility] - [place], [place] - [address] [facility] [address] - [phone] [facility] [phone] FX     Last office visit with prescribing clinician: 1/27/2025   Last telemedicine visit with prescribing clinician: Visit date not found   Next office visit with prescribing clinician: 5/6/2025     Additional details provided by patient: NEEDS NEW PRESCRIPTIONS    Does the patient have less than a 3 day supply:  [] Yes  [x] No       Kaitlyn Boyle MA   03/18/25 16:29 EDT

## 2025-03-19 RX ORDER — BACLOFEN 20 MG/1
20 TABLET ORAL 3 TIMES DAILY
Qty: 270 TABLET | Refills: 3 | Status: SHIPPED | OUTPATIENT
Start: 2025-03-19

## 2025-03-19 RX ORDER — GABAPENTIN 800 MG/1
800 TABLET ORAL 3 TIMES DAILY
Qty: 90 TABLET | Refills: 2 | OUTPATIENT
Start: 2025-03-19

## 2025-03-23 DIAGNOSIS — N52.8 OTHER MALE ERECTILE DYSFUNCTION: ICD-10-CM

## 2025-03-24 RX ORDER — SILDENAFIL 100 MG/1
TABLET, FILM COATED ORAL
Qty: 10 TABLET | Refills: 0 | Status: SHIPPED | OUTPATIENT
Start: 2025-03-24

## 2025-03-26 ENCOUNTER — TELEPHONE (OUTPATIENT)
Dept: FAMILY MEDICINE CLINIC | Facility: CLINIC | Age: 50
End: 2025-03-26
Payer: MEDICARE

## 2025-03-26 DIAGNOSIS — S24.102S T6 SPINAL CORD INJURY, SEQUELA: Chronic | ICD-10-CM

## 2025-03-26 RX ORDER — GABAPENTIN 800 MG/1
800 TABLET ORAL 3 TIMES DAILY
Qty: 270 TABLET | Refills: 0 | Status: SHIPPED | OUTPATIENT
Start: 2025-03-26

## 2025-03-26 NOTE — TELEPHONE ENCOUNTER
[facility] pharmacy is aware.       ----- Message from Delgado Rose sent at 3/26/2025  3:49 PM EDT -----  [facility]

## 2025-03-27 ENCOUNTER — TELEPHONE (OUTPATIENT)
Dept: FAMILY MEDICINE CLINIC | Facility: CLINIC | Age: 50
End: 2025-03-27
Payer: MEDICARE

## 2025-03-27 NOTE — TELEPHONE ENCOUNTER
Letter is typed and ready for .  Left message for the patient letting him know this information.   Hub to read/relay!      ----- Message from Delgado Rose sent at 3/26/2025  4:01 PM EDT -----  K - please type a short note to that effect  ----- Message -----  From: Candace Sharma MA  Sent: 3/25/2025  10:21 AM EDT  To: Delgado Rose MD    The patient is trying to get a action track chair and the letter needs to say he is paralyzed and that this wheelchair would help him get back in the woods to hunt and fish.

## (undated) DEVICE — URINE DRAINAGE BAG,LUER-LOCK SAMPLING, ANTI-REFLUX DEVICE, DRAIN PORT: Brand: DOVER

## (undated) DEVICE — NDL HYPO ECLPS SFTY 18G 1 1/2IN

## (undated) DEVICE — TBG PENCL TELESCP MEGADYNE SMOKE EVAC 10FT

## (undated) DEVICE — CVR HNDL LT SURG ACCSSRY BLU STRL

## (undated) DEVICE — SPNG DRN AMD EXCILON 6PLY 4X4IN PK/2

## (undated) DEVICE — CATHETER,FOLEY,100%SILICONE,20FR,10ML,LF: Brand: MEDLINE

## (undated) DEVICE — COR CYSTO: Brand: MEDLINE INDUSTRIES, INC.

## (undated) DEVICE — INTENDED FOR TISSUE SEPARATION, AND OTHER PROCEDURES THAT REQUIRE A SHARP SURGICAL BLADE TO PUNCTURE OR CUT.: Brand: BARD-PARKER ® STAINLESS STEEL BLADES

## (undated) DEVICE — ST INTRO SP TBG 8TO14F 14CM

## (undated) DEVICE — PROXIMATE RH ROTATING HEAD SKIN STAPLERS (35 WIDE) CONTAINS 35 STAINLESS STEEL STAPLES: Brand: PROXIMATE

## (undated) DEVICE — GLV SURG TRIUMPH MICRO PF LTX 8 STRL

## (undated) DEVICE — SUT NLY 2/0 664G

## (undated) DEVICE — SUT SILK 0 FSL 18IN 678G

## (undated) DEVICE — ANTIBACTERIAL VIOLET BRAIDED (POLYGLACTIN 910), SYNTHETIC ABSORBABLE SURGICAL SUTURE: Brand: COATED VICRYL